# Patient Record
Sex: FEMALE | Race: WHITE | NOT HISPANIC OR LATINO | Employment: OTHER | URBAN - METROPOLITAN AREA
[De-identification: names, ages, dates, MRNs, and addresses within clinical notes are randomized per-mention and may not be internally consistent; named-entity substitution may affect disease eponyms.]

---

## 2017-01-05 ENCOUNTER — OFFICE VISIT (OUTPATIENT)
Dept: URGENT CARE | Facility: URGENT CARE | Age: 67
End: 2017-01-05
Payer: COMMERCIAL

## 2017-01-05 VITALS
HEART RATE: 80 BPM | OXYGEN SATURATION: 98 % | TEMPERATURE: 98.3 F | WEIGHT: 177 LBS | SYSTOLIC BLOOD PRESSURE: 130 MMHG | BODY MASS INDEX: 30.86 KG/M2 | DIASTOLIC BLOOD PRESSURE: 74 MMHG

## 2017-01-05 DIAGNOSIS — J01.90 ACUTE SINUSITIS WITH SYMPTOMS > 10 DAYS: Primary | ICD-10-CM

## 2017-01-05 PROCEDURE — 99213 OFFICE O/P EST LOW 20 MIN: CPT | Performed by: FAMILY MEDICINE

## 2017-01-05 RX ORDER — LEVOFLOXACIN 500 MG/1
500 TABLET, FILM COATED ORAL DAILY
Qty: 10 TABLET | Refills: 0 | Status: SHIPPED | OUTPATIENT
Start: 2017-01-05 | End: 2017-01-15

## 2017-01-05 NOTE — PROGRESS NOTES
SUBJECTIVE: Elvia Helm is a 66 year old female here with concerns about sinus infection.  She states onset  of symptoms were greater than 10 days with sinus pressure and drainage.  Tx 1 month ago for sinusitis but never totally went away  Course of illness is worsening.    Severity: moderate  Current and Associated symptoms: cold symptoms  Predisposing factors include none.   Recent treatment has included: OTC's, z pack  Allergic symptoms include: none    Past Medical History   Diagnosis Date     Allergic rhinitis, cause unspecified 2003a     DIABETES TYPE II 2003     Essential hypertension, benign      Allergic rhinitis, cause unspecified      Localized osteoarthrosis not specified whether primary or secondary, ankle and foot      After surgery     Bee sting reaction 7-09     Hyperlipidaemia LDL goal < 100      Arthritis of knee 6/6/2014     Flat foot(734) 6/6/2014     Tinnitus 6/6/2014     Allergies   Allergen Reactions     Amoxicillin Hives     Bee Swelling     HIVES AND SWELLING --carries Epi Pen      Ceftin      hives     Clindamycin Hives     Erythromycin GI Disturbance     Shrimp Hives     Happened twice     Tetracycline Other (See Comments)     ?severe headaches  & nausea      Social History   Substance Use Topics     Smoking status: Former Smoker -- 1.00 packs/day for 10 years     Types: Cigarettes     Quit date: 08/19/1982     Smokeless tobacco: Former User     Alcohol Use: 0.0 oz/week     0 Standard drinks or equivalent per week      Comment: wine 1 glass qd, occasional beer       ROS:   no rash  no vomiting    OBJECTIVE:  /74 mmHg  Pulse 80  Temp(Src) 98.3  F (36.8  C) (Oral)  Wt 177 lb (80.287 kg)  SpO2 98%  LMP 03/17/2001  NAD  EYES: clear, no mattering  EARS: TM's clear, no redness/buldging, canals clear, no swelling/redness  NOSE: discolored discharge riya. Nares  THROAT: clear, no erythema, no exudate  SINUS: maxillary tenderness with palpation  LUNGS: CTAB, no  rhonchi/wheeze/rales      ICD-10-CM    1. Acute sinusitis with symptoms > 10 days J01.90 levofloxacin (LEVAQUIN) 500 MG tablet       Follow up with primary clinic if not improving

## 2017-01-05 NOTE — MR AVS SNAPSHOT
After Visit Summary   1/5/2017    Elvia Helm    MRN: [id number]           Patient Information     Date Of Birth          1950        Visit Information        Provider Department      1/5/2017 4:30 PM John Friedman DO [facility]        Today's Diagnoses     Acute sinusitis with symptoms > 10 days    -  1        Follow-ups after your visit        Who to contact     If you have questions or need follow up information about today's clinic visit or your schedule please contact [facility] directly at [phone].  Normal or non-critical lab and imaging results will be communicated to you by Wang[employer], letter or phone within 4 business days after the clinic has received the results. If you do not hear from us within 7 days, please contact the clinic through [employer]You[employer] or phone. If you have a critical or abnormal lab result, we will notify you by phone as soon as possible.  Submit refill requests through [employer] or call your pharmacy and they will forward the refill request to us. Please allow 3 business days for your refill to be completed.          Additional Information About Your Visit        MyChart Information     [employer] gives you secure access to your electronic health record. If you see a primary care provider, you can also send messages to your care team and make appointments. If you have questions, please call your primary care clinic.  If you do not have a primary care provider, please call [phone] and they will assist you.        Care EveryWhere ID     This is your Care EveryWhere ID. This could be used by other organizations to access your [place] medical records  [id number]        Your Vitals Were     Pulse Temperature Pulse Oximetry Last Period          80 98.3  F (36.8  C) (Oral) 98% 03/17/2001         Blood Pressure from Last 3 Encounters:   01/05/17 130/74   12/15/16 131/77   12/06/16 120/60    Weight from Last 3  Encounters:   01/05/17 177 lb (80.287 kg)   12/15/16 173 lb (78.472 kg)   12/06/16 175 lb 14.4 oz (79.788 kg)              Today, you had the following     No orders found for display         Today's Medication Changes          These changes are accurate as of: 1/5/17  4:46 PM.  If you have any questions, ask your nurse or doctor.               Start taking these medicines.        Dose/Directions    levofloxacin 500 MG tablet   Commonly known as:  LEVAQUIN   Used for:  Acute sinusitis with symptoms > 10 days   Started by:  John Friedman DO        Dose:  500 mg   Take 1 tablet (500 mg) by mouth daily for 10 days   Quantity:  10 tablet   Refills:  0         These medicines have changed or have updated prescriptions.        Dose/Directions    blood glucose monitoring test strip   Commonly known as:  ACCU-CHEK SMARTVIEW   This may have changed:    - how much to take  - when to take this  - additional instructions   Used for:  Type 2 diabetes, HbA1c goal < 7% (H)        Use to test blood sugar 2 times daily or as directed.   Quantity:  100 each   Refills:  1            Where to get your medicines      These medications [id number] [place] [place] [facility] [address] [facility]  [address] [facility] [address]     Phone:  [phone]    - levofloxacin 500 MG tablet             Primary Care Provider Office Phone # Fax #    Masood Osborne [phone]       [facility] [address] [facility] [address]  [facility] [address]        Thank you!     Thank you for choosing [facility]  for your care. Our goal is always to provide you with excellent care. Hearing back from our patients is one way we can continue to improve our services. Please take a few minutes to complete the written survey that you may receive in the mail after your visit with us. Thank you!             Your Updated Medication List - Protect others around you: Learn how to safely  use, store and throw away your medicines at www.disposemymeds.org.          This list is accurate as of: 1/5/17  4:46 PM.  Always use your most recent med list.                   Brand Name Dispense Instructions for use    aspirin 81 MG tablet          1 tab po QD (Once per day)       atorvastatin 20 MG tablet    LIPITOR    90 tablet    Take 1 tablet (20 mg) by mouth daily       blood glucose monitoring lancets     102 Box    USE TO TEST BLOOD SUGAR 2 TIMES DAILY OR AS DIRECTED.       blood glucose monitoring meter device kit     1 kit    1 kit Use to test blood sugar 1 times daily or as directed.       blood glucose monitoring test strip    ACCU-CHEK SMARTVIEW    100 each    Use to test blood sugar 2 times daily or as directed.       CLARITIN PO      Take 10 mg by mouth daily       EPINEPHrine 0.3 MG/0.3ML injection     0.3 mL    Inject 0.3 mLs (0.3 mg) into the muscle once as needed for anaphylaxis       fluticasone 50 MCG/ACT spray    FLONASE    16 g    Spray 1-2 sprays into both nostrils daily       guaiFENesin 600 MG 12 hr tablet    MUCINEX    20 tablet    Take 1 tablet (600 mg) by mouth 2 times daily as needed for congestion       levofloxacin 500 MG tablet    LEVAQUIN    10 tablet    Take 1 tablet (500 mg) by mouth daily for 10 days       lisinopril-hydrochlorothiazide 20-25 MG per tablet    PRINZIDE/ZESTORETIC    90 tablet    Take 0.5 tablets by mouth daily       metFORMIN 500 MG 24 hr tablet    GLUCOPHAGE-XR    180 tablet    Take 2 tablets (1,000 mg) by mouth daily (with breakfast)       OMEGA-3 FISH OIL PO          vitamin B complex with vitamin C Tabs tablet      Take 1 tablet by mouth daily       VITAMIN B-1 PO          VITAMIN D3 PO      Take by mouth daily

## 2017-01-05 NOTE — NURSING NOTE
"Chief Complaint   Patient presents with     Sinus Problem     recent tx for sinusitis which had resolved but now with sx again       Initial /74 mmHg  Pulse 80  Temp(Src) 98.3  F (36.8  C) (Oral)  Wt 177 lb (80.287 kg)  SpO2 98%  LMP 03/17/2001 Estimated body mass index is 30.86 kg/(m^2) as calculated from the following:    Height as of 12/15/16: 5' 3.5\" (1.613 m).    Weight as of this encounter: 177 lb (80.287 kg).  BP completed using cuff size: regular    "

## 2017-03-22 ENCOUNTER — OFFICE VISIT (OUTPATIENT)
Dept: URGENT CARE | Facility: URGENT CARE | Age: 67
End: 2017-03-22
Payer: COMMERCIAL

## 2017-03-22 VITALS
DIASTOLIC BLOOD PRESSURE: 77 MMHG | TEMPERATURE: 97.8 F | SYSTOLIC BLOOD PRESSURE: 122 MMHG | WEIGHT: 177 LBS | HEART RATE: 87 BPM | BODY MASS INDEX: 30.86 KG/M2

## 2017-03-22 DIAGNOSIS — Z20.818 EXPOSURE TO STREP THROAT: Primary | ICD-10-CM

## 2017-03-22 DIAGNOSIS — J30.2 SEASONAL ALLERGIC RHINITIS, UNSPECIFIED ALLERGIC RHINITIS TRIGGER: ICD-10-CM

## 2017-03-22 LAB
DEPRECATED S PYO AG THROAT QL EIA: NORMAL
MICRO REPORT STATUS: NORMAL
SPECIMEN SOURCE: NORMAL

## 2017-03-22 PROCEDURE — 99213 OFFICE O/P EST LOW 20 MIN: CPT | Performed by: PHYSICIAN ASSISTANT

## 2017-03-22 PROCEDURE — 87081 CULTURE SCREEN ONLY: CPT | Performed by: PHYSICIAN ASSISTANT

## 2017-03-22 PROCEDURE — 87880 STREP A ASSAY W/OPTIC: CPT | Performed by: PHYSICIAN ASSISTANT

## 2017-03-22 NOTE — MR AVS SNAPSHOT
After Visit Summary   3/22/2017    Elvia Helm    MRN: [id number]           Patient Information     Date Of Birth          1950        Visit Information        Provider Department      3/22/2017 1:15 PM Nataly Jacobson PA-C [facility]        Today's Diagnoses     Exposure to strep throat    -  1    Seasonal allergic rhinitis, unspecified allergic rhinitis trigger           Follow-ups after your visit        Who to contact     If you have questions or need follow up information about today's clinic visit or your schedule please contact [facility] directly at [phone].  Normal or non-critical lab and imaging results will be communicated to you by MyChart, letter or phone within 4 business days after the clinic has received the results. If you do not hear from us within 7 days, please contact the clinic through [employer] or phone. If you have a critical or abnormal lab result, we will notify you by phone as soon as possible.  Submit refill requests through [employer] or call your pharmacy and they will forward the refill request to us. Please allow 3 business days for your refill to be completed.          Additional Information About Your Visit        MyChart Information     [employer] gives you secure access to your electronic health record. If you see a primary care provider, you can also send messages to your care team and make appointments. If you have questions, please call your primary care clinic.  If you do not have a primary care provider, please call [phone] and they will assist you.        Care EveryWhere ID     This is your Care EveryWhere ID. This could be used by other organizations to access your [place] medical records  [id number]        Your Vitals Were     Pulse Temperature Last Period Breastfeeding? BMI (Body Mass Index)       87 97.8  F (36.6  C) (Oral) 03/17/2001 No 30.86 kg/m2        Blood Pressure  from Last 3 Encounters:   03/22/17 122/77   01/05/17 130/74   12/15/16 131/77    Weight from Last 3 Encounters:   03/22/17 177 lb (80.3 kg)   01/05/17 177 lb (80.3 kg)   12/15/16 173 lb (78.5 kg)              We Performed the Following     Beta strep group A culture     Rapid strep screen          Today's Medication Changes          These changes are accurate as of: 3/22/17  2:04 PM.  If you have any questions, ask your nurse or doctor.               These medicines have changed or have updated prescriptions.        Dose/Directions    blood glucose monitoring test strip   Commonly known as:  ACCU-CHEK SMARTVIEW   This may have changed:    - how much to take  - when to take this  - additional instructions   Used for:  Type 2 diabetes, HbA1c goal < 7% (H)        Use to test blood sugar 2 times daily or as directed.   Quantity:  100 each   Refills:  1                Primary Care Provider Office Phone # Fax #    Bhavrain Osborne [phone]       [facility] [address] [facility] [address]  [facility] [address]        Thank you!     Thank you for choosing [facility]  for your care. Our goal is always to provide you with excellent care. Hearing back from our patients is one way we can continue to improve our services. Please take a few minutes to complete the written survey that you may receive in the mail after your visit with us. Thank you!             Your Updated Medication List - Protect others around you: Learn how to safely use, store and throw away your medicines at www.disposemymeds.org.          This list is accurate as of: 3/22/17  2:04 PM.  Always use your most recent med list.                   Brand Name Dispense Instructions for use    aspirin 81 MG tablet          1 tab po QD (Once per day)       atorvastatin 20 MG tablet    LIPITOR    90 tablet    Take 1 tablet (20 mg) by mouth daily       blood glucose monitoring lancets     102 Box    USE TO TEST BLOOD SUGAR  2 TIMES DAILY OR AS DIRECTED.       blood glucose monitoring meter device kit     1 kit    1 kit Use to test blood sugar 1 times daily or as directed.       blood glucose monitoring test strip    ACCU-CHEK SMARTVIEW    100 each    Use to test blood sugar 2 times daily or as directed.       CLARITIN PO      Take 10 mg by mouth daily       EPINEPHrine 0.3 MG/0.3ML injection     0.3 mL    Inject 0.3 mLs (0.3 mg) into the muscle once as needed for anaphylaxis       fluticasone 50 MCG/ACT spray    FLONASE    16 g    Spray 1-2 sprays into both nostrils daily       lisinopril-hydrochlorothiazide 20-25 MG per tablet    PRINZIDE/ZESTORETIC    90 tablet    Take 0.5 tablets by mouth daily       metFORMIN 500 MG 24 hr tablet    GLUCOPHAGE-XR    180 tablet    Take 2 tablets (1,000 mg) by mouth daily (with breakfast)       OMEGA-3 FISH OIL PO          vitamin B complex with vitamin C Tabs tablet      Take 1 tablet by mouth daily       VITAMIN B-1 PO          VITAMIN D3 PO      Take by mouth daily

## 2017-03-22 NOTE — NURSING NOTE
"Chief Complaint   Patient presents with     Throat Problem     scratchy throat and headache for three days - reports recent exposure to strep throat.        Initial /77  Pulse 87  Temp 97.8  F (36.6  C) (Oral)  Wt 177 lb (80.3 kg)  LMP 03/17/2001  Breastfeeding? No  BMI 30.86 kg/m2 Estimated body mass index is 30.86 kg/(m^2) as calculated from the following:    Height as of 12/15/16: 5' 3.5\" (1.613 m).    Weight as of this encounter: 177 lb (80.3 kg).  Medication Reconciliation: complete    "

## 2017-03-22 NOTE — PROGRESS NOTES
SUBJECTIVE:   Elvia Helm is a 66 year old female presenting with a chief complaint of:  1) scratchy throat for the past 3 days  2) nasal congestion, slight cough.  Onset of symptoms was 3 day(s) ago.  Course of illness is worsening.    Severity moderate  Current and Associated symptoms: as above.  Also sneezing  Treatment measures tried include None tried.  Predisposing factors include strep exposure.    PMH  allergies    Past Medical History:   Diagnosis Date     Allergic rhinitis, cause unspecified 2003a     Allergic rhinitis, cause unspecified      Arthritis of knee 6/6/2014     Bee sting reaction 7-09     DIABETES TYPE II 2003     Essential hypertension, benign      Flat foot(734) 6/6/2014     Hyperlipidaemia LDL goal < 100      Localized osteoarthrosis not specified whether primary or secondary, ankle and foot     After surgery     Tinnitus 6/6/2014     Patient Active Problem List   Diagnosis     Allergic rhinitis     Essential hypertension, benign     Postmenopausal atrophic vaginitis     Bee sting reaction     Torticollis     Type 2 diabetes mellitus without complications (H)     HYPERLIPIDEMIA LDL GOAL <100     Post-nasal drip     Advanced directives, counseling/discussion     Osteopenia     Urticaria     Arthritis of knee     Pes planus     Tinnitus     Social History   Substance Use Topics     Smoking status: Former Smoker     Packs/day: 1.00     Years: 10.00     Types: Cigarettes     Quit date: 8/19/1982     Smokeless tobacco: Never Used     Alcohol use 0.0 oz/week     0 Standard drinks or equivalent per week      Comment: wine 1 glass qd, occasional beer       ROS:  CONSTITUTIONAL:NEGATIVE for fever, chills, change in weight  INTEGUMENTARY/SKIN: NEGATIVE for worrisome rashes, moles or lesions  EYES: NEGATIVE for vision changes or irritation  ENT/MOUTH: as per HPI  RESP:NEGATIVE for significant cough or SOB  CV: NEGATIVE for chest pain, palpitations or peripheral edema  GI: NEGATIVE for nausea,  abdominal pain, heartburn, or change in bowel habits  MUSCULOSKELETAL: NEGATIVE for significant arthralgias or myalgia    OBJECTIVE  :/77  Pulse 87  Temp 97.8  F (36.6  C) (Oral)  Wt 177 lb (80.3 kg)  LMP 03/17/2001  Breastfeeding? No  BMI 30.86 kg/m2  GENERAL APPEARANCE: healthy, alert and no distress  EYES: EOMI,  PERRL, conjunctiva clear  HENT: ear canals and TM's normal.  Nose and mouth without ulcers, erythema or lesions  NECK: supple, nontender, no lymphadenopathy  RESP: lungs clear to auscultation - no rales, rhonchi or wheezes  CV: regular rates and rhythm, normal S1 S2, no murmur noted  ABDOMEN:  soft, nontender, no HSM or masses and bowel sounds normal  NEURO: Normal strength and tone, sensory exam grossly normal,  normal speech and mentation  SKIN: no suspicious lesions or rashes    (Z20.818) Exposure to strep throat  (primary encounter diagnosis)  Comment:   Plan: Rapid strep screen, Beta strep group A culture        Salt water gargles.  Tylenol or ibuprofen prn.     (J30.2) Seasonal allergic rhinitis, unspecified allergic rhinitis trigger  Comment:   Plan: patient to start her OTC allergy meds.      F/U with PCP should symptoms persist or worsen.    Patient expresses understanding and agreement with the assessment and plan as above.

## 2017-03-24 LAB
BACTERIA SPEC CULT: NORMAL
MICRO REPORT STATUS: NORMAL
SPECIMEN SOURCE: NORMAL

## 2017-05-24 ENCOUNTER — OFFICE VISIT (OUTPATIENT)
Dept: PHARMACY | Facility: CLINIC | Age: 67
End: 2017-05-24
Payer: COMMERCIAL

## 2017-05-24 VITALS — HEART RATE: 86 BPM | SYSTOLIC BLOOD PRESSURE: 129 MMHG | DIASTOLIC BLOOD PRESSURE: 83 MMHG

## 2017-05-24 DIAGNOSIS — E11.9 TYPE 2 DIABETES MELLITUS WITHOUT COMPLICATION, WITHOUT LONG-TERM CURRENT USE OF INSULIN (H): Primary | ICD-10-CM

## 2017-05-24 DIAGNOSIS — J30.2 SEASONAL ALLERGIC RHINITIS, UNSPECIFIED ALLERGIC RHINITIS TRIGGER: ICD-10-CM

## 2017-05-24 DIAGNOSIS — E56.9 VITAMIN DEFICIENCY: ICD-10-CM

## 2017-05-24 DIAGNOSIS — I10 ESSENTIAL HYPERTENSION, BENIGN: ICD-10-CM

## 2017-05-24 DIAGNOSIS — E78.5 HYPERLIPIDEMIA LDL GOAL <100: ICD-10-CM

## 2017-05-24 PROCEDURE — 99605 MTMS BY PHARM NP 15 MIN: CPT | Performed by: PHARMACIST

## 2017-05-24 PROCEDURE — 99607 MTMS BY PHARM ADDL 15 MIN: CPT | Performed by: PHARMACIST

## 2017-05-24 NOTE — MR AVS SNAPSHOT
After Visit Summary   5/24/2017    Elvia Helm    MRN: [id number]           Patient Information     Date Of Birth          1950        Visit Information        Provider Department      5/24/2017 1:00 PM Marycruz Carcamo, [facility]        Today's Diagnoses     Type 2 diabetes mellitus without complication, without long-term current use of insulin (H)    -  1    BENIGN HYPERTENSION         Hyperlipidemia LDL goal <100        Seasonal allergic rhinitis, unspecified allergic rhinitis trigger        Vitamin deficiency          Care Instructions    Recommendations from today's MTM visit:                                                        1. When your lab measurements return in July, we will be able to tell how well your diabetes is doing.  I will MyChart you with your lab results and any other important information.     2. Try Zyrtec instead of Claritin to see if it controls your allergy symptoms more effectively.     Next MTM visit: I will MyChart you in July when your lab results are back.     To schedule another MTM appointment, please call the clinic directly or you may call the MTM scheduling line at [phone] or toll-free at [phone].     My Clinical Pharmacist's contact information:                                                      It was a pleasure seeing you today!  Please feel free to contact me with any questions or concerns you have.      Marycruz Carcamo, Pharm.D.  Medication Therapy Management Pharmacist  Page/VM:  [phone]    You may receive a survey about the MT services you received.  I would appreciate your feedback to help me serve you better in the future. Please fill it out and return it when you can. Your comments will be anonymous.                  Follow-ups after your visit        Who to contact     If you have questions or need follow up information about today's clinic visit or your schedule please contact [place]  [facility] directly at [phone].  Normal or non-critical lab and imaging results will be communicated to you by MyChart, letter or phone within 4 business days after the clinic has received the results. If you do not hear from us within 7 days, please contact the clinic through [employer] or phone. If you have a critical or abnormal lab result, we will notify you by phone as soon as possible.  Submit refill requests through [employer] or call your pharmacy and they will forward the refill request to us. Please allow 3 business days for your refill to be completed.          Additional Information About Your Visit        [employer] Information     [employer] gives you secure access to your electronic health record. If you see a primary care provider, you can also send messages to your care team and make appointments. If you have questions, please call your primary care clinic.  If you do not have a primary care provider, please call [phone] and they will assist you.        Care EveryWhere ID     This is your Care EveryWhere ID. This could be used by other organizations to access your [place] medical records  [id number]        Your Vitals Were     Pulse Last Period                86 03/17/2001           Blood Pressure from Last 3 Encounters:   05/25/17 120/85   05/24/17 129/83   03/22/17 122/77    Weight from Last 3 Encounters:   05/25/17 176 lb 11.2 oz (80.2 kg)   03/22/17 177 lb (80.3 kg)   01/05/17 177 lb (80.3 kg)              Today, you had the following     No orders found for display         Today's Medication Changes          These changes are accurate as of: 5/24/17 11:59 PM.  If you have any questions, ask your nurse or doctor.               These medicines have changed or have updated prescriptions.        Dose/Directions    blood glucose monitoring test strip   Commonly known as:  ACCU-CHEK SMARTMuut   This may have changed:    - how much to take  - when to take this  - additional instructions    Used for:  Type 2 diabetes, HbA1c goal < 7% (H)        Use to test blood sugar 2 times daily or as directed.   Quantity:  100 each   Refills:  1         Stop taking these medicines if you haven't already. Please contact your care team if you have questions.     CLARITIN PO                    Primary Care Provider Office Phone # Fax #    Masood MD [place] [phone]       [facility] [address] [facility] [address]  [facility] [address]        Thank you!     Thank you for choosing [facility]  for your care. Our goal is always to provide you with excellent care. Hearing back from our patients is one way we can continue to improve our services. Please take a few minutes to complete the written survey that you may receive in the mail after your visit with us. Thank you!             Your Updated Medication List - Protect others around you: Learn how to safely use, store and throw away your medicines at www.disposemymeds.org.          This list is accurate as of: 5/24/17 11:59 PM.  Always use your most recent med list.                   Brand Name Dispense Instructions for use    aspirin 81 MG tablet          1 tab po QD (Once per day)       atorvastatin 20 MG tablet    LIPITOR    90 tablet    Take 1 tablet (20 mg) by mouth daily       blood glucose monitoring lancets     102 Box    USE TO TEST BLOOD SUGAR 2 TIMES DAILY OR AS DIRECTED.       blood glucose monitoring meter device kit     1 kit    1 kit Use to test blood sugar 1 times daily or as directed.       blood glucose monitoring test strip    ACCU-CHEK SMARTVIEW    100 each    Use to test blood sugar 2 times daily or as directed.       cetirizine 10 MG tablet    zyrTEC    30 tablet    Take 1 tablet (10 mg) by mouth every evening       EPINEPHrine 0.3 MG/0.3ML injection     0.3 mL    Inject 0.3 mLs (0.3 mg) into the muscle once as needed for anaphylaxis       fluticasone 50 MCG/ACT spray    FLONASE    16 g    Spray 1-2 sprays into  both nostrils daily       lisinopril-hydrochlorothiazide 20-25 MG per tablet    PRINZIDE/ZESTORETIC    90 tablet    Take 0.5 tablets by mouth daily       metFORMIN 500 MG 24 hr tablet    GLUCOPHAGE-XR    180 tablet    Take 2 tablets (1,000 mg) by mouth daily (with breakfast)       OMEGA-3 FISH OIL PO          vitamin B complex with vitamin C Tabs tablet      Take 1 tablet by mouth daily       VITAMIN D3 PO      Take by mouth daily

## 2017-05-24 NOTE — PROGRESS NOTES
SUBJECTIVE/OBJECTIVE:                                                    Elvia Helm is a 65 year old female coming in for a follow-up visit for Medication Therapy Management.  She was referred to me from Dr. Osborne. This serves as an initial visit for 2017.    Chief Complaint: Follow up from visit on 3/30/16    Tobacco: History of tobacco dependence - quit    Alcohol: Less than 1 beverages / week    Medication Adherence: no issues reported by patient    Diabetes:  Pt currently taking metformin ER 1000mg/day.  Pt is not experiencing side effects.  Metformin still makes her a little nauseous sometimes.  She also has trouble figuring out what to eat.    SMB-2 times daily.   Ranges (per glucometer readings): 120-130's, some 140's.  She states she has noticed that her sugars have gone up recently into the 120's, 130's and 140's (up from primarily 120's and 130's).  She wonders if she will need more medication. We discuss that her blood sugars are still well managed and that her most recent A1c in December was excellent.   Symptoms of low blood sugar? none. Frequency of hypoglycemia? never.  Recent symptoms of high blood sugar? none.  Eye exam: up to date  Foot exam: up to date  Microalbumin is < 30 mg/g. Pt is taking an ACEi/ARB.  Aspirin: Taking 81mg daily and denies side effects   Diet/Exercise: She has noticed she has gained about 5 pounds since 2017.  Given a Fitbit for Mother's Day, intends to lose the weight she has gained before her next trip.  She cares for a grandchild often and it is hard to focus on exercise and diet during those times. She has started walking by herself (without dogs) which she has found to be more effective.  She wonders if the recommended 10,000 steps is appropriate for all ages and abilities.  We discuss making measurable goals that she can achieve and that it doesn't not need to be 10,000 steps daily.  All movement is helpful in managing diabetes.    "    Hypertension: Current medications include lisinopril/HCTZ 20/25mg daily - 1/2 tablet daily.  Patient does not self-monitor BP.  Patient reports no side effects.      Hyperlipidemia: Current therapy includes atorvastatin 20mg daily.  Pt reports no significant myalgias or other side effects.   The 10-year ASCVD risk score (Derrick KELLY Jr, et al., 2013) is: 14.7%    Values used to calculate the score:      Age: 66 years      Sex: Female      Is Non- : No      Diabetic: Yes      Tobacco smoker: No      Systolic Blood Pressure: 120 mmHg      Is BP treated: Yes      HDL Cholesterol: 50 mg/dL      Total Cholesterol: 173 mg/dL    Allergies: She takes loratadine 10mg daily and fluticasone nasal spray daily. She states that her \"body must just be getting used to it\" because it is not longer working.  Her daughter uses Zyrtec and she wonders if that would be effective for her.      Vitamins:  Current medications include vitamin B complex, Omega 3 fatty acids and cholecalciferol.  She is not experiencing side effects.     Current labs include:  BP Readings from Last 3 Encounters:   05/25/17 120/85   05/24/17 129/83   03/22/17 122/77     Lab Results   Component Value Date    A1C 6.0 12/15/2016    A1C 6.0 07/19/2016    A1C 6.0 12/10/2015    A1C 6.7 06/30/2015    A1C 12.6 04/10/2015     Last Basic Metabolic Panel:  Lab Results   Component Value Date     12/15/2016      Lab Results   Component Value Date    POTASSIUM 4.3 12/15/2016     Lab Results   Component Value Date    CHLORIDE 105 12/15/2016     Lab Results   Component Value Date    KIM 9.4 12/15/2016     Lab Results   Component Value Date    CO2 30 12/15/2016     Lab Results   Component Value Date    BUN 19 12/15/2016     Lab Results   Component Value Date    CR 0.79 12/15/2016     Lab Results   Component Value Date     12/15/2016     Recent Labs   Lab Test  12/15/16   1113  07/19/16   0745  06/30/15   0855  06/06/14   0906   CHOL  173  " 169  146  170   HDL  50  47*  50*  44*   LDL  84  74  67  84   TRIG  194*  238*  145  212*   CHOLHDLRATIO   --    --   2.9  3.9     Liver Function Studies -   Recent Labs   Lab Test  07/19/16   0745   PROTTOTAL  7.1   ALBUMIN  3.9   BILITOTAL  0.4   ALKPHOS  60   AST  15   ALT  30       Lab Results   Component Value Date    MICROL 6 07/19/2016       GFR Estimate   Date Value Ref Range Status   12/15/2016 72 >60 mL/min/1.7m2 Final     Comment:     Non  GFR Calc   07/19/2016 66 >60 mL/min/1.7m2 Final     Comment:     Non  GFR Calc   06/30/2015 78 >60 mL/min/1.7m2 Final     Comment:     Non  GFR Calc     TSH   Date Value Ref Range Status   12/15/2016 1.73 0.40 - 4.00 mU/L Final     /83 (BP Location: Right arm, Patient Position: Chair, Cuff Size: Adult Regular)  Pulse 86  LMP 03/17/2001    Most Recent Immunizations   Administered Date(s) Administered     Influenza (H1N1) 01/14/2010     Influenza (High Dose) 3 valent vaccine 12/01/2016     Influenza (IIV3) 12/27/2013     Pneumococcal (PCV 13) 04/10/2015     Pneumococcal 23 valent 11/05/2009     TD (ADULT, 7+) 04/20/2005     TDAP Vaccine (Adacel) 03/11/2013     Zoster vaccine, live 11/19/2009     ASSESSMENT:                                                    Current medications were reviewed with her today.      Medication Adherence: no issues identified    Diabetes: Stable. Patient is meeting A1c goal of < 7%. Self monitoring of blood glucose is at goal of fasting  mg/dL. Patient would benefit from continuing close monitoring of weight and exercise.  Continue SMBG. Due for A1c in June.     Hypertension: Stable. Patient meeting BP goal of <140/90. Due for micoalbumin in July.     Hyperlipidemia: Stable. Pt is not on high intensity statin which is indicated based on 2013 ACC/AHA guidelines for lipid management.  Further increase in statin dose not warranted as LDL is well controlled (close to or less than  40mg/dL).    Allergies: Needs improvement.  Patient may benefit from changing to another second generation antihistamine to help improve allergy symptoms.       Vitamins:  Stable.      PLAN:                                                      1) Measure A1c. Continue SMBG.  2) Measure microalbumin.   3) Stop Loratadine. Start cetirizine 10 mg daily.     I spent 30 minutes with this patient today. All changes were made in collaborative practice agreement with Masood Osborne. A copy of the visit note was provided to the patient's primary care provider.     [facility] allergies, lab results and weight.     The patient given a summary of these recommendations via [employer] as an after visit summary.    Marycruz Carcamo, Pharm.D.  Medication Therapy Management Pharmacist  Page/VM:  [phone]

## 2017-05-25 ENCOUNTER — OFFICE VISIT (OUTPATIENT)
Dept: URGENT CARE | Facility: URGENT CARE | Age: 67
End: 2017-05-25
Payer: COMMERCIAL

## 2017-05-25 VITALS
TEMPERATURE: 98 F | DIASTOLIC BLOOD PRESSURE: 85 MMHG | OXYGEN SATURATION: 96 % | WEIGHT: 176.7 LBS | SYSTOLIC BLOOD PRESSURE: 120 MMHG | BODY MASS INDEX: 30.81 KG/M2 | RESPIRATION RATE: 20 BRPM | HEART RATE: 83 BPM

## 2017-05-25 DIAGNOSIS — R07.0 THROAT PAIN: Primary | ICD-10-CM

## 2017-05-25 DIAGNOSIS — Z20.818 EXPOSURE TO STREP THROAT: ICD-10-CM

## 2017-05-25 DIAGNOSIS — J03.90 TONSILLITIS WITH EXUDATE: ICD-10-CM

## 2017-05-25 LAB
DEPRECATED S PYO AG THROAT QL EIA: NORMAL
MICRO REPORT STATUS: NORMAL
SPECIMEN SOURCE: NORMAL

## 2017-05-25 PROCEDURE — 99213 OFFICE O/P EST LOW 20 MIN: CPT | Performed by: PHYSICIAN ASSISTANT

## 2017-05-25 PROCEDURE — 87081 CULTURE SCREEN ONLY: CPT | Performed by: FAMILY MEDICINE

## 2017-05-25 PROCEDURE — 87880 STREP A ASSAY W/OPTIC: CPT | Performed by: FAMILY MEDICINE

## 2017-05-25 RX ORDER — AZITHROMYCIN 250 MG/1
TABLET, FILM COATED ORAL
Qty: 6 TABLET | Refills: 0 | Status: SHIPPED | OUTPATIENT
Start: 2017-05-25 | End: 2018-01-12

## 2017-05-25 NOTE — NURSING NOTE
"Chief Complaint   Patient presents with     Cough     Sore throat and cough. Pt was exposed to strep.       Initial /85 (BP Location: Left arm, Patient Position: Chair, Cuff Size: Adult Regular)  Pulse 83  Temp 98  F (36.7  C) (Oral)  Resp 20  Wt 176 lb 11.2 oz (80.2 kg)  LMP 03/17/2001  SpO2 96%  BMI 30.81 kg/m2 Estimated body mass index is 30.81 kg/(m^2) as calculated from the following:    Height as of 12/15/16: 5' 3.5\" (1.613 m).    Weight as of this encounter: 176 lb 11.2 oz (80.2 kg).  Medication Reconciliation: complete    "

## 2017-05-25 NOTE — MR AVS SNAPSHOT
After Visit Summary   5/25/2017    Elvia Helm    MRN: [id number]           Patient Information     Date Of Birth          1950        Visit Information        Provider Department      5/25/2017 9:05 AM Vincent Encinas PA-C [facility]        Today's Diagnoses     Throat pain    -  1    Tonsillitis with exudate        Exposure to strep throat           Follow-ups after your visit        Who to contact     If you have questions or need follow up information about today's clinic visit or your schedule please contact [facility] directly at [phone].  Normal or non-critical lab and imaging results will be communicated to you by [employer], letter or phone within 4 business days after the clinic has received the results. If you do not hear from us within 7 days, please contact the clinic through [employer] or phone. If you have a critical or abnormal lab result, we will notify you by phone as soon as possible.  Submit refill requests through [employer] or call your pharmacy and they will forward the refill request to us. Please allow 3 business days for your refill to be completed.          Additional Information About Your Visit        MyChart Information     [employer] gives you secure access to your electronic health record. If you see a primary care provider, you can also send messages to your care team and make appointments. If you have questions, please call your primary care clinic.  If you do not have a primary care provider, please call [phone] and they will assist you.        Care EveryWhere ID     This is your Care EveryWhere ID. This could be used by other organizations to access your [facility] medical records  [id number]        Your Vitals Were     Pulse Temperature Respirations Last Period Pulse Oximetry BMI (Body Mass Index)    83 98  F (36.7  C) (Oral) 20 03/17/2001 96% 30.81 kg/m2       Blood Pressure from Last 3  Encounters:   05/25/17 120/85   05/24/17 129/83   03/22/17 122/77    Weight from Last 3 Encounters:   05/25/17 176 lb 11.2 oz (80.2 kg)   03/22/17 177 lb (80.3 kg)   01/05/17 177 lb (80.3 kg)              We Performed the Following     Beta strep group A culture     Strep, Rapid Screen          Today's Medication Changes          These changes are accurate as of: 5/25/17  9:35 AM.  If you have any questions, ask your nurse or doctor.               Start taking these medicines.        Dose/Directions    azithromycin 250 MG tablet   Commonly known as:  ZITHROMAX   Used for:  Tonsillitis with exudate, Exposure to strep throat   Started by:  Vincent Encinas PA-C        2 tabs po qd day 1, then 1 tab po qd days 2-5   Quantity:  6 tablet   Refills:  0       MAGIC MOUTHWASH (ENTER INGREDIENTS IN COMMENTS)   Used for:  Tonsillitis with exudate, Exposure to strep throat   Started by:  Vincent Encinas PA-C        Dose:  5-10 mL   Swish and spit 5-10 mLs in mouth every 6 hours as needed Pharmacy please compound  30 ml of Benadryl (12.5 mg/5 ml), 60 ml Maalox and 30 ml Viscous Lidocaine   Quantity:  120 mL   Refills:  0         These medicines have changed or have updated prescriptions.        Dose/Directions    blood glucose monitoring test strip   Commonly known as:  ACCU-CHEK SMARTVIEW   This may have changed:    - how much to take  - when to take this  - additional instructions   Used for:  Type 2 diabetes, HbA1c goal < 7% (H)        Use to test blood sugar 2 times daily or as directed.   Quantity:  100 each   Refills:  1            Where to get your medicines      These medications were sent to [id number] - [facility] [address] [facility]  [address] [address] [facility] [place] [address]     Phone:  [phone]     azithromycin 250 MG tablet         Some of these will need a paper prescription and others can be bought over the counter.  Ask your nurse if you have questions.     Bring a paper prescription  for each of these medications     MAGIC MOUTHWASH (ENTER INGREDIENTS IN COMMENTS)                Primary Care Provider Office Phone # Fax #    Masood Osborne [phone]       [facility] [address]   [place] [address]        Thank you!     Thank you for choosing [facility]  for your care. Our goal is always to provide you with excellent care. Hearing back from our patients is one way we can continue to improve our services. Please take a few minutes to complete the written survey that you may receive in the mail after your visit with us. Thank you!             Your Updated Medication List - Protect others around you: Learn how to safely use, store and throw away your medicines at www.disposemymeds.org.          This list is accurate as of: 5/25/17  9:35 AM.  Always use your most recent med list.                   Brand Name Dispense Instructions for use    aspirin 81 MG tablet          1 tab po QD (Once per day)       atorvastatin 20 MG tablet    LIPITOR    90 tablet    Take 1 tablet (20 mg) by mouth daily       azithromycin 250 MG tablet    ZITHROMAX    6 tablet    2 tabs po qd day 1, then 1 tab po qd days 2-5       blood glucose monitoring lancets     102 Box    USE TO TEST BLOOD SUGAR 2 TIMES DAILY OR AS DIRECTED.       blood glucose monitoring meter device kit     1 kit    1 kit Use to test blood sugar 1 times daily or as directed.       blood glucose monitoring test strip    ACCU-CHEK SMARTVIEW    100 each    Use to test blood sugar 2 times daily or as directed.       CLARITIN PO      Take 10 mg by mouth daily       EPINEPHrine 0.3 MG/0.3ML injection     0.3 mL    Inject 0.3 mLs (0.3 mg) into the muscle once as needed for anaphylaxis       fluticasone 50 MCG/ACT spray    FLONASE    16 g    Spray 1-2 sprays into both nostrils daily       lisinopril-hydrochlorothiazide 20-25 MG per tablet    PRINZIDE/ZESTORETIC    90 tablet    Take 0.5 tablets  by mouth daily       MAGIC MOUTHWASH (ENTER INGREDIENTS IN COMMENTS)     120 mL    Swish and spit 5-10 mLs in mouth every 6 hours as needed Pharmacy please compound  30 ml of Benadryl (12.5 mg/5 ml), 60 ml Maalox and 30 ml Viscous Lidocaine       metFORMIN 500 MG 24 hr tablet    GLUCOPHAGE-XR    180 tablet    Take 2 tablets (1,000 mg) by mouth daily (with breakfast)       OMEGA-3 FISH OIL PO          vitamin B complex with vitamin C Tabs tablet      Take 1 tablet by mouth daily       VITAMIN D3 PO      Take by mouth daily

## 2017-05-25 NOTE — PROGRESS NOTES
SUBJECTIVE:  Elvia Helm is a 66 year old female with a chief complaint of sore throat.  Onset of symptoms was 5 day(s) ago.    Course of illness: gradual onset.  Severity moderate  Current and Associated symptoms: sore throat  Treatment measures tried include OTC meds.  Predisposing factors include strep throat exposure.    Past Medical History:   Diagnosis Date     Allergic rhinitis, cause unspecified 2003a     Allergic rhinitis, cause unspecified      Arthritis of knee 6/6/2014     Bee sting reaction 7-09     DIABETES TYPE II 2003     Essential hypertension, benign      Flat foot(734) 6/6/2014     Hyperlipidaemia LDL goal < 100      Localized osteoarthrosis not specified whether primary or secondary, ankle and foot     After surgery     Tinnitus 6/6/2014     Allergies   Allergen Reactions     Amoxicillin Hives     Bee Swelling     HIVES AND SWELLING --carries Epi Pen      Ceftin      hives     Clindamycin Hives     Erythromycin GI Disturbance     Shrimp Hives     Happened twice     Tetracycline Other (See Comments)     ?severe headaches  & nausea      Social History   Substance Use Topics     Smoking status: Former Smoker     Packs/day: 1.00     Years: 10.00     Types: Cigarettes     Quit date: 8/19/1982     Smokeless tobacco: Never Used     Alcohol use 0.0 oz/week     0 Standard drinks or equivalent per week      Comment: wine 1 glass qd, occasional beer       ROS:  CONSTITUTIONAL:NEGATIVE for fever, chills, change in weight  INTEGUMENTARY/SKIN: NEGATIVE for worrisome rashes, moles or lesions  ENT/MOUTH: POSITIVE for throat pain, swollen glands, strep throat exposure  RESP:NEGATIVE for significant cough or SOB  CV: NEGATIVE for chest pain, palpitations or peripheral edema  GI: NEGATIVE for nausea, abdominal pain, heartburn, or change in bowel habits  MUSCULOSKELETAL: NEGATIVE for significant arthralgias or myalgia  NEURO: NEGATIVE for weakness, dizziness or paresthesias    OBJECTIVE:   /85 (BP  Location: Left arm, Patient Position: Chair, Cuff Size: Adult Regular)  Pulse 83  Temp 98  F (36.7  C) (Oral)  Resp 20  Wt 176 lb 11.2 oz (80.2 kg)  LMP 03/17/2001  SpO2 96%  BMI 30.81 kg/m2  GENERAL APPEARANCE: healthy, alert and no distress  EYES: EOMI,  PERRL, conjunctiva clear  HENT: TM's normal bilaterally, nasal turbinates erythematous, swollen, tonsillar hypertrophy and tonsillar erythema  NECK: cervical adenopathy   RESP: lungs clear to auscultation - no rales, rhonchi or wheezes  CV: regular rates and rhythm, normal S1 S2, no murmur noted  SKIN: no suspicious lesions or rashes    Results for orders placed or performed in visit on 03/22/17   Rapid strep screen   Result Value Ref Range    Specimen Description Throat     Rapid Strep A Screen       NEGATIVE: No Group A streptococcal antigen detected by immunoassay, await   culture report.      Micro Report Status FINAL 03/22/2017    Beta strep group A culture   Result Value Ref Range    Specimen Description Throat     Culture Micro No Beta Streptococcus isolated     Micro Report Status FINAL 03/24/2017          ASSESSMENT/PLAN:      ICD-10-CM    1. Throat pain R07.0 Strep, Rapid Screen     Beta strep group A culture   2. Tonsillitis with exudate J03.90 azithromycin (ZITHROMAX) 250 MG tablet     MAGIC MOUTHWASH, ENTER INGREDIENTS IN COMMENTS,   3. Exposure to strep throat Z20.818 azithromycin (ZITHROMAX) 250 MG tablet     MAGIC MOUTHWASH, ENTER INGREDIENTS IN COMMENTS,     Advisement given that patient will be contagious for the next 24-48 hours after antibiotics initiated

## 2017-05-26 LAB
BACTERIA SPEC CULT: NORMAL
MICRO REPORT STATUS: NORMAL
SPECIMEN SOURCE: NORMAL

## 2017-05-26 RX ORDER — CETIRIZINE HYDROCHLORIDE 10 MG/1
10 TABLET ORAL EVERY EVENING
Qty: 30 TABLET | Refills: 1 | COMMUNITY
Start: 2017-05-26 | End: 2018-01-12

## 2017-05-26 NOTE — PATIENT INSTRUCTIONS
Recommendations from today's MTM visit:                                                        1. When your lab measurements return in July, we will be able to tell how well your diabetes is doing.  I will MyChart you with your lab results and any other important information.     2. Try Zyrtec instead of Claritin to see if it controls your allergy symptoms more effectively.     Next MTM visit: I will MyChart you in July when your lab results are back.     To schedule another MTM appointment, please call the clinic directly or you may call the MTM scheduling line at [phone] or toll-free at [phone].     My Clinical Pharmacist's contact information:                                                      It was a pleasure seeing you today!  Please feel free to contact me with any questions or concerns you have.      Marycruz Carcamo, Pharm.D.  Medication Therapy Management Pharmacist  Page/VM:  [phone]    You may receive a survey about the MTM services you received.  I would appreciate your feedback to help me serve you better in the future. Please fill it out and return it when you can. Your comments will be anonymous.

## 2017-05-30 DIAGNOSIS — E11.9 TYPE 2 DIABETES, HBA1C GOAL < 7% (H): ICD-10-CM

## 2017-05-30 NOTE — TELEPHONE ENCOUNTER
Pending Prescriptions:                       Disp   Refills    blood glucose monitoring (ACCU-CHEK SMART*100 ea*1            Sig: Use to test blood sugar 2 times daily or as           directed.          Last Written Prescription Date: 4/26/16  Last Fill Quantity: 100,  # refills: 1  Last Office Visit with [facility], [facility] or [facility] prescribing provider: 12/15/16 RT Flor(R)

## 2017-05-31 NOTE — TELEPHONE ENCOUNTER
Patient has not seen Dr. Osborne since 4-26-16.    Sent to [facility] for refill.  Brittani Moore RN

## 2017-06-05 DIAGNOSIS — E78.5 HYPERLIPIDEMIA LDL GOAL <100: ICD-10-CM

## 2017-06-05 RX ORDER — ATORVASTATIN CALCIUM 20 MG/1
TABLET, FILM COATED ORAL
Qty: 90 TABLET | Refills: 1 | Status: SHIPPED | OUTPATIENT
Start: 2017-06-05 | End: 2017-12-01

## 2017-06-05 NOTE — TELEPHONE ENCOUNTER
Atorvastatin     Last Written Prescription Date: 12/6/2016  Last Fill Quantity: 90, # refills: 1  Last Office Visit with G, [facility] or [facility] prescribing provider: 12/15/2016       Lab Results   Component Value Date    CHOL 173 12/15/2016     Lab Results   Component Value Date    HDL 50 12/15/2016     Lab Results   Component Value Date    LDL 84 12/15/2016     Lab Results   Component Value Date    TRIG 194 12/15/2016     Lab Results   Component Value Date    CHOLHDLRATIO 2.9 06/30/2015     Prescription approved per [facility] or [facility] refill protocol.  Raeann Manrique RN  Triage Flex Workforce

## 2017-07-11 DIAGNOSIS — E11.9 TYPE 2 DIABETES MELLITUS WITHOUT COMPLICATION, WITHOUT LONG-TERM CURRENT USE OF INSULIN (H): ICD-10-CM

## 2017-07-11 LAB — HBA1C MFR BLD: 6.1 % (ref 4.3–6)

## 2017-07-11 PROCEDURE — 36415 COLL VENOUS BLD VENIPUNCTURE: CPT | Performed by: INTERNAL MEDICINE

## 2017-07-11 PROCEDURE — 83036 HEMOGLOBIN GLYCOSYLATED A1C: CPT | Performed by: INTERNAL MEDICINE

## 2017-09-23 ENCOUNTER — HEALTH MAINTENANCE LETTER (OUTPATIENT)
Age: 67
End: 2017-09-23

## 2017-10-05 ENCOUNTER — OFFICE VISIT (OUTPATIENT)
Dept: URGENT CARE | Facility: URGENT CARE | Age: 67
End: 2017-10-05
Payer: COMMERCIAL

## 2017-10-05 VITALS
TEMPERATURE: 98.1 F | WEIGHT: 177.3 LBS | OXYGEN SATURATION: 98 % | BODY MASS INDEX: 30.91 KG/M2 | RESPIRATION RATE: 20 BRPM | HEART RATE: 88 BPM | DIASTOLIC BLOOD PRESSURE: 90 MMHG | SYSTOLIC BLOOD PRESSURE: 147 MMHG

## 2017-10-05 DIAGNOSIS — J01.90 ACUTE SINUSITIS WITH SYMPTOMS > 10 DAYS: Primary | ICD-10-CM

## 2017-10-05 PROCEDURE — 99213 OFFICE O/P EST LOW 20 MIN: CPT | Performed by: FAMILY MEDICINE

## 2017-10-05 RX ORDER — AZITHROMYCIN 250 MG/1
TABLET, FILM COATED ORAL
Qty: 6 TABLET | Refills: 0 | Status: SHIPPED | OUTPATIENT
Start: 2017-10-05 | End: 2017-10-10

## 2017-10-05 NOTE — MR AVS SNAPSHOT
"              After Visit Summary   10/5/2017    Elvia Helm    MRN: [id number]           Patient Information     Date Of Birth          1950        Visit Information        Provider Department      10/5/2017 2:40 PM John Friedman DO [facility]        Today's Diagnoses     Acute sinusitis with symptoms > 10 days    -  1       Follow-ups after your visit        Your next 10 appointments already scheduled     Oct 11, 2017  9:15 AM CDT   [place] SCREENING BILATERAL W/ AMADO with [id number]   [facility] [place] ([facility])    [address] [place] [address], [address]  [facility] [id number] [phone]           Three-dimensional (3D) mammograms are available at [facility] locations in [facility], [place], [facility], [facility], and [place]. [facility] locations include [place] [facility] in [place]. Benefits of 3D mammograms include: - Improved rate of cancer detection - Decreases your chance of having to go back for more tests, which means fewer: - \"False-positive\" results (This means that there is an abnormal area but it isn't cancer.) - Invasive testing procedures, such as a biopsy or surgery - Can provide clearer images of the breast if you have dense breast tissue. 3D mammography is an optional exam that anyone can have with a 2D mammogram. It doesn't replace or take the place of a 2D mammogram. 2D mammograms remain an effective screening test for all women.  Not all insurance companies cover the cost of a 3D mammogram. Check with your insurance.              Who to contact     If you have questions or need follow up information about today's clinic visit or your schedule please contact [facility] directly at [phone].  Normal or non-critical lab and imaging results will be communicated to you by MyChart, letter or phone within 4 business days after the clinic has " received the results. If you do not hear from us within 7 days, please contact the clinic through [facility] or phone. If you have a critical or abnormal lab result, we will notify you by phone as soon as possible.  Submit refill requests through [facility] or call your pharmacy and they will forward the refill request to us. Please allow 3 business days for your refill to be completed.          Additional Information About Your Visit        [facility] Information     [facility] gives you secure access to your electronic health record. If you see a primary care provider, you can also send messages to your care team and make appointments. If you have questions, please call your primary care clinic.  If you do not have a primary care provider, please call [phone] and they will assist you.        Care EveryWhere ID     This is your Care EveryWhere ID. This could be used by other organizations to access your [place] medical records  [id number]        Your Vitals Were     Pulse Temperature Respirations Last Period Pulse Oximetry BMI (Body Mass Index)    88 98.1  F (36.7  C) (Oral) 20 03/17/2001 98% 30.91 kg/m2       Blood Pressure from Last 3 Encounters:   10/05/17 147/90   05/25/17 120/85   05/24/17 129/83    Weight from Last 3 Encounters:   10/05/17 177 lb 4.8 oz (80.4 kg)   05/25/17 176 lb 11.2 oz (80.2 kg)   03/22/17 177 lb (80.3 kg)              Today, you had the following     No orders found for display         Today's Medication Changes          These changes are accurate as of: 10/5/17  2:58 PM.  If you have any questions, ask your nurse or doctor.               These medicines have changed or have updated prescriptions.        Dose/Directions    * azithromycin 250 MG tablet   Commonly known as:  ZITHROMAX   This may have changed:  Another medication with the same name was added. Make sure you understand how and when to take each.   Used for:  Tonsillitis with exudate, Exposure to strep throat   Changed by:  Ce  Vincent MITTAL PA-C        2 tabs po qd day 1, then 1 tab po qd days 2-5   Quantity:  6 tablet   Refills:  0       * azithromycin 250 MG tablet   Commonly known as:  ZITHROMAX   This may have changed:  You were already taking a medication with the same name, and this prescription was added. Make sure you understand how and when to take each.   Used for:  Acute sinusitis with symptoms > 10 days   Changed by:  John Friedman, DO        Two tablets first day, then one tablet daily for four days.   Quantity:  6 tablet   Refills:  0       * Notice:  This list has 2 medication(s) that are the same as other medications prescribed for you. Read the directions carefully, and ask your doctor or other care provider to review them with you.         Where to get your medicines      These medications were sent to [id number] - [facility] [address] [facility]  [address] [facility] [address]     Phone:  [phone]     azithromycin 250 MG tablet                Primary Care Provider Office Phone # Fax #    Bhelsy Osborne [phone] [address] [facility] [address]  [facility] [address]        Equal Access to Services     [facility]: Hadii aad ku hadasho Somarleyali, waaxda luqadaha, qaybta kaalmada adeegyada, nara quiñones . So [facility] [phone].    ATENCIÓN: Si habla español, tiene a navarrete disposición servicios gratuitos de asistencia lingüística. Llame al [phone].    We comply with applicable federal civil rights laws and [place] laws. We do not discriminate on the basis of race, color, national origin, age, disability, sex, sexual orientation, or gender identity.            Thank you!     Thank you for choosing [facility]  for your care. Our goal is always to provide you with excellent care. Hearing back from our patients is one way we can continue to improve our services. Please take a few minutes to complete the written survey  that you may receive in the mail after your visit with us. Thank you!             Your Updated Medication List - Protect others around you: Learn how to safely use, store and throw away your medicines at www.disposemymeds.org.          This list is accurate as of: 10/5/17  2:58 PM.  Always use your most recent med list.                   Brand Name Dispense Instructions for use Diagnosis    aspirin 81 MG tablet          1 tab po QD (Once per day)        atorvastatin 20 MG tablet    LIPITOR    90 tablet    TAKE ONE TABLET BY MOUTH ONE TIME DAILY    Hyperlipidemia LDL goal <100       * azithromycin 250 MG tablet    ZITHROMAX    6 tablet    2 tabs po qd day 1, then 1 tab po qd days 2-5    Tonsillitis with exudate, Exposure to strep throat       * azithromycin 250 MG tablet    ZITHROMAX    6 tablet    Two tablets first day, then one tablet daily for four days.    Acute sinusitis with symptoms > 10 days       blood glucose monitoring lancets     102 Box    USE TO TEST BLOOD SUGAR 2 TIMES DAILY OR AS DIRECTED.    Type 2 diabetes mellitus without complications (H)       blood glucose monitoring meter device kit     1 kit    1 kit Use to test blood sugar 1 times daily or as directed.    Type 2 diabetes, HbA1c goal < 7% (H)       blood glucose monitoring test strip    ACCU-CHEK SMARTVIEW    100 each    Use to test blood sugar 2 times daily or as directed.    Type 2 diabetes, HbA1c goal < 7% (H)       cetirizine 10 MG tablet    zyrTEC    30 tablet    Take 1 tablet (10 mg) by mouth every evening    Seasonal allergic rhinitis, unspecified allergic rhinitis trigger       EPINEPHrine 0.3 MG/0.3ML injection 2-pack    EPIPEN/ADRENACLICK/or ANY BX GENERIC EQUIV    0.3 mL    Inject 0.3 mLs (0.3 mg) into the muscle once as needed for anaphylaxis    Bee sting reaction       fluticasone 50 MCG/ACT spray    FLONASE    16 g    Spray 1-2 sprays into both nostrils daily    Seasonal allergic rhinitis due to other allergic trigger        lisinopril-hydrochlorothiazide 20-25 MG per tablet    PRINZIDE/ZESTORETIC    90 tablet    Take 0.5 tablets by mouth daily    Essential hypertension, benign       MAGIC MOUTHWASH (ENTER INGREDIENTS IN COMMENTS)     120 mL    Swish and spit 5-10 mLs in mouth every 6 hours as needed Pharmacy please compound  30 ml of Benadryl (12.5 mg/5 ml), 60 ml Maalox and 30 ml Viscous Lidocaine    Tonsillitis with exudate, Exposure to strep throat       metFORMIN 500 MG 24 hr tablet    GLUCOPHAGE-XR    180 tablet    Take 2 tablets (1,000 mg) by mouth daily (with breakfast)    Type 2 diabetes mellitus without complications (H)       OMEGA-3 FISH OIL PO           vitamin B complex with vitamin C Tabs tablet      Take 1 tablet by mouth daily        VITAMIN D3 PO      Take by mouth daily        * Notice:  This list has 2 medication(s) that are the same as other medications prescribed for you. Read the directions carefully, and ask your doctor or other care provider to review them with you.

## 2017-10-05 NOTE — NURSING NOTE
"Chief Complaint   Patient presents with     Sinus Problem     Sinus pressure, cough and rt ear pain x couple of weeks on and off.        Initial /90  Pulse 88  Temp 98.1  F (36.7  C) (Oral)  Resp 20  Wt 177 lb 4.8 oz (80.4 kg)  LMP 03/17/2001  SpO2 98%  BMI 30.91 kg/m2 Estimated body mass index is 30.91 kg/(m^2) as calculated from the following:    Height as of 12/15/16: 5' 3.5\" (1.613 m).    Weight as of this encounter: 177 lb 4.8 oz (80.4 kg).  Medication Reconciliation: complete    "

## 2017-10-05 NOTE — PROGRESS NOTES
SUBJECTIVE: Elvia Helm is a 67 year old female here with concerns about sinus infection.  She states onset  of symptoms were greater than 10 days with sinus pressure and drainage.  Course of illness is worsening.    Severity: moderate  Current and Associated symptoms: cold symptoms  Predisposing factors include none.   Recent treatment has included: OTC's  Allergic symptoms include: none    Past Medical History:   Diagnosis Date     Allergic rhinitis, cause unspecified 2003a     Allergic rhinitis, cause unspecified      Arthritis of knee 6/6/2014     Bee sting reaction 7-09     DIABETES TYPE II 2003     Essential hypertension, benign      Flat foot(734) 6/6/2014     Hyperlipidaemia LDL goal < 100      Localized osteoarthrosis not specified whether primary or secondary, ankle and foot     After surgery     Tinnitus 6/6/2014     Allergies   Allergen Reactions     Amoxicillin Hives     Bee Swelling     HIVES AND SWELLING --carries Epi Pen      Ceftin      hives     Clindamycin Hives     Erythromycin GI Disturbance     Shrimp Hives     Happened twice     Tetracycline Other (See Comments)     ?severe headaches  & nausea      Social History   Substance Use Topics     Smoking status: Former Smoker     Packs/day: 1.00     Years: 10.00     Types: Cigarettes     Quit date: 8/19/1982     Smokeless tobacco: Never Used     Alcohol use 0.0 oz/week     0 Standard drinks or equivalent per week      Comment: wine 1 glass qd, occasional beer       ROS:   no rash  no vomiting    OBJECTIVE:  /90  Pulse 88  Temp 98.1  F (36.7  C) (Oral)  Resp 20  Wt 177 lb 4.8 oz (80.4 kg)  LMP 03/17/2001  SpO2 98%  BMI 30.91 kg/m2  NAD  EYES: clear, no mattering  EARS: TM's clear, no redness/buldging, canals clear, no swelling/redness  NOSE: discolored discharge riya. Nares  THROAT: clear, no erythema, no exudate  SINUS: maxillary tenderness with palpation  LUNGS: CTAB, no rhonchi/wheeze/rales      ICD-10-CM    1. Acute sinusitis  with symptoms > 10 days J01.90 azithromycin (ZITHROMAX) 250 MG tablet       Follow up with primary clinic if not improving

## 2017-10-11 ENCOUNTER — HOSPITAL ENCOUNTER (OUTPATIENT)
Dept: MAMMOGRAPHY | Facility: CLINIC | Age: 67
Discharge: HOME | End: 2017-10-11
Attending: INTERNAL MEDICINE | Admitting: INTERNAL MEDICINE
Payer: MEDICARE

## 2017-10-11 DIAGNOSIS — Z12.31 VISIT FOR SCREENING MAMMOGRAM: ICD-10-CM

## 2017-10-11 DIAGNOSIS — E11.9 TYPE 2 DIABETES MELLITUS WITHOUT COMPLICATION, WITHOUT LONG-TERM CURRENT USE OF INSULIN (H): ICD-10-CM

## 2017-10-11 LAB
CREAT UR-MCNC: 49 MG/DL
MICROALBUMIN UR-MCNC: 8 MG/L
MICROALBUMIN/CREAT UR: 16.01 MG/G CR (ref 0–25)

## 2017-10-11 PROCEDURE — 77063 BREAST TOMOSYNTHESIS BI: CPT

## 2017-10-11 PROCEDURE — 82043 UR ALBUMIN QUANTITATIVE: CPT | Performed by: INTERNAL MEDICINE

## 2017-10-12 DIAGNOSIS — T63.441A BEE STING REACTION: ICD-10-CM

## 2017-10-12 DIAGNOSIS — I10 ESSENTIAL HYPERTENSION, BENIGN: ICD-10-CM

## 2017-10-12 RX ORDER — LISINOPRIL AND HYDROCHLOROTHIAZIDE 20; 25 MG/1; MG/1
TABLET ORAL
Qty: 45 TABLET | Refills: 0 | Status: SHIPPED | OUTPATIENT
Start: 2017-10-12 | End: 2018-01-29

## 2017-10-13 RX ORDER — EPINEPHRINE 0.3 MG/.3ML
INJECTION INTRAMUSCULAR
Qty: 2 ML | Refills: 1 | Status: SHIPPED | OUTPATIENT
Start: 2017-10-13 | End: 2018-12-20

## 2017-10-13 NOTE — TELEPHONE ENCOUNTER
Pending Prescriptions:                       Disp   Refills    EPIPEN 2-THERESA 0.3 MG/0.3ML injection 2-pac*       1            Sig: INJECT 0.3 MLS INTO THE MUSCLE ONCE AS NEEDED FOR           ANAPHYLAXIS          Last Written Prescription Date: 10/7/16  Last Fill Quantity: 0.3mL,  # refills: 2   Last Office Visit with G, P or [facility] prescribing provider: 12/15/16                                             RT Nabil(R)

## 2017-10-28 ENCOUNTER — MYC MEDICAL ADVICE (OUTPATIENT)
Dept: FAMILY MEDICINE | Facility: CLINIC | Age: 67
End: 2017-10-28

## 2017-10-28 DIAGNOSIS — E11.9 TYPE 2 DIABETES MELLITUS WITHOUT COMPLICATIONS (H): ICD-10-CM

## 2017-10-30 RX ORDER — METFORMIN HCL 500 MG
TABLET, EXTENDED RELEASE 24 HR ORAL
Qty: 180 TABLET | Refills: 0 | Status: SHIPPED | OUTPATIENT
Start: 2017-10-30 | End: 2018-01-29

## 2017-10-30 NOTE — TELEPHONE ENCOUNTER
"Dr Osborne--please read patient's [employer] message.      She has already had both Pneumonia vaccines.     Health Maintenance is advising PPSV23 (with message 2 of 2).   I see that a second \"23\" might be indicated if given prior to age 65.  Patient received it at age 59.      Please advise.      Mi DOUGHERTY RN,BSN    "

## 2017-10-30 NOTE — TELEPHONE ENCOUNTER
Prescription approved per [facility] Refill Protocol.  Due for appointment December 2017  Adriana CORNELIUS RN

## 2017-12-01 DIAGNOSIS — E78.5 HYPERLIPIDEMIA LDL GOAL <100: ICD-10-CM

## 2017-12-04 RX ORDER — ATORVASTATIN CALCIUM 20 MG/1
TABLET, FILM COATED ORAL
Qty: 30 TABLET | Refills: 0 | Status: SHIPPED | OUTPATIENT
Start: 2017-12-04 | End: 2018-01-05

## 2017-12-04 NOTE — TELEPHONE ENCOUNTER
Due for annual physical and fasting labs  Medication is being filled for 1 time refill only due to:  Patient needs to be seen because it has been more than one year since last visit.   Libra Beck RN

## 2018-01-05 DIAGNOSIS — E78.5 HYPERLIPIDEMIA LDL GOAL <100: ICD-10-CM

## 2018-01-05 NOTE — TELEPHONE ENCOUNTER
Requested Prescriptions   Pending Prescriptions Disp Refills     atorvastatin (LIPITOR) 20 MG tablet [Pharmacy Med Name: Atorvastatin Calcium Oral Tablet 20 MG] 30 tablet 0    Last Written Prescription Date:  12/04/17  Last Fill Quantity: 30,  # refills: 0   Last Office Visit with [facility], [facility] or [facility] prescribing provider:  12/15/16   Future Office Visit:    Next 5 appointments (look out 90 days)     Jan 12, 2018  9:00 AM CST   SHORT with Danyelle Jonas RPH   [facility] ([facility])    [address] [place] [id number]   [phone]            Feb 19, 2018  8:30 AM CST   PHYSICAL with Masood Osborne MD   [facility]    [address] [place] [id number]   [phone]                  Sig: TAKE ONE TABLET BY MOUTH ONE TIME DAILY    Statins Protocol Failed    1/5/2018 10:04 AM       Failed - LDL on file in past 12 months    Recent Labs   Lab Test  12/15/16   1113   LDL  84            Failed - Recent or future visit with authorizing provider    Patient had office visit in the last year or has a visit in the next 30 days with authorizing provider.  See chart review.              Passed - No abnormal creatine kinase in past 12 months    No lab results found.         Passed - Patient is age 18 or older       Passed - No active pregnancy on record       Passed - No positive pregnancy test in past 12 months

## 2018-01-08 RX ORDER — ATORVASTATIN CALCIUM 20 MG/1
TABLET, FILM COATED ORAL
Qty: 30 TABLET | Refills: 0 | Status: SHIPPED | OUTPATIENT
Start: 2018-01-08 | End: 2018-01-29

## 2018-01-08 NOTE — TELEPHONE ENCOUNTER
Prescription approved per [facility] Refill Protocol.  Has apt scheduled 1/12.  Libra Beck RN

## 2018-01-12 ENCOUNTER — OFFICE VISIT (OUTPATIENT)
Dept: PHARMACY | Facility: CLINIC | Age: 68
End: 2018-01-12
Payer: COMMERCIAL

## 2018-01-12 VITALS
HEART RATE: 76 BPM | DIASTOLIC BLOOD PRESSURE: 76 MMHG | WEIGHT: 177 LBS | BODY MASS INDEX: 30.86 KG/M2 | SYSTOLIC BLOOD PRESSURE: 134 MMHG

## 2018-01-12 DIAGNOSIS — E78.5 HYPERLIPIDEMIA LDL GOAL <100: ICD-10-CM

## 2018-01-12 DIAGNOSIS — I10 ESSENTIAL HYPERTENSION, BENIGN: ICD-10-CM

## 2018-01-12 DIAGNOSIS — E11.9 TYPE 2 DIABETES MELLITUS WITHOUT COMPLICATION, WITHOUT LONG-TERM CURRENT USE OF INSULIN (H): Primary | ICD-10-CM

## 2018-01-12 DIAGNOSIS — E56.9 VITAMIN DEFICIENCY: ICD-10-CM

## 2018-01-12 DIAGNOSIS — L50.9 URTICARIA: ICD-10-CM

## 2018-01-12 DIAGNOSIS — E11.9 TYPE 2 DIABETES MELLITUS WITHOUT COMPLICATION, WITHOUT LONG-TERM CURRENT USE OF INSULIN (H): ICD-10-CM

## 2018-01-12 LAB
ANION GAP SERPL CALCULATED.3IONS-SCNC: 8 MMOL/L (ref 3–14)
BUN SERPL-MCNC: 16 MG/DL (ref 7–30)
CALCIUM SERPL-MCNC: 9.4 MG/DL (ref 8.5–10.1)
CHLORIDE SERPL-SCNC: 103 MMOL/L (ref 94–109)
CHOLEST SERPL-MCNC: 179 MG/DL
CO2 SERPL-SCNC: 29 MMOL/L (ref 20–32)
CREAT SERPL-MCNC: 0.77 MG/DL (ref 0.52–1.04)
GFR SERPL CREATININE-BSD FRML MDRD: 74 ML/MIN/1.7M2
GLUCOSE SERPL-MCNC: 145 MG/DL (ref 70–99)
HBA1C MFR BLD: 5.9 % (ref 4.3–6)
HDLC SERPL-MCNC: 62 MG/DL
LDLC SERPL CALC-MCNC: 78 MG/DL
NONHDLC SERPL-MCNC: 117 MG/DL
POTASSIUM SERPL-SCNC: 4.3 MMOL/L (ref 3.4–5.3)
SODIUM SERPL-SCNC: 140 MMOL/L (ref 133–144)
TRIGL SERPL-MCNC: 193 MG/DL

## 2018-01-12 PROCEDURE — 99607 MTMS BY PHARM ADDL 15 MIN: CPT | Performed by: PHARMACIST

## 2018-01-12 PROCEDURE — 80061 LIPID PANEL: CPT | Performed by: INTERNAL MEDICINE

## 2018-01-12 PROCEDURE — 83036 HEMOGLOBIN GLYCOSYLATED A1C: CPT | Performed by: INTERNAL MEDICINE

## 2018-01-12 PROCEDURE — 99605 MTMS BY PHARM NP 15 MIN: CPT | Performed by: PHARMACIST

## 2018-01-12 PROCEDURE — 36415 COLL VENOUS BLD VENIPUNCTURE: CPT | Performed by: INTERNAL MEDICINE

## 2018-01-12 PROCEDURE — 80048 BASIC METABOLIC PNL TOTAL CA: CPT | Performed by: INTERNAL MEDICINE

## 2018-01-12 RX ORDER — LORATADINE 10 MG/1
10 TABLET ORAL DAILY
COMMUNITY
End: 2018-02-19

## 2018-01-12 NOTE — PATIENT INSTRUCTIONS
Recommendations from today's MTM visit:                                                    MTM (medication therapy management) is a service provided by a clinical pharmacist designed to help you get the most of out of your medicines.   Today we reviewed what your medicines are for, how to know if they are working, that your medicines are safe and how to make your medicine regimen as easy as possible.     1.  We will check your labs today - A1c, cholesterol, electrolytes/kidney function.    2.  I agree with trying to change Claritin (loratadine) to Allegra (fexofenadine) 180mg daily.    3.  I would recommend discontinuing Vitamin E - this can increase the risk of cardiac events.    4.  Depending on the results of your cholesterol, we may want to consider increasing the atorvastatin to the 40mg dose.    Next MTM visit:  Pending lab results    To schedule another MTM appointment, please call the clinic directly or you may call the MTM scheduling line at [phone] or toll-free at [phone].     My Clinical Pharmacist's contact information:                                                      It was a pleasure seeing you today!  Please feel free to contact me with any questions or concerns you have.      Monae Ley, PharmD  Pharmaceutical Care Resident  Pager: [phone]    Danyelle Jonas PharmD, [facility]  Medication Therapy Management Provider  Pager: [phone]     You may receive a survey about the MTM services you received.  I would appreciate your feedback to help me serve you better in the future. Please fill it out and return it when you can. Your comments will be anonymous.

## 2018-01-12 NOTE — MR AVS SNAPSHOT
After Visit Summary   1/12/2018    Elvia Helm    MRN: [id number]           Patient Information     Date Of Birth          1950        Visit Information        Provider Department      1/12/2018 9:00 AM Danyelle Jonas, [facility] MTM        Today's Diagnoses     Type 2 diabetes mellitus without complication, without long-term current use of insulin (H)    -  1    Hyperlipidemia LDL goal <100        Essential hypertension, benign          Care Instructions    Recommendations from today's MTM visit:                                                    MTM (medication therapy management) is a service provided by a clinical pharmacist designed to help you get the most of out of your medicines.   Today we reviewed what your medicines are for, how to know if they are working, that your medicines are safe and how to make your medicine regimen as easy as possible.     1.  We will check your labs today - A1c, cholesterol, electrolytes/kidney function.    2.  I agree with trying to change Claritin (loratadine) to Allegra (fexofenadine) 180mg daily.    3.  I would recommend discontinuing Vitamin E - this can increase the risk of cardiac events.    4.  Depending on the results of your cholesterol, we may want to consider increasing the atorvastatin to the 40mg dose.    Next MTM visit:  Pending lab results    To schedule another MTM appointment, please call the clinic directly or you may call the MTM scheduling line at [phone] or toll-free at [phone].     My Clinical Pharmacist's contact information:                                                      It was a pleasure seeing you today!  Please feel free to contact me with any questions or concerns you have.      Monae Ley PharmD  Pharmaceutical Care Resident  Pager: [phone]    Danyelle Jonas PharmD, [facility]  Medication Therapy Management Provider  Pager: [phone]     You may receive a survey about the MTM  services you received.  I would appreciate your feedback to help me serve you better in the future. Please fill it out and return it when you can. Your comments will be anonymous.                   Follow-ups after your visit        Your next 10 appointments already scheduled     Jan 12, 2018  9:30 AM CST   LAB with [facility]   [facility]    [address] [address] [phone]           Please do not eat 10-12 hours before your appointment if you are coming in fasting for labs on lipids, cholesterol, or glucose (sugar). This does not apply to pregnant women. Water, hot tea and black coffee (with nothing added) are okay. Do not drink other fluids, diet soda or chew gum.            Feb 19, 2018  8:30 AM CST   PHYSICAL with Masood Osborne MD   [facility]    [address] [place] [id number] [phone]              Future tests that were ordered for you today     Open Future Orders        Priority Expected Expires Ordered    Hemoglobin A1c Routine  1/12/2019 1/12/2018    Lipid panel reflex to direct LDL Fasting Routine  1/12/2019 1/12/2018    Basic metabolic panel Routine  1/12/2019 1/12/2018            Who to contact     If you have questions or need follow up information about today's clinic visit or your schedule please contact [facility] MT directly at [phone].  Normal or non-critical lab and imaging results will be communicated to you by MyChart, letter or phone within 4 business days after the clinic has received the results. If you do not hear from us within 7 days, please contact the clinic through MyChart or phone. If you have a critical or abnormal lab result, we will notify you by phone as soon as possible.  Submit refill requests through [facility] or call your pharmacy and they will forward the refill request to us. Please allow 3 business days for your refill to be completed.           Additional Information About Your Visit        MyChart Information     [facility] gives you secure access to your electronic health record. If you see a primary care provider, you can also send messages to your care team and make appointments. If you have questions, please call your primary care clinic.  If you do not have a primary care provider, please call [phone] and they will assist you.        Care EveryWhere ID     This is your Care EveryWhere ID. This could be used by other organizations to access your [facility] medical records  [id number]        Your Vitals Were     Pulse Last Period BMI (Body Mass Index)             76 03/17/2001 30.86 kg/m2          Blood Pressure from Last 3 Encounters:   01/12/18 134/76   10/05/17 147/90   05/25/17 120/85    Weight from Last 3 Encounters:   01/12/18 177 lb (80.3 kg)   10/05/17 177 lb 4.8 oz (80.4 kg)   05/25/17 176 lb 11.2 oz (80.2 kg)                 Today's Medication Changes          These changes are accurate as of: 1/12/18  9:19 AM.  If you have any questions, ask your nurse or doctor.               Stop taking these medicines if you haven't already. Please contact your care team if you have questions.     VITAMIN E NATURAL PO   Stopped by:  Danyelle Jonas, [facility]                    Primary Care Provider Office Phone # Fax #    Bhavjot MD [place] [phone] [address] [facility] [address]  [place] [address]        Equal Access to Services     GEETHA [facility] AH: Hadii kaitlyn tolentinoo Soandrews, waaxda luchavo, qaybta kaalmanara johnson bushrapippa slaughter. So [facility] [phone].    ATENCIÓN: Si habla español, tiene a navarrete disposición servicios gratuitos de asistencia lingüística. Llame al [phone].    We comply with applicable federal civil rights laws and [place] laws. We do not discriminate on the basis of race, color, national origin, age, disability, sex, sexual orientation, or gender identity.            Thank you!      Thank you for choosing [facility]  for your care. Our goal is always to provide you with excellent care. Hearing back from our patients is one way we can continue to improve our services. Please take a few minutes to complete the written survey that you may receive in the mail after your visit with us. Thank you!             Your Updated Medication List - Protect others around you: Learn how to safely use, store and throw away your medicines at www.disposemymeds.org.          This list is accurate as of: 1/12/18  9:19 AM.  Always use your most recent med list.                   Brand Name Dispense Instructions for use Diagnosis    aspirin 81 MG tablet          1 tab po QD (Once per day)        atorvastatin 20 MG tablet    LIPITOR    30 tablet    TAKE ONE TABLET BY MOUTH ONE TIME DAILY    Hyperlipidemia LDL goal <100       blood glucose monitoring lancets     102 Box    USE TO TEST BLOOD SUGAR 2 TIMES DAILY OR AS DIRECTED.    Type 2 diabetes mellitus without complications (H)       blood glucose monitoring meter device kit     1 kit    1 kit Use to test blood sugar 1 times daily or as directed.    Type 2 diabetes, HbA1c goal < 7% (H)       blood glucose monitoring test strip    ACCU-CHEK SMARTVIEW    100 each    Use to test blood sugar 2 times daily or as directed.    Type 2 diabetes, HbA1c goal < 7% (H)       EMERGEN-C IMMUNE Pack      Take 1 packet by mouth daily        EPIPEN 2-THERESA 0.3 MG/0.3ML injection 2-pack   Generic drug:  EPINEPHrine     2 mL    INJECT 0.3 MLS INTO THE MUSCLE ONCE AS NEEDED FOR ANAPHYLAXIS    Bee sting reaction       fluticasone 50 MCG/ACT spray    FLONASE    16 g    Spray 1-2 sprays into both nostrils daily    Seasonal allergic rhinitis due to other allergic trigger       lisinopril-hydrochlorothiazide 20-25 MG per tablet    PRINZIDE/ZESTORETIC    45 tablet    TAKE 1/2 TABLET BY MOUTH EVERY DAY    Essential hypertension, benign       loratadine 10 MG tablet    CLARITIN      Take 10 mg by mouth daily        metFORMIN 500 MG 24 hr tablet    GLUCOPHAGE-XR    180 tablet    take 2 tablets by mouth daily (with breakfast)    Type 2 diabetes mellitus without complications (H)       OMEGA-3 FISH OIL PO      Take 1 g by mouth daily        sodium chloride 0.65 % nasal spray    OCEAN     Spray 1 spray into both nostrils daily as needed for congestion        vitamin B complex with vitamin C Tabs tablet      Take 1 tablet by mouth daily        VITAMIN D3 PO      Take by mouth daily Dose unknown

## 2018-01-12 NOTE — PROGRESS NOTES
SUBJECTIVE/OBJECTIVE:                Elvia Helm is a 67 year old female coming in for a follow-up visit for Medication Therapy Management.  She was referred to me from Dr. Osborne.     Chief Complaint: Follow up from her visit on 17 with Pedro Carcamo, PharmD, BCACP.  She's here to f/up on diabetes management.  This visit serves as an initial visit for 2018.     Tobacco: History of tobacco dependence - quit   Alcohol: 1 glass of wine per day    Medication Adherence: no issues reported    Diabetes:  Pt currently taking metformin ER 1000mg/day.  Pt notes food just doesn't sound good anymore, and she attributes this to metformin.  She knows the metformin works well for her so she prefers to continue on this vs change to something else.  SMB-2 times daily.   Ranges (per glucometer readings): 90-130s  Symptoms of low blood sugar? Shaky, anxious feeling. Frequency of hypoglycemia? Very rarely  Recent symptoms of high blood sugar? none.  Eye exam: up to date  Foot exam: due  Microalbumin is < 30 mg/g. Pt is taking an ACEi/ARB.  Aspirin: Taking 81mg daily and denies side effects   Diet/Exercise: She continues using her FitBit to track exercise, she continues watching her granddaughter a lot, working around the house.  She's not going to the [facility] due to rotator cuff pain    Hypertension: Current medications include lisinopril/HCTZ 20/25mg daily - 1/2 tablet daily.  Patient does not self-monitor BP.  Patient reports no side effects.      Hyperlipidemia: Current therapy includes atorvastatin 20mg daily.  Pt reports no significant myalgias or other side effects.   The 10-year ASCVD risk score (Derrick LETICIA Jr, et al., 2013) is: 23.5%    Values used to calculate the score:      Age: 67 years      Sex: Female      Is Non- : No      Diabetic: Yes      Tobacco smoker: No      Systolic Blood Pressure: 147 mmHg      Is BP treated: Yes      HDL Cholesterol: 50 mg/dL      Total Cholesterol: 173  mg/dL     Allergies:  She's currently taking loratadine 10mg daily.  She did try Zyrtec, but didn't like how it made her feel.  She still doesn't feel like loratadine is working great so wonders about trying Allegra.  She also continues using a saline nasal spray, Flonase nasal spray and has an EpiPen on hand to use if needed.  She denies side effects.    Vitamins:  Current medications include vitamin B complex, Omega 3 fatty acids, cholecalciferol and Vitamin E.  She's also been using Emergen-C this winter and has found this effective.  She is not experiencing side effects.     Current labs include:BP Readings from Last 3 Encounters:   10/05/17 147/90   05/25/17 120/85   05/24/17 129/83     Today's Vitals: /76  Pulse 76  Wt 177 lb (80.3 kg)  LMP 03/17/2001  BMI 30.86 kg/m2     Lab Results   Component Value Date    A1C 6.1 07/11/2017   .  Lab Results   Component Value Date    CHOL 173 12/15/2016     Lab Results   Component Value Date    TRIG 194 12/15/2016     Lab Results   Component Value Date    HDL 50 12/15/2016     Lab Results   Component Value Date    LDL 84 12/15/2016       Liver Function Studies -   Recent Labs   Lab Test  07/19/16   0745   PROTTOTAL  7.1   ALBUMIN  3.9   BILITOTAL  0.4   ALKPHOS  60   AST  15   ALT  30       Lab Results   Component Value Date    UCRR 49 10/11/2017    MICROL 8 10/11/2017    UMALCR 16.01 10/11/2017       Last Basic Metabolic Panel:  Lab Results   Component Value Date     12/15/2016      Lab Results   Component Value Date    POTASSIUM 4.3 12/15/2016     Lab Results   Component Value Date    CHLORIDE 105 12/15/2016     Lab Results   Component Value Date    BUN 19 12/15/2016     Lab Results   Component Value Date    CR 0.79 12/15/2016     GFR Estimate   Date Value Ref Range Status   12/15/2016 72 >60 mL/min/1.7m2 Final     Comment:     Non  GFR Calc   07/19/2016 66 >60 mL/min/1.7m2 Final     Comment:     Non  GFR Calc   06/30/2015  78 >60 mL/min/1.7m2 Final     Comment:     Non  GFR Calc       TSH   Date Value Ref Range Status   12/15/2016 1.73 0.40 - 4.00 mU/L Final   ]    Most Recent Immunizations   Administered Date(s) Administered     Influenza (H1N1) 01/14/2010     Influenza (High Dose) 3 valent vaccine 12/01/2016     Influenza (IIV3) PF 12/27/2013     Pneumo Conj 13-V (2010&after) 04/10/2015     Pneumococcal 23 valent 11/05/2009     TD (ADULT, 7+) 04/20/2005     TDAP Vaccine (Adacel) 03/11/2013     Zoster vaccine, live 11/19/2009       ASSESSMENT:              Current medications were reviewed today as discussed above.      Medication Adherence: no issues identified    Diabetes: Needs Improvement. Patient is meeting A1c goal of < 7%, due for re-check. Self monitoring of blood glucose is at goal of fasting  mg/dL and post prandial < 180 mg/dL.     Hypertension: Needs Improvement. Patient is meeting BP goal of < 140/90mmHg.  Due for BMP.     Hyperlipidemia: Needs Improvement. Pt is not on high intensity statin which is indicated based on 2013 ACC/AHA guidelines for lipid management.  Due for fasting lipid panel, if LDL is >70mg/dL will change to high intensity statin.       Allergies: Agree would be good to try Allegra instead of loratadine since she's not getting complete relief.    Supplements: Needed additional education regarding potential risks of Vitamin E therapy.  Questionable how much benefit she's getting from Vitamin C, Vitamin B complex, and fish oil but she prefers to continue and they likely aren't harmful.  Vitamin D should be continued.     PLAN:                1.  Orders placed for A1c, lipids and BMP to be checked today.  2.  Recommended changing Claritin to Allegra 180mg daily.  3.  Recommended d/c Vitamin E  4.  If LDL returns >70mg/dL, will change atorvastatin to 40mg dose.    I spent 30 minutes with this patient today.  All changes were made via collaborative practice agreement with [place]  Masood. A copy of the visit note was provided to the patient's primary care provider.     Will follow up pending lab results.    The patient was given a summary of these recommendations as an after visit summary.    Betsy Bowens, HeD IV Student    Danyelle Burres, PharmD, [facility]  Medication Therapy Management Provider  Pager: [phone]

## 2018-01-29 DIAGNOSIS — E78.5 HYPERLIPIDEMIA LDL GOAL <100: ICD-10-CM

## 2018-01-29 DIAGNOSIS — E11.9 TYPE 2 DIABETES MELLITUS WITHOUT COMPLICATIONS (H): ICD-10-CM

## 2018-01-29 DIAGNOSIS — I10 ESSENTIAL HYPERTENSION, BENIGN: ICD-10-CM

## 2018-01-30 NOTE — TELEPHONE ENCOUNTER
"Requested Prescriptions   Pending Prescriptions Disp Refills     atorvastatin (LIPITOR) 20 MG tablet [Pharmacy Med Name: Atorvastatin Calcium Oral Tablet 20 MG] 30 tablet 0    Last Written Prescription Date:  1/8/2018  Last Fill Quantity: 30,  # refills: 0   Last Office Visit with [facility] provider:  12/15/2016   Future Office Visit:    Next 5 appointments (look out 90 days)     Feb 19, 2018  8:30 AM CST   PHYSICAL with Masood Osborne MD   [facility])    [address] [address]   [phone]                  Sig: TAKE ONE TABLET BY MOUTH ONE TIME DAILY    Statins Protocol Passed    1/29/2018 12:40 PM       Passed - LDL on file in past 12 months    Recent Labs   Lab Test  01/12/18   0933   LDL  78            Passed - No abnormal creatine kinase in past 12 months    No lab results found.         Passed - Recent or future visit with authorizing provider    Patient had office visit in the last year or has a visit in the next 30 days with authorizing provider.  See \"Patient Info\" tab in inbasket, or \"Choose Columns\" in Meds & Orders section of the refill encounter.            Passed - Patient is age 18 or older       Passed - No active pregnancy on record       Passed - No positive pregnancy test in past 12 months        lisinopril-hydrochlorothiazide (PRINZIDE/ZESTORETIC) 20-25 MG per tablet [Pharmacy Med Name: Lisinopril-Hydrochlorothiazide Oral Tablet 20-25 MG] 45 tablet 0    Last Written Prescription Date:  10/12/2017  Last Fill Quantity: 45,  # refills: 0   Last Office Visit with [facility] provider:  12/15/2016   Future Office Visit:    Next 5 appointments (look out 90 days)     Feb 19, 2018  8:30 AM CST   PHYSICAL with Masood Osborne MD   [facility] ([facility])    [address] [address] [phone]                  Sig: TAKE 1/2 TABLET BY MOUTH EVERY DAY    Diuretics (Including Combos) Protocol Passed    1/29/2018 12:40 PM    " "   Passed - Blood pressure under 140/90    BP Readings from Last 3 Encounters:   01/12/18 134/76   10/05/17 147/90   05/25/17 120/85                Passed - Recent or future visit with authorizing provider's specialty    Patient had office visit in the last year or has a visit in the next 30 days with authorizing provider.  See \"Patient Info\" tab in inbasket, or \"Choose Columns\" in Meds & Orders section of the refill encounter.            Passed - Patient is age 18 or older       Passed - No active pregancy on record       Passed - Normal serum creatinine on file in past 12 months    Recent Labs   Lab Test  01/12/18   0933   CR  0.77             Passed - Normal serum potassium on file in past 12 months    Recent Labs   Lab Test  01/12/18   0933   POTASSIUM  4.3                   Passed - Normal serum sodium on file in past 12 months    Recent Labs   Lab Test  01/12/18   0933   NA  140             Passed - No positive pregnancy test in past 12 months        metFORMIN (GLUCOPHAGE-XR) 500 MG 24 hr tablet [Pharmacy Med Name: MetFORMIN HCl ER Oral Tablet Extended Release 24 Hour 500 MG] 180 tablet 0    Last Written Prescription Date:  10/30/2017  Last Fill Quantity: 180,  # refills: 0   Last Office Visit with [facility] [place]:  12/15/2016   Future Office Visit:    Next 5 appointments (look out 90 days)     Feb 19, 2018  8:30 AM CST   PHYSICAL with Masood Osborne MD   [facility]    [address] [address]   [phone]                  Sig: take 2 tablets by mouth daily (with breakfast)    Biguanide Agents Passed    1/29/2018 12:40 PM       Passed - Patient's BP is less than 140/90    BP Readings from Last 3 Encounters:   01/12/18 134/76   10/05/17 147/90   05/25/17 120/85                Passed - Patient has documented LDL within the past 12 mos.    Recent Labs   Lab Test  01/12/18   0933   LDL  78            Passed - Patient has had a Microalbumin in the past 12 mos.    " "Recent Labs   Lab Test  10/11/17   0835   MICROL  8   UMALCR  16.01            Passed - Patient is age 10 or older       Passed - Patient has documented A1c within the specified period of time.    Recent Labs   Lab Test  01/12/18   0933   A1C  5.9            Passed - Patient's CR is NOT>1.4 OR Patient's EGFR is NOT<45 within past 12 mos.    Recent Labs   Lab Test  01/12/18   0933   GFRESTIMATED  74   GFRESTBLACK  90       Recent Labs   Lab Test  01/12/18   0933   CR  0.77            Passed - Patient does NOT have a diagnosis of CHF.       Passed - Patient is not pregnant       Passed - Patient has not had a positive pregnancy test within the past 12 mos.        Passed - Recent (6 mos) or future visit with authorizing provider's specialty    Patient had office visit in the last 6 months or has a visit in the next 30 days with authorizing provider.  See \"Patient Info\" tab in inbasket, or \"Choose Columns\" in Meds & Orders section of the refill encounter.              "

## 2018-01-31 RX ORDER — LISINOPRIL AND HYDROCHLOROTHIAZIDE 20; 25 MG/1; MG/1
TABLET ORAL
Qty: 45 TABLET | Refills: 0 | Status: SHIPPED | OUTPATIENT
Start: 2018-01-31 | End: 2018-02-19

## 2018-01-31 RX ORDER — ATORVASTATIN CALCIUM 20 MG/1
TABLET, FILM COATED ORAL
Qty: 90 TABLET | Refills: 0 | Status: SHIPPED | OUTPATIENT
Start: 2018-01-31 | End: 2018-02-19

## 2018-01-31 RX ORDER — METFORMIN HCL 500 MG
TABLET, EXTENDED RELEASE 24 HR ORAL
Qty: 180 TABLET | Refills: 0 | Status: SHIPPED | OUTPATIENT
Start: 2018-01-31 | End: 2018-02-19

## 2018-01-31 NOTE — TELEPHONE ENCOUNTER
Prescription approved per [facility] Refill Protocol.  Adriana CORNELIUS RN    Next 5 appointments (look out 90 days)     Feb 19, 2018  8:30 AM CST   PHYSICAL with Masood Osborne MD   [facility]    [address] [facility] [address]   [phone]

## 2018-02-16 ASSESSMENT — ACTIVITIES OF DAILY LIVING (ADL)
I_NEED_ASSISTANCE_FOR_THE_FOLLOWING_DAILY_ACTIVITIES:: NO ASSISTANCE IS NEEDED
CURRENT_FUNCTION: NO ASSISTANCE NEEDED

## 2018-02-19 ENCOUNTER — RADIANT APPOINTMENT (OUTPATIENT)
Dept: GENERAL RADIOLOGY | Facility: CLINIC | Age: 68
End: 2018-02-19
Attending: INTERNAL MEDICINE
Payer: COMMERCIAL

## 2018-02-19 ENCOUNTER — OFFICE VISIT (OUTPATIENT)
Dept: FAMILY MEDICINE | Facility: CLINIC | Age: 68
End: 2018-02-19
Payer: COMMERCIAL

## 2018-02-19 VITALS
BODY MASS INDEX: 31.36 KG/M2 | TEMPERATURE: 98.9 F | HEART RATE: 89 BPM | SYSTOLIC BLOOD PRESSURE: 132 MMHG | DIASTOLIC BLOOD PRESSURE: 75 MMHG | OXYGEN SATURATION: 96 % | HEIGHT: 63 IN | WEIGHT: 177 LBS

## 2018-02-19 DIAGNOSIS — G89.29 CHRONIC RIGHT SHOULDER PAIN: ICD-10-CM

## 2018-02-19 DIAGNOSIS — Z00.00 ROUTINE GENERAL MEDICAL EXAMINATION AT A HEALTH CARE FACILITY: Primary | ICD-10-CM

## 2018-02-19 DIAGNOSIS — M21.41 PES PLANUS OF BOTH FEET: ICD-10-CM

## 2018-02-19 DIAGNOSIS — E11.9 TYPE 2 DIABETES MELLITUS WITHOUT COMPLICATION, WITHOUT LONG-TERM CURRENT USE OF INSULIN (H): ICD-10-CM

## 2018-02-19 DIAGNOSIS — M94.9 DISORDER OF BONE AND CARTILAGE: ICD-10-CM

## 2018-02-19 DIAGNOSIS — J01.01 ACUTE RECURRENT MAXILLARY SINUSITIS: ICD-10-CM

## 2018-02-19 DIAGNOSIS — M25.511 CHRONIC RIGHT SHOULDER PAIN: ICD-10-CM

## 2018-02-19 DIAGNOSIS — E78.5 HYPERLIPIDEMIA LDL GOAL <100: ICD-10-CM

## 2018-02-19 DIAGNOSIS — I10 ESSENTIAL HYPERTENSION, BENIGN: ICD-10-CM

## 2018-02-19 DIAGNOSIS — Z12.11 SPECIAL SCREENING FOR MALIGNANT NEOPLASMS, COLON: ICD-10-CM

## 2018-02-19 DIAGNOSIS — M21.42 PES PLANUS OF BOTH FEET: ICD-10-CM

## 2018-02-19 DIAGNOSIS — M89.9 DISORDER OF BONE AND CARTILAGE: ICD-10-CM

## 2018-02-19 PROCEDURE — 73030 X-RAY EXAM OF SHOULDER: CPT | Mod: RT

## 2018-02-19 PROCEDURE — 99213 OFFICE O/P EST LOW 20 MIN: CPT | Mod: 25 | Performed by: INTERNAL MEDICINE

## 2018-02-19 PROCEDURE — 99397 PER PM REEVAL EST PAT 65+ YR: CPT | Mod: 25 | Performed by: INTERNAL MEDICINE

## 2018-02-19 PROCEDURE — 99207 C FOOT EXAM  NO CHARGE: CPT | Mod: 25 | Performed by: INTERNAL MEDICINE

## 2018-02-19 RX ORDER — AZITHROMYCIN 250 MG/1
TABLET, FILM COATED ORAL
Qty: 6 TABLET | Refills: 0 | Status: SHIPPED | OUTPATIENT
Start: 2018-02-19 | End: 2018-03-14

## 2018-02-19 RX ORDER — ATORVASTATIN CALCIUM 20 MG/1
20 TABLET, FILM COATED ORAL DAILY
Qty: 90 TABLET | Refills: 3 | Status: SHIPPED | OUTPATIENT
Start: 2018-02-19 | End: 2018-05-29 | Stop reason: DRUGHIGH

## 2018-02-19 RX ORDER — LISINOPRIL AND HYDROCHLOROTHIAZIDE 20; 25 MG/1; MG/1
0.5 TABLET ORAL DAILY
Qty: 45 TABLET | Refills: 3 | Status: SHIPPED | OUTPATIENT
Start: 2018-02-19 | End: 2019-04-13

## 2018-02-19 RX ORDER — METFORMIN HCL 500 MG
TABLET, EXTENDED RELEASE 24 HR ORAL
Qty: 180 TABLET | Refills: 3 | Status: SHIPPED | OUTPATIENT
Start: 2018-02-19 | End: 2019-02-18

## 2018-02-19 ASSESSMENT — ACTIVITIES OF DAILY LIVING (ADL): CURRENT_FUNCTION: NO ASSISTANCE NEEDED

## 2018-02-19 NOTE — PROGRESS NOTES
SUBJECTIVE:   Elvia Helm is a 67 year old female who presents for Preventive Visit.    Patient has sinus drainage - greenish bloody for 2 weeks  No headache or fever   No shortness of breath  She travels a lot and is exposed to grand kids  She also has a blister above gum line  She also has rt shoulder pain for months  She has to sleep in a posture to get comfortable    She carries heavy grand daughter about 30 lb who lives with her    Physical   Annual:     Getting at least 3 servings of Calcium per day::  Yes    Bi-annual eye exam::  Yes    Dental care twice a year::  Yes    Sleep apnea or symptoms of sleep apnea::  Daytime drowsiness    Diet::  Regular (no restrictions)    Frequency of exercise::  None    Taking medications regularly::  Yes    Medication side effects::  Other    Additional concerns today::  YES    Ability to successfully perform activities of daily living: no assistance needed  Home Safety:  No safety concerns identified  Hearing Impairment: difficulty following a conversation in a noisy restaurant or crowded room        Fall risk:  Fallen 2 or more times in the past year?: No  Any fall with injury in the past year?: No    COGNITIVE SCREEN  1) Repeat 3 items (Banana, Sunrise, Chair)    2) Clock draw: NORMAL  3) 3 item recall: Recalls 3 objects  Results: 3 items recalled: COGNITIVE IMPAIRMENT LESS LIKELY    Mini-CogTM Copyright EDITH Costa. Licensed by the author for use in [facility]; reprinted with permission (beck@[email or web address]). All rights reserved.        Reviewed and updated as needed this visit by clinical staff  Tobacco  Allergies  Meds  Problems  Fam Hx         Reviewed and updated as needed this visit by Provider  Allergies  Meds  Problems  Fam Hx        Social History   Substance Use Topics     Smoking status: Former Smoker     Packs/day: 1.00     Years: 10.00     Types: Cigarettes     Quit date: 8/19/1982     Smokeless tobacco: Never Used     Alcohol use 0.0  oz/week     0 Standard drinks or equivalent per week      Comment: wine 1 glass qd, occasional beer       Alcohol Use 2/16/2018   If you drink alcohol, do you typically have greater than 3 drinks per day OR greater than 7 drinks per week?   Yes   AUDIT SCORE  5               Today's PHQ-2 Score:   PHQ-2 ( 1999 [employer]) 2/19/2018   Q1: Little interest or pleasure in doing things 0   Q2: Feeling down, depressed or hopeless 1   PHQ-2 Score 1   Q1: Little interest or pleasure in doing things -   Q2: Feeling down, depressed or hopeless -   PHQ-2 Score -       Do you feel safe in your environment - Yes    Do you have a Health Care Directive?: No: Advance care planning reviewed with patient; information given to patient to review.    Current providers sharing in care for this patient include:   Patient Care Team:  Masood Osborne MD as PCP - General (Internal Medicine)  Masood Osborne MD as PCP - Internal Medicine    The following health maintenance items are reviewed in Epic and correct as of today:  Health Maintenance   Topic Date Due     DEXA Q3 YR  11/30/2015     FOOT EXAM Q1 YEAR  06/26/2016     ADVANCE DIRECTIVE PLANNING Q5 YRS  03/26/2017     COLON CANCER SCREEN (SYSTEM ASSIGNED)  12/10/2017     FALL RISK ASSESSMENT  12/15/2017     A1C Q6 MO  07/12/2018     MICROALBUMIN Q1 YEAR  10/11/2018     MAMMO Q1 YR  10/11/2018     TSH W/ FREE T4 REFLEX Q2 YEAR  12/15/2018     EYE EXAM Q1 YEAR  12/18/2018     CREATININE Q1 YEAR  01/12/2019     LIPID MONITORING Q1 YEAR  01/12/2019     TETANUS Q10 YR  03/11/2023     INFLUENZA VACCINE (SYSTEM ASSIGNED)  Addressed     PNEUMOCOCCAL  Completed     HEPATITIS C SCREENING  Completed     Patient Active Problem List   Diagnosis     Allergic rhinitis     Essential hypertension, benign     Postmenopausal atrophic vaginitis     Bee sting reaction     Torticollis     Type 2 diabetes mellitus without complications (H)     HYPERLIPIDEMIA LDL GOAL <100     Post-nasal drip     Advanced directives,  counseling/discussion     Osteopenia     Urticaria     Arthritis of knee     Pes planus     Tinnitus     Past Surgical History:   Procedure Laterality Date     C  DELIVERY ONLY       C LIGATE FALLOPIAN TUBE,POSTPARTUM       C NONSPECIFIC PROCEDURE      right ankle roe and pins     COLONOSCOPY        ENDOMETRIAL BIOPSY W/O CERVICAL DILATION         Social History   Substance Use Topics     Smoking status: Former Smoker     Packs/day: 1.00     Years: 10.00     Types: Cigarettes     Quit date: 1982     Smokeless tobacco: Never Used     Alcohol use 0.0 oz/week     0 Standard drinks or equivalent per week      Comment: wine 1 glass qd, occasional beer     Family History   Problem Relation Age of Onset     Musculoskeletal Disorder Mother      b:1938   Hx Fibromyalgia     Hypertension Mother      CEREBROVASCULAR DISEASE Mother      CEREBROVASCULAR DISEASE Father      b:,  age 68  massive stroke     DIABETES Father      dx age 50s and his family     GASTROINTESTINAL DISEASE Sister      b:   Hx of reflux     Family History Negative Brother      b:     Hypertension Daughter      Family History Negative Daughter      b:     DIABETES Daughter      b:    Diabetes dx age 22**insulin     Lipids Son      b: high cholosterol and triglycerides     CANCER Maternal Grandfather      throat     CEREBROVASCULAR DISEASE Sister 62     ? TIA         Current Outpatient Prescriptions   Medication Sig Dispense Refill     atorvastatin (LIPITOR) 20 MG tablet Take 1 tablet (20 mg) by mouth daily 90 tablet 3     lisinopril-hydrochlorothiazide (PRINZIDE/ZESTORETIC) 20-25 MG per tablet Take 0.5 tablets by mouth daily 45 tablet 3     metFORMIN (GLUCOPHAGE-XR) 500 MG 24 hr tablet take 2 tablets by mouth daily (with breakfast) 180 tablet 3     azithromycin (ZITHROMAX) 250 MG tablet Two tablets first day, then one tablet daily for four days. 6 tablet 0     sodium chloride (OCEAN) 0.65 % nasal  "spray Spray 1 spray into both nostrils daily as needed for congestion       Multiple Vitamins-Minerals (EMERGEN-C IMMUNE) PACK Take 1 packet by mouth daily       EPIPEN 2-THERESA 0.3 MG/0.3ML injection 2-pack INJECT 0.3 MLS INTO THE MUSCLE ONCE AS NEEDED FOR ANAPHYLAXIS 2 mL 1     blood glucose monitoring (ACCU-CHEK SMARTVIEW) test strip Use to test blood sugar 2 times daily or as directed. 100 each 1     Cholecalciferol (VITAMIN D3 PO) Take by mouth daily Dose unknown       vitamin B complex with vitamin C (VITAMIN  B COMPLEX) TABS tablet Take 1 tablet by mouth daily       Omega-3 Fatty Acids (OMEGA-3 FISH OIL PO) Take 1 g by mouth daily        fluticasone (FLONASE) 50 MCG/ACT spray Spray 1-2 sprays into both nostrils daily 16 g 0     blood glucose monitoring (ACCU-CHEK FASTCLIX) lancets USE TO TEST BLOOD SUGAR 2 TIMES DAILY OR AS DIRECTED. 102 Box PRN     blood glucose monitoring (ACCU-CHEK SIXTO SMARTVIEW) meter device kit 1 kit Use to test blood sugar 1 times daily or as directed. 1 kit 0     ASPIRIN 81 MG OR TABS 1 tab po QD (Once per day)       [DISCONTINUED] atorvastatin (LIPITOR) 20 MG tablet TAKE ONE TABLET BY MOUTH ONE TIME DAILY 90 tablet 0     [DISCONTINUED] lisinopril-hydrochlorothiazide (PRINZIDE/ZESTORETIC) 20-25 MG per tablet TAKE 1/2 TABLET BY MOUTH EVERY DAY 45 tablet 0     [DISCONTINUED] metFORMIN (GLUCOPHAGE-XR) 500 MG 24 hr tablet take 2 tablets by mouth daily (with breakfast) 180 tablet 0           Review of Systems  Constitutional, HEENT, cardiovascular, pulmonary, GI, , musculoskeletal, neuro, skin, endocrine and psych systems are negative, except as otherwise noted.    OBJECTIVE:   /75 (BP Location: Left arm, Cuff Size: Adult Regular)  Pulse 89  Temp 98.9  F (37.2  C) (Oral)  Ht 5' 2.8\" (1.595 m)  Wt 177 lb (80.3 kg)  LMP 03/17/2001  SpO2 96%  BMI 31.55 kg/m2 Estimated body mass index is 31.55 kg/(m^2) as calculated from the following:    Height as of this encounter: 5' 2.8\" (1.595 " m).    Weight as of this encounter: 177 lb (80.3 kg).  Physical Exam  GENERAL APPEARANCE: healthy, alert and no distress  EYES: Eyes grossly normal to inspection, PERRL and conjunctivae and sclerae normal  HENT: ear canals are normal and TM's both are red and bulging, nose purulent and mouth without ulcers or lesions, oropharynx clear and oral mucous membranes moist    Rt gumline lesion swelling NECK: no adenopathy, no asymmetry, masses, or scars and thyroid normal to palpation  RESP: lungs clear to auscultation - no rales, rhonchi or wheezes  BREAST: normal without masses, tenderness or nipple discharge and no palpable axillary masses or adenopathy  CV: regular rate and rhythm, normal S1 S2, no S3 or S4, no murmur, click or rub, no peripheral edema and peripheral pulses strong  ABDOMEN: soft, nontender, no hepatosplenomegaly, no masses and bowel sounds normal  MS: no musculoskeletal defects are noted and gait is age appropriate without ataxia  SKIN: no suspicious lesions or rashes  NEURO: Normal strength and tone, sensory exam grossly normal, mentation intact and speech normal  PSYCH: mentation appears normal and affect normal/bright  Foot exam shows no ulceration, no neuropathy, microfilament well felt. Skin on the feet not dry.  Pulses in the feet well felt.  Orders Only on 01/12/2018   Component Date Value Ref Range Status     Hemoglobin A1C 01/12/2018 5.9  4.3 - 6.0 % Final     Cholesterol 01/12/2018 179  <200 mg/dL Final     Triglycerides 01/12/2018 193* <150 mg/dL Final    Comment: Borderline high:  150-199 mg/dl  High:             200-499 mg/dl  Very high:       >499 mg/dl       HDL Cholesterol 01/12/2018 62  >49 mg/dL Final     LDL Cholesterol Calculated 01/12/2018 78  <100 mg/dL Final    Desirable:       <100 mg/dl     Non HDL Cholesterol 01/12/2018 117  <130 mg/dL Final     Sodium 01/12/2018 140  133 - 144 mmol/L Final     Potassium 01/12/2018 4.3  3.4 - 5.3 mmol/L Final     Chloride 01/12/2018 103  94 -  109 mmol/L Final     Carbon Dioxide 01/12/2018 29  20 - 32 mmol/L Final     Anion Gap 01/12/2018 8  3 - 14 mmol/L Final     Glucose 01/12/2018 145* 70 - 99 mg/dL Final     Urea Nitrogen 01/12/2018 16  7 - 30 mg/dL Final     Creatinine 01/12/2018 0.77  0.52 - 1.04 mg/dL Final     GFR Estimate 01/12/2018 74  >60 mL/min/1.7m2 Final    Non  GFR Calc     GFR Estimate If Black 01/12/2018 90  >60 mL/min/1.7m2 Final    African American GFR Calc     Calcium 01/12/2018 9.4  8.5 - 10.1 mg/dL Final       ASSESSMENT / PLAN:   Elvia was seen today for wellness visit.    Diagnoses and all orders for this visit:    Routine general medical examination at a health care facility  She needs to lose weight and will start working at that  She is currently very busy taking care of grandbaby  Please see below she will not be able to lift the baby until physical therapy is done  Type 2 diabetes mellitus without complication, without long-term current use of insulin (H)  -    Very well controlled  Foot exam is normal  -     metFORMIN (GLUCOPHAGE-XR) 500 MG 24 hr tablet; take 2 tablets by mouth daily (with breakfast)    Disorder of bone and cartilage  -     DEXA HIP/PELVIS/SPINE - Future; Future    Hyperlipidemia LDL goal <100  -     atorvastatin (LIPITOR) 20 MG tablet; Take 1 tablet (20 mg) by mouth daily  She will continue statins at this point and a baby aspirin  LDL Cholesterol Calculated   Date Value Ref Range Status   01/12/2018 78 <100 mg/dL Final     Comment:     Desirable:       <100 mg/dl   ]    Pes planus of both feet  She does have chronic pain in the feet so swimming exercises can be done  Essential hypertension, benign  -     lisinopril-hydrochlorothiazide (PRINZIDE/ZESTORETIC) 20-25 MG per tablet; Take 0.5 tablets by mouth daily  Lisinopril causes some dry cough but this is acceptable to her  Special screening for malignant neoplasms, colon  -     GASTROENTEROLOGY ADULT REF PROCEDURE ONLY [facility]  [phone]; Colorectal Surgery (Dr Isaacs or Maria Del Rosario)      Rt shoulder pain  I do not think she has tendon rupture but this is bursitis   Physical therapy will be done   Local x-ray will be taken today   She does not want pain meds   She will not be able to lift more than 5-10 pounds until physical therapy is done     sinusitis and acute otitis media    Z-Matthias will be given  She might have to change to a different antibiotic if symptoms persist    End of Life Planning:  Patient currently has an advanced directive: DNR/DNI.  Practitioner is supportive of decision.    COUNSELING:  Reviewed preventive health counseling, as reflected in patient instructions       Regular exercise       Healthy diet/nutrition        Estimated body mass index is 31.55 kg/(m^2) as calculated from the following:    Height as of this encounter: 5' 2.8\" (1.595 m).    Weight as of this encounter: 177 lb (80.3 kg).  Weight management plan: Discussed healthy diet and exercise guidelines and patient will follow up in 6 months in clinic to re-evaluate.   reports that she quit smoking about 35 years ago. Her smoking use included Cigarettes. She has a 10.00 pack-year smoking history. She has never used smokeless tobacco.      Appropriate preventive services were discussed with this patient, including applicable screening as appropriate for cardiovascular disease, diabetes, osteopenia/osteoporosis, and glaucoma.  As appropriate for age/gender, discussed screening for colorectal cancer,r, breast cancer, and cervical cancer. Checklist reviewing preventive services available has been given to the patient.    Reviewed patients plan of care and provided an AVS. The Basic Care Plan (routine screening as documented in Health Maintenance) for Elvia meets the Care Plan requirement. This Care Plan has been established and reviewed with the Patient.    Counseling Resources:  ATP IV Guidelines  Pooled Cohorts Equation Calculator  Breast Cancer Risk " Calculator  FRAX Risk Assessment  ICSI Preventive Guidelines  Dietary Guidelines for Americans, 2010  [place]'s MyPlate  ASA Prophylaxis  Lung CA Screening    Masood Osborne MD  [facility]  Answers for HPI/ROS submitted by the patient on 2/16/2018   PHQ-2 Score: 1

## 2018-02-19 NOTE — NURSING NOTE
"Chief Complaint   Patient presents with     Wellness Visit     Fasting       Initial /75 (BP Location: Left arm, Cuff Size: Adult Regular)  Pulse 89  Temp 98.9  F (37.2  C) (Oral)  Ht 5' 2.8\" (1.595 m)  Wt 177 lb (80.3 kg)  LMP 03/17/2001  SpO2 96%  BMI 31.55 kg/m2 Estimated body mass index is 31.55 kg/(m^2) as calculated from the following:    Height as of this encounter: 5' 2.8\" (1.595 m).    Weight as of this encounter: 177 lb (80.3 kg).  Medication Reconciliation: complete       Jil Garduno CMA      "

## 2018-02-19 NOTE — MR AVS SNAPSHOT
After Visit Summary   2/19/2018    Elvia Helm    MRN: [id number]           Patient Information     Date Of Birth          1950        Visit Information        Provider Department      2/19/2018 8:30 AM Masood Osborne MD [facility]        Today's Diagnoses     Routine general medical examination at a health care facility    -  1    Type 2 diabetes mellitus without complication, without long-term current use of insulin (H)        Disorder of bone and cartilage        Hyperlipidemia LDL goal <100        Pes planus of both feet        Essential hypertension, benign        Special screening for malignant neoplasms, colon          Care Instructions      Preventive Health Recommendations    Female Ages 65 +    Yearly exam:     See your health care provider every year in order to  o Review health changes.   o Discuss preventive care.    o Review your medicines if your doctor has prescribed any.      You no longer need a yearly Pap test unless you've had an abnormal Pap test in the past 10 years. If you have vaginal symptoms, such as bleeding or discharge, be sure to talk with your provider about a Pap test.      Every 1 to 2 years, have a mammogram.  If you are over 69, talk with your health care provider about whether or not you want to continue having screening mammograms.      Every 10 years, have a colonoscopy. Or, have a yearly FIT test (stool test). These exams will check for colon cancer.       Have a cholesterol test every 5 years, or more often if your doctor advises it.       Have a diabetes test (fasting glucose) every three years. If you are at risk for diabetes, you should have this test more often.       At age 65, have a bone density scan (DEXA) to check for osteoporosis (brittle bone disease).    Shots:    Get a flu shot each year.    Get a tetanus shot every 10 years.    Talk to your doctor about your pneumonia vaccines. There are now two you should receive -  Pneumovax (PPSV 23) and Prevnar (PCV 13).    Talk to your doctor about the shingles vaccine.    Talk to your doctor about the hepatitis B vaccine.    Nutrition:     Eat at least 5 servings of fruits and vegetables each day.      Eat whole-grain bread, whole-wheat pasta and brown rice instead of white grains and rice.      Talk to your provider about Calcium and Vitamin D.     Lifestyle    Exercise at least 150 minutes a week (30 minutes a day, 5 days a week). This will help you control your weight and prevent disease.      Limit alcohol to one drink per day.      No smoking.       Wear sunscreen to prevent skin cancer.       See your dentist twice a year for an exam and cleaning.      See your eye doctor every 1 to 2 years to screen for conditions such as glaucoma, macular degeneration and cataracts.          Follow-ups after your visit        Additional Services     GASTROENTEROLOGY ADULT REF PROCEDURE ONLY Deo Meyersge [phone]; Colorectal Surgery (Dr Isaacs or Maria Del Rosario)       Last Lab Result: Creatinine (mg/dL)       Date                     Value                 01/12/2018               0.77             ----------  Body mass index is 31.55 kg/(m^2).      Patient will be contacted to schedule procedure.     Please be aware that coverage of these services is subject to the terms and limitations of your health insurance plan.  Call member services at your health plan with any benefit or coverage questions.  Any procedures must be performed at a [facility] facility OR coordinated by your clinic's referral office.    Please bring the following with you to your appointment:    (1) Any X-Rays, CTs or MRIs which have been performed.  Contact the facility where they were done to arrange for  prior to your scheduled appointment.    (2) List of current medications   (3) This referral request   (4) Any documents/labs given to you for this referral                  Future tests that were ordered for you  "today     Open Future Orders        Priority Expected Expires Ordered    DEXA HIP/PELVIS/SPINE - Future Routine  2/19/2019 2/19/2018            Who to contact     If you have questions or need follow up information about today's clinic visit or your schedule please contact [facility] directly at [phone].  Normal or non-critical lab and imaging results will be communicated to you by MyChart, letter or phone within 4 business days after the clinic has received the results. If you do not hear from us within 7 days, please contact the clinic through [employer] or phone. If you have a critical or abnormal lab result, we will notify you by phone as soon as possible.  Submit refill requests through [employer] or call your pharmacy and they will forward the refill request to us. Please allow 3 business days for your refill to be completed.          Additional Information About Your Visit        [employer] Information     [employer] gives you secure access to your electronic health record. If you see a primary care provider, you can also send messages to your care team and make appointments. If you have questions, please call your primary care clinic.  If you do not have a primary care provider, please call [phone] and they will assist you.        Care EveryWhere ID     This is your Care EveryWhere ID. This could be used by other organizations to access your [facility] medical records  [id number]        Your Vitals Were     Pulse Temperature Height Last Period Pulse Oximetry BMI (Body Mass Index)    89 98.9  F (37.2  C) (Oral) 5' 2.8\" (1.595 m) 03/17/2001 96% 31.55 kg/m2       Blood Pressure from Last 3 Encounters:   02/19/18 132/75   01/12/18 134/76   10/05/17 147/90    Weight from Last 3 Encounters:   02/19/18 177 lb (80.3 kg)   01/12/18 177 lb (80.3 kg)   10/05/17 177 lb 4.8 oz (80.4 kg)              We Performed the Following     FOOT EXAM  NO CHARGE [14853.114]     GASTROENTEROLOGY ADULT REF PROCEDURE ONLY " Deo Krupa [phone]; Colorectal Surgery (Dr Isaacs or Maria Del Rosario)          Today's Medication Changes          These changes are accurate as of 2/19/18  8:52 AM.  If you have any questions, ask your nurse or doctor.               These medicines have changed or have updated prescriptions.        Dose/Directions    atorvastatin 20 MG tablet   Commonly known as:  LIPITOR   This may have changed:  See the new instructions.   Used for:  Hyperlipidemia LDL goal <100   Changed by:  Masood Osborne MD        Dose:  20 mg   Take 1 tablet (20 mg) by mouth daily   Quantity:  90 tablet   Refills:  3       lisinopril-hydrochlorothiazide 20-25 MG per tablet   Commonly known as:  PRINZIDE/ZESTORETIC   This may have changed:  See the new instructions.   Used for:  Essential hypertension, benign   Changed by:  Masood Osborne MD        Dose:  0.5 tablet   Take 0.5 tablets by mouth daily   Quantity:  45 tablet   Refills:  3       metFORMIN 500 MG 24 hr tablet   Commonly known as:  GLUCOPHAGE-XR   This may have changed:  See the new instructions.   Used for:  Type 2 diabetes mellitus without complication, without long-term current use of insulin (H)   Changed by:  Masood Osborne MD        take 2 tablets by mouth daily (with breakfast)   Quantity:  180 tablet   Refills:  3            Where to get your medicines      These medications were sent to [id number] [address] [address]  [address] [address] [facility] [address]     Phone:  [phone]     atorvastatin 20 MG tablet    lisinopril-hydrochlorothiazide 20-25 MG per tablet    metFORMIN 500 MG 24 hr tablet                Primary Care Provider Office Phone # Fax #    Masood Osborne [phone] [address] [facility] [address]  [facility] [address]        Equal Access to Services     JASON LUNA AH: Mikie Gonzáles, diane choudhary, melonie new, nara slaughter. So [facility]  [phone].    ATENCIÓN: Si reymundo cueva, tiene a navarrete disposición servicios gratuitos de asistencia lingüística. Chidi laird [phone].    We comply with applicable federal civil rights laws and [place] laws. We do not discriminate on the basis of race, color, national origin, age, disability, sex, sexual orientation, or gender identity.            Thank you!     Thank you for choosing [facility]  for your care. Our goal is always to provide you with excellent care. Hearing back from our patients is one way we can continue to improve our services. Please take a few minutes to complete the written survey that you may receive in the mail after your visit with us. Thank you!             Your Updated Medication List - Protect others around you: Learn how to safely use, store and throw away your medicines at www.disposemymeds.org.          This list is accurate as of 2/19/18  8:52 AM.  Always use your most recent med list.                   Brand Name Dispense Instructions for use Diagnosis    aspirin 81 MG tablet          1 tab po QD (Once per day)        atorvastatin 20 MG tablet    LIPITOR    90 tablet    Take 1 tablet (20 mg) by mouth daily    Hyperlipidemia LDL goal <100       blood glucose monitoring lancets     102 Box    USE TO TEST BLOOD SUGAR 2 TIMES DAILY OR AS DIRECTED.    Type 2 diabetes mellitus without complications (H)       blood glucose monitoring meter device kit     1 kit    1 kit Use to test blood sugar 1 times daily or as directed.    Type 2 diabetes, HbA1c goal < 7% (H)       blood glucose monitoring test strip    ACCU-CHEK SMARTVIEW    100 each    Use to test blood sugar 2 times daily or as directed.    Type 2 diabetes, HbA1c goal < 7% (H)       EMERGEN-C IMMUNE Pack      Take 1 packet by mouth daily        EPIPEN 2-THERESA 0.3 MG/0.3ML injection 2-pack   Generic drug:  EPINEPHrine     2 mL    INJECT 0.3 MLS INTO THE MUSCLE ONCE AS NEEDED FOR ANAPHYLAXIS    Bee sting reaction        fluticasone 50 MCG/ACT spray    FLONASE    16 g    Spray 1-2 sprays into both nostrils daily    Seasonal allergic rhinitis due to other allergic trigger       lisinopril-hydrochlorothiazide 20-25 MG per tablet    PRINZIDE/ZESTORETIC    45 tablet    Take 0.5 tablets by mouth daily    Essential hypertension, benign       metFORMIN 500 MG 24 hr tablet    GLUCOPHAGE-XR    180 tablet    take 2 tablets by mouth daily (with breakfast)    Type 2 diabetes mellitus without complication, without long-term current use of insulin (H)       OMEGA-3 FISH OIL PO      Take 1 g by mouth daily        sodium chloride 0.65 % nasal spray    OCEAN     Spray 1 spray into both nostrils daily as needed for congestion        vitamin B complex with vitamin C Tabs tablet      Take 1 tablet by mouth daily        VITAMIN D3 PO      Take by mouth daily Dose unknown

## 2018-02-27 ENCOUNTER — THERAPY VISIT (OUTPATIENT)
Dept: PHYSICAL THERAPY | Facility: CLINIC | Age: 68
End: 2018-02-27
Payer: COMMERCIAL

## 2018-02-27 DIAGNOSIS — M25.511 RIGHT SHOULDER PAIN: Primary | ICD-10-CM

## 2018-02-27 PROCEDURE — 97110 THERAPEUTIC EXERCISES: CPT | Mod: GP | Performed by: PHYSICAL THERAPIST

## 2018-02-27 PROCEDURE — 97161 PT EVAL LOW COMPLEX 20 MIN: CPT | Mod: GP | Performed by: PHYSICAL THERAPIST

## 2018-02-27 NOTE — MR AVS SNAPSHOT
After Visit Summary   2/27/2018    Elvia Helm    MRN: [id number]           Patient Information     Date Of Birth          1950        Visit Information        Provider Department      2/27/2018 8:10 AM Padmini Macedo, CHRISTOPHE [facility]        Today's Diagnoses     Right shoulder pain    -  1       Follow-ups after your visit        Your next 10 appointments already scheduled     Mar 01, 2018  9:00 AM CST   DX HIP/PELVIS/SPINE with [id number]   [facility] ([facility])    [address] [address]   [phone]           Please do not take any of the following 24 hours prior to the day of your exam: vitamins, calcium tablets, antacids.  If possible, please wear clothes without metal (snaps, zippers). A sweatsuit works well.            Mar 06, 2018  8:10 AM CST   SIVA Extremity with Padmini Macedo PT   [facility] ([facility]  )    [address] [id number] [phone]            Mar 13, 2018  8:10 AM CDT   SIVA Extremity with Padmini Macedo PT   [facility] ([facility]  )    [address] [place] [address] [address]  [facility] [id number] [phone]              Who to contact     If you have questions or need follow up information about today's clinic visit or your schedule please contact [facility] PH[facility] THERAP[facility] directly at [phone].  Normal or non-critical lab and imaging results will be communicated to you by MyChart, letter or phone within 4 business days after the clinic has received the results. If you do not hear from us within 7 days, please contact the clinic through MyChart or phone. If you have a critical or abnormal lab result, we will notify you by phone as soon as possible.  Submit refill  requests through [employer] or call your pharmacy and they will forward the refill request to us. Please allow 3 business days for your refill to be completed.          Additional Information About Your Visit        [email or web address] Information     [employer] gives you secure access to your electronic health record. If you see a primary care provider, you can also send messages to your care team and make appointments. If you have questions, please call your primary care clinic.  If you do not have a primary care provider, please call [phone] and they will assist you.        Care EveryWhere ID     This is your Care EveryWhere ID. This could be used by other organizations to access your [place] medical records  [id number]        Your Vitals Were     Last Period                   03/17/2001            Blood Pressure from Last 3 Encounters:   02/19/18 132/75   01/12/18 134/76   10/05/17 147/90    Weight from Last 3 Encounters:   02/19/18 80.3 kg (177 lb)   01/12/18 80.3 kg (177 lb)   10/05/17 80.4 kg (177 lb 4.8 oz)              We Performed the Following     SIVA Inital Eval Report     PT Eval, Low Complexity ([id number])     Therapeutic Exercises        Primary Care Provider Office Phone # Fax #    Bhavfelipet MD [place] [phone] [address] [facility] [address]  [facility] [address]        Equal Access to Services     GEETHA [facility]AMES : Hadii aad ku hadasho Soomaali, waaxda luqadaha, qaybta kaalmada adeegyada, nara quiñones . So [place] -[phone].    ATENCIÓN: Si habla español, tiene a navarrete disposición servicios gratuitos de asistencia lingüística. Llame al [phone].    We comply with applicable federal civil rights laws and [place] laws. We do not discriminate on the basis of race, color, national origin, age, disability, sex, sexual orientation, or gender identity.            Thank you!     Thank you for choosing [facility] THER[facility]  for your care. Our  goal is always to provide you with excellent care. Hearing back from our patients is one way we can continue to improve our services. Please take a few minutes to complete the written survey that you may receive in the mail after your visit with us. Thank you!             Your Updated Medication List - Protect others around you: Learn how to safely use, store and throw away your medicines at www.disposemymeds.org.          This list is accurate as of 2/27/18  9:23 AM.  Always use your most recent med list.                   Brand Name Dispense Instructions for use Diagnosis    aspirin 81 MG tablet          1 tab po QD (Once per day)        atorvastatin 20 MG tablet    LIPITOR    90 tablet    Take 1 tablet (20 mg) by mouth daily    Hyperlipidemia LDL goal <100       azithromycin 250 MG tablet    ZITHROMAX    6 tablet    Two tablets first day, then one tablet daily for four days.    Acute recurrent maxillary sinusitis       blood glucose monitoring lancets     102 Box    USE TO TEST BLOOD SUGAR 2 TIMES DAILY OR AS DIRECTED.    Type 2 diabetes mellitus without complications (H)       blood glucose monitoring meter device kit     1 kit    1 kit Use to test blood sugar 1 times daily or as directed.    Type 2 diabetes, HbA1c goal < 7% (H)       blood glucose monitoring test strip    ACCU-CHEK SMARTVIEW    100 each    Use to test blood sugar 2 times daily or as directed.    Type 2 diabetes, HbA1c goal < 7% (H)       doxycycline 100 MG capsule    VIBRAMYCIN    20 capsule    Take 1 capsule (100 mg) by mouth 2 times daily    Acute otitis media, unspecified otitis media type       EMERGEN-C IMMUNE Pack      Take 1 packet by mouth daily        EPIPEN 2-THERESA 0.3 MG/0.3ML injection 2-pack   Generic drug:  EPINEPHrine     2 mL    INJECT 0.3 MLS INTO THE MUSCLE ONCE AS NEEDED FOR ANAPHYLAXIS    Bee sting reaction       fluticasone 50 MCG/ACT spray    FLONASE    16 g    Spray 1-2 sprays into both nostrils daily    Seasonal allergic  rhinitis due to other allergic trigger       lisinopril-hydrochlorothiazide 20-25 MG per tablet    PRINZIDE/ZESTORETIC    45 tablet    Take 0.5 tablets by mouth daily    Essential hypertension, benign       metFORMIN 500 MG 24 hr tablet    GLUCOPHAGE-XR    180 tablet    take 2 tablets by mouth daily (with breakfast)    Type 2 diabetes mellitus without complication, without long-term current use of insulin (H)       OMEGA-3 FISH OIL PO      Take 1 g by mouth daily        sodium chloride 0.65 % nasal spray    OCEAN     Spray 1 spray into both nostrils daily as needed for congestion        vitamin B complex with vitamin C Tabs tablet      Take 1 tablet by mouth daily        VITAMIN D3 PO      Take by mouth daily Dose unknown

## 2018-02-27 NOTE — PROGRESS NOTES
[facility] Initial Evaluation  Subjective:  Patient is a 67 year old female presenting with rehab right shoulder hpi.   Elvia Helm is a 67 year old female with a right shoulder condition.  Condition occurred with:  Degenerative joint disease.  Condition occurred: for unknown reasons.  This is a recurrent condition  Pt presents with complaints of right shoulder pain. Pt reported long history of shoulder pain for the past 10 years but aggravated in January of this year. Pt reported current sx's are of constant duration but of varying intensity. Sx's are aggravated with reaching with R UE over head (with weight), reaching behind the back and with lying on the right side. Pt reported she is currently caring for her 2 year old granddaughter while her daughter is recovering from back surgery; pt has been advised to avoid lifting over 10# per MD.    Patient reports pain:  Anterior.    Pain is described as aching and is constant Pain Scale: 2-8/10.  Associated symptoms:  Loss of motion/stiffness and loss of strength. Pain is worse during the day and worse during the night.  Symptoms are exacerbated by using arm overhead, using arm behind back and lifting and relieved by heat.  Since onset symptoms are unchanged.  Special tests:  X-ray.      General health as reported by patient is good.                  Barriers include:  None as reported by the patient.    Red flags:  None as reported by the patient.                        Objective:  System                   Shoulder Evaluation:  ROM:  AROM:    Flexion:  Left:  WFL's    Right:  WFL's; mild increase of pain during ROM    Abduction:  Left: WFL's   Right:  WFL's, mild increase of pain during movement, primarily with eccentric lowering                Flexion/External Rotation:  Left:  T1    Right:  T1  Extension/Internal Rotation:  Left:  T6    Right:  T9          Strength:        Abduction:  Left: 5/5  Pain:    Right: 5-/5       Pain:+    Internal Rotation:  Left:5/5     Pain:    Right: 5/5     Pain:  External Rotation:   Left:5/5     Pain:   Right:5-/5     Pain:              Special Tests:      Right shoulder positive for the following special tests:Impingement                                       General     ROS    Assessment/Plan:    Patient is a 67 year old female with right side shoulder complaints.    Patient has the following significant findings with corresponding treatment plan.                Diagnosis 1:  Right shoulder pain  Pain -  self management, education and home program  Decreased strength - therapeutic exercise and therapeutic activities  Impaired muscle performance - neuro re-education  Decreased function - therapeutic activities    Therapy Evaluation Codes:   1) History comprised of:   Personal factors that impact the plan of care:      Time since onset of symptoms.    Comorbidity factors that impact the plan of care are:      None.     Medications impacting care: None.  2) Examination of Body Systems comprised of:   Body structures and functions that impact the plan of care:      Shoulder.   Activity limitations that impact the plan of care are:      Lifting, Sleeping and reaching.  3) Clinical presentation characteristics are:   Stable/Uncomplicated.  4) Decision-Making    Low complexity using standardized patient assessment instrument and/or measureable assessment of functional outcome.  Cumulative Therapy Evaluation is: Low complexity.    Previous and current functional limitations:  (See Goal Flow Sheet for this information)    Short term and Long term goals: (See Goal Flow Sheet for this information)     Communication ability:  Patient appears to be able to clearly communicate and understand verbal and written communication and follow directions correctly.  Treatment Explanation - The following has been discussed with the patient:   RX ordered/plan of care  Anticipated outcomes  Possible risks and side  effects  This patient would benefit from PT intervention to resume normal activities.   Rehab potential is good.    Frequency:  1 X week, once daily  Duration:  for 6 visits  Discharge Plan:  Achieve all LTG.  Independent in home treatment program.  Reach maximal therapeutic benefit.    Please refer to the daily flowsheet for treatment today, total treatment time and time spent performing 1:1 timed codes.

## 2018-02-28 NOTE — PROGRESS NOTES
[facility] Initial Evaluation  Subjective:  Patient is a 67 year old female presenting with rehab left ankle/foot hpi.                                      Pertinent medical history includes:  Osteoarthritis, diabetes, overweight and high blood pressure.    Other surgeries include:  Orthopedic surgery (Rt ankle).  Current medications:  Cardiac medication and high blood pressure medication.  Current occupation is Retired.    Primary job tasks include:  Repetitive tasks.                                Objective:  System    Physical Exam    General     ROS    Assessment/Plan:

## 2018-03-01 ENCOUNTER — HOSPITAL ENCOUNTER (OUTPATIENT)
Dept: BONE DENSITY | Facility: CLINIC | Age: 68
Discharge: HOME | End: 2018-03-01
Attending: INTERNAL MEDICINE | Admitting: INTERNAL MEDICINE
Payer: MEDICARE

## 2018-03-01 DIAGNOSIS — M94.9 DISORDER OF BONE AND CARTILAGE: ICD-10-CM

## 2018-03-01 DIAGNOSIS — M89.9 DISORDER OF BONE AND CARTILAGE: ICD-10-CM

## 2018-03-01 PROCEDURE — 77080 DXA BONE DENSITY AXIAL: CPT

## 2018-03-02 ENCOUNTER — TELEPHONE (OUTPATIENT)
Dept: FAMILY MEDICINE | Facility: CLINIC | Age: 68
End: 2018-03-02

## 2018-03-02 DIAGNOSIS — Z76.89 ENCOUNTER FOR BIOPSY: Primary | ICD-10-CM

## 2018-03-02 NOTE — TELEPHONE ENCOUNTER
Reason for Call: Request for an order or referral:    Order or referral being requested: Referral for bx    Date needed: Pt has appt 3/13    Has the patient been seen by the PCP for this problem? YES    Additional comments: Need referral with explanation and any imaging if there was any done    Phone number Patient can be reached at:  Other phone number:  Number to Clinic Oral Surg Consult   P: [phone]  F: [phone]    Best Time:  any    Can we leave a detailed message on this number?  Not Applicable    Call taken on 3/2/2018 at 10:31 AM by Nichol Montes

## 2018-03-06 ENCOUNTER — THERAPY VISIT (OUTPATIENT)
Dept: PHYSICAL THERAPY | Facility: CLINIC | Age: 68
End: 2018-03-06
Payer: COMMERCIAL

## 2018-03-06 DIAGNOSIS — M25.511 RIGHT SHOULDER PAIN: ICD-10-CM

## 2018-03-06 PROCEDURE — 97110 THERAPEUTIC EXERCISES: CPT | Mod: GP | Performed by: PHYSICAL THERAPIST

## 2018-03-06 PROCEDURE — 97112 NEUROMUSCULAR REEDUCATION: CPT | Mod: GP | Performed by: PHYSICAL THERAPIST

## 2018-03-13 ENCOUNTER — THERAPY VISIT (OUTPATIENT)
Dept: PHYSICAL THERAPY | Facility: CLINIC | Age: 68
End: 2018-03-13
Payer: COMMERCIAL

## 2018-03-13 DIAGNOSIS — M25.511 RIGHT SHOULDER PAIN: ICD-10-CM

## 2018-03-13 PROCEDURE — 97110 THERAPEUTIC EXERCISES: CPT | Mod: GP | Performed by: PHYSICAL THERAPIST

## 2018-03-13 PROCEDURE — 97112 NEUROMUSCULAR REEDUCATION: CPT | Mod: GP | Performed by: PHYSICAL THERAPIST

## 2018-03-14 ENCOUNTER — OFFICE VISIT (OUTPATIENT)
Dept: FAMILY MEDICINE | Facility: CLINIC | Age: 68
End: 2018-03-14
Payer: COMMERCIAL

## 2018-03-14 VITALS
WEIGHT: 175 LBS | OXYGEN SATURATION: 98 % | HEART RATE: 85 BPM | HEIGHT: 63 IN | SYSTOLIC BLOOD PRESSURE: 151 MMHG | DIASTOLIC BLOOD PRESSURE: 86 MMHG | BODY MASS INDEX: 31.01 KG/M2 | TEMPERATURE: 99 F

## 2018-03-14 DIAGNOSIS — H92.01 RIGHT EAR PAIN: Primary | ICD-10-CM

## 2018-03-14 DIAGNOSIS — K04.7 DENTAL INFECTION: ICD-10-CM

## 2018-03-14 PROCEDURE — 99213 OFFICE O/P EST LOW 20 MIN: CPT | Performed by: NURSE PRACTITIONER

## 2018-03-14 NOTE — NURSING NOTE
"Chief Complaint   Patient presents with     Ear Problem       Initial /86 (BP Location: Right arm, Cuff Size: Adult Large)  Pulse 85  Temp 99  F (37.2  C) (Tympanic)  Ht 5' 2.8\" (1.595 m)  Wt 175 lb (79.4 kg)  LMP 03/17/2001  SpO2 98%  Breastfeeding? No  BMI 31.2 kg/m2 Estimated body mass index is 31.2 kg/(m^2) as calculated from the following:    Height as of this encounter: 5' 2.8\" (1.595 m).    Weight as of this encounter: 175 lb (79.4 kg).  Medication Reconciliation: complete   Diya Mcconnell MA      "

## 2018-03-14 NOTE — MR AVS SNAPSHOT
After Visit Summary   3/14/2018    Elvia Helm    MRN: [id number]           Patient Information     Date Of Birth          1950        Visit Information        Provider Department      3/14/2018 1:30 PM Skip Francis APRN CNP [facility]        Today's Diagnoses     Right ear pain    -  1    Dental infection           Follow-ups after your visit        Your next 10 appointments already scheduled     Mar 30, 2018  3:20 PM CDT   SIVA Extremity with Padmini Macedo, PT   [place] [facility] ([facility]  )    [address] [address] [phone]              Who to contact     If you have questions or need follow up information about today's clinic visit or your schedule please contact [facility] directly at [phone].  Normal or non-critical lab and imaging results will be communicated to you by MyChart, letter or phone within 4 business days after the clinic has received the results. If you do not hear from us within 7 days, please contact the clinic through MyChart or phone. If you have a critical or abnormal lab result, we will notify you by phone as soon as possible.  Submit refill requests through [employer] or call your pharmacy and they will forward the refill request to us. Please allow 3 business days for your refill to be completed.          Additional Information About Your Visit        MyChart Information     [employer] gives you secure access to your electronic health record. If you see a primary care provider, you can also send messages to your care team and make appointments. If you have questions, please call your primary care clinic.  If you do not have a primary care provider, please call [phone] and they will assist you.        Care EveryWhere ID     This is your Care EveryWhere ID. This could be used by other organizations to access your [facility]  "records  [id number]        Your Vitals Were     Pulse Temperature Height Last Period Pulse Oximetry Breastfeeding?    85 99  F (37.2  C) (Tympanic) 5' 2.8\" (1.595 m) 03/17/2001 98% No    BMI (Body Mass Index)                   31.2 kg/m2            Blood Pressure from Last 3 Encounters:   03/14/18 151/86   02/19/18 132/75   01/12/18 134/76    Weight from Last 3 Encounters:   03/14/18 175 lb (79.4 kg)   02/19/18 177 lb (80.3 kg)   01/12/18 177 lb (80.3 kg)              Today, you had the following     No orders found for display       Primary Care Provider Office Phone # Fax #    Masood Osborne [phone] [address] [facility] [address]  [facility] [address]        Equal Access to Services     [facility]: Hadii kaitlyn tolentinoo Soandrews, waaxda luqricci, qaybta kaalmada virgen, nara quiñones . So [facility] [phone].    ATENCIÓN: Si habla español, tiene a navarrete disposición servicios gratuitos de asistencia lingüística. Lllorri al [phone].    We comply with applicable federal civil rights laws and [place] laws. We do not discriminate on the basis of race, color, national origin, age, disability, sex, sexual orientation, or gender identity.            Thank you!     Thank you for choosing [facility]  for your care. Our goal is always to provide you with excellent care. Hearing back from our patients is one way we can continue to improve our services. Please take a few minutes to complete the written survey that you may receive in the mail after your visit with us. Thank you!             Your Updated Medication List - Protect others around you: Learn how to safely use, store and throw away your medicines at www.disposemymeds.org.          This list is accurate as of 3/14/18 11:59 PM.  Always use your most recent med list.                   Brand Name Dispense Instructions for use Diagnosis    aspirin 81 MG tablet          1 tab po QD (Once per day)        " atorvastatin 20 MG tablet    LIPITOR    90 tablet    Take 1 tablet (20 mg) by mouth daily    Hyperlipidemia LDL goal <100       blood glucose monitoring lancets     102 Box    USE TO TEST BLOOD SUGAR 2 TIMES DAILY OR AS DIRECTED.    Type 2 diabetes mellitus without complications (H)       blood glucose monitoring meter device kit     1 kit    1 kit Use to test blood sugar 1 times daily or as directed.    Type 2 diabetes, HbA1c goal < 7% (H)       blood glucose monitoring test strip    ACCU-CHEK SMARTVIEW    100 each    Use to test blood sugar 2 times daily or as directed.    Type 2 diabetes, HbA1c goal < 7% (H)       EMERGEN-C IMMUNE Pack      Take 1 packet by mouth daily        EPIPEN 2-THERESA 0.3 MG/0.3ML injection 2-pack   Generic drug:  EPINEPHrine     2 mL    INJECT 0.3 MLS INTO THE MUSCLE ONCE AS NEEDED FOR ANAPHYLAXIS    Bee sting reaction       fluticasone 50 MCG/ACT spray    FLONASE    16 g    Spray 1-2 sprays into both nostrils daily    Seasonal allergic rhinitis due to other allergic trigger       lisinopril-hydrochlorothiazide 20-25 MG per tablet    PRINZIDE/ZESTORETIC    45 tablet    Take 0.5 tablets by mouth daily    Essential hypertension, benign       metFORMIN 500 MG 24 hr tablet    GLUCOPHAGE-XR    180 tablet    take 2 tablets by mouth daily (with breakfast)    Type 2 diabetes mellitus without complication, without long-term current use of insulin (H)       OMEGA-3 FISH OIL PO      Take 1 g by mouth daily        sodium chloride 0.65 % nasal spray    OCEAN     Spray 1 spray into both nostrils daily as needed for congestion        vitamin B complex with vitamin C Tabs tablet      Take 1 tablet by mouth daily        VITAMIN D3 PO      Take by mouth daily Dose unknown

## 2018-03-14 NOTE — PROGRESS NOTES
HPI      SUBJECTIVE:   Elvia Helm is a 67 year old female who presents to clinic today for the following health issues:    Follow Up - Acute Otitis Media.  After 2 rounds of antibiotics, still experiencing some ear pain in both ear, but right is worst.   Found out yesterday that she has a tooth abscess on the upper right and can't get in to the dentist for 2 weeks to get it taken care of. She is flying to [place] in 2 days and wants to see about any other possible treatment.     Has some pain of the right zygoma   Sometimes the pain shoots up the head to forehead and back   Ear still hurts some, not as bad   Occasional left ear pain   No fevers   No tooth sensitivity       Past Medical History:   Diagnosis Date     Allergic rhinitis, cause unspecified a     Allergic rhinitis, cause unspecified      Arthritis of knee 2014     Bee sting reaction      DIABETES TYPE II      Essential hypertension, benign      Flat foot(734) 2014     Hyperlipidaemia LDL goal < 100      Localized osteoarthrosis not specified whether primary or secondary, ankle and foot     After surgery     Tinnitus 2014     Past Surgical History:   Procedure Laterality Date     C  DELIVERY ONLY       C LIGATE FALLOPIAN TUBE,POSTPARTUM       C NONSPECIFIC PROCEDURE      right ankle roe and pins     COLONOSCOPY       HC ENDOMETRIAL BIOPSY W/O CERVICAL DILATION       Social History   Substance Use Topics     Smoking status: Former Smoker     Packs/day: 1.00     Years: 10.00     Types: Cigarettes     Quit date: 1982     Smokeless tobacco: Never Used     Alcohol use 0.0 oz/week     0 Standard drinks or equivalent per week      Comment: wine 1 glass qd, occasional beer     Current Outpatient Prescriptions   Medication Sig Dispense Refill     atorvastatin (LIPITOR) 20 MG tablet Take 1 tablet (20 mg) by mouth daily 90 tablet 3     lisinopril-hydrochlorothiazide (PRINZIDE/ZESTORETIC) 20-25 MG  "per tablet Take 0.5 tablets by mouth daily 45 tablet 3     metFORMIN (GLUCOPHAGE-XR) 500 MG 24 hr tablet take 2 tablets by mouth daily (with breakfast) 180 tablet 3     sodium chloride (OCEAN) 0.65 % nasal spray Spray 1 spray into both nostrils daily as needed for congestion       Multiple Vitamins-Minerals (EMERGEN-C IMMUNE) PACK Take 1 packet by mouth daily       EPIPEN 2-THERESA 0.3 MG/0.3ML injection 2-pack INJECT 0.3 MLS INTO THE MUSCLE ONCE AS NEEDED FOR ANAPHYLAXIS 2 mL 1     blood glucose monitoring (ACCU-CHEK SMARTVIEW) test strip Use to test blood sugar 2 times daily or as directed. 100 each 1     Cholecalciferol (VITAMIN D3 PO) Take by mouth daily Dose unknown       vitamin B complex with vitamin C (VITAMIN  B COMPLEX) TABS tablet Take 1 tablet by mouth daily       Omega-3 Fatty Acids (OMEGA-3 FISH OIL PO) Take 1 g by mouth daily        fluticasone (FLONASE) 50 MCG/ACT spray Spray 1-2 sprays into both nostrils daily 16 g 0     blood glucose monitoring (ACCU-CHEK FASTCLIX) lancets USE TO TEST BLOOD SUGAR 2 TIMES DAILY OR AS DIRECTED. 102 Box PRN     blood glucose monitoring (ACCU-CHEK SIXTO SMARTVIEW) meter device kit 1 kit Use to test blood sugar 1 times daily or as directed. 1 kit 0     ASPIRIN 81 MG OR TABS 1 tab po QD (Once per day)       Allergies   Allergen Reactions     Amoxicillin Hives     Bee Swelling     HIVES AND SWELLING --carries Epi Pen      Ceftin      hives     Clindamycin Hives     Erythromycin GI Disturbance     Shrimp Hives     Happened twice     Tetracycline Other (See Comments)     ?severe headaches  & nausea        Reviewed and updated as needed this visit by clinical staff and provider      ROS  Detailed as above       /86 (BP Location: Right arm, Cuff Size: Adult Large)  Pulse 85  Temp 99  F (37.2  C) (Tympanic)  Ht 5' 2.8\" (1.595 m)  Wt 175 lb (79.4 kg)  LMP 03/17/2001  SpO2 98%  Breastfeeding? No  BMI 31.2 kg/m2    Physical Exam   Constitutional: She is well-developed, " well-nourished, and in no distress.   HENT:   Head: Normocephalic.   Right Ear: Tympanic membrane, external ear and ear canal normal.   Left Ear: Tympanic membrane, external ear and ear canal normal.   Mouth/Throat: Oropharynx is clear and moist. No oropharyngeal exudate.   Eyes: Conjunctivae are normal.   Neck: Normal range of motion.   Pulmonary/Chest: Effort normal.   Lymphadenopathy:     She has no cervical adenopathy.   Neurological: She is alert.   Skin: Skin is warm and dry.   Psychiatric: Mood and affect normal.   Vitals reviewed.      Assessment and Plan:       ICD-10-CM    1. Right ear pain H92.01    2. Dental infection K04.7        Persistent right ear pain after 2 rounds of antibiotics.  She just went to the dentist and they found that she does have a dental infection.  I suspect this is the cause of the pain as exam is normal today.  She has an appointment for 2 weeks from now to get this taken care of.  If her symptoms are still present after her dental visit, we will need to send to ENT for evaluation      KRISTI Foster, CNP  [facility]

## 2018-04-10 ENCOUNTER — THERAPY VISIT (OUTPATIENT)
Dept: PHYSICAL THERAPY | Facility: CLINIC | Age: 68
End: 2018-04-10
Payer: COMMERCIAL

## 2018-04-10 DIAGNOSIS — M25.511 RIGHT SHOULDER PAIN: Primary | ICD-10-CM

## 2018-04-10 PROCEDURE — 97110 THERAPEUTIC EXERCISES: CPT | Mod: GP | Performed by: PHYSICAL THERAPIST

## 2018-04-10 PROCEDURE — 97112 NEUROMUSCULAR REEDUCATION: CPT | Mod: GP | Performed by: PHYSICAL THERAPIST

## 2018-04-10 NOTE — MR AVS SNAPSHOT
After Visit Summary   4/10/2018    Elvia Helm    MRN: [id number]           Patient Information     Date Of Birth          1950        Visit Information        Provider Department      4/10/2018 8:50 AM Padmini Macedo PT [facility]        Today's Diagnoses     Right shoulder pain    -  1       Follow-ups after your visit        Your next 10 appointments already scheduled     May 10, 2018  1:20 PM CDT   SIVA Extremity with Padmini Macedo PT   [facility]    [address] [address] [phone]              Who to contact     If you have questions or need follow up information about today's clinic visit or your schedule please contact [facility]ERAP[facility] directly at [phone].  Normal or non-critical lab and imaging results will be communicated to you by MyChart, letter or phone within 4 business days after the clinic has received the results. If you do not hear from us within 7 days, please contact the clinic through [employer] or phone. If you have a critical or abnormal lab result, we will notify you by phone as soon as possible.  Submit refill requests through [employer] or call your pharmacy and they will forward the refill request to us. Please allow 3 business days for your refill to be completed.          Additional Information About Your Visit        MyChart Information     [employer] gives you secure access to your electronic health record. If you see a primary care provider, you can also send messages to your care team and make appointments. If you have questions, please call your primary care clinic.  If you do not have a primary care provider, please call [phone] and they will assist you.        Care EveryWhere ID     This is your Care EveryWhere ID. This could be used by other  organizations to access your [facility] medical records  [id number]        Your Vitals Were     Last Period                   03/17/2001            Blood Pressure from Last 3 Encounters:   03/14/18 151/86   02/19/18 132/75   01/12/18 134/76    Weight from Last 3 Encounters:   03/14/18 79.4 kg (175 lb)   02/19/18 80.3 kg (177 lb)   01/12/18 80.3 kg (177 lb)              We Performed the Following     Neuromuscular Re-Education     Therapeutic Exercises        Primary Care Provider Office Phone # Fax #    Masood Osborne [phone] [address]   [place] [address]        Equal Access to Services     JASON LUNA : Hadserjio Gonzáles, wacheko choudhary, qachad haasalmades new, anra quiñones . So [facility] [phone].    ATENCIÓN: Si habla español, tiene a navarrete disposición servicios gratuitos de asistencia lingüística. Llame al [phone].    We comply with applicable federal civil rights laws and [place] laws. We do not discriminate on the basis of race, color, national origin, age, disability, sex, sexual orientation, or gender identity.            Thank you!     Thank you for choosing [facility]ER[facility]  for your care. Our goal is always to provide you with excellent care. Hearing back from our patients is one way we can continue to improve our services. Please take a few minutes to complete the written survey that you may receive in the mail after your visit with us. Thank you!             Your Updated Medication List - Protect others around you: Learn how to safely use, store and throw away your medicines at www.disposemymeds.org.          This list is accurate as of 4/10/18 11:35 AM.  Always use your most recent med list.                   Brand Name Dispense Instructions for use Diagnosis    aspirin 81 MG tablet          1 tab po QD (Once per day)        atorvastatin 20 MG tablet    LIPITOR    90  tablet    Take 1 tablet (20 mg) by mouth daily    Hyperlipidemia LDL goal <100       blood glucose monitoring lancets     102 Box    USE TO TEST BLOOD SUGAR 2 TIMES DAILY OR AS DIRECTED.    Type 2 diabetes mellitus without complications (H)       blood glucose monitoring meter device kit     1 kit    1 kit Use to test blood sugar 1 times daily or as directed.    Type 2 diabetes, HbA1c goal < 7% (H)       blood glucose monitoring test strip    ACCU-CHEK SMARTVIEW    100 each    Use to test blood sugar 2 times daily or as directed.    Type 2 diabetes, HbA1c goal < 7% (H)       EMERGEN-C IMMUNE Pack      Take 1 packet by mouth daily        EPIPEN 2-THERESA 0.3 MG/0.3ML injection 2-pack   Generic drug:  EPINEPHrine     2 mL    INJECT 0.3 MLS INTO THE MUSCLE ONCE AS NEEDED FOR ANAPHYLAXIS    Bee sting reaction       fluticasone 50 MCG/ACT spray    FLONASE    16 g    Spray 1-2 sprays into both nostrils daily    Seasonal allergic rhinitis due to other allergic trigger       lisinopril-hydrochlorothiazide 20-25 MG per tablet    PRINZIDE/ZESTORETIC    45 tablet    Take 0.5 tablets by mouth daily    Essential hypertension, benign       metFORMIN 500 MG 24 hr tablet    GLUCOPHAGE-XR    180 tablet    take 2 tablets by mouth daily (with breakfast)    Type 2 diabetes mellitus without complication, without long-term current use of insulin (H)       OMEGA-3 FISH OIL PO      Take 1 g by mouth daily        sodium chloride 0.65 % nasal spray    OCEAN     Spray 1 spray into both nostrils daily as needed for congestion        vitamin B complex with vitamin C Tabs tablet      Take 1 tablet by mouth daily        VITAMIN D3 PO      Take by mouth daily Dose unknown

## 2018-04-26 ENCOUNTER — TRANSFERRED RECORDS (OUTPATIENT)
Dept: HEALTH INFORMATION MANAGEMENT | Facility: CLINIC | Age: 68
End: 2018-04-26

## 2018-10-17 ENCOUNTER — OFFICE VISIT (OUTPATIENT)
Dept: PHARMACY | Facility: CLINIC | Age: 68
End: 2018-10-17
Payer: COMMERCIAL

## 2018-10-17 VITALS
HEART RATE: 85 BPM | BODY MASS INDEX: 31.71 KG/M2 | DIASTOLIC BLOOD PRESSURE: 68 MMHG | WEIGHT: 177.9 LBS | SYSTOLIC BLOOD PRESSURE: 130 MMHG

## 2018-10-17 DIAGNOSIS — J30.2 SEASONAL ALLERGIC RHINITIS, UNSPECIFIED TRIGGER: ICD-10-CM

## 2018-10-17 DIAGNOSIS — E56.9 VITAMIN DEFICIENCY: ICD-10-CM

## 2018-10-17 DIAGNOSIS — E11.9 TYPE 2 DIABETES MELLITUS WITHOUT COMPLICATION, WITHOUT LONG-TERM CURRENT USE OF INSULIN (H): ICD-10-CM

## 2018-10-17 DIAGNOSIS — E78.5 HYPERLIPIDEMIA LDL GOAL <100: ICD-10-CM

## 2018-10-17 DIAGNOSIS — E11.9 TYPE 2 DIABETES MELLITUS WITHOUT COMPLICATION, WITHOUT LONG-TERM CURRENT USE OF INSULIN (H): Primary | ICD-10-CM

## 2018-10-17 DIAGNOSIS — I10 ESSENTIAL HYPERTENSION, BENIGN: ICD-10-CM

## 2018-10-17 DIAGNOSIS — Z23 ENCOUNTER FOR IMMUNIZATION: ICD-10-CM

## 2018-10-17 LAB — HBA1C MFR BLD: 6.3 % (ref 0–5.6)

## 2018-10-17 PROCEDURE — 99606 MTMS BY PHARM EST 15 MIN: CPT | Performed by: PHARMACIST

## 2018-10-17 PROCEDURE — 36415 COLL VENOUS BLD VENIPUNCTURE: CPT | Performed by: INTERNAL MEDICINE

## 2018-10-17 PROCEDURE — 99607 MTMS BY PHARM ADDL 15 MIN: CPT | Performed by: PHARMACIST

## 2018-10-17 PROCEDURE — 83036 HEMOGLOBIN GLYCOSYLATED A1C: CPT | Performed by: INTERNAL MEDICINE

## 2018-10-17 RX ORDER — FEXOFENADINE HCL 180 MG/1
180 TABLET ORAL AT BEDTIME
Status: ON HOLD | COMMUNITY
End: 2023-09-13

## 2018-10-17 RX ORDER — LANCETS
EACH MISCELLANEOUS
Qty: 100 EACH | Refills: 3 | Status: SHIPPED | OUTPATIENT
Start: 2018-10-17 | End: 2020-03-12

## 2018-10-17 NOTE — PATIENT INSTRUCTIONS
Recommendations from today's MTM visit:                                                        1. We will send in new prescriptions for you for a new blood glucose meter, strips, and lancets.    2. Try Nasacort 2 sprays in each nostril one time a day in place of Flonase to see if this better helps with congestion. You can also try eye drops at bedtime for allergies called Zatador (active ingredient ketotifen).    3. A1c today    Next MTM visit: 6 months depending on lab results    To schedule another MTM appointment, please call the clinic directly or you may call the MTM scheduling line at [phone] or toll-free at [phone].     My Clinical Pharmacist's contact information:                                                      It was a pleasure seeing you today!  Please feel free to contact me with any questions or concerns you have.      eH JoseD  Medication Therapy Management Resident, [facility]  Pager: [phone]    Danyelle Jonas PharmD, [facility]  Medication Therapy Management Provider  Pager: [phone]     You may receive a survey about the MTM services you received.  I would appreciate your feedback to help me serve you better in the future. Please fill it out and return it when you can. Your comments will be anonymous.

## 2018-10-17 NOTE — MR AVS SNAPSHOT
After Visit Summary   10/17/2018    Elvia Helm    MRN: [id number]           Patient Information     Date Of Birth          1950        Visit Information        Provider Department      10/17/2018 8:00 AM Danyelle Jonas, [facility] MTM        Today's Diagnoses     Type 2 diabetes mellitus without complication, without long-term current use of insulin (H)    -  1    Type 2 diabetes mellitus without complications (H)        Type 2 diabetes, HbA1c goal < 7% (H)          Care Instructions    Recommendations from today's MTM visit:                                                        1. We will send in new prescriptions for you for a new blood glucose meter, strips, and lancets.    2. Try Nasacort 2 sprays in each nostril one time a day in place of Flonase to see if this better helps with congestion. You can also try eye drops at bedtime for allergies called Zatador (active ingredient ketotifen).    3. A1c today    Next MTM visit: 6 months depending on lab results    To schedule another MTM appointment, please call the clinic directly or you may call the MTM scheduling line at [phone] or toll-free at [phone].     My Clinical Pharmacist's contact information:                                                      It was a pleasure seeing you today!  Please feel free to contact me with any questions or concerns you have.      Diya Lugo PharmD  Medication Therapy Management Resident, [facility]  Pager: [phone]    Danyelle Jonas PharmD, [facility]  Medication Therapy Management Provider  Pager: [phone]     You may receive a survey about the MTM services you received.  I would appreciate your feedback to help me serve you better in the future. Please fill it out and return it when you can. Your comments will be anonymous.            Follow-ups after your visit        Future tests that were ordered for you today     Open Future Orders         Priority Expected Expires Ordered    Hemoglobin A1c Routine  10/17/2019 10/17/2018            Who to contact     If you have questions or need follow up information about today's clinic visit or your schedule please contact [facility] directly at [phone].  Normal or non-critical lab and imaging results will be communicated to you by MyChart, letter or phone within 4 business days after the clinic has received the results. If you do not hear from us within 7 days, please contact the clinic through [employer] or phone. If you have a critical or abnormal lab result, we will notify you by phone as soon as possible.  Submit refill requests through [employer] or call your pharmacy and they will forward the refill request to us. Please allow 3 business days for your refill to be completed.          Additional Information About Your Visit        MyChart Information     [employer] gives you secure access to your electronic health record. If you see a primary care provider, you can also send messages to your care team and make appointments. If you have questions, please call your primary care clinic.  If you do not have a primary care provider, please call [phone] and they will assist you.        Care EveryWhere ID     This is your Care EveryWhere ID. This could be used by other organizations to access your [place] medical records  [id number]        Your Vitals Were     Pulse Last Period BMI (Body Mass Index)             85 03/17/2001 31.71 kg/m2          Blood Pressure from Last 3 Encounters:   10/17/18 130/68   03/14/18 151/86   02/19/18 132/75    Weight from Last 3 Encounters:   10/17/18 177 lb 14.4 oz (80.7 kg)   03/14/18 175 lb (79.4 kg)   02/19/18 177 lb (80.3 kg)                 Today's Medication Changes          These changes are accurate as of 10/17/18  8:33 AM.  If you have any questions, ask your nurse or doctor.               These medicines have changed or have updated prescriptions.         Dose/Directions    blood glucose monitoring lancets   This may have changed:  See the new instructions.   Used for:  Type 2 diabetes mellitus without complications (H)   Changed by:  Danyelle Jonas, RPH        Use to test blood sugar 1 time daily or as directed.   Quantity:  100 each   Refills:  3       blood glucose monitoring meter device kit   This may have changed:    - how much to take  - additional instructions   Used for:  Type 2 diabetes, HbA1c goal < 7% (H)   Changed by:  Danyelle Jonas E, RPH        Use to test blood sugar 1 time daily or as directed.   Quantity:  1 kit   Refills:  0       blood glucose monitoring test strip   Commonly known as:  ACCU-CHEK SMARTVIEW   This may have changed:  additional instructions   Used for:  Type 2 diabetes, HbA1c goal < 7% (H)   Changed by:  BurDanyelle quarles E, RPH        Use to test blood sugar 1 time daily or as directed.   Quantity:  100 each   Refills:  3            Where to get your medicines      These medications were sent to [facility] [facility] [address] [address] [address] [address] [address] [facility] [address]     Phone:  [phone]     blood glucose monitoring lancets    blood glucose monitoring meter device kit    blood glucose monitoring test strip                Primary Care Provider Office Phone # Fax #    Bhavfelipet MD [place] [phone] [address] [address]        Equal Access to Services     JASON LUNA : Hadii aad ku hadasho Soomaali, waaxda luqadaha, qaybta kaalmada adeegyada, naar slaughter. So [facility] [phone].    ATENCIÓN: Si habla español, tiene a navarrete disposición servicios gratuitos de asistencia lingüística. Llame al [phone].    We comply with applicable federal civil rights laws and [place] laws. We do not discriminate on the basis of race, color, national origin, age, disability, sex, sexual orientation, or gender identity.            Thank you!      Thank you for choosing [facility]  for your care. Our goal is always to provide you with excellent care. Hearing back from our patients is one way we can continue to improve our services. Please take a few minutes to complete the written survey that you may receive in the mail after your visit with us. Thank you!             Your Updated Medication List - Protect others around you: Learn how to safely use, store and throw away your medicines at www.disposemymeds.org.          This list is accurate as of 10/17/18  8:33 AM.  Always use your most recent med list.                   Brand Name Dispense Instructions for use Diagnosis    aspirin 81 MG tablet          1 tab po QD (Once per day)        atorvastatin 40 MG tablet    LIPITOR    90 tablet    Take 1 tablet (40 mg) by mouth daily    Hyperlipidemia LDL goal <100       blood glucose monitoring lancets     100 each    Use to test blood sugar 1 time daily or as directed.    Type 2 diabetes mellitus without complications (H)       blood glucose monitoring meter device kit     1 kit    Use to test blood sugar 1 time daily or as directed.    Type 2 diabetes, HbA1c goal < 7% (H)       blood glucose monitoring test strip    ACCU-CHEK SMARTVIEW    100 each    Use to test blood sugar 1 time daily or as directed.    Type 2 diabetes, HbA1c goal < 7% (H)       EMERGEN-C IMMUNE Pack      Take 1 packet by mouth daily as needed        EPIPEN 2-THERESA 0.3 MG/0.3ML injection 2-pack   Generic drug:  EPINEPHrine     2 mL    INJECT 0.3 MLS INTO THE MUSCLE ONCE AS NEEDED FOR ANAPHYLAXIS    Bee sting reaction       fexofenadine 180 MG tablet    ALLEGRA     Take 180 mg by mouth daily        fluticasone 50 MCG/ACT spray    FLONASE    16 g    Spray 1-2 sprays into both nostrils daily    Seasonal allergic rhinitis due to other allergic trigger       lisinopril-hydrochlorothiazide 20-25 MG per tablet    PRINZIDE/ZESTORETIC    45 tablet    Take 0.5 tablets by mouth daily     Essential hypertension, benign       metFORMIN 500 MG 24 hr tablet    GLUCOPHAGE-XR    180 tablet    take 2 tablets by mouth daily (with breakfast)    Type 2 diabetes mellitus without complication, without long-term current use of insulin (H)       OMEGA-3 FISH OIL PO      Take 1 g by mouth daily        sodium chloride 0.65 % nasal spray    OCEAN     Spray 1 spray into both nostrils daily as needed for congestion        vitamin B complex with vitamin C Tabs tablet      Take 1 tablet by mouth daily        VITAMIN D3 PO      Take 2,000 Units by mouth daily Dose unknown

## 2018-10-17 NOTE — PROGRESS NOTES
SUBJECTIVE/OBJECTIVE:                Elvia Helm is a 68 year old female coming in for a follow-up visit for Medication Therapy Management.  She was referred to me from Dr. Osborne.     Chief Complaint: Follow up from our visit on 18.  She has no questions or concerns today.    Tobacco: History of tobacco dependence - quit   Alcohol: 1 glass wine/day    Medication Adherence/Access: no issues reported    Diabetes:  Pt currently taking metformin ER 1000mg/day. She takes metformin after food but notes she is sometimes nauseous later in the day. She says her nausea is maybe associated with her decreased appetite and not necessarily from the metformin.  Pt wonders if she is able to get a new meter and refills on strips and lancets.  SMB time daily in the AM.   Ranges (per pt report): 120-130s mg/dL  Symptoms of low blood sugar? Shaky, anxious feeling. Frequency of hypoglycemia? Very rarely  Recent symptoms of high blood sugar? none.  Eye exam: up to date  Foot exam: up to date  Microalbumin is < 30 mg/g. Pt is taking an ACEi/ARB.  Aspirin: Taking 81mg daily and denies side effects. Pt wondering if this is still beneficial for her to continue to take.    Hypertension: Current medications include lisinopril/HCTZ 20/25mg daily - 1/2 tablet daily.  Patient does not self-monitor BP.  Patient reports no side effects.    BP Readings from Last 3 Encounters:   10/17/18 130/68   18 151/86   18 132/75     Hyperlipidemia: Current therapy includes atorvastatin 40mg daily.  Pt reports no significant myalgias or other side effects. Pt reports when she increased dose from 20mg to 40mg about 4 months ago, she noticed some leg muscle aches and then turned into feet muscle aches but has not been noticing any of this recently.  The 10-year ASCVD risk score (Derrick LETICIA Jr, et al., 2013) is: 26.2%    Values used to calculate the score:      Age: 68 years      Sex: Female      Is Non- : No      " Diabetic: Yes      Tobacco smoker: No      Systolic Blood Pressure: 151 mmHg      Is BP treated: Yes      HDL Cholesterol: 62 mg/dL      Total Cholesterol: 179 mg/dL     Allergies:  Pt currently taking fexofenadine 180mg (pt not completely sure of dose) at bedtime. She switched from loratadine to fexofenadine at last visit and feels this regimen may work better because her body was used to the loratadine. She also continues using a saline nasal spray, Flonase 2 sprays once daily, and has an EpiPen on hand to use if needed (has not needed to use this summer).  She denies side effects. She feels that her allergy symptoms are overall controlled but she still gets \"gooey eyes\" every morning, has some congestion, and gets headaches especially around this time of year. Pt does note that she knows the medications are working because if she misses a dose, she'll notice worsened allergy symptoms.    Supplements:  Current medications include vitamin B complex, Omega 3 fatty acids, Emergen-C pack as needed, and Vitamin D 2000 units. She is not experiencing side effects.  She feels these are effective and prefers to continue taking.    Immunizations: Pt has not received a flu vaccine this season yet but prefers to get this in a month or so later in the flu season year which she'll do on her own. She is worried about the vaccine's effectiveness if she gets the shot too early.    Today's Vitals: /68  Pulse 85  Wt 177 lb 14.4 oz (80.7 kg)  LMP 03/17/2001  BMI 31.71 kg/m2      ASSESSMENT:              Current medications were reviewed today as discussed above.      Medication Adherence: no issues identified    Diabetes: Needs improvement. Pt is due for A1c. Recent fasting blood glucose numbers have been slightly elevated in the 120-130s but still stable. Pt also due for microalbumin but pt unable to produce urine for sample today - plan to get at next visit.  Aspirin therapy appropriate - discussed risks vs benefits with " pt.    Hypertension: Stable. Patient is meeting BP goal of < 140/90mmHg.     Hyperlipidemia: Stable. Pt is on high intensity statin which is indicated based on 2013 ACC/AHA guidelines for lipid management.      Allergic rhinitis: Needs improvement.  Pt would benefit from trying Nasacort instead of Flonase and starting ketotifen eye drops to further help decrease allergy symptoms.    Supplements: stable    Immunizations: Plan in place to get flu vaccine next month, pt needed additional education that she could get it now, but she declines.     PLAN:                  1. A1c today.  2. Sent in new rx from meter, strips, and lancets.  3. Pt to try Nasacort 2 sprays in each nostril once daily instead of Flonase to see if this improves congestion.  4. Pt to try Zatador (ketotifen) eye drops at bedtime.    A1c lab came back at 6.3% - at goal and no changes to diabetes medications.    I spent 30 minutes with this patient today. All changes were made via collaborative practice agreement with Masood Osborne. A copy of the visit note was provided to the patient's primary care provider.     Will follow up in 6 months or sooner if needed.    The patient was given a summary of these recommendations as an after visit summary.    Diya Lugo PharmD  Medication Therapy Management Resident, [facility]  Pager: [phone]    Danyelle Jonas PharmD, [facility]  Medication Therapy Management Provider  Pager: [phone]

## 2018-11-01 ENCOUNTER — HOSPITAL ENCOUNTER (OUTPATIENT)
Dept: MAMMOGRAPHY | Facility: CLINIC | Age: 68
Discharge: HOME | End: 2018-11-01
Attending: INTERNAL MEDICINE | Admitting: INTERNAL MEDICINE
Payer: MEDICARE

## 2018-11-01 DIAGNOSIS — Z12.31 SCREENING MAMMOGRAM, ENCOUNTER FOR: ICD-10-CM

## 2018-11-01 PROCEDURE — 77063 BREAST TOMOSYNTHESIS BI: CPT

## 2018-12-20 DIAGNOSIS — T63.441A BEE STING REACTION: ICD-10-CM

## 2018-12-21 RX ORDER — EPINEPHRINE 0.3 MG/.3ML
INJECTION INTRAMUSCULAR
Qty: 2 ML | Refills: 1 | Status: SHIPPED | OUTPATIENT
Start: 2018-12-21 | End: 2019-11-18

## 2018-12-21 NOTE — TELEPHONE ENCOUNTER
"Requested Prescriptions   Pending Prescriptions Disp Refills     EPIPEN 2-MATTHIAS 0.3 MG/0.3ML injection 2-pack [Pharmacy Med Name: EpiPen 2-Matthias Injection Solution Auto-injector 0.3 MG/0.3ML]  0    Last Written Prescription Date:  10/13/2017  Last Fill Quantity: 2 mL,  # refills: 1   Last office visit: 3/14/2018 with prescribing provider:  Penny   Future Office Visit:   Next 5 appointments (look out 90 days)    Feb 22, 2019  9:30 AM CST  [facility] [address] [facility] [address]  [phone]          Sig: INJECT 0.3 MLS INTO THE MUSCLE ONCE AS NEEDED FOR ANAPHYLAXIS    Anaphylaxis Kits Protocol Passed - 12/20/2018 11:14 AM       Passed - Recent (12 mo) or future (30 days) visit witin the authorizing provider's specialty    Patient had office visit in the last 12 months or has a visit in the next 30 days with authorizing provider or within the authorizing provider's specialty.  See \"Patient Info\" tab in inbasket, or \"Choose Columns\" in Meds & Orders section of the refill encounter.             Passed - Patient is age 5 or older        Filled per [facility] protocol.     ALEE Brian, RN  [employer] Triage    "

## 2019-01-17 ENCOUNTER — TRANSFERRED RECORDS (OUTPATIENT)
Dept: HEALTH INFORMATION MANAGEMENT | Facility: CLINIC | Age: 69
End: 2019-01-17

## 2019-01-17 LAB — RETINOPATHY: NORMAL

## 2019-02-07 PROBLEM — M25.511 RIGHT SHOULDER PAIN: Status: RESOLVED | Noted: 2018-02-27 | Resolved: 2019-02-07

## 2019-02-18 DIAGNOSIS — E11.9 TYPE 2 DIABETES MELLITUS WITHOUT COMPLICATION, WITHOUT LONG-TERM CURRENT USE OF INSULIN (H): ICD-10-CM

## 2019-02-19 NOTE — TELEPHONE ENCOUNTER
"Last Written Prescription Date:  2/19/18  Last Fill Quantity: 180 tablet,  # refills: 3   Last office visit: 2/19/2018 with prescribing provider:  [place]   Future Office Visit:   Next 5 appointments (look out 90 days)    May 06, 2019  9:00 AM CDT  PHYSICAL with [facility]  [facility] [address] [facility] [id number] [phone]         Requested Prescriptions   Pending Prescriptions Disp Refills     metFORMIN (GLUCOPHAGE-XR) 500 MG 24 hr tablet [Pharmacy Med Name: metFORMIN HCl ER Oral Tablet Extended Release 24 Hour 500 MG] 180 tablet 2     Sig: take 2 tablets by mouth daily (with breakfast)    Biguanide Agents Failed - 2/18/2019  8:41 AM       Failed - Patient has documented LDL within the past 12 mos.    Recent Labs   Lab Test 01/12/18  0933   LDL 78            Failed - Patient has had a Microalbumin in the past 15 mos.    Recent Labs   Lab Test 10/11/17  0835   MICROL 8   UMALCR 16.01            Failed - Recent (6 mo) or future (30 days) visit within the authorizing provider's specialty    Patient had office visit in the last 6 months or has a visit in the next 30 days with authorizing provider or within the authorizing provider's specialty.  See \"Patient Info\" tab in inbasket, or \"Choose Columns\" in Meds & Orders section of the refill encounter.           Passed - Blood pressure less than 140/90 in past 6 months    BP Readings from Last 3 Encounters:   10/17/18 130/68   03/14/18 151/86   02/19/18 132/75                Passed - Patient is age 10 or older       Passed - Patient has documented A1c within the specified period of time.    If HgbA1C is 8 or greater, it needs to be on file within the past 3 months.  If less than 8, must be on file within the past 6 months.     Recent Labs   Lab Test 10/17/18  0843   A1C 6.3*            Passed - Patient's CR is NOT>1.4 OR Patient's EGFR is NOT<45 within past 12 mos.    Recent Labs   Lab Test 01/12/18  0933 "   GFRESTIMATED 74   GFRESTBLACK 90       Recent Labs   Lab Test 01/12/18  0933   CR 0.77            Passed - Patient does NOT have a diagnosis of CHF.       Passed - Medication is active on med list       Passed - Patient is not pregnant       Passed - Patient has not had a positive pregnancy test within the past 12 mos.

## 2019-02-20 RX ORDER — METFORMIN HCL 500 MG
TABLET, EXTENDED RELEASE 24 HR ORAL
Qty: 180 TABLET | Refills: 0 | Status: SHIPPED | OUTPATIENT
Start: 2019-02-20 | End: 2019-05-17

## 2019-02-20 NOTE — TELEPHONE ENCOUNTER
A 90 day supply is given, patient is due for an office visit.  Patient has appointment scheduled with provider on 5/6/2019  ALEE Smith, RN  [employer] Triage

## 2019-05-16 ASSESSMENT — ACTIVITIES OF DAILY LIVING (ADL): CURRENT_FUNCTION: NO ASSISTANCE NEEDED

## 2019-05-17 ENCOUNTER — OFFICE VISIT (OUTPATIENT)
Dept: FAMILY MEDICINE | Facility: CLINIC | Age: 69
End: 2019-05-17
Payer: COMMERCIAL

## 2019-05-17 VITALS
SYSTOLIC BLOOD PRESSURE: 128 MMHG | WEIGHT: 178 LBS | BODY MASS INDEX: 31.54 KG/M2 | DIASTOLIC BLOOD PRESSURE: 83 MMHG | OXYGEN SATURATION: 98 % | HEIGHT: 63 IN | TEMPERATURE: 98 F | HEART RATE: 80 BPM

## 2019-05-17 DIAGNOSIS — I10 ESSENTIAL HYPERTENSION, BENIGN: ICD-10-CM

## 2019-05-17 DIAGNOSIS — Z00.00 ROUTINE GENERAL MEDICAL EXAMINATION AT A HEALTH CARE FACILITY: Primary | ICD-10-CM

## 2019-05-17 DIAGNOSIS — I83.93 VARICOSE VEINS OF BOTH LOWER EXTREMITIES, UNSPECIFIED WHETHER COMPLICATED: ICD-10-CM

## 2019-05-17 DIAGNOSIS — Z12.11 SPECIAL SCREENING FOR MALIGNANT NEOPLASMS, COLON: ICD-10-CM

## 2019-05-17 DIAGNOSIS — M17.10 ARTHRITIS OF KNEE: ICD-10-CM

## 2019-05-17 DIAGNOSIS — E78.5 HYPERLIPIDEMIA LDL GOAL <100: ICD-10-CM

## 2019-05-17 DIAGNOSIS — T63.444S BEE STING REACTION, UNDETERMINED INTENT, SEQUELA: ICD-10-CM

## 2019-05-17 DIAGNOSIS — Z23 NEED FOR VACCINATION: ICD-10-CM

## 2019-05-17 DIAGNOSIS — E11.9 TYPE 2 DIABETES MELLITUS WITHOUT COMPLICATION, WITHOUT LONG-TERM CURRENT USE OF INSULIN (H): ICD-10-CM

## 2019-05-17 LAB
ANION GAP SERPL CALCULATED.3IONS-SCNC: 7 MMOL/L (ref 3–14)
BUN SERPL-MCNC: 15 MG/DL (ref 7–30)
CALCIUM SERPL-MCNC: 9 MG/DL (ref 8.5–10.1)
CHLORIDE SERPL-SCNC: 106 MMOL/L (ref 94–109)
CHOLEST SERPL-MCNC: 159 MG/DL
CO2 SERPL-SCNC: 27 MMOL/L (ref 20–32)
CREAT SERPL-MCNC: 0.8 MG/DL (ref 0.52–1.04)
GFR SERPL CREATININE-BSD FRML MDRD: 75 ML/MIN/{1.73_M2}
GLUCOSE SERPL-MCNC: 152 MG/DL (ref 70–99)
HBA1C MFR BLD: 6.1 % (ref 0–5.6)
HDLC SERPL-MCNC: 55 MG/DL
LDLC SERPL CALC-MCNC: 66 MG/DL
NONHDLC SERPL-MCNC: 104 MG/DL
POTASSIUM SERPL-SCNC: 4.1 MMOL/L (ref 3.4–5.3)
SODIUM SERPL-SCNC: 140 MMOL/L (ref 133–144)
TRIGL SERPL-MCNC: 190 MG/DL
TSH SERPL DL<=0.005 MIU/L-ACNC: 1.42 MU/L (ref 0.4–4)

## 2019-05-17 PROCEDURE — G0439 PPPS, SUBSEQ VISIT: HCPCS | Performed by: INTERNAL MEDICINE

## 2019-05-17 PROCEDURE — 90732 PPSV23 VACC 2 YRS+ SUBQ/IM: CPT | Performed by: INTERNAL MEDICINE

## 2019-05-17 PROCEDURE — 99213 OFFICE O/P EST LOW 20 MIN: CPT | Mod: 25 | Performed by: INTERNAL MEDICINE

## 2019-05-17 PROCEDURE — 80061 LIPID PANEL: CPT | Performed by: INTERNAL MEDICINE

## 2019-05-17 PROCEDURE — G0009 ADMIN PNEUMOCOCCAL VACCINE: HCPCS | Performed by: INTERNAL MEDICINE

## 2019-05-17 PROCEDURE — 36415 COLL VENOUS BLD VENIPUNCTURE: CPT | Performed by: INTERNAL MEDICINE

## 2019-05-17 PROCEDURE — 84443 ASSAY THYROID STIM HORMONE: CPT | Performed by: INTERNAL MEDICINE

## 2019-05-17 PROCEDURE — 83036 HEMOGLOBIN GLYCOSYLATED A1C: CPT | Performed by: INTERNAL MEDICINE

## 2019-05-17 PROCEDURE — 80048 BASIC METABOLIC PNL TOTAL CA: CPT | Performed by: INTERNAL MEDICINE

## 2019-05-17 RX ORDER — ATORVASTATIN CALCIUM 40 MG/1
40 TABLET, FILM COATED ORAL DAILY
Qty: 90 TABLET | Refills: 3 | Status: SHIPPED | OUTPATIENT
Start: 2019-05-17 | End: 2020-06-04

## 2019-05-17 RX ORDER — METFORMIN HCL 500 MG
TABLET, EXTENDED RELEASE 24 HR ORAL
Qty: 180 TABLET | Refills: 3 | Status: SHIPPED | OUTPATIENT
Start: 2019-05-17 | End: 2019-11-18

## 2019-05-17 RX ORDER — LISINOPRIL AND HYDROCHLOROTHIAZIDE 20; 25 MG/1; MG/1
0.5 TABLET ORAL DAILY
Qty: 45 TABLET | Refills: 3 | Status: SHIPPED | OUTPATIENT
Start: 2019-05-17 | End: 2019-11-18

## 2019-05-17 ASSESSMENT — ACTIVITIES OF DAILY LIVING (ADL): CURRENT_FUNCTION: NO ASSISTANCE NEEDED

## 2019-05-17 ASSESSMENT — MIFFLIN-ST. JEOR: SCORE: 1297.65

## 2019-05-17 NOTE — PROGRESS NOTES
Screening Questionnaire for Adult Immunization    Are you sick today?   No   Do you have allergies to medications, food, a vaccine component or latex?   Yes   Have you ever had a serious reaction after receiving a vaccination?   No   Do you have a long-term health problem with heart disease, lung disease, asthma, kidney disease, metabolic disease (e.g. diabetes), anemia, or other blood disorder?   Yes   Do you have cancer, leukemia, HIV/AIDS, or any other immune system problem?   No   In the past 3 months, have you taken medications that affect  your immune system, such as prednisone, other steroids, or anticancer drugs; drugs for the treatment of rheumatoid arthritis, Crohn s disease, or psoriasis; or have you had radiation treatments?   No   Have you had a seizure, or a brain or other nervous system problem?   No   During the past year, have you received a transfusion of blood or blood     products, or been given immune (gamma) globulin or antiviral drug?   No   For women: Are you pregnant or is there a chance you could become        pregnant during the next month?   No   Have you received any vaccinations in the past 4 weeks?   No     Immunization questionnaire was positive for at least one answer.  Notified Dr. Osborne.        Per orders of Dr. Osborne, injection of PPV23 given by Diya Mcconnell. Patient instructed to remain in clinic for 15 minutes afterwards, and to report any adverse reaction to me immediately.       Screening performed by Diya Mcconnell on 5/17/2019 at 9:21 AM.

## 2019-05-17 NOTE — PATIENT INSTRUCTIONS
[facility]  Patient Education   Personalized Prevention Plan  You are due for the preventive services outlined below.  Your care team is available to assist you in scheduling these services.  If you have already completed any of these items, please share that information with your care team to update in your medical record.  Health Maintenance Due   Topic Date Due     Zoster (Shingles) Vaccine (2 of 3) 01/14/2010     Pneumococcal Vaccine (2 of 2 - PPSV23) 04/10/2016     Discuss Advance Directive Planning  03/26/2017     Colon Cancer Screening - every 10 years.  12/10/2017     Microalbumin Lab - yearly  10/11/2018     Thyroid Function Lab (TSH) - every 2 years  12/15/2018     Eye Exam - yearly  12/18/2018     Creatinine Lab - yearly  01/12/2019     Cholesterol Lab - yearly  01/12/2019     Diabetic Foot Exam - yearly  02/19/2019     FALL RISK ASSESSMENT  02/19/2019     A1C (Diabetes) Lab - every 6 months  04/17/2019       Signs of Hearing Loss     Hearing much better with one ear can be a sign of hearing loss.     Hearing loss is a problem shared by many people. In fact, it is one of the most common health conditions, particularly as people age. Most people over age 65 have some hearing loss, and by age 80, almost everyone does. Because hearing loss usually occurs slowly over the years, you may not realize your hearing ability has gotten worse.  Have your hearing checked  Contact your healthcare provider if you:    Have to strain to hear normal conversation    Have to watch other people s faces very carefully to follow what they re saying    Need to ask people to repeat what they ve said    Often misunderstand what people are saying    Turn the volume of the television or radio up so high that others complain    Feel that people are mumbling when they re talking to you    Find that the effort to hear leaves you feeling tired and irritated    Notice, when using the phone, that you hear better with one ear than the  other  Date Last Reviewed: 12/1/2016 2000-2018 [employer]. [address] [place] [address], [place] [address]. All rights reserved. This information is not intended as a substitute for professional medical care. Always follow your healthcare professional's instructions.

## 2019-05-17 NOTE — PROGRESS NOTES
"SUBJECTIVE:   Elvia Helm is a 68 year old female who presents for Preventive Visit.  Patient notices leg edema  This is at the end of the day  She is concerned because she is going on a safari in [place]  She is otherwise doing well  Are you in the first 12 months of your Medicare coverage?  No    Healthy Habits:     In general, how would you rate your overall health?  Good    Frequency of exercise:  2-3 days/week    Duration of exercise:  15-30 minutes    Do you usually eat at least 4 servings of fruit and vegetables a day, include whole grains    & fiber and avoid regularly eating high fat or \"junk\" foods?  Yes    Taking medications regularly:  Yes    Barriers to taking medications:  None    Ability to successfully perform activities of daily living:  No assistance needed    Home Safety:  No safety concerns identified    Hearing Impairment:  Difficulty following a conversation in a noisy restaurant or crowded room    In the past 6 months, have you been bothered by leaking of urine?  No    In general, how would you rate your overall mental or emotional health?  Good      PHQ-2 Total Score: 1    Additional concerns today:  Yes    Do you feel safe in your environment? Yes    Do you have a Health Care Directive? Yes: Patient states has Advance Directive and will bring in a copy to clinic.      Fall risk  Fallen 2 or more times in the past year?: No  Any fall with injury in the past year?: No    Cognitive Screening   1) Repeat 3 items (Leader, Season, Table)    2) Clock draw: NORMAL  3) 3 item recall: Recalls 3 objects  Results: 3 items recalled: COGNITIVE IMPAIRMENT LESS LIKELY    Mini-CogTM Copyright EDITH Costa. Licensed by the author for use in [facility]; reprinted with permission (beck@.[facility]). All rights reserved.      Do you have sleep apnea, excessive snoring or daytime drowsiness?: no    Reviewed and updated as needed this visit by clinical staff  Tobacco  Allergies  Meds     "     Reviewed and updated as needed this visit by Provider        Social History     Tobacco Use     Smoking status: Former Smoker     Packs/day: 1.00     Years: 10.00     Pack years: 10.00     Types: Cigarettes     Last attempt to quit: 1982     Years since quittin.7     Smokeless tobacco: Never Used   Substance Use Topics     Alcohol use: Yes     Alcohol/week: 0.0 oz     Comment: wine 1 glass qd, occasional beer         Alcohol Use 2019   Prescreen: >3 drinks/day or >7 drinks/week? -   Prescreen: >3 drinks/day or >7 drinks/week? -   AUDIT SCORE  4     AUDIT - Alcohol Use Disorders Identification Test - Reproduced from the World Health Organization Audit 2001 (Second Edition) 2019   1.  How often do you have a drink containing alcohol? 4 or more times a week   2.  How many drinks containing alcohol do you have on a typical day when you are drinking? 1 or 2   3.  How often do you have five or more drinks on one occasion? Never   4.  How often during the last year have you found that you were not able to stop drinking once you had started? Never   5.  How often during the last year have you failed to do what was normally expected of you because of drinking? Never   6.  How often during the last year have you needed a first drink in the morning to get yourself going after a heavy drinking session? Never   7.  How often during the last year have you had a feeling of guilt or remorse after drinking? Never   8.  How often during the last year have you been unable to remember what happened the night before because of your drinking? Never   9.  Have you or someone else been injured because of your drinking? No   10. Has a relative, friend, doctor or other health care worker been concerned about your drinking or suggested you cut down? No   TOTAL SCORE 4           Diabetes Follow-up    Patient is checking blood sugars: once daily.  Results are as follows:         am - 120-122    Diabetic concerns:  None     Symptoms of hypoglycemia (low blood sugar): none     Paresthesias (numbness or burning in feet) or sores: No     Date of last diabetic eye exam: 2019    Dr Grijalva    BP Readings from Last 2 Encounters:   05/17/19 128/83   10/17/18 130/68     Hemoglobin A1C (%)   Date Value   10/17/2018 6.3 (H)   01/12/2018 5.9     LDL Cholesterol Calculated (mg/dL)   Date Value   01/12/2018 78   12/15/2016 84       Diabetes Management Resources    Current providers sharing in care for this patient include:   Patient Care Team:  Masood Osborne MD as PCP - General (Internal Medicine)  Masood Osborne MD as PCP - Internal Medicine  Masood Osborne MD as Assigned PCP  Diya Lugo [facility] as Pharmacist (Pharmacist)    The following health maintenance items are reviewed in Epic and correct as of today:  Health Maintenance   Topic Date Due     ZOSTER IMMUNIZATION (2 of 3) 01/14/2010     PNEUMOCOCCAL IMMUNIZATION 65+ LOW/MEDIUM RISK (2 of 2 - PPSV23) 04/10/2016     ADVANCE DIRECTIVE PLANNING Q5 YRS  03/26/2017     COLON CANCER SCREEN (SYSTEM ASSIGNED)  12/10/2017     MICROALBUMIN Q1 YEAR  10/11/2018     TSH W/ FREE T4 REFLEX Q2 YEAR  12/15/2018     EYE EXAM Q1 YEAR  12/18/2018     CREATININE Q1 YEAR  01/12/2019     LIPID MONITORING Q1 YEAR  01/12/2019     FOOT EXAM Q1 YEAR  02/19/2019     FALL RISK ASSESSMENT  02/19/2019     A1C Q6 MO  04/17/2019     MAMMO Q1 YR  11/01/2019     MEDICARE ANNUAL WELLNESS VISIT  05/17/2020     DEXA Q3 YR  03/01/2021     DTAP/TDAP/TD IMMUNIZATION (3 - Td) 03/11/2023     PHQ-2  Completed     INFLUENZA VACCINE  Completed     HEPATITIS C SCREENING  Completed     IPV IMMUNIZATION  Aged Out     MENINGITIS IMMUNIZATION  Aged Out     BP Readings from Last 3 Encounters:   05/17/19 128/83   10/17/18 130/68   03/14/18 151/86    Wt Readings from Last 3 Encounters:   05/17/19 80.7 kg (178 lb)   10/17/18 80.7 kg (177 lb 14.4 oz)   03/14/18 79.4 kg (175 lb)                  Patient Active Problem List   Diagnosis      Allergic rhinitis     Essential hypertension, benign     Postmenopausal atrophic vaginitis     Bee sting reaction     Torticollis     Type 2 diabetes mellitus without complications (H)     HYPERLIPIDEMIA LDL GOAL <100     Post-nasal drip     Advanced directives, counseling/discussion     Osteopenia     Urticaria     Arthritis of knee     Pes planus     Tinnitus     Past Surgical History:   Procedure Laterality Date     ABDOMEN SURGERY  1988    ceasaran birth     C  DELIVERY ONLY       C LIGATE FALLOPIAN TUBE,POSTPARTUM       C NONSPECIFIC PROCEDURE      right ankle roe and pins     COLONOSCOPY       ENT SURGERY      frequent sinus infections     HC ENDOMETRIAL BIOPSY W/O CERVICAL DILATION       ORTHOPEDIC SURGERY      broken right ankle/plates and screws     SOFT TISSUE SURGERY      tendonitis in right upper leg and below right elbow       Social History     Tobacco Use     Smoking status: Former Smoker     Packs/day: 1.00     Years: 10.00     Pack years: 10.00     Types: Cigarettes     Last attempt to quit: 1982     Years since quittin.7     Smokeless tobacco: Never Used   Substance Use Topics     Alcohol use: Yes     Alcohol/week: 0.0 oz     Comment: wine 1 glass qd, occasional beer     Family History   Problem Relation Age of Onset     Musculoskeletal Disorder Mother         b:1938   Hx Fibromyalgia     Hypertension Mother      Cerebrovascular Disease Mother      Anesthesia Reaction Mother      Cerebrovascular Disease Father         b:,  age 68  massive stroke     Diabetes Father         dx age 50s and his family     Gastrointestinal Disease Sister         b:   Hx of reflux     Cerebrovascular Disease Sister      Family History Negative Brother         b:     Hypertension Daughter      Family History Negative Daughter         b:     Diabetes Daughter         b:    Diabetes dx age 22**insulin     Lipids Son         b: high cholosterol and  triglycerides     Cancer Maternal Grandfather         throat     Cerebrovascular Disease Sister 62        ? TIA     Diabetes Daughter      Hypertension Daughter          Current Outpatient Medications   Medication Sig Dispense Refill     ASPIRIN 81 MG OR TABS 1 tab po QD (Once per day)       atorvastatin (LIPITOR) 40 MG tablet Take 1 tablet (40 mg) by mouth daily 90 tablet 3     blood glucose monitoring (ACCU-CHEK FASTCLIX) lancets Use to test blood sugar 1 time daily or as directed. 100 each 3     blood glucose monitoring (ACCU-CHEK SIXTO SMARTVIEW) meter device kit Use to test blood sugar 1 time daily or as directed. 1 kit 0     blood glucose monitoring (ACCU-CHEK SMARTVIEW) test strip Use to test blood sugar 1 time daily or as directed. 100 each 3     Cholecalciferol (VITAMIN D3 PO) Take 2,000 Units by mouth daily Dose unknown       EPIPEN 2-THERESA 0.3 MG/0.3ML injection 2-pack INJECT 0.3 MLS INTO THE MUSCLE ONCE AS NEEDED FOR ANAPHYLAXIS 2 mL 1     fexofenadine (ALLEGRA) 180 MG tablet Take 180 mg by mouth daily       fluticasone (FLONASE) 50 MCG/ACT spray Spray 1-2 sprays into both nostrils daily 16 g 0     lisinopril-hydrochlorothiazide (PRINZIDE/ZESTORETIC) 20-25 MG tablet Take 0.5 tablets by mouth daily 45 tablet 0     metFORMIN (GLUCOPHAGE-XR) 500 MG 24 hr tablet take 2 tablets by mouth daily (with breakfast) 180 tablet 0     Multiple Vitamins-Minerals (EMERGEN-C IMMUNE) PACK Take 1 packet by mouth daily as needed        Omega-3 Fatty Acids (OMEGA-3 FISH OIL PO) Take 1 g by mouth daily        sodium chloride (OCEAN) 0.65 % nasal spray Spray 1 spray into both nostrils daily as needed for congestion       vitamin B complex with vitamin C (VITAMIN  B COMPLEX) TABS tablet Take 1 tablet by mouth daily           Review of Systems  Constitutional, HEENT, cardiovascular, pulmonary, GI, , musculoskeletal, neuro, skin, endocrine and psych systems are negative, except as otherwise noted.    OBJECTIVE:   /83 (BP  "Location: Left arm, Cuff Size: Adult Regular)   Pulse 80   Temp 98  F (36.7  C) (Tympanic)   Ht 1.594 m (5' 2.76\")   Wt 80.7 kg (178 lb)   LMP 03/17/2001   SpO2 98%   Breastfeeding? No   BMI 31.78 kg/m   Estimated body mass index is 31.78 kg/m  as calculated from the following:    Height as of this encounter: 1.594 m (5' 2.76\").    Weight as of this encounter: 80.7 kg (178 lb).  Physical Exam  GENERAL APPEARANCE: healthy, alert and no distress  EYES: Eyes grossly normal to inspection, PERRL and conjunctivae and sclerae normal  HENT: ear canals and TM's normal, nose and mouth without ulcers or lesions, oropharynx clear and oral mucous membranes moist  NECK: no adenopathy, no asymmetry, masses, or scars and thyroid normal to palpation  RESP: lungs clear to auscultation - no rales, rhonchi or wheezes  BREAST: normal without masses, tenderness or nipple discharge and no palpable axillary masses or adenopathy  CV: regular rate and rhythm, normal S1 S2, no S3 or S4, no murmur, click or rub, no peripheral edema and peripheral pulses strong  ABDOMEN: soft, nontender, no hepatosplenomegaly, no masses and bowel sounds normal  MS:feet flat  Bunions   no musculoskeletal defects are noted and gait is age appropriate without ataxia  SKIN: no suspicious lesions or rashes  NEURO: Normal strength and tone, sensory exam grossly normal, mentation intact and speech normal  PSYCH: mentation appears normal and affect normal/bright    Foot exam shows no ulceration, no neuropathy, microfilament well felt. Skin on the feet not dry.  Pulses in the feet well felt.  varicose veins      ASSESSMENT / PLAN:   Elvia was seen today for physical.    Diagnoses and all orders for this visit:    Routine general medical examination at a health care facility  pneumovoax  We talked about Shingrix also  Mammogram   Hyperlipidemia LDL goal <100  -     Lipid panel reflex to direct LDL Fasting  -     atorvastatin (LIPITOR) 40 MG tablet; Take 1 " "tablet (40 mg) by mouth daily    Type 2 diabetes mellitus without complication, without long-term current use of insulin (H)  -     Hemoglobin A1c  -     Basic metabolic panel  -     Albumin Random Urine Quantitative with Creat Ratio  -     TSH with free T4 reflex  -     Albumin Random Urine Quantitative with Creat Ratio  -     Lipid panel reflex to direct LDL Fasting  -     metFORMIN (GLUCOPHAGE-XR) 500 MG 24 hr tablet; 1000 mg daily    Arthritis of knee  She also has flat feet  She is encouraged to use compression stockings  Bee sting reaction, undetermined intent, sequela  She uses EpiPen as needed  Essential hypertension, benign  -     lisinopril-hydrochlorothiazide (PRINZIDE/ZESTORETIC) 20-25 MG tablet; Take 0.5 tablets by mouth daily  She has normal blood pressure  Special screening for malignant neoplasms, colon  -     GASTROENTEROLOGY ADULT REF PROCEDURE ONLY Carlos Enriquebeatris [facility]ersge [phone]    Varicose veins  She is encouraged to lose weight  She will go to travel clinic before going to [place]  She will use compression stockings  She does not need surgery  End of Life Planning:  Patient currently has an advanced directive: No.  I have verified the patient's ablity to prepare an advanced directive/make health care decisions.  Literature was provided to assist patient in preparing an advanced directive.    COUNSELING:  Reviewed preventive health counseling, as reflected in patient instructions       Regular exercise       Healthy diet/nutrition    Estimated body mass index is 31.78 kg/m  as calculated from the following:    Height as of this encounter: 1.594 m (5' 2.76\").    Weight as of this encounter: 80.7 kg (178 lb).    Weight management plan: Discussed healthy diet and exercise guidelines     reports that she quit smoking about 36 years ago. Her smoking use included cigarettes. She has a 10.00 pack-year smoking history. She has never used smokeless tobacco.      Appropriate preventive services " were discussed with this patient, including applicable screening as appropriate for cardiovascular disease, diabetes, osteopenia/osteoporosis, and glaucoma.  As appropriate for age/gender, discussed screening for colorectal cancer,  breast cancer, and cervical cancer. Checklist reviewing preventive services available has been given to the patient.    Reviewed patients plan of care and provided an AVS. The Basic Care Plan (routine screening as documented in Health Maintenance) for Elvia meets the Care Plan requirement. This Care Plan has been established and reviewed with the Patient.    Counseling Resources:  ATP IV Guidelines  Pooled Cohorts Equation Calculator  Breast Cancer Risk Calculator  FRAX Risk Assessment  ICSI Preventive Guidelines  Dietary Guidelines for Americans, 2010  USDA's MyPlate  ASA Prophylaxis  Lung CA Screening    Masood Osborne MD  [facility]    Identified Health Risks:    The patient was provided with written information regarding signs of hearing loss.

## 2019-05-21 DIAGNOSIS — E11.9 TYPE 2 DIABETES MELLITUS WITHOUT COMPLICATION, WITHOUT LONG-TERM CURRENT USE OF INSULIN (H): Primary | ICD-10-CM

## 2019-07-02 ENCOUNTER — TRANSFERRED RECORDS (OUTPATIENT)
Dept: HEALTH INFORMATION MANAGEMENT | Facility: CLINIC | Age: 69
End: 2019-07-02

## 2019-07-02 LAB — RETINOPATHY: NORMAL

## 2019-08-19 ENCOUNTER — OFFICE VISIT (OUTPATIENT)
Dept: FAMILY MEDICINE | Facility: CLINIC | Age: 69
End: 2019-08-19
Payer: COMMERCIAL

## 2019-08-19 VITALS
SYSTOLIC BLOOD PRESSURE: 150 MMHG | TEMPERATURE: 98.5 F | OXYGEN SATURATION: 96 % | HEART RATE: 87 BPM | DIASTOLIC BLOOD PRESSURE: 91 MMHG | RESPIRATION RATE: 16 BRPM

## 2019-08-19 DIAGNOSIS — Z71.84 TRAVEL ADVICE ENCOUNTER: Primary | ICD-10-CM

## 2019-08-19 PROCEDURE — 99402 PREV MED CNSL INDIV APPRX 30: CPT | Mod: 25 | Performed by: NURSE PRACTITIONER

## 2019-08-19 PROCEDURE — 90636 HEP A/HEP B VACC ADULT IM: CPT | Performed by: NURSE PRACTITIONER

## 2019-08-19 PROCEDURE — 90471 IMMUNIZATION ADMIN: CPT | Performed by: NURSE PRACTITIONER

## 2019-08-19 RX ORDER — AZITHROMYCIN 250 MG/1
TABLET, FILM COATED ORAL
Qty: 6 TABLET | Refills: 0 | Status: SHIPPED | OUTPATIENT
Start: 2019-08-19 | End: 2019-09-11

## 2019-08-19 RX ORDER — ATOVAQUONE AND PROGUANIL HYDROCHLORIDE 250; 100 MG/1; MG/1
1 TABLET, FILM COATED ORAL DAILY
Qty: 16 TABLET | Refills: 0 | Status: SHIPPED | OUTPATIENT
Start: 2019-08-19 | End: 2019-11-18

## 2019-08-19 NOTE — PATIENT INSTRUCTIONS
Today August 19, 2019 you received the    Twinrix (Hepatitis A & B combo) Vaccine - Please return on 9/18/19 for your 2nd dose and 2/15/20 or later for your 3rd and final dose.  .    These appointments can be made as a NURSE ONLY visit.    **It is very important for the vaccinations to be given on the scheduled day(s), this helps ensure you receive the full effectiveness of the vaccine.**    Please call [facility] with any questions [phone]    Thank you for visiting [facility]

## 2019-08-19 NOTE — PROGRESS NOTES
Nurse Note      Itinerary:  [place]       Departure Date: 09/23/19      Return Date: 10/13/19      Length of Trip a little less than a month      Reason for Travel: pleasure (vacation)           Urban or rural: both      Accommodations: Hotel        IMMUNIZATION HISTORY  Have you received any immunizations within the past 4 weeks?  No  Have you ever fainted from having your blood drawn or from an injection?  No  Have you ever had a fever reaction to vaccination?  No  Have you ever had any bad reaction or side effect from any vaccination?  No  Have you ever had hepatitis A or B vaccine?  N/A  Do you live (or work closely) with anyone who has AIDS, an AIDS-like condition, any other immune disorder or who is on chemotherapy for cancer?  No  Do you have a family history of immunodeficiency?  No  Have you received any injection of immune globulin or any blood products during the past 12 months?  No    Patient roomed by Justina Helm is a 69 year old female seen today with spouse for counsultation for international travel to the stated countries.   Patient will be departing in  1 month(s) and  traveling with spouse  And a private tour.      Patient itinerary :  will be in the [facility] [place] [facility], [facility]  which presents risk for Malaria. exposure.      Patient's activities will include sightseeing and safari/game [place].    Patient's country of birth is [place]    Special medical concerns: Diabetes  Pre-travel questionnaire was completed by patient and reviewed by provider.     Vitals: BP (!) 150/91   Pulse 87   Temp 98.5  F (36.9  C) (Oral)   Resp 16   LMP 03/17/2001   SpO2 96%   BMI= There is no height or weight on file to calculate BMI.    EXAM:  General:  Well-nourished, well-developed in no acute distress.  Appears to be stated age, interacts appropriately and expresses understanding of information given to patient.    Current Outpatient Medications    Medication Sig Dispense Refill     ASPIRIN 81 MG OR TABS 1 tab po QD (Once per day)       atorvastatin (LIPITOR) 40 MG tablet Take 1 tablet (40 mg) by mouth daily 90 tablet 3     atovaquone-proguanil (MALARONE) 250-100 MG tablet Take 1 tablet by mouth daily Start 2 days before exposure to Malaria and continue daily till  7 days after exposure. 16 tablet 0     azithromycin (ZITHROMAX) 250 MG tablet Two tablets first day, then one tablet daily for four days. 6 tablet 0     blood glucose monitoring (ACCU-CHEK FASTCLIX) lancets Use to test blood sugar 1 time daily or as directed. 100 each 3     blood glucose monitoring (ACCU-CHEK SIXTO SMARTVIEW) meter device kit Use to test blood sugar 1 time daily or as directed. 1 kit 0     blood glucose monitoring (ACCU-CHEK SMARTVIEW) test strip Use to test blood sugar 1 time daily or as directed. 100 each 3     Cholecalciferol (VITAMIN D3 PO) Take 2,000 Units by mouth daily Dose unknown       EPIPEN 2-THERESA 0.3 MG/0.3ML injection 2-pack INJECT 0.3 MLS INTO THE MUSCLE ONCE AS NEEDED FOR ANAPHYLAXIS 2 mL 1     fexofenadine (ALLEGRA) 180 MG tablet Take 180 mg by mouth daily       fluticasone (FLONASE) 50 MCG/ACT spray Spray 1-2 sprays into both nostrils daily 16 g 0     lisinopril-hydrochlorothiazide (PRINZIDE/ZESTORETIC) 20-25 MG tablet Take 0.5 tablets by mouth daily 45 tablet 3     metFORMIN (GLUCOPHAGE-XR) 500 MG 24 hr tablet 1000 mg daily 180 tablet 3     Multiple Vitamins-Minerals (EMERGEN-C IMMUNE) PACK Take 1 packet by mouth daily as needed        Omega-3 Fatty Acids (OMEGA-3 FISH OIL PO) Take 1 g by mouth daily        sodium chloride (OCEAN) 0.65 % nasal spray Spray 1 spray into both nostrils daily as needed for congestion       vitamin B complex with vitamin C (VITAMIN  B COMPLEX) TABS tablet Take 1 tablet by mouth daily       Patient Active Problem List   Diagnosis     Allergic rhinitis     Essential hypertension, benign     Postmenopausal atrophic vaginitis     Bee sting  reaction     Torticollis     Type 2 diabetes mellitus without complications (H)     HYPERLIPIDEMIA LDL GOAL <100     Post-nasal drip     Advanced directives, counseling/discussion     Osteopenia     Urticaria     Arthritis of knee     Pes planus     Tinnitus     Allergies   Allergen Reactions     Amoxicillin Hives     Bee Swelling     HIVES AND SWELLING --carries Epi Pen      Ceftin      hives     Clindamycin Hives     Erythromycin GI Disturbance     Seasonal Allergies      Shrimp Hives     Happened twice     Tetracycline Other (See Comments)     ?severe headaches  & nausea          Immunizations discussed include:   Hepatitis A:  Twin Pato series started today  Hepatitis B: Twin Pato series started today  Influenza: vaccine is not available  Typhoid: Declined  Not concerned about risk of disease  Rabies: Declined  Not concerned about risk of disease  reviewed managment of a animal bite or scratch (washing wound, seek medical care within 24 hours for post exposure prophylaxis )  Yellow Fever: Not indicated  Japanese Encephalitis: Not indicated  Meningococcus: Not indicated  Tetanus/Diphtheria: Up to date  Measles/Mumps/Rubella: Immune by disease history per patient report  Cholera: Not needed  Polio: Up to date  Pneumococcal: Up to date  Varicella: Immune by disease history per patient report  Zostavax:  Up to date  Shingrix: deferred  HPV:  Not indicated  TB:  Low risk     Altitude Exposure on this trip: no  Past tolerance to Altitude: na    ASSESSMENT/PLAN:    ICD-10-CM    1. Travel advice encounter Z71.89 atovaquone-proguanil (MALARONE) 250-100 MG tablet     azithromycin (ZITHROMAX) 250 MG tablet     I have reviewed general recommendations for safe travel   including: food/water precautions, insect precautions, safer sex   practices given high prevalence of Zika, HIV and other STDs,   roadway safety. Educational materials and [employer] report provided.    Compression stockings discussed for the prevention of  DVT    Malaraia prophylaxis recommended: Malarone  Symptomatic treatment for traveler's diarrhea: azithromycin  Altitude illness prevention and treatment: none      Evacuation insurance advised and resources were provided to patient.    Total visit time 30 minutes  with over 50% of time spent counseling patient as detailed above.    Sherrill Coronel CNP

## 2019-08-23 ENCOUNTER — OFFICE VISIT (OUTPATIENT)
Dept: URGENT CARE | Facility: URGENT CARE | Age: 69
End: 2019-08-23
Payer: COMMERCIAL

## 2019-08-23 VITALS
WEIGHT: 180 LBS | HEART RATE: 95 BPM | RESPIRATION RATE: 16 BRPM | TEMPERATURE: 98.5 F | HEIGHT: 64 IN | DIASTOLIC BLOOD PRESSURE: 78 MMHG | SYSTOLIC BLOOD PRESSURE: 118 MMHG | BODY MASS INDEX: 30.73 KG/M2 | OXYGEN SATURATION: 96 %

## 2019-08-23 DIAGNOSIS — J01.90 ACUTE SINUSITIS WITH SYMPTOMS > 10 DAYS: Primary | ICD-10-CM

## 2019-08-23 PROCEDURE — 99213 OFFICE O/P EST LOW 20 MIN: CPT | Performed by: FAMILY MEDICINE

## 2019-08-23 RX ORDER — AZITHROMYCIN 250 MG/1
TABLET, FILM COATED ORAL
Qty: 6 TABLET | Refills: 0 | Status: SHIPPED | OUTPATIENT
Start: 2019-08-23 | End: 2019-09-11

## 2019-08-23 RX ORDER — MUPIROCIN 20 MG/G
OINTMENT TOPICAL 2 TIMES DAILY
Qty: 22 G | Refills: 0 | Status: SHIPPED | OUTPATIENT
Start: 2019-08-23 | End: 2019-09-11

## 2019-08-23 ASSESSMENT — MIFFLIN-ST. JEOR: SCORE: 1321.47

## 2019-08-23 NOTE — PROGRESS NOTES
SUBJECTIVE: Elvia Helm is a 69 year old female patient complaining of sinus congestion for 10 day(s).     OBJECTIVE: The patient appears healthy, alert and mild distress.   EARS: External ears normal. Canals clear. TM's normal.  NOSE/SINUS: positive findings: mucosa erythematous and swollen  Sinus palpation: Frontal sinus nontender to palpation   THROAT: mild erythema   NECK:positive findings: moderate anterior cervical nodes   CHEST: Clear    ASSESSMENT: 1.  Sinusitis 2. Also with nasal infection    PLAN: See orders.   In addition, I have suggested that the patient   Push fluids    Will oral antibiotic and will use nasal gel to the area. .

## 2019-09-01 ENCOUNTER — TRANSFERRED RECORDS (OUTPATIENT)
Dept: HEALTH INFORMATION MANAGEMENT | Facility: CLINIC | Age: 69
End: 2019-09-01

## 2019-09-01 LAB — RETINOPATHY: NORMAL

## 2019-09-11 ENCOUNTER — OFFICE VISIT (OUTPATIENT)
Dept: FAMILY MEDICINE | Facility: CLINIC | Age: 69
End: 2019-09-11
Payer: COMMERCIAL

## 2019-09-11 VITALS
SYSTOLIC BLOOD PRESSURE: 136 MMHG | HEIGHT: 64 IN | OXYGEN SATURATION: 98 % | HEART RATE: 91 BPM | BODY MASS INDEX: 30.23 KG/M2 | TEMPERATURE: 98.4 F | DIASTOLIC BLOOD PRESSURE: 82 MMHG | WEIGHT: 177.1 LBS

## 2019-09-11 DIAGNOSIS — J01.90 ACUTE SINUSITIS WITH SYMPTOMS > 10 DAYS: Primary | ICD-10-CM

## 2019-09-11 PROCEDURE — 99207 C PAF COMPLETED  NO CHARGE: CPT | Mod: 25 | Performed by: PHYSICIAN ASSISTANT

## 2019-09-11 PROCEDURE — 99213 OFFICE O/P EST LOW 20 MIN: CPT | Performed by: PHYSICIAN ASSISTANT

## 2019-09-11 RX ORDER — LEVOFLOXACIN 500 MG/1
500 TABLET, FILM COATED ORAL DAILY
Qty: 10 TABLET | Refills: 0 | Status: SHIPPED | OUTPATIENT
Start: 2019-09-11 | End: 2019-11-18

## 2019-09-11 ASSESSMENT — MIFFLIN-ST. JEOR: SCORE: 1308.32

## 2019-09-11 NOTE — PROGRESS NOTES
Subjective     Elvia Helm is a 69 year old female who presents to clinic today for the following health issues:    She notes swollen glands for about a month  Denies a sore throat  She does feels some nasal congestion  Denies rhinorrhea but does have some post nasal drip  Denies cough  Eyes feel normal now.  These started 2 days after Twinrix vaccine on 19  She used to get sinus infections a lot but found out had tooth abscess  Hasn't had a sinus infection since last 2018  Denies fever, chills or body aches  She does live with 3 yo granddtr who is not currently ill.    At the time of the urgent care visit she was having sinus pain and eye drainage  She was prescribed Zpack which did help but she just hasn't returned to her baseline  She has a safari planned for 19  She does take Allegra 180mg daily and compliant with that  She does mostly use her flonase daily        HPI   ED/UC Followup:    Facility:  [facility]  Date of visit: 2019  Reason for visit: Acute sinusitis with symptoms > 10 days   Current Status: Facial pressure  Under right eye continues. Traveling to [place] leaving 19     Past Medical History:   Diagnosis Date     Allergic rhinitis, cause unspecified a     Allergic rhinitis, cause unspecified      Arthritis of knee 2014     Bee sting reaction      DIABETES TYPE II 2003     Essential hypertension, benign      Flat foot(734) 2014     Hyperlipidaemia LDL goal < 100      Localized osteoarthrosis not specified whether primary or secondary, ankle and foot     After surgery     Tinnitus 2014     Past Surgical History:   Procedure Laterality Date     ABDOMEN SURGERY  1988    ceasaran birth     C  DELIVERY ONLY       C LIGATE FALLOPIAN TUBE,POSTPARTUM       C NONSPECIFIC PROCEDURE      right ankle roe and pins     COLONOSCOPY       ENT SURGERY      frequent sinus infections     HC ENDOMETRIAL BIOPSY  W/O CERVICAL DILATION       ORTHOPEDIC SURGERY      broken right ankle/plates and screws     SOFT TISSUE SURGERY      tendonitis in right upper leg and below right elbow     Social History     Tobacco Use     Smoking status: Former Smoker     Packs/day: 1.00     Years: 10.00     Pack years: 10.00     Types: Cigarettes     Last attempt to quit: 1982     Years since quittin.0     Smokeless tobacco: Never Used   Substance Use Topics     Alcohol use: Yes     Alcohol/week: 0.0 oz     Comment: wine 1 glass qd, occasional beer     Current Outpatient Medications   Medication Sig Dispense Refill     ASPIRIN 81 MG OR TABS 1 tab po QD (Once per day)       atorvastatin (LIPITOR) 40 MG tablet Take 1 tablet (40 mg) by mouth daily 90 tablet 3     blood glucose monitoring (ACCU-CHEK FASTCLIX) lancets Use to test blood sugar 1 time daily or as directed. 100 each 3     blood glucose monitoring (ACCU-CHEK SIXTO SMARTVIEW) meter device kit Use to test blood sugar 1 time daily or as directed. 1 kit 0     blood glucose monitoring (ACCU-CHEK SMARTVIEW) test strip Use to test blood sugar 1 time daily or as directed. 100 each 3     Cholecalciferol (VITAMIN D3 PO) Take 2,000 Units by mouth daily Dose unknown       EPIPEN 2-THREESA 0.3 MG/0.3ML injection 2-pack INJECT 0.3 MLS INTO THE MUSCLE ONCE AS NEEDED FOR ANAPHYLAXIS 2 mL 1     fexofenadine (ALLEGRA) 180 MG tablet Take 180 mg by mouth daily       fluticasone (FLONASE) 50 MCG/ACT spray Spray 1-2 sprays into both nostrils daily 16 g 0     levofloxacin (LEVAQUIN) 500 MG tablet Take 1 tablet (500 mg) by mouth daily 10 tablet 0     lisinopril-hydrochlorothiazide (PRINZIDE/ZESTORETIC) 20-25 MG tablet Take 0.5 tablets by mouth daily 45 tablet 3     metFORMIN (GLUCOPHAGE-XR) 500 MG 24 hr tablet 1000 mg daily 180 tablet 3     Multiple Vitamins-Minerals (EMERGEN-C IMMUNE) PACK Take 1 packet by mouth daily as needed        Omega-3 Fatty Acids (OMEGA-3 FISH OIL PO) Take 1 g by mouth daily    "     sodium chloride (OCEAN) 0.65 % nasal spray Spray 1 spray into both nostrils daily as needed for congestion       vitamin B complex with vitamin C (VITAMIN  B COMPLEX) TABS tablet Take 1 tablet by mouth daily       atovaquone-proguanil (MALARONE) 250-100 MG tablet Take 1 tablet by mouth daily Start 2 days before exposure to Malaria and continue daily till  7 days after exposure. (Patient not taking: Reported on 9/11/2019) 16 tablet 0     Allergies   Allergen Reactions     Amoxicillin Hives     Bee Swelling     HIVES AND SWELLING --carries Epi Pen      Ceftin      hives     Clindamycin Hives     Erythromycin GI Disturbance     Seasonal Allergies      Shrimp Hives     Happened twice     Tetracycline Other (See Comments)     ?severe headaches  & nausea      FAMILY HISTORY NOTED AND REVIEWED    PHYSICAL EXAM:    /82 (BP Location: Left arm, Cuff Size: Adult Large)   Pulse 91   Temp 98.4  F (36.9  C) (Oral)   Ht 1.626 m (5' 4\")   Wt 80.3 kg (177 lb 1.6 oz)   LMP 03/17/2001   SpO2 98%   BMI 30.40 kg/m      Patient appears non toxic  Ears: TMs pearly grey  Nose: normal  +maxillary sinus tenderness  Throat: no erythema or exudates  Neck: supple without LA  Lungs: CTA bilat    Assessment and Plan:     (J01.90) Acute sinusitis with symptoms > 10 days  (primary encounter diagnosis)  Comment: she did note some improvement on Zpack but still not feeling well.  Plan: levofloxacin (LEVAQUIN) 500 MG tablet        every day x 10d, push fluids, cont allegra. F/u if sxs worsen or persist.      Senait Cohen PA-C        "

## 2019-11-06 ENCOUNTER — HEALTH MAINTENANCE LETTER (OUTPATIENT)
Age: 69
End: 2019-11-06

## 2019-11-18 ENCOUNTER — OFFICE VISIT (OUTPATIENT)
Dept: FAMILY MEDICINE | Facility: CLINIC | Age: 69
End: 2019-11-18
Payer: COMMERCIAL

## 2019-11-18 VITALS
WEIGHT: 178 LBS | HEART RATE: 84 BPM | SYSTOLIC BLOOD PRESSURE: 135 MMHG | TEMPERATURE: 98.1 F | BODY MASS INDEX: 30.39 KG/M2 | HEIGHT: 64 IN | DIASTOLIC BLOOD PRESSURE: 83 MMHG | OXYGEN SATURATION: 98 %

## 2019-11-18 DIAGNOSIS — I10 ESSENTIAL HYPERTENSION, BENIGN: ICD-10-CM

## 2019-11-18 DIAGNOSIS — Z23 NEED FOR PROPHYLACTIC VACCINATION AND INOCULATION AGAINST INFLUENZA: ICD-10-CM

## 2019-11-18 DIAGNOSIS — Z23 NEED FOR VACCINATION: ICD-10-CM

## 2019-11-18 DIAGNOSIS — E11.9 TYPE 2 DIABETES MELLITUS WITHOUT COMPLICATION, WITHOUT LONG-TERM CURRENT USE OF INSULIN (H): Primary | ICD-10-CM

## 2019-11-18 DIAGNOSIS — Z12.31 ENCOUNTER FOR SCREENING MAMMOGRAM FOR BREAST CANCER: ICD-10-CM

## 2019-11-18 DIAGNOSIS — T63.441S BEE STING REACTION, ACCIDENTAL OR UNINTENTIONAL, SEQUELA: ICD-10-CM

## 2019-11-18 DIAGNOSIS — Z12.11 SCREEN FOR COLON CANCER: ICD-10-CM

## 2019-11-18 LAB — HBA1C MFR BLD: 6.1 % (ref 0–5.6)

## 2019-11-18 PROCEDURE — 83036 HEMOGLOBIN GLYCOSYLATED A1C: CPT | Performed by: INTERNAL MEDICINE

## 2019-11-18 PROCEDURE — 90636 HEP A/HEP B VACC ADULT IM: CPT | Performed by: INTERNAL MEDICINE

## 2019-11-18 PROCEDURE — G0008 ADMIN INFLUENZA VIRUS VAC: HCPCS | Performed by: INTERNAL MEDICINE

## 2019-11-18 PROCEDURE — 36415 COLL VENOUS BLD VENIPUNCTURE: CPT | Performed by: INTERNAL MEDICINE

## 2019-11-18 PROCEDURE — 99214 OFFICE O/P EST MOD 30 MIN: CPT | Mod: 25 | Performed by: INTERNAL MEDICINE

## 2019-11-18 PROCEDURE — 90662 IIV NO PRSV INCREASED AG IM: CPT | Performed by: INTERNAL MEDICINE

## 2019-11-18 PROCEDURE — 90472 IMMUNIZATION ADMIN EACH ADD: CPT | Performed by: INTERNAL MEDICINE

## 2019-11-18 RX ORDER — LISINOPRIL AND HYDROCHLOROTHIAZIDE 20; 25 MG/1; MG/1
0.5 TABLET ORAL DAILY
Qty: 45 TABLET | Refills: 3 | Status: SHIPPED | OUTPATIENT
Start: 2019-11-18 | End: 2020-06-04

## 2019-11-18 RX ORDER — METFORMIN HYDROCHLORIDE 750 MG/1
750 TABLET, EXTENDED RELEASE ORAL
Qty: 90 TABLET | Refills: 3 | Status: SHIPPED | OUTPATIENT
Start: 2019-11-18 | End: 2020-06-02 | Stop reason: DRUGHIGH

## 2019-11-18 RX ORDER — METFORMIN HCL 500 MG
TABLET, EXTENDED RELEASE 24 HR ORAL
Qty: 180 TABLET | Refills: 3 | Status: SHIPPED | OUTPATIENT
Start: 2019-11-18 | End: 2019-11-18

## 2019-11-18 RX ORDER — EPINEPHRINE 0.3 MG/.3ML
INJECTION SUBCUTANEOUS
Qty: 2 ML | Refills: 11 | Status: SHIPPED | OUTPATIENT
Start: 2019-11-18 | End: 2020-11-19

## 2019-11-18 ASSESSMENT — MIFFLIN-ST. JEOR: SCORE: 1312.4

## 2019-11-18 NOTE — PROGRESS NOTES
Subjective     Elvia Helm is a 69 year old female who presents to clinic today for the following health issues:    HPI     Diabetes Follow-up  Patient checks blood glucose in AM  138-140  She has no low reactions    Diabetes Management Resources    Hyperlipidemia Follow-Up  Patient is doing pretty well  LDL Cholesterol Calculated   Date Value Ref Range Status   2019 66 <100 mg/dL Final     Comment:     Desirable:       <100 mg/dl       Hypertension Follow-up  Excellent control at home    BP Readings from Last 2 Encounters:   19 135/83   19 136/82     Hemoglobin A1C (%)   Date Value   2019 6.1 (H)   10/17/2018 6.3 (H)     LDL Cholesterol Calculated (mg/dL)   Date Value   2019 66   2018 78             Patient Active Problem List   Diagnosis     Allergic rhinitis     Essential hypertension, benign     Postmenopausal atrophic vaginitis     Bee sting reaction     Torticollis     Type 2 diabetes mellitus without complications (H)     HYPERLIPIDEMIA LDL GOAL <100     Post-nasal drip     Advanced directives, counseling/discussion     Osteopenia     Urticaria     Arthritis of knee     Pes planus     Tinnitus     Past Surgical History:   Procedure Laterality Date     ABDOMEN SURGERY  1988    ceasaran birth     C  DELIVERY ONLY       C LIGATE FALLOPIAN TUBE,POSTPARTUM       C NONSPECIFIC PROCEDURE      right ankle roe and pins     COLONOSCOPY       ENT SURGERY      frequent sinus infections     HC ENDOMETRIAL BIOPSY W/O CERVICAL DILATION       ORTHOPEDIC SURGERY      broken right ankle/plates and screws     SOFT TISSUE SURGERY      tendonitis in right upper leg and below right elbow       Social History     Tobacco Use     Smoking status: Former Smoker     Packs/day: 1.00     Years: 10.00     Pack years: 10.00     Types: Cigarettes     Last attempt to quit: 1982     Years since quittin.2     Smokeless tobacco: Never Used   Substance Use Topics      Alcohol use: Yes     Alcohol/week: 0.0 standard drinks     Comment: wine 1 glass qd, occasional beer     Family History   Problem Relation Age of Onset     Musculoskeletal Disorder Mother         b:193   Hx Fibromyalgia     Hypertension Mother      Cerebrovascular Disease Mother      Anesthesia Reaction Mother      Cerebrovascular Disease Father         b:,  age 68  massive stroke     Diabetes Father         dx age 50s and his family     Gastrointestinal Disease Sister         b:1950   Hx of reflux     Cerebrovascular Disease Sister      Family History Negative Brother         b:     Hypertension Daughter      Family History Negative Daughter         b:     Diabetes Daughter         b:    Diabetes dx age 22**insulin     Lipids Son         b: high cholosterol and triglycerides     Cancer Maternal Grandfather         throat     Cerebrovascular Disease Sister 62        ? TIA     Diabetes Daughter      Hypertension Daughter          Current Outpatient Medications   Medication Sig Dispense Refill     ASPIRIN 81 MG OR TABS 1 tab po QD (Once per day)       atorvastatin (LIPITOR) 40 MG tablet Take 1 tablet (40 mg) by mouth daily 90 tablet 3     blood glucose monitoring (ACCU-CHEK FASTCLIX) lancets Use to test blood sugar 1 time daily or as directed. 100 each 3     blood glucose monitoring (ACCU-CHEK SIXTO SMARTVIEW) meter device kit Use to test blood sugar 1 time daily or as directed. 1 kit 0     blood glucose monitoring (ACCU-CHEK SMARTVIEW) test strip Use to test blood sugar 1 time daily or as directed. 100 each 3     Cholecalciferol (VITAMIN D3 PO) Take 2,000 Units by mouth daily Dose unknown       EPINEPHrine (EPIPEN 2-THERESA) 0.3 MG/0.3ML injection 2-pack INJECT 0.3 MLS INTO THE MUSCLE ONCE AS NEEDED FOR ANAPHYLAXIS 2 mL 11     fexofenadine (ALLEGRA) 180 MG tablet Take 180 mg by mouth daily       fluticasone (FLONASE) 50 MCG/ACT spray Spray 1-2 sprays into both nostrils daily 16 g 0      "lisinopril-hydrochlorothiazide (PRINZIDE/ZESTORETIC) 20-25 MG tablet Take 0.5 tablets by mouth daily 45 tablet 3     metFORMIN (GLUCOPHAGE-XR) 750 MG 24 hr tablet Take 1 tablet (750 mg) by mouth daily (with dinner) 90 tablet 3     Multiple Vitamins-Minerals (EMERGEN-C IMMUNE) PACK Take 1 packet by mouth daily as needed        Omega-3 Fatty Acids (OMEGA-3 FISH OIL PO) Take 1 g by mouth daily        sodium chloride (OCEAN) 0.65 % nasal spray Spray 1 spray into both nostrils daily as needed for congestion       vitamin B complex with vitamin C (VITAMIN  B COMPLEX) TABS tablet Take 1 tablet by mouth daily       Allergies   Allergen Reactions     Malarone Hives     Amoxicillin Hives     Bee Swelling     HIVES AND SWELLING --carries Epi Pen      Ceftin      hives     Clindamycin Hives     Erythromycin GI Disturbance     Seasonal Allergies      Shrimp Hives     Happened twice     Tetracycline Other (See Comments)     ?severe headaches  & nausea          Reviewed and updated as needed this visit by Provider  Tobacco  Allergies  Meds  Problems  Med Hx  Surg Hx  Fam Hx         Review of Systems   10 point ROS of systems including Constitutional, Eyes, Respiratory, Cardiovascular, Gastroenterology, Genitourinary, Integumentary, Muscularskeletal, Psychiatric were all negative except for pertinent positives noted in my HPI.      Objective    /83 (BP Location: Left arm, Patient Position: Sitting, Cuff Size: Adult Large)   Pulse 84   Temp 98.1  F (36.7  C) (Oral)   Ht 1.626 m (5' 4\")   Wt 80.7 kg (178 lb)   LMP 03/17/2001   SpO2 98%   BMI 30.55 kg/m    Body mass index is 30.55 kg/m .  Physical Exam   GENERAL: healthy, alert and no distress  NECK: no adenopathy, no asymmetry, masses, or scars and thyroid normal to palpation  RESP: lungs clear to auscultation - no rales, rhonchi or wheezes  CV: regular rate and rhythm, normal S1 S2, no S3 or S4, no murmur, click or rub, no peripheral edema and peripheral pulses " strong  ABDOMEN: soft, nontender, no hepatosplenomegaly, no masses and bowel sounds normal  MS: no gross musculoskeletal defects noted, no edema    Foot exam shows no ulceration, no neuropathy, microfilament well felt. Skin on the feet not dry.  Pulses in the feet well felt.  Lab Results   Component Value Date    A1C 6.1 05/17/2019    A1C 6.3 10/17/2018    A1C 5.9 01/12/2018    A1C 6.1 07/11/2017    A1C 6.0 12/15/2016             Assessment & Plan     Elvia was seen today for hypertension, diabetes, lipids and imm/inj.    Diagnoses and all orders for this visit:    Type 2 diabetes mellitus without complication, without long-term current use of insulin (H)  -     Albumin Random Urine Quantitative with Creat Ratio; Future  -     HEMOGLOBIN A1C  -     Discontinue: metFORMIN (GLUCOPHAGE-XR) 500 MG 24 hr tablet; 1000 mg daily  -     metFORMIN (GLUCOPHAGE-XR) 750 MG 24 hr tablet; Take 1 tablet (750 mg) by mouth daily (with dinner)  -     **A1C FUTURE 6mo; Future  -     **Comprehensive metabolic panel FUTURE 6mo; Future    Essential hypertension, benign  -     lisinopril-hydrochlorothiazide (PRINZIDE/ZESTORETIC) 20-25 MG tablet; Take 0.5 tablets by mouth daily  -     Lipid panel reflex to direct LDL Fasting; Future  -     **Comprehensive metabolic panel FUTURE 6mo; Future    Bee sting reaction, accidental or unintentional, sequela  -     EPINEPHrine (EPIPEN 2-THERESA) 0.3 MG/0.3ML injection 2-pack; INJECT 0.3 MLS INTO THE MUSCLE ONCE AS NEEDED FOR ANAPHYLAXIS    Screen for colon cancer  -     GASTROENTEROLOGY ADULT REF PROCEDURE ONLY Other; [facility] [phone]    Need for prophylactic vaccination and inoculation against influenza  -     INFLUENZA (HIGH DOSE) 3 VALENT VACCINE [40986]  -     ADMIN INFLUENZA (For MEDICARE Patients ONLY) []    Encounter for screening mammogram for breast cancer  -     MA SCREENING DIGITAL BILAT - Future  (s+30); Future    Need for vaccination  -     Each additional admin.  (Right click  "and add QUANTITY)  [42407]      Will take Shingrix and flu shot today  Due for Twinrix  Also will do mammogram and is up-to-date on eye checkup  Advised to lose weight  We will reduce the dose of metformin because of her GI symptoms and she will take that after dinner  BMI:   Estimated body mass index is 30.55 kg/m  as calculated from the following:    Height as of this encounter: 1.626 m (5' 4\").    Weight as of this encounter: 80.7 kg (178 lb).   Weight management plan: Discussed healthy diet and exercise guidelines            Return in about 6 months (around 5/18/2020) for Physical Exam, diabetes.    Masood Osborne MD  [facility]      "

## 2019-11-18 NOTE — Clinical Note
Please abstract the following data from this visit with this patient into the appropriate field in Epic:Tests that can be patient reported without a hard copy:[facility] with [facility] this date[facility] Tests found in the patient's chart through Chart Review/Care Everywhere:{Abstract Quality List (Optional):530785}Note to Abstraction: If this section is blank, no results were found via Chart Review/Care Everywhere.

## 2019-11-18 NOTE — NURSING NOTE
Prior to immunization administration, verified patients identity using patient s name and date of birth. Please see Immunization Activity for additional information.     Screening Questionnaire for Adult Immunization    Are you sick today?   No   Do you have allergies to medications, food, a vaccine component or latex?   Yes   Have you ever had a serious reaction after receiving a vaccination?   No   Do you have a long-term health problem with heart disease, lung disease, asthma, kidney disease, metabolic disease (e.g. diabetes), anemia, or other blood disorder?  Yes   Do you have cancer, leukemia, HIV/AIDS, or any other immune system problem?   No   In the past 3 months, have you taken medications that affect  your immune system, such as prednisone, other steroids, or anticancer drugs; drugs for the treatment of rheumatoid arthritis, Crohn s disease, or psoriasis; or have you had radiation treatments?   No   Have you had a seizure, or a brain or other nervous system problem?   No   During the past year, have you received a transfusion of blood or blood     products, or been given immune (gamma) globulin or antiviral drug?   No   For women: Are you pregnant or is there a chance you could become        pregnant during the next month?   No   Have you received any vaccinations in the past 4 weeks?   No     Immunization questionnaire was positive for at least one answer.  Notified .        Per orders of Dr. Osborne, injection of TwinRix given by Jil Garduno CMA. Patient instructed to remain in clinic for 15 minutes afterwards, and to report any adverse reaction to me immediately.       Screening performed by Jil Garduno CMA on 11/18/2019 at 10:19 AM.

## 2019-11-18 NOTE — PATIENT INSTRUCTIONS
The shingrix is our newer vaccine and is recommended to healthy adults over 55. It's administered in 2 separate doses. You can just walk into the pharmacy and get it when convenient for you. However, it's on a national shortage currently so we recommend calling the pharmacy a head of time to verify they have it in stock ([facility] and many other pharmacies carry this). Generally speaking the pharmacy is covered better by insurance than the clinic.  Please check with your insurance to make sure what is covered/what your copay would be.     Here is a link from the CDC on Shingles Vaccines, where they provide information on this new Shingrix vaccine.   https://www.cdc.gov/shingles/vaccination.html

## 2019-11-18 NOTE — Clinical Note
Please abstract the following data from this visit with this patient into the appropriate field in Epic:Tests that can be patient reported without a hard copy:Eye exam with ophthalmology on this date:  [facility] Tests found in the patient's chart through Chart Review/Care Everywhere:{Abstract Quality List (Optional):405833}Note to Abstraction: If this section is blank, no results were found via Chart Review/Care Everywhere.

## 2019-11-19 ASSESSMENT — ASTHMA QUESTIONNAIRES: ACT_TOTALSCORE: 19

## 2020-01-06 DIAGNOSIS — Z12.31 ENCOUNTER FOR SCREENING MAMMOGRAM FOR BREAST CANCER: ICD-10-CM

## 2020-01-06 PROCEDURE — 77063 BREAST TOMOSYNTHESIS BI: CPT | Mod: TC

## 2020-01-06 PROCEDURE — 77067 SCR MAMMO BI INCL CAD: CPT | Mod: TC

## 2020-02-06 ENCOUNTER — TRANSFERRED RECORDS (OUTPATIENT)
Dept: HEALTH INFORMATION MANAGEMENT | Facility: CLINIC | Age: 70
End: 2020-02-06

## 2020-03-11 DIAGNOSIS — E11.9 TYPE 2 DIABETES MELLITUS WITHOUT COMPLICATION, WITHOUT LONG-TERM CURRENT USE OF INSULIN (H): Primary | ICD-10-CM

## 2020-03-12 RX ORDER — BLOOD SUGAR DIAGNOSTIC
STRIP MISCELLANEOUS
Qty: 100 EACH | Refills: 0 | Status: SHIPPED | OUTPATIENT
Start: 2020-03-12 | End: 2020-08-20

## 2020-03-12 RX ORDER — LANCETS
EACH MISCELLANEOUS
Qty: 102 EACH | Refills: 0 | Status: SHIPPED | OUTPATIENT
Start: 2020-03-12 | End: 2020-11-17

## 2020-03-12 NOTE — TELEPHONE ENCOUNTER
"blood glucose (ACCU-CHEK SMARTVIEW) test strip    Last Written Prescription Date:  10/17/2018  Last Fill Quantity: 100,  # refills: 3   Last office visit: 10/17/2018 with prescribing provider:  Dr. Osborne    Future Office Visit:   Next 5 appointments (look out 90 days)    Jun 04, 2020  9:00 AM CDT  Office Visit with Masood Osborne MD  [facility] [address] [address]  [phone]         blood glucose monitoring (ACCU-CHEK FASTCLIX) lancets    Last Written Prescription Date:  10/17/2018  Last Fill Quantity: 100,  # refills: 3   Last office visit: 10/17/2018 with prescribing provider:  Dr. Osborne    Future Office Visit:   Next 5 appointments (look out 90 days)    Jun 04, 2020  9:00 AM CDT  Office Visit with Masood Osborne MD  [facility] ([facility] [address] [place] [address]  [phone]         Requested Prescriptions   Pending Prescriptions Disp Refills     blood glucose (ACCU-CHEK SMARTVIEW) test strip [Pharmacy Med Name: Accu-Chek SmartView In Vitro Strip] 100 each 2     Sig: Use to test blood sugar 1 time daily or as directed.       Diabetic Supplies Protocol Passed - 3/11/2020 11:15 AM        Passed - Medication is active on med list        Passed - Patient is 18 years of age or older        Passed - Recent (6 mo) or future (30 days) visit within the authorizing provider's specialty     Patient had office visit in the last 6 months or has a visit in the next 30 days with authorizing provider.  See \"Patient Info\" tab in inbasket, or \"Choose Columns\" in Meds & Orders section of the refill encounter.               blood glucose monitoring (ACCU-CHEK FASTCLIX) lancets [Pharmacy Med Name: Accu-Chek FastClix Lancets Miscellaneous] 102 each 2     Sig: Use to test blood sugar 1 time daily or as directed.       Diabetic Supplies Protocol Passed - 3/11/2020 11:15 AM        Passed - Medication is active on med list        Passed " "- Patient is 18 years of age or older        Passed - Recent (6 mo) or future (30 days) visit within the authorizing provider's specialty     Patient had office visit in the last 6 months or has a visit in the next 30 days with authorizing provider.  See \"Patient Info\" tab in inbasket, or \"Choose Columns\" in Meds & Orders section of the refill encounter.                 "

## 2020-03-12 NOTE — TELEPHONE ENCOUNTER
Prescription approved per [facility] Refill Protocol.  Libra Beck RN     Return in about 6 months (around 5/18/2020) for Physical Exam, diabetes.

## 2020-05-15 ENCOUNTER — MYC MEDICAL ADVICE (OUTPATIENT)
Dept: FAMILY MEDICINE | Facility: CLINIC | Age: 70
End: 2020-05-15

## 2020-05-15 DIAGNOSIS — E11.9 TYPE 2 DIABETES MELLITUS WITHOUT COMPLICATION, WITHOUT LONG-TERM CURRENT USE OF INSULIN (H): Primary | ICD-10-CM

## 2020-05-15 RX ORDER — METFORMIN HCL 500 MG
1000 TABLET, EXTENDED RELEASE 24 HR ORAL
Qty: 180 TABLET | Refills: 3 | Status: SHIPPED | OUTPATIENT
Start: 2020-05-15 | End: 2020-06-04

## 2020-06-01 ENCOUNTER — MYC MEDICAL ADVICE (OUTPATIENT)
Dept: FAMILY MEDICINE | Facility: CLINIC | Age: 70
End: 2020-06-01

## 2020-06-02 ENCOUNTER — ALLIED HEALTH/NURSE VISIT (OUTPATIENT)
Dept: PHARMACY | Facility: CLINIC | Age: 70
End: 2020-06-02
Payer: COMMERCIAL

## 2020-06-02 DIAGNOSIS — E56.9 VITAMIN DEFICIENCY: ICD-10-CM

## 2020-06-02 DIAGNOSIS — I10 ESSENTIAL HYPERTENSION, BENIGN: Primary | ICD-10-CM

## 2020-06-02 DIAGNOSIS — J30.2 SEASONAL ALLERGIC RHINITIS, UNSPECIFIED TRIGGER: ICD-10-CM

## 2020-06-02 DIAGNOSIS — E78.5 HYPERLIPIDEMIA LDL GOAL <100: ICD-10-CM

## 2020-06-02 DIAGNOSIS — E11.9 TYPE 2 DIABETES MELLITUS WITHOUT COMPLICATION, WITHOUT LONG-TERM CURRENT USE OF INSULIN (H): ICD-10-CM

## 2020-06-02 PROCEDURE — 99605 MTMS BY PHARM NP 15 MIN: CPT | Performed by: PHARMACIST

## 2020-06-02 NOTE — PROGRESS NOTES
"MTM ENCOUNTER  SUBJECTIVE/OBJECTIVE:                           Elvia Helm is a 69 year old female coming in for a follow-up visit. She was referred to me from Dr. Osborne.  Today's visit is a follow-up MTM visit from 10/17/18, serving as an initial visit for .     Patient consented to a telehealth visit: yes  Telemedicine Visit Details  Type of service:  Telephone visit  Start Time: 10:48 AM  End Time: 11:03 AM  Originating Location (pt. Location): Home  Distant Location (provider location):  [facility]  Mode of Communication:  Telephone    Chief Complaint: DM f/up.    Tobacco:  reports that she quit smoking about 37 years ago. Her smoking use included cigarettes. She has a 10.00 pack-year smoking history. She has never used smokeless tobacco.  Alcohol: 1 glass of wine per day    Medication Adherence/Access: no issues reported    Diabetes:  Pt currently taking metformin ER 1000mg/day, dose increased from 750mg a few weeks ago.  She's been having a hard time taking metformin with dinner as the Rx botbeatris says.  She denies side effects of therapy.  SMB time daily in the AM.   Ranges (per pt report): 170mg/dL this morning, generally in the 130-140mg/dL range.  Did eat dessert with dinner last night.  She's frustrated that BG have been running higher than her normal in the low 100s  Symptoms of low blood sugar? Shaky, anxious feeling. Frequency of hypoglycemia? Very rarely  Recent symptoms of high blood sugar? none.  Eye exam: up to date  Foot exam: due  Microalbumin is < 30 mg/g. Pt is taking an ACEi/ARB.  Lab Results   Component Value Date    UMALCR 16.01 10/11/2017   Aspirin: Taking 81mg daily and denies side effects. Pt wondering if this is still beneficial for her to continue to take.  Diet/Exercise:  Attributes higher BG readings to \"sitting around more\" during the stay at home order.  Lab Results   Component Value Date    A1C 6.1 2019    A1C 6.1 2019    A1C 6.3 " 10/17/2018    A1C 5.9 01/12/2018    A1C 6.1 07/11/2017     Hypertension: Current medications include lisinopril/HCTZ 20/25mg daily - 1/2 tablet daily.  Patient does not self-monitor BP regularly.  Patient reports no side effects.    BP Readings from Last 3 Encounters:   11/18/19 135/83   09/11/19 136/82   08/23/19 118/78      Hyperlipidemia: Current therapy includes atorvastatin 40mg daily.  Pt reports no significant myalgias or other side effects.   The 10-year ASCVD risk score (Derrick KELLY JrSantino, et al., 2013) is: 23.7%    Values used to calculate the score:      Age: 69 years      Sex: Female      Is Non- : No      Diabetic: Yes      Tobacco smoker: No      Systolic Blood Pressure: 135 mmHg      Is BP treated: Yes      HDL Cholesterol: 55 mg/dL      Total Cholesterol: 159 mg/dL     Allergies:  Pt currently taking fexofenadine 180mg QPM, Flonase nasal spray daily and saline nasal spray PRN.   She also has an EpiPen on hand to use if needed (no recent use).  She denies side effects. She feels that her allergy symptoms are overall controlled.  She denies side effects.    Supplements:  Current medications include vitamin B complex, Omega 3 fatty acids, Emergen-C pack as needed, and Vitamin D 2000 units. She is not experiencing side effects.  She feels these are effective and prefers to continue taking.  Vitamin D Deficiency Screening Results:  Lab Results   Component Value Date    VITDT 52 07/21/2016    VITDT 36 06/06/2014    VITDT 36 03/11/2013      Today's Vitals: LMP 03/17/2001  No vitals taken - telephone visit due to COVID-19 pandemic    ASSESSMENT:                            Medication Adherence: good, no issues identified    Type 2 Diabetes: Needs Improvement. Patient is meeting A1c goal of < 7%. Self monitoring of blood glucose is not consistently at goal of fasting  mg/dL.  Would benefit from increasing metformin dose.  She does not need to take metformin with dinner - can be taken  any time of day as long as she's consistent - she needed additional education regarding this.    Hypertension: Stable. Patient is meeting BP goal of < 140/90mmHg.     Hyperlipidemia: Stable. Pt is on high intensity statin which is indicated based on 2013 ACC/AHA guidelines for lipid management.      Allergic rhinitis: Stable.    Supplements: Stable.    PLAN:                            1.  Advised it is ok to take metformin ER at any time of the day.  She will move to AM administration.  2.  Increase metformin ER to 1500mg daily.    I spent 15 minutes with this patient today. All changes were made via collaborative practice agreement with Dr. Osborne. A copy of the visit note was provided to the patient's primary care provider.    Will follow [facility] in 2-3 weeks [email or web address]    The patient declined a summary of these recommendations.     Danyelle Jonas, PharmD, BCACP  Medication Therapy Management Provider  Pager: [phone]

## 2020-06-04 ENCOUNTER — VIRTUAL VISIT (OUTPATIENT)
Dept: FAMILY MEDICINE | Facility: CLINIC | Age: 70
End: 2020-06-04
Payer: COMMERCIAL

## 2020-06-04 DIAGNOSIS — E78.5 HYPERLIPIDEMIA LDL GOAL <100: ICD-10-CM

## 2020-06-04 DIAGNOSIS — E11.9 TYPE 2 DIABETES MELLITUS WITHOUT COMPLICATION, WITHOUT LONG-TERM CURRENT USE OF INSULIN (H): ICD-10-CM

## 2020-06-04 DIAGNOSIS — I10 ESSENTIAL HYPERTENSION, BENIGN: ICD-10-CM

## 2020-06-04 PROCEDURE — 99214 OFFICE O/P EST MOD 30 MIN: CPT | Mod: 95 | Performed by: INTERNAL MEDICINE

## 2020-06-04 RX ORDER — METFORMIN HCL 500 MG
1500 TABLET, EXTENDED RELEASE 24 HR ORAL DAILY
Qty: 270 TABLET | Refills: 1 | Status: SHIPPED | OUTPATIENT
Start: 2020-06-04 | End: 2020-10-19 | Stop reason: ALTCHOICE

## 2020-06-04 RX ORDER — LISINOPRIL AND HYDROCHLOROTHIAZIDE 20; 25 MG/1; MG/1
0.5 TABLET ORAL DAILY
Qty: 45 TABLET | Refills: 3 | Status: SHIPPED | OUTPATIENT
Start: 2020-06-04 | End: 2021-06-14

## 2020-06-04 RX ORDER — ATORVASTATIN CALCIUM 40 MG/1
40 TABLET, FILM COATED ORAL DAILY
Qty: 90 TABLET | Refills: 3 | Status: SHIPPED | OUTPATIENT
Start: 2020-06-04 | End: 2021-06-23

## 2020-06-04 NOTE — PROGRESS NOTES
"Elvia Helm is a 69 year old female who is being evaluated via a billable telephone visit.      The patient has been notified of following:     \"This telephone visit will be conducted via a call between you and your physician/provider. We have found that certain health care needs can be provided without the need for a physical exam.  This service lets us provide the care you need with a short phone conversation.  If a prescription is necessary we can send it directly to your pharmacy.  If lab work is needed we can place an order for that and you can then stop by our lab to have the test done at a later time.    Telephone visits are billed at different rates depending on your insurance coverage. During this emergency period, for some insurers they may be billed the same as an in-person visit.  Please reach out to your insurance provider with any questions.    If during the course of the call the physician/provider feels a telephone visit is not appropriate, you will not be charged for this service.\"    Patient has given verbal consent for Telephone visit?  Yes    What phone number would you like to be contacted at? [phone]    How would you like to obtain your AVS? Aman Grady     Elvia Helm is a 69 year old female who presents via phone visit today for the following health issues:    HPI  Patient admits to gaining 4 pounds of weight and the blood sugars are averaging about 160  Pressure has been running 140 x 80 average  She is otherwise doing well and now is starting another exercise program    Patient Active Problem List   Diagnosis     Allergic rhinitis     Essential hypertension, benign     Postmenopausal atrophic vaginitis     Bee sting reaction     Torticollis     Type 2 diabetes mellitus without complications (H)     HYPERLIPIDEMIA LDL GOAL <100     Post-nasal drip     Advanced directives, counseling/discussion     Osteopenia     Urticaria     Arthritis of knee     Pes planus "     Tinnitus     Past Surgical History:   Procedure Laterality Date     ABDOMEN SURGERY  1988    ceasaran birth     C  DELIVERY ONLY       C LIGATE FALLOPIAN TUBE,POSTPARTUM       C NONSPECIFIC PROCEDURE      right ankle ore and pins     COLONOSCOPY       ENT SURGERY      frequent sinus infections     HC ENDOMETRIAL BIOPSY W/O CERVICAL DILATION       ORTHOPEDIC SURGERY      broken right ankle/plates and screws     SOFT TISSUE SURGERY      tendonitis in right upper leg and below right elbow       Social History     Tobacco Use     Smoking status: Former Smoker     Packs/day: 1.00     Years: 10.00     Pack years: 10.00     Types: Cigarettes     Last attempt to quit: 1982     Years since quittin.8     Smokeless tobacco: Never Used   Substance Use Topics     Alcohol use: Yes     Alcohol/week: 0.0 standard drinks     Comment: wine 1 glass qd, occasional beer     Family History   Problem Relation Age of Onset     Musculoskeletal Disorder Mother         b:1938   Hx Fibromyalgia     Hypertension Mother      Cerebrovascular Disease Mother      Anesthesia Reaction Mother      Cerebrovascular Disease Father         b:193,  age 68  massive stroke     Diabetes Father         dx age 50s and his family     Gastrointestinal Disease Sister         b:   Hx of reflux     Cerebrovascular Disease Sister      Family History Negative Brother         b:     Hypertension Daughter      Family History Negative Daughter         b:     Diabetes Daughter         b:    Diabetes dx age 22**insulin     Lipids Son         b: high cholosterol and triglycerides     Cancer Maternal Grandfather         throat     Cerebrovascular Disease Sister 62        ? TIA     Diabetes Daughter      Hypertension Daughter          Current Outpatient Medications   Medication Sig Dispense Refill     ASPIRIN 81 MG OR TABS 1 tab po QD (Once per day)       atorvastatin (LIPITOR) 40 MG tablet Take 1 tablet (40  mg) by mouth daily 90 tablet 3     blood glucose (ACCU-CHEK SMARTVIEW) test strip Use to test blood sugar 1 time daily or as directed. 100 each 0     blood glucose monitoring (ACCU-CHEK FASTCLIX) lancets Use to test blood sugar 1 time daily or as directed. 102 each 0     blood glucose monitoring (ACCU-CHEK SIXTO SMARTVIEW) meter device kit Use to test blood sugar 1 time daily or as directed. 1 kit 0     Cholecalciferol (VITAMIN D3 PO) Take 2,000 Units by mouth daily        EPINEPHrine (EPIPEN 2-THERESA) 0.3 MG/0.3ML injection 2-pack INJECT 0.3 MLS INTO THE MUSCLE ONCE AS NEEDED FOR ANAPHYLAXIS 2 mL 11     fexofenadine (ALLEGRA) 180 MG tablet Take 180 mg by mouth daily       fluticasone (FLONASE) 50 MCG/ACT spray Spray 1-2 sprays into both nostrils daily 16 g 0     lisinopril-hydrochlorothiazide (ZESTORETIC) 20-25 MG tablet Take 0.5 tablets by mouth daily 45 tablet 3     Multiple Vitamins-Minerals (EMERGEN-C IMMUNE) PACK Take 1 packet by mouth daily as needed        Omega-3 Fatty Acids (OMEGA-3 FISH OIL PO) Take 1 g by mouth daily        sodium chloride (OCEAN) 0.65 % nasal spray Spray 1 spray into both nostrils daily as needed for congestion       vitamin B complex with vitamin C (VITAMIN  B COMPLEX) TABS tablet Take 1 tablet by mouth daily       metFORMIN (GLUCOPHAGE-XR) 500 MG 24 hr tablet Take 3 tablets (1,500 mg) by mouth daily 270 tablet 1     Allergies   Allergen Reactions     Malarone Hives     Amoxicillin Hives     Bee Swelling     HIVES AND SWELLING --carries Epi Pen      Ceftin      hives     Clindamycin Hives     Erythromycin GI Disturbance     Seasonal Allergies      Shrimp Hives     Happened twice     Tetracycline Other (See Comments)     ?severe headaches  & nausea        Reviewed and updated as needed this visit by Provider         Review of Systems   10 point ROS of systems including Constitutional, Eyes, Respiratory, Cardiovascular, Gastroenterology, Genitourinary, Integumentary, Muscularskeletal,  Psychiatric were all negative except for pertinent positives noted in my HPI.         Objective    178lb   Blood pressure 140/82  Blood glucose 164lb  Reported vitals:  LMP 03/17/2001    healthy, alert and no distress  PSYCH: Alert and oriented times 3; coherent speech, normal   rate and volume, able to articulate logical thoughts, able   to abstract reason, no tangential thoughts, no hallucinations   or delusions  Her affect is normal  RESP: No cough, no audible wheezing, able to talk in full sentences  Remainder of exam unable to be completed due to telephone visits    Office Visit on 11/18/2019   Component Date Value Ref Range Status     Hemoglobin A1C 11/18/2019 6.1* 0 - 5.6 % Final    Comment: Normal <5.7% Prediabetes 5.7-6.4%  Diabetes 6.5% or higher - adopted from ADA   consensus guidelines.               Assessment/Plan:  1. Type 2 diabetes mellitus without complication, without long-term current use of insulin (H)  Patient is on metformin 3 tablets a day but her blood sugars are averaging high and we might have to have sulfonylurea or GLP agonist on board  We will check an A1c    2. Essential hypertension, benign  Patient will monitor her blood pressure readings and will mail those to us by my chart  - lisinopril-hydrochlorothiazide (ZESTORETIC) 20-25 MG tablet; Take 0.5 tablets by mouth daily  Dispense: 45 tablet; Refill: 3    3. Hyperlipidemia LDL goal <100  We will arrange patient's labs again and will continue statins  - atorvastatin (LIPITOR) 40 MG tablet; Take 1 tablet (40 mg) by mouth daily  Dispense: 90 tablet; Refill: 3    Return in about 2 weeks (around 6/18/2020) for Fasting Labs.      Phone call duration:  *10 minutes    Masood Osborne MD

## 2020-06-23 DIAGNOSIS — I10 ESSENTIAL HYPERTENSION, BENIGN: ICD-10-CM

## 2020-06-23 DIAGNOSIS — E11.9 TYPE 2 DIABETES MELLITUS WITHOUT COMPLICATION, WITHOUT LONG-TERM CURRENT USE OF INSULIN (H): ICD-10-CM

## 2020-06-23 LAB
ALBUMIN SERPL-MCNC: 3.7 G/DL (ref 3.4–5)
ALP SERPL-CCNC: 50 U/L (ref 40–150)
ALT SERPL W P-5'-P-CCNC: 41 U/L (ref 0–50)
ANION GAP SERPL CALCULATED.3IONS-SCNC: 7 MMOL/L (ref 3–14)
AST SERPL W P-5'-P-CCNC: 24 U/L (ref 0–45)
BILIRUB SERPL-MCNC: 0.4 MG/DL (ref 0.2–1.3)
BUN SERPL-MCNC: 14 MG/DL (ref 7–30)
CALCIUM SERPL-MCNC: 9.2 MG/DL (ref 8.5–10.1)
CHLORIDE SERPL-SCNC: 107 MMOL/L (ref 94–109)
CHOLEST SERPL-MCNC: 140 MG/DL
CO2 SERPL-SCNC: 24 MMOL/L (ref 20–32)
CREAT SERPL-MCNC: 0.73 MG/DL (ref 0.52–1.04)
CREAT UR-MCNC: 61 MG/DL
GFR SERPL CREATININE-BSD FRML MDRD: 82 ML/MIN/{1.73_M2}
GLUCOSE SERPL-MCNC: 166 MG/DL (ref 70–99)
HBA1C MFR BLD: 6.4 % (ref 0–5.6)
HDLC SERPL-MCNC: 48 MG/DL
LDLC SERPL CALC-MCNC: 64 MG/DL
MICROALBUMIN UR-MCNC: 9 MG/L
MICROALBUMIN/CREAT UR: 15.37 MG/G CR (ref 0–25)
NONHDLC SERPL-MCNC: 92 MG/DL
POTASSIUM SERPL-SCNC: 4.1 MMOL/L (ref 3.4–5.3)
PROT SERPL-MCNC: 7.4 G/DL (ref 6.8–8.8)
SODIUM SERPL-SCNC: 138 MMOL/L (ref 133–144)
TRIGL SERPL-MCNC: 140 MG/DL

## 2020-06-23 PROCEDURE — 80061 LIPID PANEL: CPT | Performed by: INTERNAL MEDICINE

## 2020-06-23 PROCEDURE — 83036 HEMOGLOBIN GLYCOSYLATED A1C: CPT | Performed by: INTERNAL MEDICINE

## 2020-06-23 PROCEDURE — 80053 COMPREHEN METABOLIC PANEL: CPT | Performed by: INTERNAL MEDICINE

## 2020-06-23 PROCEDURE — 82043 UR ALBUMIN QUANTITATIVE: CPT | Performed by: INTERNAL MEDICINE

## 2020-06-23 PROCEDURE — 36415 COLL VENOUS BLD VENIPUNCTURE: CPT | Performed by: INTERNAL MEDICINE

## 2020-08-20 ENCOUNTER — TELEPHONE (OUTPATIENT)
Dept: FAMILY MEDICINE | Facility: CLINIC | Age: 70
End: 2020-08-20

## 2020-08-20 DIAGNOSIS — E11.9 TYPE 2 DIABETES MELLITUS WITHOUT COMPLICATION, WITHOUT LONG-TERM CURRENT USE OF INSULIN (H): Primary | ICD-10-CM

## 2020-08-20 RX ORDER — GLUCOSAMINE HCL/CHONDROITIN SU 500-400 MG
CAPSULE ORAL
Qty: 100 EACH | Refills: 3 | Status: SHIPPED | OUTPATIENT
Start: 2020-08-20 | End: 2021-09-16

## 2020-08-20 RX ORDER — BLOOD-GLUCOSE METER
EACH MISCELLANEOUS
Qty: 1 KIT | Refills: 0 | Status: SHIPPED | OUTPATIENT
Start: 2020-08-20

## 2020-08-20 RX ORDER — BLOOD SUGAR DIAGNOSTIC
STRIP MISCELLANEOUS
Qty: 100 STRIP | Refills: 6 | Status: SHIPPED | OUTPATIENT
Start: 2020-08-20 | End: 2022-06-06

## 2020-09-01 ENCOUNTER — VIRTUAL VISIT (OUTPATIENT)
Dept: FAMILY MEDICINE | Facility: CLINIC | Age: 70
End: 2020-09-01
Payer: COMMERCIAL

## 2020-09-01 DIAGNOSIS — H10.13 ALLERGIC CONJUNCTIVITIS, BILATERAL: Primary | ICD-10-CM

## 2020-09-01 PROCEDURE — 99213 OFFICE O/P EST LOW 20 MIN: CPT | Mod: 95 | Performed by: PHYSICIAN ASSISTANT

## 2020-09-01 NOTE — PROGRESS NOTES
"Elvia Helm is a 70 year old female who is being evaluated via a billable video visit.      The patient has been notified of following:     \"This video visit will be conducted via a call between you and your physician/provider. We have found that certain health care needs can be provided without the need for an in-person physical exam.  This service lets us provide the care you need with a video conversation.  If a prescription is necessary we can send it directly to your pharmacy.  If lab work is needed we can place an order for that and you can then stop by our lab to have the test done at a later time.    Video visits are billed at different rates depending on your insurance coverage.  Please reach out to your insurance provider with any questions.    If during the course of the call the physician/provider feels a video visit is not appropriate, you will not be charged for this service.\"    Patient has given verbal consent for Video visit? Yes  How would you like to obtain your AVS? MyChart  If you are dropped from the video visit, the video invite should be resent to: Text to cell phone: [employer] [phone]  Will anyone else be joining your video visit? No      Subjective     Elvia Helm is a 70 year old female who presents today via video visit for the following health issues:    HPI    Calling regarding eye discharge starting a couple of weeks ago.       Video Start Time: 3:19pm    [place]  Normally goes to [place] in the winter but needs to wait and see the pandemic  She has allergies and right eye burning and draining all of the time  Karolina the right side  Has some right nostril drainage as well  The eye drainage is clear but can be crusted shut in the morning  Has a granddtr living with them  Eyes were initially itchy, but not as much now  Nasal drainage is clear  She has some coughing and sneezing but that is rare  No sore throat or fever  She is on allegra, flonase and " saline  She is using Pataday eye drops daily without relief  Denies eye pain or vision loss      Past Medical History:   Diagnosis Date     Allergic rhinitis, cause unspecified a     Allergic rhinitis, cause unspecified      Arthritis of knee 2014     Bee sting reaction      DIABETES TYPE II      Essential hypertension, benign      Flat foot(734) 2014     Hyperlipidaemia LDL goal < 100      Localized osteoarthrosis not specified whether primary or secondary, ankle and foot     After surgery     Tinnitus 2014     Past Surgical History:   Procedure Laterality Date     ABDOMEN SURGERY  1988    ceasaran birth     C  DELIVERY ONLY       C LIGATE FALLOPIAN TUBE,POSTPARTUM       C NONSPECIFIC PROCEDURE      right ankle roe and pins     COLONOSCOPY       ENT SURGERY      frequent sinus infections     HC ENDOMETRIAL BIOPSY W/O CERVICAL DILATION       ORTHOPEDIC SURGERY      broken right ankle/plates and screws     SOFT TISSUE SURGERY      tendonitis in right upper leg and below right elbow     Current Outpatient Medications   Medication Sig Dispense Refill     ACCU-CHEK SMARTVIEW test strip Use to test blood sugar 1 time daily or as directed. 100 each 3     alcohol swab prep pads Use to swab area of injection/pranav as directed. 100 each 3     ASPIRIN 81 MG OR TABS 1 tab po QD (Once per day)       atorvastatin (LIPITOR) 40 MG tablet Take 1 tablet (40 mg) by mouth daily 90 tablet 3     blood glucose (ONETOUCH ULTRA) test strip Use to test blood sugar 1 times daily. 100 strip 6     blood glucose calibration (ONETOUCH ULTRA CONTROL) solution Use to calibrate blood glucose monitor as directed. 1 Bottle 1     blood glucose monitoring (ACCU-CHEK FASTCLIX) lancets Use to test blood sugar 1 time daily or as directed. 102 each 0     blood glucose monitoring (ACCU-CHEK SIXTO SMARTVIEW) meter device kit Use to test blood sugar 1 time daily or as directed. 1 kit 0     blood glucose  monitoring (ONE TOUCH ULTRA 2) meter device kit Use to test blood sugar 1 times daily. 1 kit 0     Cholecalciferol (VITAMIN D3 PO) Take 2,000 Units by mouth daily        EPINEPHrine (EPIPEN 2-THERESA) 0.3 MG/0.3ML injection 2-pack INJECT 0.3 MLS INTO THE MUSCLE ONCE AS NEEDED FOR ANAPHYLAXIS 2 mL 11     fexofenadine (ALLEGRA) 180 MG tablet Take 180 mg by mouth daily       fluticasone (FLONASE) 50 MCG/ACT spray Spray 1-2 sprays into both nostrils daily 16 g 0     lisinopril-hydrochlorothiazide (ZESTORETIC) 20-25 MG tablet Take 0.5 tablets by mouth daily 45 tablet 3     metFORMIN (GLUCOPHAGE-XR) 500 MG 24 hr tablet Take 3 tablets (1,500 mg) by mouth daily 270 tablet 1     Omega-3 Fatty Acids (OMEGA-3 FISH OIL PO) Take 1 g by mouth daily        sodium chloride (OCEAN) 0.65 % nasal spray Spray 1 spray into both nostrils daily as needed for congestion       vitamin B complex with vitamin C (VITAMIN  B COMPLEX) TABS tablet Take 1 tablet by mouth daily       Multiple Vitamins-Minerals (EMERGEN-C IMMUNE) PACK Take 1 packet by mouth daily as needed            Review of Systems   Constitutional, HEENT, cardiovascular, pulmonary, GI, , musculoskeletal, neuro, skin, endocrine and psych systems are negative, except as otherwise noted.      Objective           Vitals:  No vitals were obtained today due to virtual visit.    Physical Exam     GENERAL: Healthy, alert and no distress  EYES: Eyes grossly normal to inspection.  No discharge or erythema, or obvious scleral/conjunctival abnormalities.  RESP: No audible wheeze, cough, or visible cyanosis.  No visible retractions or increased work of breathing.    SKIN: Visible skin clear. No significant rash, abnormal pigmentation or lesions.  NEURO: Cranial nerves grossly intact.  Mentation and speech appropriate for age.  PSYCH: Mentation appears normal, affect normal/bright, judgement and insight intact, normal speech and appearance well-groomed.    Assessment and Plan:     (H10.13)  Allergic conjunctivitis, bilateral  (primary encounter diagnosis)  Comment: she is taking allegra, flonase and pataday without relief  Plan: Recd she switch allegra to zyrtec 10mg bid, wash face and rinse eyes with artificial tears. Cont flonase. Stop the pataday as they are causing burning and not helping. May need to close up the house but she doesn't want to do that.      Senait Cohen PA-C              Video-Visit Details    Type of service:  Video Visit    Video End Time:3:30 PM    Originating Location (pt. Location): Home    Distant Location (provider location):  [facility] [place]     Platform used for Video Visit: Nelida

## 2020-11-17 ENCOUNTER — VIRTUAL VISIT (OUTPATIENT)
Dept: PHARMACY | Facility: CLINIC | Age: 70
End: 2020-11-17
Payer: COMMERCIAL

## 2020-11-17 DIAGNOSIS — I10 ESSENTIAL HYPERTENSION, BENIGN: ICD-10-CM

## 2020-11-17 DIAGNOSIS — E78.5 HYPERLIPIDEMIA LDL GOAL <100: ICD-10-CM

## 2020-11-17 DIAGNOSIS — J30.2 SEASONAL ALLERGIC RHINITIS, UNSPECIFIED TRIGGER: ICD-10-CM

## 2020-11-17 DIAGNOSIS — E56.9 VITAMIN DEFICIENCY: ICD-10-CM

## 2020-11-17 DIAGNOSIS — E11.9 TYPE 2 DIABETES MELLITUS WITHOUT COMPLICATION, WITHOUT LONG-TERM CURRENT USE OF INSULIN (H): Primary | ICD-10-CM

## 2020-11-17 DIAGNOSIS — T63.441S BEE STING REACTION, ACCIDENTAL OR UNINTENTIONAL, SEQUELA: ICD-10-CM

## 2020-11-17 PROCEDURE — 99606 MTMS BY PHARM EST 15 MIN: CPT | Mod: TEL | Performed by: PHARMACIST

## 2020-11-17 NOTE — PROGRESS NOTES
"MTM ENCOUNTER  SUBJECTIVE/OBJECTIVE:                           Elvia Helm is a 70 year old female called for a follow-up visit. She was referred to me from Dr. Osborne.  Today's visit is a follow-up MTM visit from 20.     Reason for visit: DM f/up.    Tobacco: She reports that she quit smoking about 38 years ago. Her smoking use included cigarettes. She has a 10.00 pack-year smoking history. She has never used smokeless tobacco.  Alcohol: 1 glass of wine/day    Medication Adherence/Access: no issues reported    Diabetes:  Pt currently taking metformin 750mg BID.  She was previously taking ER metformin, changed to IR due to recall of ER products.  At that time she had wanted to continue with the same total daily dosing, so we changed to 750mg BID.  Since changing her to IR Metformin, she has been having a hard time breaking the tablets.  She wonders if any other doses are available.  She denies side effects of therapy.  SMB time daily in the AM.   Ranges (per pt report): 117-177mg/dL (average around 130 mg/dL)  Symptoms of low blood sugar? Shaky, anxious feeling. Frequency of hypoglycemia? Very rarely  Recent symptoms of high blood sugar? none.  Eye exam: up to date  Foot exam: due  Microalbumin is < 30 mg/g. Pt is taking an ACEi/ARB.  Lab Results   Component Value Date    UMALCR 15.37 2020   Aspirin: Taking 81mg daily and denies side effects.   Diet/Exercise: She acknowledges that the control of her blood sugar is very tightly controlled with her diet.  For example when she saw the reading of 177 mg/dL, this was after \"splurging\" on her dinner the previous evening.  Lab Results   Component Value Date    A1C 6.4 2020    A1C 6.1 2019    A1C 6.1 2019    A1C 6.3 10/17/2018    A1C 5.9 2018     Hypertension: Current medications include lisinopril/HCTZ 20/25mg daily - 1/2 tablet daily.  Patient does not self-monitor BP regularly.  Patient reports no side effects.    BP Readings " from Last 3 Encounters:   11/18/19 135/83   09/11/19 136/82   08/23/19 118/78       Hyperlipidemia: Current therapy includes atorvastatin 40mg daily.  Pt reports no significant myalgias or other side effects.   The 10-year ASCVD risk score (Derrick KELLY Jr., et al., 2013) is: 26%    Values used to calculate the score:      Age: 70 years      Sex: Female      Is Non- : No      Diabetic: Yes      Tobacco smoker: No      Systolic Blood Pressure: 135 mmHg      Is BP treated: Yes      HDL Cholesterol: 48 mg/dL      Total Cholesterol: 140 mg/dL     Allergies:  Pt currently taking fexofenadine 180mg QPM, Flonase nasal spray daily and saline nasal spray PRN.   She also has an EpiPen on hand to use if needed (no recent use, but requests refill as hers expires today).  She denies side effects. She feels that her allergy symptoms are overall controlled.  She denies side effects.    Supplements:  Current medications include vitamin B complex, Omega 3 fatty acids, Emergen-C pack as needed, and Vitamin D 2000 units daily. She is not experiencing side effects.  She feels these are effective and prefers to continue taking.  Vitamin D Deficiency Screening Results:  Lab Results   Component Value Date    VITDT 52 07/21/2016    VITDT 36 06/06/2014    VITDT 36 03/11/2013      Today's Vitals: LMP 03/17/2001     ASSESSMENT:                            Medication Adherence: No issues identified    Type 2 diabetes: Patient may benefit from changing to an alternative dose of Metformin so that she does not have to cut tablets.  Since blood sugars are sometimes a little high, increasing to the 850 mg dose of Metformin would be appropriate.    Hypertension: Patient would benefit from checking blood pressure today, and reporting to me via [employer].  Previous blood pressure readings have been at goal, but we do not have a blood pressure in her chart from 2020.    Hyperlipidemia: Stable.  Patient is on high intensity statin  therapy.    Allergies: Stable, patient needs to have EpiPen refilled.    Supplements: She likely does not need to take all of her supplements, but she finds them helpful and they likely are not harmful.    PLAN:                            1.  Change metformin to 850mg BID.  Updated Rx sent to her pharmacy  2.  Pt to check BP today and send to me via [employer]  3.  Routed refill request to refill team for EpiPen    I spent 15 minutes with this patient today. All changes were made via collaborative practice agreement with Dr. Osborne. A copy of the visit note was provided to the patient's primary care provider.    Will follow up in 3 months, can recheck A1c at that time to assess Metformin dose change.    The patient declined a summary of these recommendations.     Danyelle Jonas, PharmD, [facility]  Medication Therapy Management Provider  Pager: [phone]     Patient consented to a telehealth visit: yes  Telemedicine Visit Details  Type of service:  Telephone visit  Start Time: 9:05 AM  End Time: 9:20 AM  Originating Location (patient location): Home  Distant Location (provider location):  [facility]  Mode of Communication:  Telephone

## 2020-11-17 NOTE — TELEPHONE ENCOUNTER
Pt requests refill of EpiPen - hers expires today.  It's not on my collaborative practice agreement so I'm unable to refill.  Routing to refill pool - Rx pended.    He CarterD, [facility]  Medication Therapy Management Provider  Pager: [phone]

## 2020-11-19 RX ORDER — EPINEPHRINE 0.3 MG/.3ML
INJECTION SUBCUTANEOUS
Qty: 2 ML | Refills: 1 | Status: SHIPPED | OUTPATIENT
Start: 2020-11-19 | End: 2022-08-16

## 2020-11-19 NOTE — TELEPHONE ENCOUNTER
.Prescription approved per [facility] Refill Protocol.  Libra Beck RN

## 2020-11-29 ENCOUNTER — HEALTH MAINTENANCE LETTER (OUTPATIENT)
Age: 70
End: 2020-11-29

## 2021-01-10 ENCOUNTER — TRANSFERRED RECORDS (OUTPATIENT)
Dept: HEALTH INFORMATION MANAGEMENT | Facility: CLINIC | Age: 71
End: 2021-01-10
Payer: COMMERCIAL

## 2021-01-15 ENCOUNTER — HEALTH MAINTENANCE LETTER (OUTPATIENT)
Age: 71
End: 2021-01-15

## 2021-02-06 ENCOUNTER — E-VISIT (OUTPATIENT)
Dept: FAMILY MEDICINE | Facility: CLINIC | Age: 71
End: 2021-02-06
Payer: COMMERCIAL

## 2021-02-06 DIAGNOSIS — J06.9 VIRAL URI: ICD-10-CM

## 2021-02-06 DIAGNOSIS — U07.1 CLINICAL DIAGNOSIS OF COVID-19: Primary | ICD-10-CM

## 2021-02-06 PROCEDURE — 99421 OL DIG E/M SVC 5-10 MIN: CPT | Performed by: INTERNAL MEDICINE

## 2021-02-08 NOTE — PATIENT INSTRUCTIONS
The symptoms you describe suggest a viral cause, which is much more common than a bacterial cause. Antibiotics will treat bacterial infections, but have no effect on viral infections. If possible, especially if improving, start with symptom care for the first 7-10 days, then consider seeking further treatment or taking an antibiotic. Bacterial infections generally are more severe, including symptoms such as pus, fever over 101degrees F, or rapidly worsening.    Viral Upper Respiratory Illness (Adult)    You have a viral upper respiratory illness (URI), which is another term for the common cold. This illness is contagious during the first few days. It is spread through the air by coughing and sneezing. It may also be spread by direct contact (touching the sick person and then touching your own eyes, nose, or mouth). Frequent handwashing will decrease risk of spread. Most viral illnesses go away within 7 to 10 days with rest and simple home remedies. Sometimes the illness may last for several weeks. Antibiotics will not kill a virus, and they are generally not prescribed for this condition.  Home care    If symptoms are severe, rest at home for the first 2 to 3 days. When you resume activity, don't let yourself get too tired.    Don't smoke. If you need help stopping, talk with your healthcare provider.    Avoid being exposed to cigarette smoke (yours or others ).    You may use acetaminophen or ibuprofen to control pain and fever, unless another medicine was prescribed. If you have chronic liver or kidney disease, have ever had a stomach ulcer or gastrointestinal bleeding, or are taking blood-thinning medicines, talk with your healthcare provider before using these medicines. Aspirin should never be given to anyone under 18 years of age who is ill with a viral infection or fever. It may cause severe liver or brain damage.    Your appetite may be poor, so a light diet is fine. Stay well hydrated by drinking 6 to  8 glasses of fluids per day (water, soft drinks, juices, tea, or soup). Extra fluids will help loosen secretions in the nose and lungs.    Over-the-counter cold medicines will not shorten the length of time you re sick, but they may be helpful for the following symptoms: cough, sore throat, and nasal and sinus congestion. If you take prescription medicines, ask your healthcare provider or pharmacist which over-the-counter medicines are safe to use. (Note: Don't use decongestants if you have high blood pressure.)  Follow-up care  Follow up with your healthcare provider, or as advised.  When to seek medical advice  Call your healthcare provider right away if any of these occur:    Cough with lots of colored sputum (mucus)    Severe headache; face, neck, or ear pain    Difficulty swallowing due to throat pain    Fever of 100.4 F (38 C) or higher, or as directed by your healthcare provider  Call 911  Call 911 if any of these occur:    Chest pain, shortness of breath, wheezing, or difficulty breathing    Coughing up blood    Very severe pain with swallowing, especially if it goes along with a muffled voice   StayWell last reviewed this educational content on 6/1/2018 2000-2020 [employer]. [address] [place] [address], [place] [address]. All rights reserved. This information is not intended as a substitute for professional medical care. Always follow your healthcare professional's instructions.

## 2021-02-18 ENCOUNTER — MYC MEDICAL ADVICE (OUTPATIENT)
Dept: FAMILY MEDICINE | Facility: CLINIC | Age: 71
End: 2021-02-18

## 2021-02-18 DIAGNOSIS — J32.9 SINUSITIS, UNSPECIFIED CHRONICITY, UNSPECIFIED LOCATION: Primary | ICD-10-CM

## 2021-02-19 RX ORDER — AZITHROMYCIN 250 MG/1
TABLET, FILM COATED ORAL
Qty: 6 TABLET | Refills: 0 | Status: SHIPPED | OUTPATIENT
Start: 2021-02-19 | End: 2021-02-24

## 2021-02-19 NOTE — TELEPHONE ENCOUNTER
Dr. Osborne,  See mychart ongoing sinus sx.  Do not see any covid test completed. Asked pt for clarification on this.  Pended pharmacy, otherwise advise if pt should be seen in UC.  Aleah Pham, NAZARIO  [facility] RN Triage Team

## 2021-03-02 ENCOUNTER — TELEPHONE (OUTPATIENT)
Dept: FAMILY MEDICINE | Facility: CLINIC | Age: 71
End: 2021-03-02

## 2021-03-02 NOTE — TELEPHONE ENCOUNTER
Patient Quality Outreach      Summary:    Patient has the following on her problem list/HM:     Diabetes    Last A1C:  Lab Results   Component Value Date    A1C 6.4 2020    A1C 6.1 2019       Last LDL:    Lab Results   Component Value Date    LDL 64 2020       Is the patient on a Statin? Yes          Is the patient on Aspirin? Yes    Medications     HMG CoA Reductase Inhibitors     atorvastatin (LIPITOR) 40 MG tablet       Salicylates     ASPIRIN 81 MG OR TABS             Last three blood pressure readings:  BP Readings from Last 3 Encounters:   19 135/83   19 136/82   19 118/78            Tobacco Use      Smoking status: Former Smoker        Packs/day: 1.00        Years: 10.00        Pack years: 10        Types: Cigarettes        Quit date: 1982        Years since quittin.5      Smokeless tobacco: Never Used          Patient is due/failing the following:   Eye Exam    Type of outreach:    Sent [employer] message.    Questions for provider review:    None                                                                                                                                     Jazmin Bartholomew MA on 3/2/2021 at 3:21 PM

## 2021-03-11 ENCOUNTER — ANCILLARY PROCEDURE (OUTPATIENT)
Dept: MAMMOGRAPHY | Facility: CLINIC | Age: 71
End: 2021-03-11
Payer: COMMERCIAL

## 2021-03-11 DIAGNOSIS — Z12.31 VISIT FOR SCREENING MAMMOGRAM: ICD-10-CM

## 2021-03-11 PROCEDURE — 77067 SCR MAMMO BI INCL CAD: CPT | Mod: TC | Performed by: RADIOLOGY

## 2021-03-16 ENCOUNTER — VIRTUAL VISIT (OUTPATIENT)
Dept: PHARMACY | Facility: CLINIC | Age: 71
End: 2021-03-16
Payer: COMMERCIAL

## 2021-03-16 DIAGNOSIS — E56.9 VITAMIN DEFICIENCY: ICD-10-CM

## 2021-03-16 DIAGNOSIS — I10 ESSENTIAL HYPERTENSION, BENIGN: ICD-10-CM

## 2021-03-16 DIAGNOSIS — J30.2 SEASONAL ALLERGIC RHINITIS, UNSPECIFIED TRIGGER: ICD-10-CM

## 2021-03-16 DIAGNOSIS — E11.9 TYPE 2 DIABETES MELLITUS WITHOUT COMPLICATION, WITHOUT LONG-TERM CURRENT USE OF INSULIN (H): Primary | ICD-10-CM

## 2021-03-16 DIAGNOSIS — Z71.85 VACCINE COUNSELING: ICD-10-CM

## 2021-03-16 DIAGNOSIS — E78.5 HYPERLIPIDEMIA LDL GOAL <100: ICD-10-CM

## 2021-03-16 PROCEDURE — 99607 MTMS BY PHARM ADDL 15 MIN: CPT | Mod: TEL | Performed by: PHARMACIST

## 2021-03-16 PROCEDURE — 99605 MTMS BY PHARM NP 15 MIN: CPT | Mod: TEL | Performed by: PHARMACIST

## 2021-03-16 NOTE — PROGRESS NOTES
Medication Therapy Management (MTM) Encounter    ASSESSMENT:                            Medication Adherence/Access: No issues identified    Type 2 Diabetes: Patient is meeting A1c goal of < 7%, due for re-check. Self monitoring of blood glucose is at goal of fasting  mg/dL and post prandial < 180 mg/dL.  Could change back to ER metformin, but she'd like to stay on current regimen.  Would benefit from taking PM dose AFTER dinner to help with tolerability.  Also due for TSH screening    Hypertension: Patient is not meeting blood pressure goal of < 130/80mmHg, due for re-check.    Hyperlipidemia: Stable.  Patient is on high intensity statin therapy.    Allergies: Stable.    Supplements: She likely does not need to take all of her supplements, but she finds them helpful and they likely are not harmful.    Immunizations: Plan in place.    PLAN:                            1.  Recommended taking PM dose of metformin after dinner.  2.  Future labs ordered - A1c, TSH.  Assisted patient in scheduling nurse only blood pressure check and labs on 2/26.  3.  Encouraged patient to schedule primary care physician visit for this summer.    Follow-up: Pending lab/BP results    SUBJECTIVE/OBJECTIVE:                          Elvia Helm is a 70 year old female called for a follow-up visit. She was referred to me from Dr. Osborne.  Today's visit is a follow-up MTM visit from 11/17/2020.     Reason for visit: Routine f/up.    Tobacco: She reports that she quit smoking about 38 years ago. Her smoking use included cigarettes. She has a 10.00 pack-year smoking history. She has never used smokeless tobacco.  Alcohol: 1 glass of wine/day    Medication Adherence/Access: no issues reported    Diabetes:  Pt currently taking metformin 850mg twice daily.  She has noticed some nausea in the evening after taking metformin.  She does take this about 1 hour prior to dinner, otherwise she forgets to take.  She takes her AM dose after  breakfast, and tolerates this fine.  She'd like to stay on current regimen since blood sugars are so well controlled.  SMB time daily in the AM.   Ranges (per pt report): 110mg/dL yesterday, today 128mg/dL.  These readings are pretty typical for her.  Symptoms of low blood sugar? Shaky, anxious feeling. Frequency of hypoglycemia? Very rarely  Recent symptoms of high blood sugar? none.  Eye exam: due  Foot exam: due  Microalbumin is < 30 mg/g. Pt is taking an ACEi/ARB.  Lab Results   Component Value Date    UMALCR 15.37 2020   Aspirin: Taking 81mg daily and denies side effects.   Lab Results   Component Value Date    A1C 6.4 2020    A1C 6.1 2019    A1C 6.1 2019    A1C 6.3 10/17/2018    A1C 5.9 2018     GFR Estimate   Date Value Ref Range Status   2020 82 >60 mL/min/[1.73_m2] Final     Comment:     Non  GFR Calc  Starting 2018, serum creatinine based estimated GFR (eGFR) will be   calculated using the Chronic Kidney Disease Epidemiology Collaboration   (CKD-EPI) equation.       Hypertension: Current medications include lisinopril/HCTZ 20/25mg daily - 1/2 tablet daily.  Patient does not self-monitor BP regularly.  Patient reports no side effects.    BP Readings from Last 3 Encounters:   19 135/83   19 136/82   19 118/78        Hyperlipidemia: Current therapy includes atorvastatin 40mg daily.  Pt reports no significant myalgias or other side effects.   The ASCVD Risk score (Derrick LETICIA Escobar., et al., 2013) failed to calculate for the following reasons:    The systolic blood pressure is missing     Recent Labs   Lab Test 20  0919 19  0921 06/30/15  0855 06/30/15  0855 14  0906   CHOL 140 159   < > 146 170   HDL 48* 55   < > 50* 44*   LDL 64 66   < > 67 84   TRIG 140 190*   < > 145 212*   CHOLHDLRATIO  --   --   --  2.9 3.9    < > = values in this interval not displayed.      Allergies:  Pt currently taking fexofenadine 180mg every  evening, Flonase nasal spray daily and saline nasal spray as needed.   She also has an EpiPen on hand to use if needed (no recent use, but did refill after last visit).  She denies side effects. She feels that her allergy symptoms are overall controlled.  She denies side effects.    Supplements:  Current medications include vitamin B complex, Omega 3 fatty acids, Emergen-C pack as needed, and Vitamin D 2000 units daily. She is not experiencing side effects.  She feels these are effective and prefers to continue taking.  Vitamin D Deficiency Screening Results:  Lab Results   Component Value Date    VITDT 52 07/21/2016    VITDT 36 06/06/2014    VITDT 36 03/11/2013      Immunizations:  Patient received 1st COVID-19 dose (Moderna) on 3/1/2021.  Plan in place for 2nd dose.  Patient did have COVID-19 in January.    Today's Vitals: LMP 03/17/2001   ----------------    I spent 23 minutes with this patient today. All changes were made via collaborative practice agreement with Dr. Osborne. A copy of the visit note was provided to the patient's primary care provider.    The patient was sent via [place] a summary of these recommendations.     Danyelle Jonas, PharmD, [facility]  Medication Therapy Management Provider  Pager: [phone]     Telemedicine Visit Details  Type of service:  Telephone visit  Start Time: 11:08 AM  End Time: 11:31 AM  Originating Location (patient location): Home  Distant Location (provider location):  [facility]        Medication Therapy Recommendations  Type 2 diabetes mellitus without complications (H)    Current Medication: metFORMIN (GLUCOPHAGE) 850 MG tablet   Rationale: Undesirable effect - Adverse medication event - Safety   Recommendation: Provide Education - Recommended taking PM dose of metformin after dinner   Status: Patient Agreed - Adherence/Education          Current Medication: metFORMIN (GLUCOPHAGE) 850 MG tablet   Rationale: Medication requires monitoring - Needs additional  monitoring - Effectiveness   Recommendation: Order Lab - ordered A1c and TSH   Status: Accepted per CPA

## 2021-03-16 NOTE — PATIENT INSTRUCTIONS
Recommendations from today's MTM visit:                                                    MTM (medication therapy management) is a service provided by a clinical pharmacist designed to help you get the most of out of your medicines.   Today we reviewed what your medicines are for, how to know if they are working, that your medicines are safe and how to make your medicine regimen as easy as possible.      1.  I'd recommend taking your evening dose of metformin AFTER dinner.  This should help with the nausea.    2.  I put in orders for your A1c and thyroid to be checked, along with your blood pressure on Friday, March 26th.  While you're at the clinic, please also schedule an appointment to see Dr. Osborne this summer    It was great to speak with you today.  I value your experience and would be very thankful for your time with providing feedback on our clinic survey. You may receive a survey via email or text message in the next few days.     Next MTM visit: Pending lab results    To schedule another MTM appointment, please call the clinic directly or you may call the MTM scheduling line at [phone] or toll-free at [phone].     My Clinical Pharmacist's contact information:                                                      It was a pleasure talking with you today!  Please feel free to contact me with any questions or concerns you have.      Danyelle Jonas PharmD, [facility]  Medication Therapy Management Provider  Pager: [phone]     Angelita Null PharmD  Medication Therapy Management Resident  Pager: [phone]

## 2021-03-16 NOTE — LETTER
"        Date: 3/16/2021    Elvia Helm  [address] [place] [address]    Dear Ms. Helm,    Thank you for talking with me on 3/16/2021 about your health and medications. Medicare s MTM (Medication Therapy Management) program helps you understand your medications and use them safely.      This letter includes an action plan (Medication Action Plan) and medication list (Personal Medication List). The action plan has steps you should take to help you get the best results from your medications. The medication list will help you keep track of your medications and how to use them the right way.       Have your action plan and medication list with you when you talk with your doctors, pharmacists, and other healthcare providers in your care team.     Ask your doctors, pharmacists, and other healthcare providers to update the action plan and medication list at every visit.     Take your medication list with you if you go to the hospital or emergency room.     Give a copy of the action plan and medication list to your family or caregivers.     If you want to talk about this letter or any of the papers with it, please call   [phone].We look forward to working with you, your doctors, and other healthcare providers to help you stay healthy through the [facility] MTM program.    Sincerely,  Danyelle Jonas [facility]    Enclosed: Medication Action Plan and Personal Medication List    MEDICATION ACTION PLAN FOR Elvia Helm,  1950     This action plan will help you get the best results from your medications if you:   1. Read \"What we talked about.\"   2. Take the steps listed in the \"What I need to do\" boxes.   3. Fill in \"What I did and when I did it.\"   4. Fill in \"My follow-up plan\" and \"Questions I want to ask.\"     Have this action plan with you when you talk with your doctors, pharmacists, and other healthcare providers in your care team. Share this with your family or caregivers too.  DATE " PREPARED: 3/16/2021  What we talked about: Metformin                                                  What I need to do: Please try taking your evening dose of metformin AFTER your evening meal.  This should help with the nausea. What I did and when I did it:                                              What we talked about: Monitoring                                                  What I need to do: I put in orders for your A1c and thyroid screening to be done.  We'll also check your blood pressure.  What I did and when I did it:                                                My follow-up plan:                 Questions I want to ask:              If you have any questions about your action plan, call Danyelle Jonas [facility], Phone: [phone] , Monday-Friday 8-4:30pm.           PERSONAL MEDICATION LIST FOR Elvia Helm  1950     This medication list was made for you after we talked. We also used information from your doctor's chart.      Use blank rows to add new medications. Then fill in the dates you started using them.    Cross out medications when you no longer use them. Then write the date and why you stopped using them.    Ask your doctors, pharmacists, and other healthcare providers to update this list at every visit. Keep this list up-to-date with:       Prescription medications    Over the counter drugs     Herbals    Vitamins    Minerals      If you go to the hospital or emergency room, take this list with you. Share this with your family or caregivers too.     DATE PREPARED: 3/16/2021  Allergies or side effects: Malarone, Amoxicillin, Bee, Ceftin, Clindamycin, Erythromycin, Seasonal allergies, Shrimp, and Tetracycline     Medication:  ACCU-CHEK SMARTVIEW VI STRP      How I use it:  Use to test blood sugar 1 time daily or as directed.      Why I use it: Type 2 diabetes mellitus without complication, without long-term current use of insulin (H)    Prescriber:  Masood Osborne MD      Date I  started using it:       Date I stopped using it:         Why I stopped using it:            Medication:  ALCOHOL SWABS 70 % PADS      How I use it:  Use to swab area of injection/pranav as directed.      Why I use it: Type 2 diabetes mellitus without complication, without long-term current use of insulin (H)    Prescriber:  Masood Osborne MD      Date I started using it:       Date I stopped using it:         Why I stopped using it:            Medication:  ASPIRIN 81 MG OR TABS      How I use it:  1 tab po QD (Once per day)      Why I use it:  Prevent Mi/Stroke    Prescriber:  Liu France MD      Date I started using it:       Date I stopped using it:         Why I stopped using it:            Medication:  ATORVASTATIN CALCIUM 40 MG PO TABS      How I use it:  Take 1 tablet (40 mg) by mouth daily      Why I use it: Hyperlipidemia LDL goal <100    Prescriber:  Masood Osborne MD      Date I started using it:       Date I stopped using it:         Why I stopped using it:            Medication:  EMERGEN-C IMMUNE PO PACK      How I use it:  Take 1 packet by mouth daily as needed       Why I use it:  General Health     Prescriber:  Self      Date I started using it:       Date I stopped using it:         Why I stopped using it:            Medication:  EPINEPHRINE 0.3 MG/0.3ML IJ SOAJ      How I use it:  INJECT 0.3 MLS INTO THE MUSCLE ONCE AS NEEDED FOR ANAPHYLAXIS      Why I use it: Bee sting reaction, accidental or unintentional, sequela    Prescriber:  Masood Osborne MD      Date I started using it:       Date I stopped using it:         Why I stopped using it:            Medication:  FEXOFENADINE  MG PO TABS      How I use it:  Take 180 mg by mouth daily      Why I use it:  Allergy    Prescriber:  Self      Date I started using it:       Date I stopped using it:         Why I stopped using it:            Medication:  FLUTICASONE PROPIONATE 50 MCG/ACT NA SUSP      How I use it:  Spray 1-2 sprays into both  nostrils daily      Why I use it: Seasonal allergic rhinitis due to other allergic trigger    Prescriber:  Vincent Encinas PA-C      Date I started using it:       Date I stopped using it:         Why I stopped using it:            Medication:  LISINOPRIL-HYDROCHLOROTHIAZIDE 20-25 MG PO TABS      How I use it:  Take 0.5 tablets by mouth daily      Why I use it: Essential hypertension, benign    Prescriber:  Masood Osborne MD      Date I started using it:       Date I stopped using it:         Why I stopped using it:            Medication:  METFORMIN  MG PO TABS      How I use it:  Take 1 tablet (850 mg) by mouth 2 times daily (with meals)      Why I use it: Type 2 diabetes mellitus without complication, without long-term current use of insulin (H)    Prescriber:  Masood Osborne MD      Date I started using it:       Date I stopped using it:         Why I stopped using it:            Medication:  OMEGA-3 FISH OIL PO      How I use it:  Take 1 g by mouth daily       Why I use it:  General Health     Prescriber:  Self      Date I started using it:       Date I stopped using it:         Why I stopped using it:            Medication:  ONETOUCH ULTRA 2 W/DEVICE KIT      How I use it:  Use to test blood sugar 1 times daily.      Why I use it: Type 2 diabetes mellitus without complication, without long-term current use of insulin (H)    Prescriber:  Masood Osborne MD      Date I started using it:       Date I stopped using it:         Why I stopped using it:            Medication:  ONETOUCH ULTRA CONTROL VI SOLN      How I use it:  Use to calibrate blood glucose monitor as directed.      Why I use it: Type 2 diabetes mellitus without complication, without long-term current use of insulin (H)    Prescriber:  Masood Osborne MD      Date I started using it:       Date I stopped using it:         Why I stopped using it:            Medication:  ONETOUCH ULTRA VI STRP      How I use it:  Use to test blood sugar 1 times daily.       Why I use it: Type 2 diabetes mellitus without complication, without long-term current use of insulin (H)    Prescriber:  Masood Osborne MD      Date I started using it:       Date I stopped using it:         Why I stopped using it:            Medication:  SALINE 0.65 % NA SOLN      How I use it:  Spray 1 spray into both nostrils daily as needed for congestion      Why I use it:  Allergy    Prescriber:  Self      Date I started using it:       Date I stopped using it:         Why I stopped using it:            Medication:  STRESS FORMULA PO TABS      How I use it:  Take 1 tablet by mouth daily      Why I use it:  General Health     Prescriber:  Self      Date I started using it:       Date I stopped using it:         Why I stopped using it:            Medication:  VITAMIN D3 PO      How I use it:  Take 2,000 Units by mouth daily       Why I use it:  General Health     Prescriber:  Self      Date I started using it:       Date I stopped using it:         Why I stopped using it:            Medication:         How I use it:         Why I use it:      Prescriber:         Date I started using it:       Date I stopped using it:         Why I stopped using it:            Medication:         How I use it:         Why I use it:      Prescriber:         Date I started using it:       Date I stopped using it:         Why I stopped using it:            Medication:         How I use it:         Why I use it:      Prescriber:         Date I started using it:       Date I stopped using it:         Why I stopped using it:              Other Information:     If you have any questions about your medication list, call Danyelle Jonas [facility], Phone: [phone] , Monday-Friday 8-4:30pm.    According to the Paperwork Reduction Act of 1995, no persons are required to respond to a collection of information unless it displays a valid OMB control number. The valid B number for this information collection is 2330-8065. The time required  to complete this information collection is estimated to average 40 minutes per response, including the time to review instructions, searching existing data resources, gather the data needed, and complete and review the information collection. If you have any comments concerning the accuracy of the time estimate(s) or suggestions for improving this form, please write to: CMS, Attn: [employer] Reports Clearance Officer, [address] [place] [phone].

## 2021-03-26 ENCOUNTER — TELEPHONE (OUTPATIENT)
Dept: FAMILY MEDICINE | Facility: CLINIC | Age: 71
End: 2021-03-26

## 2021-03-26 ENCOUNTER — ALLIED HEALTH/NURSE VISIT (OUTPATIENT)
Dept: NURSING | Facility: CLINIC | Age: 71
End: 2021-03-26
Payer: COMMERCIAL

## 2021-03-26 VITALS — OXYGEN SATURATION: 98 % | HEART RATE: 89 BPM | DIASTOLIC BLOOD PRESSURE: 80 MMHG | SYSTOLIC BLOOD PRESSURE: 156 MMHG

## 2021-03-26 DIAGNOSIS — I10 ESSENTIAL HYPERTENSION: Primary | ICD-10-CM

## 2021-03-26 DIAGNOSIS — E11.9 TYPE 2 DIABETES MELLITUS WITHOUT COMPLICATION, WITHOUT LONG-TERM CURRENT USE OF INSULIN (H): ICD-10-CM

## 2021-03-26 LAB
HBA1C MFR BLD: 5.9 % (ref 0–5.6)
TSH SERPL DL<=0.005 MIU/L-ACNC: 1.61 MU/L (ref 0.4–4)

## 2021-03-26 PROCEDURE — 84443 ASSAY THYROID STIM HORMONE: CPT | Performed by: INTERNAL MEDICINE

## 2021-03-26 PROCEDURE — 83036 HEMOGLOBIN GLYCOSYLATED A1C: CPT | Performed by: INTERNAL MEDICINE

## 2021-03-26 PROCEDURE — 36415 COLL VENOUS BLD VENIPUNCTURE: CPT | Performed by: INTERNAL MEDICINE

## 2021-03-26 PROCEDURE — 99207 PR NO CHARGE NURSE ONLY: CPT

## 2021-03-26 NOTE — PROGRESS NOTES
Elvia Helm is a 70 year old patient who comes in today for a Blood Pressure check.  Initial BP:  BP (!) 156/80 (BP Location: Left arm, Cuff Size: Adult Large)   Pulse 89   LMP 03/17/2001   SpO2 98%      89  Disposition: follow-up as previously indicated by provider and results routed to provider    Amelia Prado MA

## 2021-03-26 NOTE — TELEPHONE ENCOUNTER
Reason for Call:  Same Day Appointment, Requested Provider:  Appointment    PCP: Masood Osborne    Reason for visit: Yearly Physical    Duration of symptoms: NA    Have you been treated for this in the past? Yes    Additional comments: Patient requesting to see PCP in June for her yearly physical. Provider had no openings available. Patient said she is open for any day in June.  Please call patient if approved and setup appointment.    Can we leave a detailed message on this number? YES    Phone number patient can be reached at: Other phone number:  [phone]    Best Time: Any    Call taken on 3/26/2021 at 11:00 AM by Milka Marshall

## 2021-03-30 ENCOUNTER — MYC MEDICAL ADVICE (OUTPATIENT)
Dept: PHARMACY | Facility: CLINIC | Age: 71
End: 2021-03-30

## 2021-03-30 NOTE — PROGRESS NOTES
Sent patient Tristin msg re: elevated blood pressure.    Danyelle Jonas, PharmD, [facility]  Medication Therapy Management Provider  Pager: [phone]

## 2021-04-13 ENCOUNTER — HOSPITAL ENCOUNTER (EMERGENCY)
Facility: CLINIC | Age: 71
Discharge: HOME | End: 2021-04-14
Attending: EMERGENCY MEDICINE | Admitting: EMERGENCY MEDICINE
Payer: COMMERCIAL

## 2021-04-13 DIAGNOSIS — M79.661 PAIN OF RIGHT LOWER LEG: ICD-10-CM

## 2021-04-13 PROCEDURE — 99284 EMERGENCY DEPT VISIT MOD MDM: CPT | Mod: 25

## 2021-04-14 ENCOUNTER — APPOINTMENT (OUTPATIENT)
Dept: ULTRASOUND IMAGING | Facility: CLINIC | Age: 71
End: 2021-04-14
Attending: EMERGENCY MEDICINE
Payer: COMMERCIAL

## 2021-04-14 VITALS
DIASTOLIC BLOOD PRESSURE: 94 MMHG | OXYGEN SATURATION: 95 % | RESPIRATION RATE: 20 BRPM | TEMPERATURE: 98.6 F | SYSTOLIC BLOOD PRESSURE: 152 MMHG | HEART RATE: 105 BPM

## 2021-04-14 PROCEDURE — 93971 EXTREMITY STUDY: CPT | Mod: RT

## 2021-04-14 ASSESSMENT — ENCOUNTER SYMPTOMS
CHEST TIGHTNESS: 0
MYALGIAS: 1
SHORTNESS OF BREATH: 0
COLOR CHANGE: 1
FEVER: 0
COUGH: 0

## 2021-04-14 NOTE — ED PROVIDER NOTES
History   Chief Complaint:  Leg Pain     HPI   Elvia Helm is a 70 year old female with history of diabetes who presents with left leg pain. Starting a couple days ago she began having left leg pain with some inflammation and redness. She denies any trauma but has had prior surgeries. She says the pain is alleviated more when moving around. Tonight when she went to bed, the pain became severe and then shot up her thigh. She denies any chest pain, chest tightness, shortness of breath, cough, syncope, or fevers. She does have some tingling but says its from her bursitis. She denies any history of sciatica, pinched nerves, or DVTs. She has gotten the Moderna vaccine.    Review of Systems   Constitutional: Negative for fever.   Respiratory: Negative for cough, chest tightness and shortness of breath.    Cardiovascular: Positive for leg swelling (left inflammation). Negative for chest pain.   Musculoskeletal: Positive for myalgias (left leg).   Skin: Positive for color change (redness).   Neurological: Negative for syncope.   All other systems reviewed and are negative.      Allergies:  Malarone  Amoxicillin  Bee  Ceftin  Clindamycin  Erythromycin  Seasonal allergies  Shrimp  Tetracycline    Medications:  Lipitor  Epinephrine  Allegra  Flonase  Zestoretic  Glucophage    Past Medical History:    Allergic rhinitis  Arthritis of knee  Type II diabetes  Hypertension  Hyperlipidemia  Osteoarthritis  Tinnitus  Pes planus  Urticaria  Osteopenia     Past Surgical History:     section  Colonoscopy  ENT surgery  Endometrial biopsy  Tendonitis in right upper leg  Right ankle roe and pins     Family History:    Fibromyalgia  Hypertension  Cerebrovascular disease  Anesthesia  Diabetes  Gastrointestinal disease  Throat cancer  TIA    Social History:  Patient came from home.  Patient is unaccompanied in the ED.    Physical Exam     Patient Vitals for the past 24 hrs:   BP Temp Temp src Pulse Resp SpO2   21 0131  (!) 152/94 -- -- -- -- --   04/14/21 0046 -- -- -- -- -- 95 %   04/14/21 0038 -- -- -- -- -- 97 %   04/14/21 0030 (!) 145/85 -- -- 105 -- --   04/14/21 0029 -- -- -- -- -- 95 %   04/14/21 0010 -- -- -- -- -- 95 %   04/14/21 0000 (!) 149/91 -- -- 105 -- 95 %   04/13/21 2349 -- -- -- -- -- 96 %   04/13/21 2346 (!) 171/100 -- -- -- -- --   04/13/21 2339 (!) 185/95 98.6  F (37  C) Oral 109 20 97 %     Physical Exam  SKIN:  Warm, dry.  No erythema of the right lower extremity.  HEMATOLOGIC/IMMUNOLOGIC/LYMPHATIC:  No pallor or edema of right lower extremity.  No lymphangitic streaking of right lower extremity.  HENT: No JVD.  EYES:  Conjunctivae normal.  CARDIOVASCULAR:  Regular rate and rhythm.  No murmur.  RESPIRATORY:  No respiratory distress, breath sounds equal and normal.  MUSCULOSKELETAL: Full painless active range of motion of right lower extremity.  Straight leg raise of right lower extremity very well tolerated, not exacerbating right lower extremity pain.  No right knee joint swelling.  No right knee joint effusion.  NEUROLOGIC:  Alert, conversant.  No gross motor or sensory deficit of right lower extremity.  PSYCHIATRIC:  Normal mood.    Emergency Department Course     Imaging:    US Lower Extremity Venous Duplex Right  1.  No deep venous thrombosis in the right lower extremity.  Reading per radiology    Emergency Department Course:    Reviewed:  I reviewed nursing notes, vitals, past medical history and care everywhere    Assessments:  0032 I obtained history and examined the patient as noted above.   0123 I rechecked the patient and explained findings.     Disposition:  The patient was discharged to home.       Impression & Plan     Medical Decision Making:  Elvia Helm is a 70 year old female presenting with atraumatic pain to the right lower extremity focused lateral and inferior to the right knee, but also then tracking up the right lateral thigh. Considered DVT, see imaging as above. Neuropathic  pain is certainly possible. She reported some sort of erythema about this region, but the skin on exam appears normal and she is afebrile. I doubt cellulitis given thats the case. She has very well tolerated active range of motion of the right knee joint and I certainly doubt septic joint. Advised conservative management to the symptoms and primary follow up if this lingers.    Diagnosis:    ICD-10-CM    1. Pain of right lower leg  M79.661        Discharge Medications:  New Prescriptions    No medications on file       Scribe Disclosure:  I, Mahesh Devi, am serving as a scribe at 12:22 AM on 4/14/2021 to document services personally performed by Jin Trevino MD based on my observations and the provider's statements to me.          Jin Trevino MD  04/14/21 0582

## 2021-04-21 ENCOUNTER — TRANSFERRED RECORDS (OUTPATIENT)
Dept: HEALTH INFORMATION MANAGEMENT | Facility: CLINIC | Age: 71
End: 2021-04-21

## 2021-04-21 LAB
RETINOPATHY: NEGATIVE
RETINOPATHY: NEGATIVE

## 2021-06-13 ASSESSMENT — ACTIVITIES OF DAILY LIVING (ADL): CURRENT_FUNCTION: NO ASSISTANCE NEEDED

## 2021-06-14 ENCOUNTER — OFFICE VISIT (OUTPATIENT)
Dept: FAMILY MEDICINE | Facility: CLINIC | Age: 71
End: 2021-06-14
Payer: COMMERCIAL

## 2021-06-14 VITALS
SYSTOLIC BLOOD PRESSURE: 134 MMHG | HEART RATE: 88 BPM | WEIGHT: 172.3 LBS | TEMPERATURE: 98 F | DIASTOLIC BLOOD PRESSURE: 87 MMHG | BODY MASS INDEX: 29.41 KG/M2 | OXYGEN SATURATION: 98 % | HEIGHT: 64 IN

## 2021-06-14 DIAGNOSIS — Z12.11 SPECIAL SCREENING FOR MALIGNANT NEOPLASMS, COLON: ICD-10-CM

## 2021-06-14 DIAGNOSIS — E78.5 HYPERLIPIDEMIA LDL GOAL <100: ICD-10-CM

## 2021-06-14 DIAGNOSIS — Z82.49 FAMILY HISTORY OF CORONARY ARTERIOSCLEROSIS: ICD-10-CM

## 2021-06-14 DIAGNOSIS — M17.10 ARTHRITIS OF KNEE: ICD-10-CM

## 2021-06-14 DIAGNOSIS — H01.00B BLEPHARITIS OF UPPER AND LOWER EYELIDS OF BOTH EYES, UNSPECIFIED TYPE: ICD-10-CM

## 2021-06-14 DIAGNOSIS — Z00.00 ENCOUNTER FOR ANNUAL WELLNESS VISIT (AWV) IN MEDICARE PATIENT: Primary | ICD-10-CM
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
## 2021-06-14 DIAGNOSIS — R19.5 POSITIVE COLORECTAL CANCER SCREENING USING COLOGUARD TEST: ICD-10-CM

## 2021-06-14 DIAGNOSIS — H01.00A BLEPHARITIS OF UPPER AND LOWER EYELIDS OF BOTH EYES, UNSPECIFIED TYPE: ICD-10-CM

## 2021-06-14 DIAGNOSIS — I10 ESSENTIAL HYPERTENSION, BENIGN: ICD-10-CM

## 2021-06-14 DIAGNOSIS — M85.89 OTHER SPECIFIED DISORDERS OF BONE DENSITY AND STRUCTURE, MULTIPLE SITES: ICD-10-CM

## 2021-06-14 DIAGNOSIS — E11.9 TYPE 2 DIABETES MELLITUS WITHOUT COMPLICATION, WITHOUT LONG-TERM CURRENT USE OF INSULIN (H): ICD-10-CM

## 2021-06-14 LAB
ALBUMIN SERPL-MCNC: 3.9 G/DL (ref 3.4–5)
ALP SERPL-CCNC: 44 U/L (ref 40–150)
ALT SERPL W P-5'-P-CCNC: 32 U/L (ref 0–50)
ANION GAP SERPL CALCULATED.3IONS-SCNC: 4 MMOL/L (ref 3–14)
AST SERPL W P-5'-P-CCNC: 23 U/L (ref 0–45)
BILIRUB SERPL-MCNC: 0.5 MG/DL (ref 0.2–1.3)
BUN SERPL-MCNC: 15 MG/DL (ref 7–30)
CALCIUM SERPL-MCNC: 9.4 MG/DL (ref 8.5–10.1)
CHLORIDE SERPL-SCNC: 104 MMOL/L (ref 94–109)
CHOLEST SERPL-MCNC: 151 MG/DL
CO2 SERPL-SCNC: 29 MMOL/L (ref 20–32)
CREAT SERPL-MCNC: 0.81 MG/DL (ref 0.52–1.04)
ERYTHROCYTE [DISTWIDTH] IN BLOOD BY AUTOMATED COUNT: 14 % (ref 10–15)
GFR SERPL CREATININE-BSD FRML MDRD: 72 ML/MIN/{1.73_M2}
GLUCOSE SERPL-MCNC: 127 MG/DL (ref 70–99)
HBA1C MFR BLD: 6.1 % (ref 0–5.6)
HCT VFR BLD AUTO: 40.5 % (ref 35–47)
HDLC SERPL-MCNC: 60 MG/DL
HGB BLD-MCNC: 13.5 G/DL (ref 11.7–15.7)
LDLC SERPL CALC-MCNC: 65 MG/DL
MCH RBC QN AUTO: 31 PG (ref 26.5–33)
MCHC RBC AUTO-ENTMCNC: 33.3 G/DL (ref 31.5–36.5)
MCV RBC AUTO: 93 FL (ref 78–100)
NONHDLC SERPL-MCNC: 91 MG/DL
PLATELET # BLD AUTO: 278 10E9/L (ref 150–450)
POTASSIUM SERPL-SCNC: 4 MMOL/L (ref 3.4–5.3)
PROT SERPL-MCNC: 7.3 G/DL (ref 6.8–8.8)
RBC # BLD AUTO: 4.36 10E12/L (ref 3.8–5.2)
SODIUM SERPL-SCNC: 137 MMOL/L (ref 133–144)
TRIGL SERPL-MCNC: 128 MG/DL
TSH SERPL DL<=0.005 MIU/L-ACNC: 1.24 MU/L (ref 0.4–4)
WBC # BLD AUTO: 7.5 10E9/L (ref 4–11)

## 2021-06-14 PROCEDURE — 99397 PER PM REEVAL EST PAT 65+ YR: CPT | Performed by: INTERNAL MEDICINE

## 2021-06-14 PROCEDURE — 36415 COLL VENOUS BLD VENIPUNCTURE: CPT | Performed by: INTERNAL MEDICINE

## 2021-06-14 PROCEDURE — 99214 OFFICE O/P EST MOD 30 MIN: CPT | Mod: 25 | Performed by: INTERNAL MEDICINE

## 2021-06-14 PROCEDURE — 84443 ASSAY THYROID STIM HORMONE: CPT | Performed by: INTERNAL MEDICINE

## 2021-06-14 PROCEDURE — 82043 UR ALBUMIN QUANTITATIVE: CPT | Performed by: INTERNAL MEDICINE

## 2021-06-14 PROCEDURE — 80053 COMPREHEN METABOLIC PANEL: CPT | Performed by: INTERNAL MEDICINE

## 2021-06-14 PROCEDURE — 83036 HEMOGLOBIN GLYCOSYLATED A1C: CPT | Performed by: INTERNAL MEDICINE

## 2021-06-14 PROCEDURE — 85027 COMPLETE CBC AUTOMATED: CPT | Performed by: INTERNAL MEDICINE

## 2021-06-14 PROCEDURE — 80061 LIPID PANEL: CPT | Performed by: INTERNAL MEDICINE

## 2021-06-14 RX ORDER — MOXIFLOXACIN 5 MG/ML
1 SOLUTION/ DROPS OPHTHALMIC 2 TIMES DAILY
Qty: 5 ML | Refills: 3 | Status: ON HOLD | OUTPATIENT
Start: 2021-06-14 | End: 2021-07-26

## 2021-06-14 RX ORDER — LISINOPRIL AND HYDROCHLOROTHIAZIDE 20; 25 MG/1; MG/1
0.5 TABLET ORAL DAILY
Qty: 45 TABLET | Refills: 3 | Status: SHIPPED | OUTPATIENT
Start: 2021-06-14 | End: 2022-07-21

## 2021-06-14 ASSESSMENT — MIFFLIN-ST. JEOR: SCORE: 1281.55

## 2021-06-14 ASSESSMENT — ACTIVITIES OF DAILY LIVING (ADL): CURRENT_FUNCTION: NO ASSISTANCE NEEDED

## 2021-06-14 NOTE — PROGRESS NOTES
"SUBJECTIVE:   Elvia Helm is a 70 year old female who presents for Preventive Visit.    Patient is very nice 70 years old lady with well-controlled diabetes  She had COVID infection in Feb and her  got too sick  She herself is ok.  Her daughter at age 43, had CABG  Alma is checking her blood glucose couple of times a week  Blood glucose is below 140  No low reactions  Blood pressure not done at home  No new symptoms   She has bug bites on back and front of abdromen  They were at cabin and could be Tick bites  Patient notes mattery eyes upon waking up , yellow sticky discharge  Patient has been advised of split billing requirements and indicates understanding: Yes   Are you in the first 12 months of your Medicare coverage?  No    Healthy Habits:     In general, how would you rate your overall health?  Good    Frequency of exercise:  1 day/week    Duration of exercise:  Less than 15 minutes    Do you usually eat at least 4 servings of fruit and vegetables a day, include whole grains    & fiber and avoid regularly eating high fat or \"junk\" foods?  Yes    Taking medications regularly:  Yes    Medication side effects:  None    Ability to successfully perform activities of daily living:  No assistance needed    Home Safety:  No safety concerns identified    Hearing Impairment:  Difficulty following a conversation in a noisy restaurant or crowded room    In the past 6 months, have you been bothered by leaking of urine?  No    In general, how would you rate your overall mental or emotional health?  Good      PHQ-2 Total Score: 2    Additional concerns today:  Yes    Do you feel safe in your environment? Yes    Have you ever done Advance Care Planning? (For example, a Health Directive, POLST, or a discussion with a medical provider or your loved ones about your wishes): Yes, patient states has an Advance Care Planning document and will bring a copy to the clinic.       Fall risk  Fallen 2 or more times in the " past year?: No  Any fall with injury in the past year?: No    Cognitive Screening   1) Repeat 3 items (Leader, Season, Table)    2) Clock draw: NORMAL  3) 3 item recall: Recalls 2 objects   Results: NORMAL clock, 1-2 items recalled: COGNITIVE IMPAIRMENT LESS LIKELY    Mini-CogTM Copyright EDITH Costa. Licensed by the author for use in [facility]; reprinted with permission (beck@[facility]). All rights reserved.      Do you have sleep apnea, excessive snoring or daytime drowsiness?: yes    Reviewed and updated as needed this visit by clinical staff  Tobacco  Allergies  Meds  Problems  Med Hx  Surg Hx  Fam Hx  Soc Hx          Reviewed and updated as needed this visit by Provider    Meds  Problems    Fam Hx         Social History     Tobacco Use     Smoking status: Former Smoker     Packs/day: 1.00     Years: 10.00     Pack years: 10.00     Types: Cigarettes     Quit date: 1982     Years since quittin.8     Smokeless tobacco: Never Used   Substance Use Topics     Alcohol use: Yes     Alcohol/week: 0.0 standard drinks     Comment: wine 1 glass qd, occasional beer     If you drink alcohol do you typically have >3 drinks per day or >7 drinks per week? No    Alcohol Use 2021   Prescreen: >3 drinks/day or >7 drinks/week? -   Prescreen: >3 drinks/day or >7 drinks/week? No   AUDIT SCORE  -               Current providers sharing in care for this patient include:   Patient Care Team:  Masood Osborne MD as PCP - General (Internal Medicine)  Masood Osborne MD as PCP - Internal Medicine  Masood Osborne MD as Assigned PCP  Danyelle Jonas [facility] as Pharmacist (Pharmacist)    The following health maintenance items are reviewed in Epic and correct as of today:  Health Maintenance Due   Topic Date Due     ANNUAL REVIEW OF HM ORDERS  Never done     ZOSTER IMMUNIZATION (2 of 3) 2010     COLORECTAL CANCER SCREENING  12/10/2017     DIABETIC FOOT EXAM  2019     DEXA  2021     FALL RISK  ASSESSMENT  2021     BMP  2021     LIPID  2021     MICROALBUMIN  2021     CREATININE  2021     Patient Active Problem List   Diagnosis     Allergic rhinitis     Essential hypertension, benign     Postmenopausal atrophic vaginitis     Bee sting reaction     Torticollis     Type 2 diabetes mellitus without complications (H)     HYPERLIPIDEMIA LDL GOAL <100     Post-nasal drip     Advanced directives, counseling/discussion     Osteopenia     Urticaria     Arthritis of knee     Pes planus     Tinnitus     Past Surgical History:   Procedure Laterality Date     ABDOMEN SURGERY  1988    ceasaran birth     C  DELIVERY ONLY       C LIGATE FALLOPIAN TUBE,POSTPARTUM       COLONOSCOPY       ENT SURGERY      frequent sinus infections     HC ENDOMETRIAL BIOPSY W/O CERVICAL DILATION       ORTHOPEDIC SURGERY      broken right ankle/plates and screws     SOFT TISSUE SURGERY      tendonitis in right upper leg and below right elbow     ZZC NONSPECIFIC PROCEDURE      right ankle roe and pins       Social History     Tobacco Use     Smoking status: Former Smoker     Packs/day: 1.00     Years: 10.00     Pack years: 10.00     Types: Cigarettes     Quit date: 1982     Years since quittin.8     Smokeless tobacco: Never Used   Substance Use Topics     Alcohol use: Yes     Alcohol/week: 0.0 standard drinks     Comment: wine 1 glass qd, occasional beer     Family History   Problem Relation Age of Onset     Musculoskeletal Disorder Mother         b:1938   Hx Fibromyalgia     Hypertension Mother      Cerebrovascular Disease Mother      Anesthesia Reaction Mother      Cerebrovascular Disease Father         b:1939,  age 68  massive stroke     Diabetes Father         dx age 50s and his family     Gastrointestinal Disease Sister         b:1950   Hx of reflux     Cerebrovascular Disease Sister      Family History Negative Brother         b:     Hypertension Daughter       Family History Negative Daughter         b:1988     Diabetes Daughter         b:1978    Diabetes dx age 22**insulin     CABG Daughter 43     Lipids Son         b:1981 high cholosterol and triglycerides     Cancer Maternal Grandfather         throat     Cerebrovascular Disease Sister 62        ? TIA         Current Outpatient Medications   Medication Sig Dispense Refill     ACCU-CHEK SMARTVIEW test strip Use to test blood sugar 1 time daily or as directed. 100 each 3     alcohol swab prep pads Use to swab area of injection/pranav as directed. 100 each 3     ASPIRIN 81 MG OR TABS 1 tab po QD (Once per day)       atorvastatin (LIPITOR) 40 MG tablet Take 1 tablet (40 mg) by mouth daily 90 tablet 3     blood glucose (NO BRAND SPECIFIED) lancets standard Use to test blood sugar one* times daily or as directed. 100 each 3     blood glucose (ONETOUCH ULTRA) test strip Use to test blood sugar 1 times daily. 100 strip 6     blood glucose calibration (ONETOUCH ULTRA CONTROL) solution Use to calibrate blood glucose monitor as directed. 1 Bottle 1     blood glucose monitoring (ONE TOUCH ULTRA 2) meter device kit Use to test blood sugar 1 times daily. 1 kit 0     Cholecalciferol (VITAMIN D3 PO) Take 2,000 Units by mouth daily        EPINEPHrine (EPIPEN 2-THERESA) 0.3 MG/0.3ML injection 2-pack INJECT 0.3 MLS INTO THE MUSCLE ONCE AS NEEDED FOR ANAPHYLAXIS 2 mL 1     fexofenadine (ALLEGRA) 180 MG tablet Take 180 mg by mouth daily       fluticasone (FLONASE) 50 MCG/ACT spray Spray 1-2 sprays into both nostrils daily 16 g 0     lisinopril-hydrochlorothiazide (ZESTORETIC) 20-25 MG tablet Take 0.5 tablets by mouth daily 45 tablet 3     metFORMIN (GLUCOPHAGE) 850 MG tablet Take 1 tablet (850 mg) by mouth 2 times daily (with meals) 180 tablet 3     moxifloxacin (VIGAMOX) 0.5 % ophthalmic solution Place 1 drop into both eyes 2 times daily 5 mL 3     Multiple Vitamins-Minerals (EMERGEN-C IMMUNE) PACK Take 1 packet by mouth daily as needed         "Omega-3 Fatty Acids (OMEGA-3 FISH OIL PO) Take 1 g by mouth daily        sodium chloride (OCEAN) 0.65 % nasal spray Spray 1 spray into both nostrils daily as needed for congestion       vitamin B complex with vitamin C (VITAMIN  B COMPLEX) TABS tablet Take 1 tablet by mouth daily       Allergies   Allergen Reactions     Malarone Hives     Amoxicillin Hives     Bee Swelling     HIVES AND SWELLING --carries Epi Pen      Ceftin      hives     Clindamycin Hives     Erythromycin GI Disturbance     Seasonal Allergies      Shrimp Hives     Happened twice     Tetracycline Other (See Comments)     ?severe headaches  & nausea              Review of Systems  10 point ROS of systems including Constitutional, Eyes, Respiratory, Cardiovascular, Gastroenterology, Genitourinary, Integumentary, Muscularskeletal, Psychiatric were all negative except for pertinent positives noted in my HPI.      OBJECTIVE:   /87 (BP Location: Right arm, Patient Position: Sitting, Cuff Size: Adult Regular)   Pulse 88   Temp 98  F (36.7  C) (Temporal)   Ht 1.626 m (5' 4\")   Wt 78.2 kg (172 lb 4.8 oz)   LMP 03/17/2001   SpO2 98%   BMI 29.58 kg/m   Estimated body mass index is 29.58 kg/m  as calculated from the following:    Height as of this encounter: 1.626 m (5' 4\").    Weight as of this encounter: 78.2 kg (172 lb 4.8 oz).  Physical Exam  GENERAL APPEARANCE: healthy, alert and no distress  EYES: Eyes grossly normal to inspection, PERRL and conjunctivae and sclerae normal  HENT: ear canals and TM's normal, nose and mouth without ulcers or lesions, oropharynx clear and oral mucous membranes moist  NECK: no adenopathy, no asymmetry, masses, or scars and thyroid normal to palpation  RESP: lungs clear to auscultation - no rales, rhonchi or wheezes  BREAST: normal without masses, tenderness or nipple discharge and no palpable axillary masses or adenopathy  CV: regular rate and rhythm, normal S1 S2, no S3 or S4, no murmur, click or rub, no " peripheral edema and peripheral pulses strong  ABDOMEN: soft, nontender, no hepatosplenomegaly, no masses and bowel sounds normal  MS: no musculoskeletal defects are noted and gait is age appropriate without ataxia  SKIN: no suspicious lesions or rashes  NEURO: Normal strength and tone, sensory exam grossly normal, mentation intact and speech normal  PSYCH: mentation appears normal and affect normal/bright    Foot exam shows no ulceration, no neuropathy, microfilament well felt. Skin on the feet not dry.  Pulses in the feet well felt.      ASSESSMENT / PLAN:   Elvia was seen today for physical.    Diagnoses and all orders for this visit:    Encounter for annual wellness visit (AWV) in Medicare patient  Patient is a very nice 70 years old lady who successfully recovered from COVID-19 without any residual symptoms  She is up-to-date on immunizations including COVID-19 vaccination  She is working on weight loss and has been successful  She is up-to-date on colonoscopy and needs a mammogram  Wt Readings from Last 2 Encounters:   06/14/21 78.2 kg (172 lb 4.8 oz)   11/18/19 80.7 kg (178 lb)       Type 2 diabetes mellitus without complication, without long-term current use of insulin (H)  Patient will check blood sugar every day  -     CBC with platelets  -     Hemoglobin A1c  -     TSH with free T4 reflex  -     Albumin Random Urine Quantitative with Creat Ratio  -     CT Coronary Calcium Scan; Future  -     blood glucose (NO BRAND SPECIFIED) lancets standard; Use to test blood sugar one* times daily or as directed.  -     metFORMIN (GLUCOPHAGE) 850 MG tablet; Take 1 tablet (850 mg) by mouth 2 times daily (with meals)    Essential hypertension, benign  -     lisinopril-hydrochlorothiazide (ZESTORETIC) 20-25 MG tablet; Take 0.5 tablets by mouth daily  Blood pressure is good and advised to check blood pressure at home  Hyperlipidemia LDL goal <100  Although patient is on statin we will check a CT calcium screen because  "of her family history and her relatively sedentary lifestyle  -     Comprehensive metabolic panel  -     Lipid panel reflex to direct LDL Fasting  -     CT Coronary Calcium Scan; Future    Arthritis of knee  -     DX Hip/Pelvis/Spine; Future  Patient is doing well with off and on pain  Weight loss is advised  Special screening for malignant neoplasms, colon  -     COLOGUARD(EXACT SCIENCES)    Other specified disorders of bone density and structure, multiple sites   -     DX Hip/Pelvis/Spine; Future    Family history of coronary arteriosclerosis  Patient is diabetic   -     CT Coronary Calcium Scan; Future    Blepharitis of upper and lower eyelids of both eyes, unspecified type  Patient will wash the eye with baby shampoo  -     moxifloxacin (VIGAMOX) 0.5 % ophthalmic solution; Place 1 drop into both eyes 2 times daily    Tick bite, initial encounter  Patient will watch for cellulitis and watch for bull's-eye infection which she is familiar with and in future seek help within for 72 hours        COUNSELING:  Reviewed preventive health counseling, as reflected in patient instructions       Regular exercise       Vision screening    Estimated body mass index is 29.58 kg/m  as calculated from the following:    Height as of this encounter: 1.626 m (5' 4\").    Weight as of this encounter: 78.2 kg (172 lb 4.8 oz).    Weight management plan: Discussed healthy diet and exercise guidelines    She reports that she quit smoking about 38 years ago. Her smoking use included cigarettes. She has a 10.00 pack-year smoking history. She has never used smokeless tobacco.      Appropriate preventive services were discussed with this patient, including applicable screening as appropriate for cardiovascular disease, diabetes, osteopenia/osteoporosis, and glaucoma.  As appropriate for age/gender, discussed screening for colorectal cancer, , breast cancer, and cervical cancer. Checklist reviewing preventive services available has been given " to the patient.    Reviewed patients plan of care and provided an AVS. The Basic Care Plan (routine screening as documented in Health Maintenance) for Elvia meets the Care Plan requirement. This Care Plan has been established and reviewed with the Patient.    Counseling Resources:  ATP IV Guidelines  Pooled Cohorts Equation Calculator  Breast Cancer Risk Calculator  Breast Cancer: Medication to Reduce Risk  FRAX Risk Assessment  ICSI Preventive Guidelines  Dietary Guidelines for Americans, 2010  magnet[employer]'s MyPlate  ASA Prophylaxis  Lung CA Screening    Masood Osborne MD  [facility]    Identified Health Risks:

## 2021-06-15 LAB
CREAT UR-MCNC: 44 MG/DL
MICROALBUMIN UR-MCNC: 6 MG/L
MICROALBUMIN/CREAT UR: 13.12 MG/G CR (ref 0–25)

## 2021-06-17 ENCOUNTER — HOSPITAL ENCOUNTER (OUTPATIENT)
Dept: BONE DENSITY | Facility: CLINIC | Age: 71
Discharge: HOME | End: 2021-06-17
Attending: INTERNAL MEDICINE | Admitting: INTERNAL MEDICINE
Payer: COMMERCIAL

## 2021-06-17 DIAGNOSIS — M17.10 ARTHRITIS OF KNEE: ICD-10-CM

## 2021-06-17 DIAGNOSIS — M85.89 OTHER SPECIFIED DISORDERS OF BONE DENSITY AND STRUCTURE, MULTIPLE SITES: ICD-10-CM

## 2021-06-17 PROCEDURE — 77080 DXA BONE DENSITY AXIAL: CPT

## 2021-06-30 ENCOUNTER — TRANSFERRED RECORDS (OUTPATIENT)
Dept: HEALTH INFORMATION MANAGEMENT | Facility: CLINIC | Age: 71
End: 2021-06-30

## 2021-06-30 LAB — COLOGUARD-ABSTRACT: POSITIVE

## 2021-07-07 ENCOUNTER — TELEPHONE (OUTPATIENT)
Dept: FAMILY MEDICINE | Facility: CLINIC | Age: 71
End: 2021-07-07

## 2021-07-07 DIAGNOSIS — R19.5 POSITIVE COLORECTAL CANCER SCREENING USING COLOGUARD TEST: Primary | ICD-10-CM

## 2021-07-07 NOTE — TELEPHONE ENCOUNTER
Please call pt to tell her that her cologuard test is positive and a colonsocopy is now recommended. The positive cologuard does not mean cancer. It just means further screening is needed. Order is placed. This is a Dr Osborne pt and I am covering today     KRISTI Valentino CNP

## 2021-07-07 NOTE — TELEPHONE ENCOUNTER
Mahogany from [employer] called to report positive cologuard result. She will be faxing report to clinic today. Future colonoscopy order pended. Please approve if appropriate.

## 2021-07-08 NOTE — TELEPHONE ENCOUNTER
Called pt to advise of cologuard results and colonoscopy follow up.  She said Dr. Osborne had already called her regarding this, and is aware of the follow up colonoscopy.    Tati Cui RN

## 2021-07-09 DIAGNOSIS — Z11.59 ENCOUNTER FOR SCREENING FOR OTHER VIRAL DISEASES: ICD-10-CM

## 2021-07-13 ENCOUNTER — HOSPITAL ENCOUNTER (OUTPATIENT)
Dept: CARDIOLOGY | Facility: CLINIC | Age: 71
Discharge: HOME | End: 2021-07-13
Attending: INTERNAL MEDICINE | Admitting: INTERNAL MEDICINE
Payer: COMMERCIAL

## 2021-07-13 DIAGNOSIS — E11.9 TYPE 2 DIABETES MELLITUS WITHOUT COMPLICATION, WITHOUT LONG-TERM CURRENT USE OF INSULIN (H): ICD-10-CM

## 2021-07-13 DIAGNOSIS — Z82.49 FAMILY HISTORY OF CORONARY ARTERIOSCLEROSIS: ICD-10-CM

## 2021-07-13 DIAGNOSIS — E78.5 HYPERLIPIDEMIA LDL GOAL <100: ICD-10-CM

## 2021-07-13 PROCEDURE — 75571 CT HRT W/O DYE W/CA TEST: CPT | Mod: 26 | Performed by: INTERNAL MEDICINE

## 2021-07-13 PROCEDURE — 75571 CT HRT W/O DYE W/CA TEST: CPT

## 2021-07-22 ENCOUNTER — APPOINTMENT (OUTPATIENT)
Dept: LAB | Facility: CLINIC | Age: 71
End: 2021-07-22
Attending: COLON & RECTAL SURGERY
Payer: COMMERCIAL

## 2021-07-26 ENCOUNTER — HOSPITAL ENCOUNTER (OUTPATIENT)
Facility: CLINIC | Age: 71
Discharge: HOME | End: 2021-07-26
Attending: COLON & RECTAL SURGERY | Admitting: COLON & RECTAL SURGERY
Payer: COMMERCIAL

## 2021-07-26 VITALS
BODY MASS INDEX: 29.02 KG/M2 | HEIGHT: 64 IN | DIASTOLIC BLOOD PRESSURE: 79 MMHG | WEIGHT: 170 LBS | HEART RATE: 75 BPM | OXYGEN SATURATION: 97 % | TEMPERATURE: 97.3 F | SYSTOLIC BLOOD PRESSURE: 110 MMHG | RESPIRATION RATE: 14 BRPM

## 2021-07-26 LAB
COLONOSCOPY: NORMAL
GLUCOSE BLDC GLUCOMTR-MCNC: 136 MG/DL (ref 70–99)

## 2021-07-26 PROCEDURE — 45380 COLONOSCOPY AND BIOPSY: CPT | Mod: XU | Performed by: COLON & RECTAL SURGERY

## 2021-07-26 PROCEDURE — 45385 COLONOSCOPY W/LESION REMOVAL: CPT | Performed by: COLON & RECTAL SURGERY

## 2021-07-26 PROCEDURE — 99153 MOD SED SAME PHYS/QHP EA: CPT | Performed by: COLON & RECTAL SURGERY

## 2021-07-26 PROCEDURE — 272N000105 HC DEVICE CLIP QUICK: Performed by: COLON & RECTAL SURGERY

## 2021-07-26 PROCEDURE — 45381 COLONOSCOPY SUBMUCOUS NJX: CPT | Performed by: COLON & RECTAL SURGERY

## 2021-07-26 PROCEDURE — 82962 GLUCOSE BLOOD TEST: CPT

## 2021-07-26 PROCEDURE — G0500 MOD SEDAT ENDO SERVICE >5YRS: HCPCS | Performed by: COLON & RECTAL SURGERY

## 2021-07-26 PROCEDURE — 88305 TISSUE EXAM BY PATHOLOGIST: CPT | Mod: TC | Performed by: COLON & RECTAL SURGERY

## 2021-07-26 PROCEDURE — 250N000011 HC RX IP 250 OP 636: Performed by: COLON & RECTAL SURGERY

## 2021-07-26 PROCEDURE — 45382 COLONOSCOPY W/CONTROL BLEED: CPT | Performed by: COLON & RECTAL SURGERY

## 2021-07-26 RX ORDER — ONDANSETRON 2 MG/ML
4 INJECTION INTRAMUSCULAR; INTRAVENOUS
Status: DISCONTINUED | OUTPATIENT
Start: 2021-07-26 | End: 2021-07-26 | Stop reason: HOSPADM

## 2021-07-26 RX ORDER — ONDANSETRON 2 MG/ML
4 INJECTION INTRAMUSCULAR; INTRAVENOUS EVERY 6 HOURS PRN
Status: DISCONTINUED | OUTPATIENT
Start: 2021-07-26 | End: 2021-07-26 | Stop reason: HOSPADM

## 2021-07-26 RX ORDER — NALOXONE HYDROCHLORIDE 0.4 MG/ML
0.2 INJECTION, SOLUTION INTRAMUSCULAR; INTRAVENOUS; SUBCUTANEOUS
Status: DISCONTINUED | OUTPATIENT
Start: 2021-07-26 | End: 2021-07-26 | Stop reason: HOSPADM

## 2021-07-26 RX ORDER — PROCHLORPERAZINE MALEATE 5 MG
5 TABLET ORAL EVERY 6 HOURS PRN
Status: DISCONTINUED | OUTPATIENT
Start: 2021-07-26 | End: 2021-07-26 | Stop reason: HOSPADM

## 2021-07-26 RX ORDER — ONDANSETRON 4 MG/1
4 TABLET, ORALLY DISINTEGRATING ORAL EVERY 6 HOURS PRN
Status: DISCONTINUED | OUTPATIENT
Start: 2021-07-26 | End: 2021-07-26 | Stop reason: HOSPADM

## 2021-07-26 RX ORDER — NALOXONE HYDROCHLORIDE 0.4 MG/ML
0.4 INJECTION, SOLUTION INTRAMUSCULAR; INTRAVENOUS; SUBCUTANEOUS
Status: DISCONTINUED | OUTPATIENT
Start: 2021-07-26 | End: 2021-07-26 | Stop reason: HOSPADM

## 2021-07-26 RX ORDER — LIDOCAINE 40 MG/G
CREAM TOPICAL
Status: DISCONTINUED | OUTPATIENT
Start: 2021-07-26 | End: 2021-07-26 | Stop reason: HOSPADM

## 2021-07-26 RX ORDER — FENTANYL CITRATE 50 UG/ML
INJECTION, SOLUTION INTRAMUSCULAR; INTRAVENOUS PRN
Status: COMPLETED | OUTPATIENT
Start: 2021-07-26 | End: 2021-07-26

## 2021-07-26 RX ORDER — FLUMAZENIL 0.1 MG/ML
0.2 INJECTION, SOLUTION INTRAVENOUS
Status: DISCONTINUED | OUTPATIENT
Start: 2021-07-26 | End: 2021-07-26 | Stop reason: HOSPADM

## 2021-07-26 RX ADMIN — FENTANYL CITRATE 100 MCG: 50 INJECTION, SOLUTION INTRAMUSCULAR; INTRAVENOUS at 09:32

## 2021-07-26 RX ADMIN — MIDAZOLAM 0.5 MG: 1 INJECTION INTRAMUSCULAR; INTRAVENOUS at 09:37

## 2021-07-26 RX ADMIN — MIDAZOLAM 1 MG: 1 INJECTION INTRAMUSCULAR; INTRAVENOUS at 09:33

## 2021-07-26 ASSESSMENT — MIFFLIN-ST. JEOR: SCORE: 1266.11

## 2021-07-26 NOTE — H&P
Pre-Endoscopy History and Physical     Elvia Helm MRN# [id number]   YOB: 1950 Age: 71 year old     Date of Procedure: 7/26/2021  Primary care provider: Masood Osborne  Type of Endoscopy: Colonoscopy  Reason for Procedure: + cologuard test  Type of Anesthesia Anticipated: Moderate Sedation    HPI:    Elvia is a 71 year old female who will be undergoing the above procedure.      A history and physical has been performed. The patient's medications and allergies have been reviewed. The risks and benefits of the procedure and the sedation options and risks were discussed with the patient.  All questions were answered and informed consent was obtained.      She denies a personal or family history of anesthesia complications or bleeding disorders.     Allergies   Allergen Reactions     Malarone Hives     Amoxicillin Hives     Bee Swelling     HIVES AND SWELLING --carries Epi Pen      Ceftin      hives     Clindamycin Hives     Erythromycin GI Disturbance     Seasonal Allergies      Shrimp Hives     Happened twice     Tetracycline Other (See Comments)     ?severe headaches  & nausea         No current facility-administered medications on file prior to encounter.  ASPIRIN 81 MG OR TABS, 1 tab po QD (Once per day)  atorvastatin (LIPITOR) 40 MG tablet, Take 1 tablet (40 mg) by mouth daily  Cholecalciferol (VITAMIN D3 PO), Take 2,000 Units by mouth daily   fexofenadine (ALLEGRA) 180 MG tablet, Take 180 mg by mouth daily  fluticasone (FLONASE) 50 MCG/ACT spray, Spray 1-2 sprays into both nostrils daily  lisinopril-hydrochlorothiazide (ZESTORETIC) 20-25 MG tablet, Take 0.5 tablets by mouth daily  metFORMIN (GLUCOPHAGE) 850 MG tablet, Take 1 tablet (850 mg) by mouth 2 times daily (with meals)  Multiple Vitamins-Minerals (EMERGEN-C IMMUNE) PACK, Take 1 packet by mouth daily as needed   Omega-3 Fatty Acids (OMEGA-3 FISH OIL PO), Take 1 g by mouth daily   vitamin B complex with vitamin C (VITAMIN  B  COMPLEX) TABS tablet, Take 1 tablet by mouth daily  ACCU-CHEK SMARTVIEW test strip, Use to test blood sugar 1 time daily or as directed.  alcohol swab prep pads, Use to swab area of injection/pranav as directed.  blood glucose (NO BRAND SPECIFIED) lancets standard, Use to test blood sugar one* times daily or as directed.  blood glucose (ONETOUCH ULTRA) test strip, Use to test blood sugar 1 times daily.  blood glucose calibration (ONETOUCH ULTRA CONTROL) solution, Use to calibrate blood glucose monitor as directed.  blood glucose monitoring (ONE TOUCH ULTRA 2) meter device kit, Use to test blood sugar 1 times daily.  EPINEPHrine (EPIPEN 2-THERESA) 0.3 MG/0.3ML injection 2-pack, INJECT 0.3 MLS INTO THE MUSCLE ONCE AS NEEDED FOR ANAPHYLAXIS  sodium chloride (OCEAN) 0.65 % nasal spray, Spray 1 spray into both nostrils daily as needed for congestion        Patient Active Problem List   Diagnosis     Allergic rhinitis     Essential hypertension, benign     Postmenopausal atrophic vaginitis     Bee sting reaction     Torticollis     Type 2 diabetes mellitus without complications (H)     HYPERLIPIDEMIA LDL GOAL <100     Post-nasal drip     Advanced directives, counseling/discussion     Osteopenia     Urticaria     Arthritis of knee     Pes planus     Tinnitus        Past Medical History:   Diagnosis Date     Allergic rhinitis, cause unspecified a     Allergic rhinitis, cause unspecified      Arthritis of knee 2014     Bee sting reaction      DIABETES TYPE II      Essential hypertension, benign      Flat foot(734) 2014     Hyperlipidaemia LDL goal < 100      Localized osteoarthrosis not specified whether primary or secondary, ankle and foot     After surgery     Tinnitus 2014        Past Surgical History:   Procedure Laterality Date     ABDOMEN SURGERY  1988    ceasaran birth     C  DELIVERY ONLY       C LIGATE FALLOPIAN TUBE,POSTPARTUM       COLONOSCOPY       ENT SURGERY       "frequent sinus infections     HC ENDOMETRIAL BIOPSY W/O CERVICAL DILATION       ORTHOPEDIC SURGERY      broken right ankle/plates and screws     SOFT TISSUE SURGERY      tendonitis in right upper leg and below right elbow     ZZC NONSPECIFIC PROCEDURE      right ankle roe and pins       Social History     Tobacco Use     Smoking status: Former Smoker     Packs/day: 1.00     Years: 10.00     Pack years: 10.00     Types: Cigarettes     Quit date: 1982     Years since quittin.9     Smokeless tobacco: Never Used   Substance Use Topics     Alcohol use: Yes     Alcohol/week: 0.0 standard drinks     Comment: wine 1 glass qd, occasional beer       Family History   Problem Relation Age of Onset     Musculoskeletal Disorder Mother         b:1938   Hx Fibromyalgia     Hypertension Mother      Cerebrovascular Disease Mother      Anesthesia Reaction Mother      Cerebrovascular Disease Father         b:,  age 68  massive stroke     Diabetes Father         dx age 50s and his family     Gastrointestinal Disease Sister         b:   Hx of reflux     Cerebrovascular Disease Sister      Family History Negative Brother         b:     Hypertension Daughter      Family History Negative Daughter         b:     Diabetes Daughter         b:    Diabetes dx age 22**insulin     CABG Daughter 43     Lipids Son         b: high cholosterol and triglycerides     Cancer Maternal Grandfather         throat     Cerebrovascular Disease Sister 62        ? TIA       REVIEW OF SYSTEMS:     5 point ROS negative except as noted above in HPI, including Gen., Resp., CV, GI &  system review.      PHYSICAL EXAM:   /73   Pulse 74   Temp 97.3  F (36.3  C) (Skin)   Resp 15   Ht 1.626 m (5' 4\")   Wt 77.1 kg (170 lb)   LMP 2001   SpO2 96%   BMI 29.18 kg/m   Estimated body mass index is 29.18 kg/m  as calculated from the following:    Height as of this encounter: 1.626 m (5' 4\").    Weight as of this " encounter: 77.1 kg (170 lb).   GENERAL APPEARANCE: healthy and alert  MENTAL STATUS: alert  AIRWAY EXAM: Mallampatti Class I (visualization of the soft palate, fauces, uvula, anterior and posterior pillars)  RESP: lungs clear to auscultation - no rales, rhonchi or wheezes  CV: regular rates and rhythm      IMPRESSION   ASA Class 3 - Severe systemic disease, but not incapacitating        PLAN:     Plan for colonoscopy. We discussed the risks, benefits and alternatives and the patient wished to proceed.    The above has been forwarded to the consulting provider.      Daija Grubbs MD  [facility]  Phone: [phone]  Fax: [phone]  July 26, 2021

## 2021-07-27 LAB
PATH REPORT.COMMENTS IMP SPEC: NORMAL
PATH REPORT.COMMENTS IMP SPEC: NORMAL
PATH REPORT.FINAL DX SPEC: NORMAL
PATH REPORT.GROSS SPEC: NORMAL
PATH REPORT.MICROSCOPIC SPEC OTHER STN: NORMAL
PATH REPORT.RELEVANT HX SPEC: NORMAL
PHOTO IMAGE: NORMAL

## 2021-07-27 PROCEDURE — 88305 TISSUE EXAM BY PATHOLOGIST: CPT | Mod: 26 | Performed by: PATHOLOGY

## 2021-09-16 ENCOUNTER — VIRTUAL VISIT (OUTPATIENT)
Dept: PHARMACY | Facility: CLINIC | Age: 71
End: 2021-09-16
Payer: COMMERCIAL

## 2021-09-16 DIAGNOSIS — I10 ESSENTIAL HYPERTENSION, BENIGN: ICD-10-CM

## 2021-09-16 DIAGNOSIS — E78.5 HYPERLIPIDEMIA LDL GOAL <100: ICD-10-CM

## 2021-09-16 DIAGNOSIS — J30.2 SEASONAL ALLERGIC RHINITIS, UNSPECIFIED TRIGGER: ICD-10-CM

## 2021-09-16 DIAGNOSIS — E11.9 TYPE 2 DIABETES MELLITUS WITHOUT COMPLICATION, WITHOUT LONG-TERM CURRENT USE OF INSULIN (H): Primary | ICD-10-CM

## 2021-09-16 DIAGNOSIS — E56.9 VITAMIN DEFICIENCY: ICD-10-CM

## 2021-09-16 PROCEDURE — 99607 MTMS BY PHARM ADDL 15 MIN: CPT | Performed by: PHARMACIST

## 2021-09-16 PROCEDURE — 99606 MTMS BY PHARM EST 15 MIN: CPT | Performed by: PHARMACIST

## 2021-09-16 NOTE — PROGRESS NOTES
Medication Therapy Management (MTM) Encounter    ASSESSMENT:                            Medication Adherence/Access: No issues identified    Type 2 Diabetes: Patient is meeting A1c goal of < 7%. Self monitoring of blood glucose is at goal of fasting  mg/dL and post prandial < 180 mg/dL.  No changes needed today.    Hypertension: Stable. Patient is meeting blood pressure goal of < 130/80mmHg.     Hyperlipidemia: Stable.  Patient is on high intensity statin therapy.    Allergies: she may benefit from trial of another 2nd generation antihistamine to see if symptoms are controlled better.    Supplements: She likely does not need to take all of her supplements, but she finds them helpful and they likely are not harmful.    PLAN:                            1.  Advised she could try Claritin (loratadine) 10mg daily or Zyrtec (cetirizine) 10mg daily instead of Allegra (fexofenadine) if she wanted to try something different.    Follow-up: Return in about 6 months (around 3/16/2022) for Routine Visit.    SUBJECTIVE/OBJECTIVE:                          Elvia Helm is a 71 year old female called for a follow-up visit. She was referred to me from Dr. Osborne.  Today's visit is a follow-up MTM visit from 3/16/2021.     Reason for visit: Routine follow-up; she has no specific questions or concerns today    Tobacco: She reports that she quit smoking about 39 years ago. Her smoking use included cigarettes. She has a 10.00 pack-year smoking history. She has never used smokeless tobacco.  Alcohol: 1 glass of wine/day    Medication Adherence/Access: no issues reported    Diabetes:  Pt currently taking metformin 850mg twice daily.  She reports nausea is better now that she's taking metformin with food.  SMB time daily in the AM.   Ranges (per pt report): 115-145mg/dL (higher numbers usually due to dietary indiscretion)  Symptoms of low blood sugar? Shaky, anxious feeling. Frequency of hypoglycemia? Very rarely  Recent  symptoms of high blood sugar? none.  Eye exam: up to date  Foot exam: due  Microalbumin is < 30 mg/g. Pt is taking an ACEi/ARB.  Lab Results   Component Value Date    UMALCR 13.12 06/14/2021   Aspirin: Taking 81mg daily and denies side effects.   Lab Results   Component Value Date    A1C 6.1 06/14/2021    A1C 5.9 03/26/2021    A1C 6.4 06/23/2020    A1C 6.1 11/18/2019    A1C 6.1 05/17/2019     GFR Estimate   Date Value Ref Range Status   06/14/2021 72 >60 mL/min/[1.73_m2] Final     Comment:     Non  GFR Calc  Starting 12/18/2018, serum creatinine based estimated GFR (eGFR) will be   calculated using the Chronic Kidney Disease Epidemiology Collaboration   (CKD-EPI) equation.       Hypertension: Current medications include lisinopril/HCTZ 20/25mg daily - 1/2 tablet daily.  Patient does not self-monitor blood pressure regularly.  Patient reports no side effects.    BP Readings from Last 3 Encounters:   07/26/21 110/79   06/14/21 134/87   04/14/21 (!) 152/94         Hyperlipidemia: Current therapy includes atorvastatin 40mg daily.  Pt reports no significant myalgias or other side effects.   The 10-year ASCVD risk score ([place] KELLY Jr., et al., 2013) is: 20%    Values used to calculate the score:      Age: 71 years      Sex: Female      Is Non- : No      Diabetic: Yes      Tobacco smoker: No      Systolic Blood Pressure: 110 mmHg      Is BP treated: Yes      HDL Cholesterol: 60 mg/dL      Total Cholesterol: 151 mg/dL     Recent Labs   Lab Test 06/14/21  1010 06/23/20  0919 07/19/16  0745 06/30/15  0855 06/06/14  0906 06/06/14  0906   CHOL 151 140   < > 146   < > 170   HDL 60 48*   < > 50*   < > 44*   LDL 65 64   < > 67   < > 84   TRIG 128 140   < > 145   < > 212*   CHOLHDLRATIO  --   --   --  2.9  --  3.9    < > = values in this interval not displayed.       Allergies:  Pt currently taking fexofenadine 180mg every evening, Flonase nasal spray daily and saline nasal spray as needed.    She also has an EpiPen on hand to use if needed (no recent use, but did recently refill).  She denies side effects.  She has been having a hard time with allergy symptoms this summer - wonders if she should try something other than fexofenadine.      Supplements:  Current medications include vitamin B complex, Omega 3 fatty acids, Emergen-C pack as needed, and Vitamin D 2000 units daily. She is not experiencing side effects.  She feels these are effective and prefers to continue taking.  Vitamin D Deficiency Screening Results:  Lab Results   Component Value Date    VITDT 52 07/21/2016    VITDT 36 06/06/2014    VITDT 36 03/11/2013       Today's Vitals: LMP 03/17/2001   ----------------    I spent 11 minutes with this patient today. A copy of the visit note was provided to the patient's primary care provider.    The patient was sent via [employer] a summary of these recommendations.     Danyelle Jonas, PharmD, BCACP  Medication Therapy Management Provider  Pager: [phone]     Telemedicine Visit Details  Type of service:  Telephone visit  Start Time: 11:15 AM  End Time: 11:26 AM  Originating Location (patient location): [place]  Distant Location (provider location):  [facility]     Medication Therapy Recommendations  Allergic rhinitis    Current Medication: fexofenadine (ALLEGRA) 180 MG tablet   Rationale: More effective medication available - Ineffective medication - Effectiveness   Recommendation: Change Medication - loratadine 10 MG tablet   Status: Accepted - no CPA Needed

## 2021-09-17 NOTE — PATIENT INSTRUCTIONS
Recommendations from today's MTM visit:                                                    MTM (medication therapy management) is a service provided by a clinical pharmacist designed to help you get the most of out of your medicines.   Today we reviewed what your medicines are for, how to know if they are working, that your medicines are safe and how to make your medicine regimen as easy as possible.      You could try Claritin (loratadine) 10mg daily or Zyrtec (cetirizine) 10mg daily instead of Allegra (fexofenadine) to see if either of these works better for your allergy symptoms    Follow-up: Return in about 6 months (around 3/16/2022) for Routine Visit.    It was great to speak with you today.  I value your experience and would be very thankful for your time with providing feedback on our clinic survey. You may receive a survey via email or text message in the next few days.     To schedule another MTM appointment, please call the clinic directly or you may call the MTM scheduling line at [phone] or toll-free at [phone].     My Clinical Pharmacist's contact information:                                                      Please feel free to contact me with any questions or concerns you have.      Danyelle Jonas PharmD, [facility]  Medication Therapy Management Provider  Pager: [phone]     Alise Trevizo, PharmD  Medication Therapy Management Resident  Pager: [phone]

## 2021-09-19 ENCOUNTER — HEALTH MAINTENANCE LETTER (OUTPATIENT)
Age: 71
End: 2021-09-19

## 2021-11-03 ENCOUNTER — TRANSFERRED RECORDS (OUTPATIENT)
Dept: HEALTH INFORMATION MANAGEMENT | Facility: CLINIC | Age: 71
End: 2021-11-03
Payer: COMMERCIAL

## 2021-11-10 ENCOUNTER — TRANSFERRED RECORDS (OUTPATIENT)
Dept: HEALTH INFORMATION MANAGEMENT | Facility: CLINIC | Age: 71
End: 2021-11-10
Payer: COMMERCIAL

## 2022-01-04 DIAGNOSIS — Z11.59 ENCOUNTER FOR SCREENING FOR OTHER VIRAL DISEASES: ICD-10-CM

## 2022-01-09 ENCOUNTER — HEALTH MAINTENANCE LETTER (OUTPATIENT)
Age: 72
End: 2022-01-09

## 2022-01-14 ENCOUNTER — DOCUMENTATION ONLY (OUTPATIENT)
Dept: LAB | Facility: CLINIC | Age: 72
End: 2022-01-14
Payer: COMMERCIAL

## 2022-01-14 DIAGNOSIS — E11.9 TYPE 2 DIABETES MELLITUS WITHOUT COMPLICATION, WITHOUT LONG-TERM CURRENT USE OF INSULIN (H): Primary | ICD-10-CM

## 2022-01-14 DIAGNOSIS — I10 ESSENTIAL HYPERTENSION, BENIGN: ICD-10-CM

## 2022-01-14 NOTE — PROGRESS NOTES
Elvia Helm has an upcoming lab appointment:    Future Appointments   Date Time Provider Department Center   1/21/2022  8:00 AM CS LAB [facility]   1/27/2022  1:00 PM BL COVID TESTING HUB 2 [facility]     There is no order available. Please review and place either future orders or HMPO (Review of Health Maintenance Protocol Orders), as appropriate.    Health Maintenance Due   Topic     ANNUAL REVIEW OF HM ORDERS      A1C      Mackenzie Butts

## 2022-01-21 ENCOUNTER — LAB (OUTPATIENT)
Dept: LAB | Facility: CLINIC | Age: 72
End: 2022-01-21
Payer: COMMERCIAL

## 2022-01-21 DIAGNOSIS — I10 ESSENTIAL HYPERTENSION, BENIGN: ICD-10-CM

## 2022-01-21 DIAGNOSIS — E11.9 TYPE 2 DIABETES MELLITUS WITHOUT COMPLICATION, WITHOUT LONG-TERM CURRENT USE OF INSULIN (H): ICD-10-CM

## 2022-01-21 LAB
ANION GAP SERPL CALCULATED.3IONS-SCNC: 8 MMOL/L (ref 3–14)
BUN SERPL-MCNC: 16 MG/DL (ref 7–30)
CALCIUM SERPL-MCNC: 9.4 MG/DL (ref 8.5–10.1)
CHLORIDE BLD-SCNC: 104 MMOL/L (ref 94–109)
CO2 SERPL-SCNC: 27 MMOL/L (ref 20–32)
CREAT SERPL-MCNC: 0.79 MG/DL (ref 0.52–1.04)
GFR SERPL CREATININE-BSD FRML MDRD: 79 ML/MIN/1.73M2
GLUCOSE BLD-MCNC: 126 MG/DL (ref 70–99)
HBA1C MFR BLD: 5.7 % (ref 0–5.6)
POTASSIUM BLD-SCNC: 4.2 MMOL/L (ref 3.4–5.3)
SODIUM SERPL-SCNC: 139 MMOL/L (ref 133–144)

## 2022-01-21 PROCEDURE — 36415 COLL VENOUS BLD VENIPUNCTURE: CPT

## 2022-01-21 PROCEDURE — 80048 BASIC METABOLIC PNL TOTAL CA: CPT

## 2022-01-21 PROCEDURE — 83036 HEMOGLOBIN GLYCOSYLATED A1C: CPT

## 2022-01-27 ENCOUNTER — LAB (OUTPATIENT)
Dept: URGENT CARE | Facility: URGENT CARE | Age: 72
End: 2022-01-27
Attending: INTERNAL MEDICINE
Payer: COMMERCIAL

## 2022-01-27 DIAGNOSIS — Z11.59 ENCOUNTER FOR SCREENING FOR OTHER VIRAL DISEASES: ICD-10-CM

## 2022-01-27 PROCEDURE — U0003 INFECTIOUS AGENT DETECTION BY NUCLEIC ACID (DNA OR RNA); SEVERE ACUTE RESPIRATORY SYNDROME CORONAVIRUS 2 (SARS-COV-2) (CORONAVIRUS DISEASE [COVID-19]), AMPLIFIED PROBE TECHNIQUE, MAKING USE OF HIGH THROUGHPUT TECHNOLOGIES AS DESCRIBED BY CMS-2020-01-R: HCPCS

## 2022-01-27 PROCEDURE — U0005 INFEC AGEN DETEC AMPLI PROBE: HCPCS

## 2022-01-28 LAB — SARS-COV-2 RNA RESP QL NAA+PROBE: NEGATIVE

## 2022-01-31 ENCOUNTER — HOSPITAL ENCOUNTER (OUTPATIENT)
Facility: CLINIC | Age: 72
Discharge: HOME | End: 2022-01-31
Attending: COLON & RECTAL SURGERY | Admitting: COLON & RECTAL SURGERY
Payer: COMMERCIAL

## 2022-01-31 VITALS
HEIGHT: 64 IN | DIASTOLIC BLOOD PRESSURE: 67 MMHG | SYSTOLIC BLOOD PRESSURE: 105 MMHG | OXYGEN SATURATION: 99 % | HEART RATE: 72 BPM | RESPIRATION RATE: 14 BRPM | BODY MASS INDEX: 29.02 KG/M2 | WEIGHT: 170 LBS

## 2022-01-31 LAB
COLONOSCOPY: NORMAL
GLUCOSE BLDC GLUCOMTR-MCNC: 110 MG/DL (ref 70–99)

## 2022-01-31 PROCEDURE — 88305 TISSUE EXAM BY PATHOLOGIST: CPT | Mod: TC | Performed by: COLON & RECTAL SURGERY

## 2022-01-31 PROCEDURE — 45380 COLONOSCOPY AND BIOPSY: CPT | Performed by: COLON & RECTAL SURGERY

## 2022-01-31 PROCEDURE — 250N000011 HC RX IP 250 OP 636: Performed by: COLON & RECTAL SURGERY

## 2022-01-31 PROCEDURE — G0500 MOD SEDAT ENDO SERVICE >5YRS: HCPCS | Performed by: COLON & RECTAL SURGERY

## 2022-01-31 PROCEDURE — 82962 GLUCOSE BLOOD TEST: CPT

## 2022-01-31 PROCEDURE — 99153 MOD SED SAME PHYS/QHP EA: CPT | Performed by: COLON & RECTAL SURGERY

## 2022-01-31 RX ORDER — NALOXONE HYDROCHLORIDE 0.4 MG/ML
0.4 INJECTION, SOLUTION INTRAMUSCULAR; INTRAVENOUS; SUBCUTANEOUS
Status: DISCONTINUED | OUTPATIENT
Start: 2022-01-31 | End: 2022-01-31 | Stop reason: HOSPADM

## 2022-01-31 RX ORDER — ONDANSETRON 4 MG/1
4 TABLET, ORALLY DISINTEGRATING ORAL EVERY 6 HOURS PRN
Status: DISCONTINUED | OUTPATIENT
Start: 2022-01-31 | End: 2022-01-31 | Stop reason: HOSPADM

## 2022-01-31 RX ORDER — ONDANSETRON 2 MG/ML
4 INJECTION INTRAMUSCULAR; INTRAVENOUS EVERY 6 HOURS PRN
Status: DISCONTINUED | OUTPATIENT
Start: 2022-01-31 | End: 2022-01-31 | Stop reason: HOSPADM

## 2022-01-31 RX ORDER — FENTANYL CITRATE 50 UG/ML
INJECTION, SOLUTION INTRAMUSCULAR; INTRAVENOUS PRN
Status: COMPLETED | OUTPATIENT
Start: 2022-01-31 | End: 2022-01-31

## 2022-01-31 RX ORDER — FLUMAZENIL 0.1 MG/ML
0.2 INJECTION, SOLUTION INTRAVENOUS
Status: DISCONTINUED | OUTPATIENT
Start: 2022-01-31 | End: 2022-01-31 | Stop reason: HOSPADM

## 2022-01-31 RX ORDER — PROCHLORPERAZINE MALEATE 5 MG
5 TABLET ORAL EVERY 6 HOURS PRN
Status: DISCONTINUED | OUTPATIENT
Start: 2022-01-31 | End: 2022-01-31 | Stop reason: HOSPADM

## 2022-01-31 RX ORDER — NALOXONE HYDROCHLORIDE 0.4 MG/ML
0.2 INJECTION, SOLUTION INTRAMUSCULAR; INTRAVENOUS; SUBCUTANEOUS
Status: DISCONTINUED | OUTPATIENT
Start: 2022-01-31 | End: 2022-01-31 | Stop reason: HOSPADM

## 2022-01-31 RX ORDER — LIDOCAINE 40 MG/G
CREAM TOPICAL
Status: DISCONTINUED | OUTPATIENT
Start: 2022-01-31 | End: 2022-01-31 | Stop reason: HOSPADM

## 2022-01-31 RX ORDER — ONDANSETRON 2 MG/ML
4 INJECTION INTRAMUSCULAR; INTRAVENOUS
Status: DISCONTINUED | OUTPATIENT
Start: 2022-01-31 | End: 2022-01-31 | Stop reason: HOSPADM

## 2022-01-31 RX ADMIN — FENTANYL CITRATE 50 MCG: 50 INJECTION, SOLUTION INTRAMUSCULAR; INTRAVENOUS at 10:29

## 2022-01-31 RX ADMIN — MIDAZOLAM 1 MG: 1 INJECTION INTRAMUSCULAR; INTRAVENOUS at 10:19

## 2022-01-31 RX ADMIN — MIDAZOLAM 1 MG: 1 INJECTION INTRAMUSCULAR; INTRAVENOUS at 10:32

## 2022-01-31 RX ADMIN — MIDAZOLAM 1 MG: 1 INJECTION INTRAMUSCULAR; INTRAVENOUS at 10:24

## 2022-01-31 RX ADMIN — FENTANYL CITRATE 100 MCG: 50 INJECTION, SOLUTION INTRAMUSCULAR; INTRAVENOUS at 10:18

## 2022-01-31 ASSESSMENT — MIFFLIN-ST. JEOR: SCORE: 1266.11

## 2022-01-31 NOTE — H&P
Pre-Endoscopy History and Physical     Elvia Helm MRN# [id number]   YOB: 1950 Age: 71 year old     Date of Procedure: 1/31/2022  Primary care provider: Masood Osborne  Type of Endoscopy: Colonoscopy  Reason for Procedure: H/o polyps  Type of Anesthesia Anticipated: Moderate Sedation    HPI:    Elvia is a 71 year old female who will be undergoing the above procedure.      A history and physical has been performed. The patient's medications and allergies have been reviewed. The risks and benefits of the procedure and the sedation options and risks were discussed with the patient.  All questions were answered and informed consent was obtained.      She denies a personal or family history of anesthesia complications or bleeding disorders.     Allergies   Allergen Reactions     Malarone Hives     Amoxicillin Hives     Bee Swelling     HIVES AND SWELLING --carries Epi Pen      Ceftin      hives     Clindamycin Hives     Erythromycin GI Disturbance     Seasonal Allergies      Shrimp Hives     Happened twice     Tetracycline Other (See Comments)     ?severe headaches  & nausea         No current facility-administered medications on file prior to encounter.  ASPIRIN 81 MG OR TABS, 1 tab po QD (Once per day)  atorvastatin (LIPITOR) 40 MG tablet, Take 1 tablet (40 mg) by mouth daily  blood glucose (NO BRAND SPECIFIED) lancets standard, Use to test blood sugar one* times daily or as directed.  blood glucose (ONETOUCH ULTRA) test strip, Use to test blood sugar 1 times daily.  blood glucose calibration (ONETOUCH ULTRA CONTROL) solution, Use to calibrate blood glucose monitor as directed.  blood glucose monitoring (ONE TOUCH ULTRA 2) meter device kit, Use to test blood sugar 1 times daily.  Cholecalciferol (VITAMIN D3 PO), Take 2,000 Units by mouth daily   EPINEPHrine (EPIPEN 2-THERESA) 0.3 MG/0.3ML injection 2-pack, INJECT 0.3 MLS INTO THE MUSCLE ONCE AS NEEDED FOR ANAPHYLAXIS (Patient not taking: Reported  on 9/16/2021)  fexofenadine (ALLEGRA) 180 MG tablet, Take 180 mg by mouth daily  fluticasone (FLONASE) 50 MCG/ACT spray, Spray 1-2 sprays into both nostrils daily  lisinopril-hydrochlorothiazide (ZESTORETIC) 20-25 MG tablet, Take 0.5 tablets by mouth daily  metFORMIN (GLUCOPHAGE) 850 MG tablet, Take 1 tablet (850 mg) by mouth 2 times daily (with meals)  Multiple Vitamins-Minerals (EMERGEN-C IMMUNE) PACK, Take 1 packet by mouth daily as needed   Omega-3 Fatty Acids (OMEGA-3 FISH OIL PO), Take 1 g by mouth daily   sodium chloride (OCEAN) 0.65 % nasal spray, Spray 1 spray into both nostrils daily as needed for congestion  vitamin B complex with vitamin C (VITAMIN  B COMPLEX) TABS tablet, Take 1 tablet by mouth daily        Patient Active Problem List   Diagnosis     Allergic rhinitis     Essential hypertension, benign     Postmenopausal atrophic vaginitis     Bee sting reaction     Torticollis     Type 2 diabetes mellitus without complications (H)     HYPERLIPIDEMIA LDL GOAL <100     Post-nasal drip     Advanced directives, counseling/discussion     Osteopenia     Urticaria     Arthritis of knee     Pes planus     Tinnitus        Past Medical History:   Diagnosis Date     Allergic rhinitis, cause unspecified 2003a     Allergic rhinitis, cause unspecified      Arthritis of knee 6/6/2014     Bee sting reaction 7-09     DIABETES TYPE II 2003     Essential hypertension, benign      Flat foot(734) 6/6/2014     Hyperlipidaemia LDL goal < 100      Localized osteoarthrosis not specified whether primary or secondary, ankle and foot     After surgery     Tinnitus 6/6/2014        Past Surgical History:   Procedure Laterality Date     ABDOMEN SURGERY  7/1988    ceasaran birth     COLONOSCOPY  12/07     ENT SURGERY      frequent sinus infections     HC ENDOMETRIAL BIOPSY W/O CERVICAL DILATION  2001     ORTHOPEDIC SURGERY      broken right ankle/plates and screws     SOFT TISSUE SURGERY      tendonitis in right upper leg and below  "right elbow     ZZC  DELIVERY ONLY       ZZC LIGATE FALLOPIAN TUBE,POSTPARTUM       ZZC NONSPECIFIC PROCEDURE      right ankle roe and pins       Social History     Tobacco Use     Smoking status: Former Smoker     Packs/day: 1.00     Years: 10.00     Pack years: 10.00     Types: Cigarettes     Quit date: 1982     Years since quittin.4     Smokeless tobacco: Never Used   Substance Use Topics     Alcohol use: Yes     Alcohol/week: 0.0 standard drinks     Comment: wine 1 glass qd, occasional beer       Family History   Problem Relation Age of Onset     Musculoskeletal Disorder Mother         b:1938   Hx Fibromyalgia     Hypertension Mother      Cerebrovascular Disease Mother      Anesthesia Reaction Mother      Cerebrovascular Disease Father         b:,  age 68  massive stroke     Diabetes Father         dx age 50s and his family     Gastrointestinal Disease Sister         b:   Hx of reflux     Cerebrovascular Disease Sister      Family History Negative Brother         b:     Hypertension Daughter      Family History Negative Daughter         b:     Diabetes Daughter         b:    Diabetes dx age 22**insulin     CABG Daughter 43     Lipids Son         b: high cholosterol and triglycerides     Cancer Maternal Grandfather         throat     Cerebrovascular Disease Sister 62        ? TIA       REVIEW OF SYSTEMS:     5 point ROS negative except as noted above in HPI, including Gen., Resp., CV, GI &  system review.      PHYSICAL EXAM:   LMP 2001  Estimated body mass index is 29.18 kg/m  as calculated from the following:    Height as of 21: 1.626 m (5' 4\").    Weight as of 21: 77.1 kg (170 lb).   GENERAL APPEARANCE: healthy and alert  MENTAL STATUS: alert  AIRWAY EXAM: Mallampatti Class I (visualization of the soft palate, fauces, uvula, anterior and posterior pillars)  RESP: lungs clear to auscultation - no rales, rhonchi or wheezes  CV: regular rates " and rhythm      IMPRESSION   ASA Class 2 - Mild systemic disease        PLAN:     Plan for colonoscopy. We discussed the risks, benefits and alternatives and the patient wished to proceed.    The above has been forwarded to the consulting provider.      Daija Grubbs MD  [facility]  Phone: [phone]  Fax: [phone]  January 31, 2022

## 2022-02-01 PROCEDURE — 88305 TISSUE EXAM BY PATHOLOGIST: CPT | Mod: 26 | Performed by: PATHOLOGY

## 2022-03-24 ENCOUNTER — E-VISIT (OUTPATIENT)
Dept: FAMILY MEDICINE | Facility: CLINIC | Age: 72
End: 2022-03-24
Payer: COMMERCIAL

## 2022-03-24 DIAGNOSIS — J01.90 ACUTE BACTERIAL SINUSITIS: ICD-10-CM

## 2022-03-24 DIAGNOSIS — B96.89 ACUTE BACTERIAL SINUSITIS: ICD-10-CM

## 2022-03-24 DIAGNOSIS — J01.00 ACUTE NON-RECURRENT MAXILLARY SINUSITIS: Primary | ICD-10-CM

## 2022-03-24 PROCEDURE — 99421 OL DIG E/M SVC 5-10 MIN: CPT | Performed by: INTERNAL MEDICINE

## 2022-03-24 RX ORDER — AZITHROMYCIN 250 MG/1
TABLET, FILM COATED ORAL
Qty: 6 TABLET | Refills: 0 | Status: SHIPPED | OUTPATIENT
Start: 2022-03-24 | End: 2022-04-04

## 2022-03-24 NOTE — PATIENT INSTRUCTIONS
Dear Elvia Helm    After reviewing your responses, I've been able to diagnose you with?a sinus infection caused by bacteria.?     Based on your responses and diagnosis, I have prescribed Zpak to treat your symptoms. I have sent this to your pharmacy.?     It is also important to stay well hydrated, get lots of rest and take over-the-counter decongestants,?tylenol?or ibuprofen if you?are able to?take those medications per your primary care provider to help relieve discomfort.?     It is important that you take?all of?your prescribed medication even if your symptoms are improving after a few doses.? Taking?all of?your medicine helps prevent the symptoms from returning.?     If your symptoms worsen, you develop severe headache, vomiting, high fever (>102), or are not improving in 7 days, please contact your primary care provider for an appointment or visit any of our convenient Walk-in Care or Urgent Care Centers to be seen which can be found on our website?here.?     Thanks again for choosing?us?as your health care partner,?   ?  Masood Osborne MD?

## 2022-03-25 NOTE — TELEPHONE ENCOUNTER
Dr Osborne,  [facility] advise.  Thanks,  Adriana CORNELIUS RN     General Sunscreen Counseling: I recommended a zinc or titanium- based sunscreen with a SPF of 30 or higher. I explained that SPF 30 sunscreens block approximately 97 percent of the sun's harmful rays. Sunscreens should be applied at least 15 minutes prior to expected sun exposure and then every 2 hours after that as long as sun exposure continues. If swimming or exercising sunscreen should be reapplied every 45 minutes to an hour after getting wet or sweating. One ounce, or the equivalent of a shot glass full of sunscreen, is adequate to protect the skin not covered by a bathing suit. I also recommended a lip balm with a sunscreen as well. Sun protective clothing can be used in lieu of sunscreen but must be worn the entire time you are exposed to the sun's rays. Detail Level: Detailed Products Recommended: Sunblock recommendation sheet available to patient

## 2022-03-31 ENCOUNTER — LAB (OUTPATIENT)
Dept: URGENT CARE | Facility: URGENT CARE | Age: 72
End: 2022-03-31
Attending: INTERNAL MEDICINE
Payer: COMMERCIAL

## 2022-03-31 DIAGNOSIS — J01.00 ACUTE NON-RECURRENT MAXILLARY SINUSITIS: ICD-10-CM

## 2022-03-31 DIAGNOSIS — E78.5 HYPERLIPIDEMIA LDL GOAL <100: ICD-10-CM

## 2022-03-31 LAB — SARS-COV-2 RNA RESP QL NAA+PROBE: NEGATIVE

## 2022-03-31 PROCEDURE — U0005 INFEC AGEN DETEC AMPLI PROBE: HCPCS

## 2022-03-31 PROCEDURE — U0003 INFECTIOUS AGENT DETECTION BY NUCLEIC ACID (DNA OR RNA); SEVERE ACUTE RESPIRATORY SYNDROME CORONAVIRUS 2 (SARS-COV-2) (CORONAVIRUS DISEASE [COVID-19]), AMPLIFIED PROBE TECHNIQUE, MAKING USE OF HIGH THROUGHPUT TECHNOLOGIES AS DESCRIBED BY CMS-2020-01-R: HCPCS

## 2022-04-01 RX ORDER — ATORVASTATIN CALCIUM 40 MG/1
40 TABLET, FILM COATED ORAL DAILY
Qty: 90 TABLET | Refills: 3 | Status: SHIPPED | OUTPATIENT
Start: 2022-04-01 | End: 2022-08-16

## 2022-04-01 NOTE — TELEPHONE ENCOUNTER
Prescription approved per [facility] Refill Protocol.    Nanette CM RN  EP Triage

## 2022-04-01 NOTE — TELEPHONE ENCOUNTER
Atorvastatin 40 mg tablet    Summary: Take 1 tablet (40 mg) by mouth daily, Disp-90 tablet, R-3, E-Prescribe   Dose, Route, Frequency: 40 mg, Oral, DAILY  Start: 6/23/2021  Ord/Sold: 6/23/2021

## 2022-04-02 NOTE — PROGRESS NOTES
Medication Therapy Management (MTM) Encounter    ASSESSMENT:                            Medication Adherence/Access: No issues identified    Type 2 Diabetes: Stable. Patient is meeting A1c goal of < 8%. Patient may benefit from obtaining a new lancing device. After visit, called pharmacy who advised us to contact [employer] about replacing the lancing device.     Hypertension: Stable. Patient is meeting blood pressure goal of < 130/80mmHg.    Hyperlipidemia: Stable. Patient is on high intensity statin which is indicated based on 2019 ACC/AHA guidelines for lipid management.      Allergic rhinitis: Stable. Plan in place for follow-up on sinus infection. Patient may benefit from trying Zyrtec in future to see if this helps with her chronic allergies.      Supplements: Stable. It is unclear how much benefit she is getting from the omega-3 and vitamin B complex. However, patient prefers to continue. Although some of her vitamins could contain shellfish products, she has tolerated them well so okay to continue for now.     Immunizations: Patient is due for second dose of the Shingrix vaccine. She may also benefit from getting the second dose of the Covid vaccine given that she is >50 years old, per new CDC guidance.     PLAN:                            1. I called the [employer] company to see if they would be able to replace your lancing device for you. They will be sending you a replacement lancing device for free.     2. I spoke with your pharmacy and they will be contacting your insurance about a lost medication override with atorvastatin. However, you may have to pay out of pocket for this medication pending what the insurance says.    3. I spoke to [facility] - they have refilled lisinopril-hydrochlorothiazide and metformin for you.    4. Try using Zyrtec instead of Allegra to see if this helps with your allergies.    5. You should talk with your pharmacy to see if they are administering the second dose  of the Covid boosters yet. I would also recommend getting the second dose of the Shingrix vaccine when you are in next.    Follow-up: send mychart after talking with pharmacy, then after annual visit with Dr. Osborne in August    SUBJECTIVE/OBJECTIVE:                          Elvia Helm is a 71 year old female contacted via secure video for a follow-up visit.  Today's visit is a follow-up [facility] visit from 9/16/21     Reason for visit: comprehensive medication review.    Allergies/ADRs: Reviewed in chart  Past Medical History: Reviewed in chart  Tobacco: She reports that she quit smoking about 39 years ago. Her smoking use included cigarettes. She has a 10.00 pack-year smoking history. She has never used smokeless tobacco.    Medication Adherence/Access: Patient reports that her  lost atorvastatin after picking it up - she has been out of it for 1 day. She also requests refills on lisinopril-hydrochlorothiazide and metformin.     Type 2 Diabetes:  Currently taking metformin 850 mg twice daily. Patient is not experiencing side effects. She reports that she had leg cramping after evening dose with metformin, but started drinking more water and this has resolved.  She reports lancing device may be broken - [employer] 2 so she hasn't been testing recently.  Symptoms of low blood sugar? none  Symptoms of high blood sugar? none  Eye exam: due - appt in May  Foot exam: due   Diet/Exercise: She needs a knee replacement, so been difficult to exercise.     Hypertension: Current medications include lisinopril/HCTZ 20/25 mg daily - 1/2 tablet daily.  Patient does not self-monitor blood pressure regularly.  Patient reports no side effects.     Hyperlipidemia: Current therapy includes atorvastatin 40 mg daily. Patient reports no significant myalgias or other side effects.    Allergies:  Pt currently taking fexofenadine 180 mg every evening, Flonase nasal spray daily, and saline nasal spray as needed. Reports that she  chronic allergies due to pollen. She also has an EpiPen on hand to use if needed (no recent use and pen is not ). She reports that she has allergies to bees, shellfish, and shrimp, but wonders if she has a true allergy to seafood and hopes to get allergy testing done in the future. She denies side effects from current regimen. She has found fexofenadine to not be very effective for her and Claritin was not helpful in past. Patient had a sinus infection recently and took a short course of Zithromax. However, she feels the sinus infection has not fully resolved and plans to make a follow-up appt for this.    Supplements:  Current medications include vitamin B complex, Omega 3 fatty acids, and Vitamin D 2000 units daily. She is not experiencing side effects. She takes Emergen-C when she feels a cold coming on. She feels these are effective and prefers to continue taking. Patient reports that Dr. Osborne recommended she continue omega 3 at this time.     Immunizations: Patient has not yet had second dose of the Shingrix vaccination. Patient also wonders if she should get a second booster COVID vaccine.  ----------------  I spent 28 minutes with this patient today. All changes were made via collaborative practice agreement with Masood Osborne MD. A copy of the visit note was provided to the patient's provider(s).    The patient was sent via [employer] a summary of these recommendations.     Alise Trevizo PharmD   Pharmaceutical Care Resident   Medication Therapy Management    Preceptor cosignature: Elvia Helm was seen independently by Dr. Trevizo. I have reviewed the assessment and plan. Leonora Cárdenas PharmD, TESS, BCACP    Telemedicine Visit Details  Type of service:  Video Conference via [employer]  Start Time: 11:00 AM  End Time: 11:28 AM  Originating Location (patient location): Home  Distant Location (provider location):  [facility]     Medication Therapy Recommendations  Allergic  rhinitis    Current Medication: fexofenadine (ALLEGRA) 180 MG tablet   Rationale: More effective medication available - Ineffective medication - Effectiveness   Recommendation: Change Medication - cetirizine 10 MG tablet   Status: Accepted - no CPA Needed         Vaccine counseling    Rationale: Preventive therapy - Needs additional medication therapy - Indication   Recommendation: Order Vaccine - Shingrix 50 MCG/0.5ML Susr   Status: Accepted - no CPA Needed

## 2022-04-04 ENCOUNTER — VIRTUAL VISIT (OUTPATIENT)
Dept: PHARMACY | Facility: CLINIC | Age: 72
End: 2022-04-04
Payer: COMMERCIAL

## 2022-04-04 DIAGNOSIS — J30.2 SEASONAL ALLERGIC RHINITIS, UNSPECIFIED TRIGGER: ICD-10-CM

## 2022-04-04 DIAGNOSIS — E11.9 TYPE 2 DIABETES MELLITUS WITHOUT COMPLICATION, WITHOUT LONG-TERM CURRENT USE OF INSULIN (H): Primary | ICD-10-CM

## 2022-04-04 DIAGNOSIS — E56.9 VITAMIN DEFICIENCY: ICD-10-CM

## 2022-04-04 DIAGNOSIS — E78.5 HYPERLIPIDEMIA LDL GOAL <100: ICD-10-CM

## 2022-04-04 DIAGNOSIS — I10 ESSENTIAL HYPERTENSION, BENIGN: ICD-10-CM

## 2022-04-04 DIAGNOSIS — Z71.85 VACCINE COUNSELING: ICD-10-CM

## 2022-04-04 PROCEDURE — 99607 MTMS BY PHARM ADDL 15 MIN: CPT | Performed by: PHARMACIST

## 2022-04-04 PROCEDURE — 99605 MTMS BY PHARM NP 15 MIN: CPT | Performed by: PHARMACIST

## 2022-04-04 NOTE — LETTER
"Recommended To-Do List      Prepared on: 4/4/2022     You can get the best results from your medications by completing the items on this \"To-Do List.\"      Bring your To-Do List when you go to your doctor. And, share it with your family or caregivers.    My To-Do List:  What we talked about: What I should do:   A medication that is not working    Change the medication you are taking from fexofenadine (ALLEGRA) to cetirizine (zyrTEC)          What we talked about: What I should do:   A new medication that may be of benefit to you    Get the following vaccine(s): Shingrix and second Covid booster           What we talked about: What I should do:                     "

## 2022-04-04 NOTE — LETTER
_  Medication List        Prepared on: 4/4/2022     Bring your Medication List when you go to the doctor, hospital, or   emergency room. And, share it with your family or caregivers.     Note any changes to how you take your medications.  Cross out medications when you no longer use them.    Medication How I take it Why I use it Prescriber   atorvastatin (LIPITOR) 40 MG tablet Take 1 tablet (40 mg) by mouth daily Hyperlipidemia LDL Goal <100 Masood Osborne MD   blood glucose (NO BRAND SPECIFIED) lancets standard Use to test blood sugar one* times daily or as directed. Type 2 diabetes mellitus without complication, without long-term current use of insulin (H) Masood Osborne MD   blood glucose (ONETOUCH ULTRA) test strip Use to test blood sugar 1 times daily. Type 2 diabetes mellitus without complication, without long-term current use of insulin (H) Masood Osborne MD   blood glucose calibration (ONETOUCH ULTRA CONTROL) solution Use to calibrate blood glucose monitor as directed. Type 2 diabetes mellitus without complication, without long-term current use of insulin (H) Masood Osborne MD   blood glucose monitoring (ONE TOUCH ULTRA 2) meter device kit Use to test blood sugar 1 times daily. Type 2 diabetes mellitus without complication, without long-term current use of insulin (H) Masood Osborne MD   Cholecalciferol (VITAMIN D3 PO) Take 2,000 Units by mouth daily  General health Patient Reported   EPINEPHrine (EPIPEN 2-THERESA) 0.3 MG/0.3ML injection 2-pack INJECT 0.3 MLS INTO THE MUSCLE ONCE AS NEEDED FOR ANAPHYLAXIS Bee sting reaction, accidental or unintentional, sequela Masood Osborne MD   fexofenadine (ALLEGRA) 180 MG tablet Take 180 mg by mouth daily allergies Patient Reported   fluticasone (FLONASE) 50 MCG/ACT spray Spray 1-2 sprays into both nostrils daily Seasonal allergic rhinitis due to other allergic trigger Vincent Encinas PA-C   lisinopril-hydrochlorothiazide (ZESTORETIC) 20-25 MG tablet Take 0.5 tablets by mouth  daily Essential Hypertension, Benign Masood Osborne MD   metFORMIN (GLUCOPHAGE) 850 MG tablet Take 1 tablet (850 mg) by mouth 2 times daily (with meals) Type 2 diabetes mellitus without complication, without long-term current use of insulin (H) Masood Osborne MD   Multiple Vitamins-Minerals (EMERGEN-C IMMUNE) PACK Take 1 packet by mouth daily as needed  General health Patient Reported   Omega-3 Fatty Acids (OMEGA-3 FISH OIL PO) Take 1 g by mouth daily  General health Patient Reported   sodium chloride (OCEAN) 0.65 % nasal spray Spray 1 spray into both nostrils daily as needed for congestion Congestion  Patient Reported   vitamin B complex with vitamin C (VITAMIN  B COMPLEX) TABS tablet Take 1 tablet by mouth daily General health Patient Reported         Add new medications, over-the-counter drugs, herbals, vitamins, or  minerals in the blank rows below.    Medication How I take it Why I use it Prescriber                          Allergies:      malarone; amoxicillin; bee; ceftin; clindamycin; erythromycin; seasonal allergies; shellfish-derived products; shrimp; tetracycline        Side effects I have had:              Other Information:             My notes and questions:

## 2022-04-04 NOTE — LETTER
April 4, 2022  Elvia Helm  [address] [id number]    Dear BENNETT Serna [facility]        Thank you for talking with me on Apr 4, 2022 about your health and medications. As a follow-up to our conversation, I have included two documents:      1. Your Recommended To-Do List has steps you should take to get the best results from your medications.  2. Your Medication List will help you keep track of your medications and how to take them.    If you want to talk about these documents, please call Alise Trevizo RPH at phone: [phone], Monday-Friday 8-4:30pm.    I look forward to working with you and your doctors to make sure your medications work well for you.    Sincerely,    Alise Trevizo RPH  [facility] Pharmacist, [facility]

## 2022-04-04 NOTE — PATIENT INSTRUCTIONS
Recommendations from today's MTM visit:                                                       1. I called the [employer] company to see if they would be able to replace your lancing device for you. They will be sending you a replacement lancing device for free.     2. I spoke with your pharmacy and they will be contacting your insurance about a lost medication override with atorvastatin. However, you may have to pay out of pocket for this medication pending what the insurance says.    3. I spoke to [facility] - they have refilled lisinopril-hydrochlorothiazide and metformin for you.    4. Try using Zyrtec instead of Allegra to see if this helps with your allergies.    5. You should talk with your pharmacy to see if they are administering the second dose of the Covid boosters yet. I would also recommend getting the second dose of the Shingrix vaccine when you are in next.    Follow-up: send mychart after talking with pharmacy, then after annual visit with Dr. Osborne in August    It was great to speak with you today.  I value your experience and would be very thankful for your time with providing feedback on our clinic survey. You may receive a survey via email or text message in the next few days.     To schedule another MTM appointment, please call the clinic directly or you may call the MTM scheduling line at [phone] or toll-free at [phone].     My Clinical Pharmacist's contact information:                                                      Please feel free to contact me with any questions or concerns you have.      Alise Trevizo, PharmD   Medication Therapy Management   Pharmacist Resident  Pager: [phone]

## 2022-04-13 ENCOUNTER — ANCILLARY PROCEDURE (OUTPATIENT)
Dept: MAMMOGRAPHY | Facility: CLINIC | Age: 72
End: 2022-04-13
Attending: INTERNAL MEDICINE
Payer: COMMERCIAL

## 2022-04-13 DIAGNOSIS — Z12.31 VISIT FOR SCREENING MAMMOGRAM: ICD-10-CM

## 2022-04-13 PROCEDURE — 77067 SCR MAMMO BI INCL CAD: CPT | Mod: TC | Performed by: RADIOLOGY

## 2022-04-20 ENCOUNTER — IMMUNIZATION (OUTPATIENT)
Dept: NURSING | Facility: CLINIC | Age: 72
End: 2022-04-20
Payer: COMMERCIAL

## 2022-04-20 PROCEDURE — 91306 COVID-19,PF,MODERNA (18+ YRS BOOSTER .25ML): CPT

## 2022-04-20 PROCEDURE — 0064A COVID-19,PF,MODERNA (18+ YRS BOOSTER .25ML): CPT

## 2022-05-24 ENCOUNTER — TRANSFERRED RECORDS (OUTPATIENT)
Dept: HEALTH INFORMATION MANAGEMENT | Facility: CLINIC | Age: 72
End: 2022-05-24
Payer: COMMERCIAL

## 2022-05-24 LAB
RETINOPATHY: NORMAL
RETINOPATHY: NORMAL

## 2022-05-26 ENCOUNTER — TELEPHONE (OUTPATIENT)
Dept: FAMILY MEDICINE | Facility: CLINIC | Age: 72
End: 2022-05-26
Payer: COMMERCIAL

## 2022-05-26 NOTE — TELEPHONE ENCOUNTER
Please abstract the following data from this visit with this patient into the appropriate field in Epic:    Tests that can be patient reported without a hard copy:    Eye exam with ophthalmology on this date: 05/24/2022 at [facility].

## 2022-06-03 DIAGNOSIS — E11.9 TYPE 2 DIABETES MELLITUS WITHOUT COMPLICATION, WITHOUT LONG-TERM CURRENT USE OF INSULIN (H): ICD-10-CM

## 2022-06-06 RX ORDER — BLOOD SUGAR DIAGNOSTIC
STRIP MISCELLANEOUS
Qty: 100 STRIP | Refills: 0 | Status: SHIPPED | OUTPATIENT
Start: 2022-06-06 | End: 2023-06-06

## 2022-06-06 NOTE — TELEPHONE ENCOUNTER
Prescription approved per [facility] Refill Protocol.  Sharee PATEL RN  [facility]

## 2022-06-13 ENCOUNTER — OFFICE VISIT (OUTPATIENT)
Dept: ALLERGY | Facility: CLINIC | Age: 72
End: 2022-06-13
Attending: INTERNAL MEDICINE
Payer: COMMERCIAL

## 2022-06-13 VITALS
OXYGEN SATURATION: 97 % | HEART RATE: 98 BPM | SYSTOLIC BLOOD PRESSURE: 139 MMHG | WEIGHT: 179.4 LBS | DIASTOLIC BLOOD PRESSURE: 88 MMHG | BODY MASS INDEX: 30.79 KG/M2

## 2022-06-13 DIAGNOSIS — L50.9 URTICARIA, UNSPECIFIED: ICD-10-CM

## 2022-06-13 DIAGNOSIS — T78.40XA ALLERGIC REACTION, INITIAL ENCOUNTER: ICD-10-CM

## 2022-06-13 DIAGNOSIS — J34.89 NASAL VESTIBULITIS: ICD-10-CM

## 2022-06-13 DIAGNOSIS — J30.1 SEASONAL ALLERGIC RHINITIS DUE TO POLLEN: ICD-10-CM

## 2022-06-13 DIAGNOSIS — T78.1XXA OTHER ADVERSE FOOD REACTIONS, NOT ELSEWHERE CLASSIFIED, INITIAL ENCOUNTER: Primary | ICD-10-CM

## 2022-06-13 PROCEDURE — 95004 PERQ TESTS W/ALRGNC XTRCS: CPT | Performed by: INTERNAL MEDICINE

## 2022-06-13 PROCEDURE — 99204 OFFICE O/P NEW MOD 45 MIN: CPT | Mod: 25 | Performed by: INTERNAL MEDICINE

## 2022-06-13 RX ORDER — AZELASTINE 1 MG/ML
1 SPRAY, METERED NASAL 2 TIMES DAILY
Qty: 30 ML | Refills: 3 | Status: SHIPPED | OUTPATIENT
Start: 2022-06-13 | End: 2023-12-15

## 2022-06-13 RX ORDER — MUPIROCIN 20 MG/G
OINTMENT TOPICAL
Qty: 15 G | Refills: 1 | Status: SHIPPED | OUTPATIENT
Start: 2022-06-13 | End: 2022-08-16

## 2022-06-13 ASSESSMENT — ENCOUNTER SYMPTOMS
PSYCHIATRIC NEGATIVE: 1
GASTROINTESTINAL NEGATIVE: 1
NEUROLOGICAL NEGATIVE: 1
EYE DISCHARGE: 1
MUSCULOSKELETAL NEGATIVE: 1
COUGH: 1
CONSTITUTIONAL NEGATIVE: 1
HEMATOLOGIC/LYMPHATIC NEGATIVE: 1
ENDOCRINE NEGATIVE: 1
EYE ITCHING: 1
CARDIOVASCULAR NEGATIVE: 1

## 2022-06-13 NOTE — PATIENT INSTRUCTIONS
Today skin testing was borderline positive to shrimp and crab.  Negative skin testing to lobster.  We will confirm these results with blood testing.  Depending on these results may consider an oral challenge to lobster if they are borderline positive.    For allergic rhinitis recommend Allegra 180 mg daily or Zyrtec 10 mg daily for itching or sneezing.  This does not typically help nasal congestion.    For the nasal congestion recommend continuation of the Flonase 1 to 2 sprays each nostril daily but will also prescribe azelastine nasal spray which can be combined with Flonase which is also 1 to 2 sprays each nostril once daily.    For the nasal crusting will recommend mupirocin antibiotic ointment which will be applied to both nostrils twice daily for 5 days.  This can be repeated repeated in the future if the crusting redevelops.    For the eye itching and drainage, particularly in the fall, Pataday eyedrops or Zaditor eyedrops are available over-the-counter and are antihistamine eyedrops and should work well.

## 2022-06-13 NOTE — LETTER
6/13/2022         RE: Eliva Helm  [address] [facility] [id number]        Dear Colleague,    Thank you for referring your patient, Elvia Helm, to the [facility] [place]. Please see a copy of my visit note below.    Elvia Helm was seen in the Allergy Clinic at [facility].    Elvia Helm is a 71 year old White female being seen today at the request of Dr. Osborne in consultation for shellfish allergy.     Approximately 10 years ago she ate shrimp on 2 different occasions and developed hives on her face neck as well as chest.  This happens within minutes of eating the shrimp.  She was able to tolerate shrimp without any symptoms.  She did not have any other systemic symptoms such as nausea, vomiting, shortness of breath, angioedema, or cough.    She is traveling to [place] this summer and would like to know if she can eat lobster rolls.  She has had lobster in the past without any problems.  The last was approximately 12 years ago.  She has not had any shellfish including scallops, crab, lobster or shrimp for the last decade.  In the past scallops and clams may have caused hives for her and was avoided.  Shrimp and lobster previously were tolerated.      Past Medical History:   Diagnosis Date     Allergic rhinitis, cause unspecified 2003a     Allergic rhinitis, cause unspecified      Arthritis of knee 6/6/2014     Bee sting reaction 7-09     DIABETES TYPE II 2003     Essential hypertension, benign      Flat foot(734) 6/6/2014     Hyperlipidaemia LDL goal < 100      Localized osteoarthrosis not specified whether primary or secondary, ankle and foot     After surgery     Tinnitus 6/6/2014     Family History   Problem Relation Age of Onset     Musculoskeletal Disorder Mother         b:1938   Hx Fibromyalgia     Hypertension Mother      Cerebrovascular Disease Mother      Anesthesia Reaction Mother      Cerebrovascular Disease Father          b:1939,  age 68  massive stroke     Diabetes Father         dx age 50s and his family     Gastrointestinal Disease Sister         b:   Hx of reflux     Cerebrovascular Disease Sister      Family History Negative Brother         b:     Hypertension Daughter      Family History Negative Daughter         b:     Diabetes Daughter         b:    Diabetes dx age 22**insulin     CABG Daughter 43     Lipids Son         b: high cholosterol and triglycerides     Cancer Maternal Grandfather         throat     Cerebrovascular Disease Sister 62        ? TIA     Past Surgical History:   Procedure Laterality Date     ABDOMEN SURGERY  1988    ceasaran birth     COLONOSCOPY       COLONOSCOPY N/A 2022    Procedure: COLONOSCOPY, WITH POLYPECTOMY AND BIOPSY;  Surgeon: Nicole Grubbs MD;  Location:  GI     ENT SURGERY      frequent sinus infections     HC ENDOMETRIAL BIOPSY W/O CERVICAL DILATION       ORTHOPEDIC SURGERY      broken right ankle/plates and screws     SOFT TISSUE SURGERY      tendonitis in right upper leg and below right elbow     ZZC  DELIVERY ONLY       ZZC LIGATE FALLOPIAN TUBE,POSTPARTUM       ZZC NONSPECIFIC PROCEDURE      right ankle roe and pins       ENVIRONMENTAL HISTORY:   Pets inside the house include 1 dog(s).  Do you smoke cigarettes or other recreational drugs? No There is/are 0 smokers living in the house. The house does have a damp basement.     SOCIAL HISTORY:   Elvia is retired. She lives with her spouse.  Her spouse works as a/an legal .    Review of Systems   Constitutional: Negative.    HENT: Positive for postnasal drip and sneezing.    Eyes: Positive for discharge and itching.   Respiratory: Positive for cough.    Cardiovascular: Negative.    Gastrointestinal: Negative.    Endocrine: Negative.    Genitourinary: Negative.    Musculoskeletal: Negative.    Skin: Negative.    Allergic/Immunologic: Positive for  environmental allergies and food allergies.   Neurological: Negative.    Hematological: Negative.    Psychiatric/Behavioral: Negative.    All other systems reviewed and are negative.        Current Outpatient Medications:      atorvastatin (LIPITOR) 40 MG tablet, Take 1 tablet (40 mg) by mouth daily, Disp: 90 tablet, Rfl: 3     blood glucose (NO BRAND SPECIFIED) lancets standard, Use to test blood sugar one* times daily or as directed., Disp: 100 each, Rfl: 3     blood glucose calibration (ONETOUCH ULTRA CONTROL) solution, Use to calibrate blood glucose monitor as directed., Disp: 1 Bottle, Rfl: 1     blood glucose monitoring (ONE TOUCH ULTRA 2) meter device kit, Use to test blood sugar 1 times daily., Disp: 1 kit, Rfl: 0     Cholecalciferol (VITAMIN D3 PO), Take 2,000 Units by mouth daily , Disp: , Rfl:      fexofenadine (ALLEGRA) 180 MG tablet, Take 180 mg by mouth daily, Disp: , Rfl:      fluticasone (FLONASE) 50 MCG/ACT spray, Spray 1-2 sprays into both nostrils daily, Disp: 16 g, Rfl: 0     lisinopril-hydrochlorothiazide (ZESTORETIC) 20-25 MG tablet, Take 0.5 tablets by mouth daily, Disp: 45 tablet, Rfl: 3     metFORMIN (GLUCOPHAGE) 850 MG tablet, Take 1 tablet (850 mg) by mouth 2 times daily (with meals), Disp: 180 tablet, Rfl: 3     Multiple Vitamins-Minerals (EMERGEN-C IMMUNE) PACK, Take 1 packet by mouth daily as needed , Disp: , Rfl:      Omega-3 Fatty Acids (OMEGA-3 FISH OIL PO), Take 1 g by mouth daily , Disp: , Rfl:      ONETOUCH ULTRA test strip, Use to test blood sugar 1 times daily., Disp: 100 strip, Rfl: 0     sodium chloride (OCEAN) 0.65 % nasal spray, Spray 1 spray into both nostrils daily as needed for congestion, Disp: , Rfl:      vitamin B complex with vitamin C (STRESS TAB) tablet, Take 1 tablet by mouth daily, Disp: , Rfl:      EPINEPHrine (EPIPEN 2-THERESA) 0.3 MG/0.3ML injection 2-pack, INJECT 0.3 MLS INTO THE MUSCLE ONCE AS NEEDED FOR ANAPHYLAXIS (Patient not taking: No sig reported), Disp: 2  mL, Rfl: 1  Allergies   Allergen Reactions     Malarone Hives     Amoxicillin Hives     Bee Swelling     HIVES AND SWELLING --carries Epi Pen      Ceftin      hives     Clindamycin Hives     Erythromycin GI Disturbance     Seasonal Allergies      Shellfish-Derived Products Unknown     Shrimp Hives     Happened twice     Tetracycline Other (See Comments)     ?severe headaches  & nausea          EXAM:   /88 (Patient Position: Sitting)   Pulse 98   Wt 81.4 kg (179 lb 6.4 oz)   LMP 03/17/2001   SpO2 97%   BMI 30.79 kg/m      Physical Exam  Constitutional:       General: She is not in acute distress.     Appearance: Normal appearance. She is not ill-appearing.   HENT:      Head: Normocephalic and atraumatic.      Nose: She has thickened mucus on her left nasal septum.  Bilateral mild inferior turbinate hypertrophy.     Mouth/Throat:      Mouth: Mucous membranes are moist.      Pharynx: Oropharynx is clear. No posterior oropharyngeal erythema.   Eyes:      General:         Right eye: No discharge.         Left eye: No discharge.   Cardiovascular:      Rate and Rhythm: Normal rate and regular rhythm.      Heart sounds: Normal heart sounds.   Pulmonary:      Effort: Pulmonary effort is normal.      Breath sounds: Normal breath sounds. No wheezing or rhonchi.   Skin:     General: Skin is warm.      Findings: No erythema or rash.   Neurological:      General: No focal deficit present.      Mental Status: She is alert. Mental status is at baseline.   Psychiatric:         Mood and Affect: Mood normal.         Behavior: Behavior normal.       WORKUP: Skin testing was negative to lobster and scallops but borderline positive to shrimp and crab.    FOOD ALLERGEN PERCUTANEOUS SKIN TESTING  [employer]  6/13/2022   Consent Y   Ordering Physician Dr. Marshall   Interpreting Physician Dr. Marshall   Testing Technician Georgia MONREAL RN   Location Arm   Time start: 10:10 AM   Time End: 10:25 AM   Positive Control: Histatrol*ALK 1 mg/ml  6/15   Negative Control: 50% Glycerin**[place] Kristen 0   Shrimp 1:20 (W/F in millimeters) 4/6   Lobster 1:20 (W/F in millimeters) 0   Crab 1:20 (W/F in millimeters) 4/6   Scallops 1:20 (W/F in millimeters) 0        ASSESSMENT/PLAN:  Elvia Helm is a 71 year old female seen today for evaluation for shellfish allergy.  She is most concerned about lobster as she would like to eat that while in [place] on vacation this summer.  She also has a history of allergic rhinitis and has evidence of nasal vestibulitis.    1.  Skin testing was borderline positive to shrimp as well as crab.  It was negative to lobster.  She is most concerned about the lobster.  We will check blood tests for shellfish today.  She will be contacted with results.  If mildly positive may consider an oral challenge in the office.  She does have an EpiPen.  This is for a venom allergy.    2.  Allergic rhinitis recommend Allegra 180 mg daily or Zyrtec 10 mg daily for itching or sneezing.  This does not typically help nasal congestion.    For the nasal congestion recommend continuation of the Flonase 1 to 2 sprays each nostril daily but will also prescribe azelastine nasal spray which can be combined with Flonase which is also 1 to 2 sprays each nostril once daily.    3.  For the nasal crusting will recommend mupirocin antibiotic ointment which will be applied to both nostrils twice daily for 5 days.  This can be repeated in the future if the crusting redevelops.    4.  For the eye itching and drainage, particularly in the fall, Pataday eyedrops or Zaditor eyedrops are available over-the-counter and are antihistamine eyedrops and should work well.    Do not recommend allergy shots at this time.    Follow-up in the future if considering an oral challenge.       Thank you for allowing me to participate in the care of Elvia Helm.      A total of 45 minutes was spent on the day of the encounter performing chart review, history and exam,  documentation, and counseling and coordination of care as noted above.       Zain Marshall MD  Allergy/Immunology  [facility]      Per provider verbal order, placed shrimp, lobster, crab, and scallop scratch test.  Consent was obtained prior to procedure.  Once panels were placed, patient was monitored for 15 minutes in clinic.  Provider read test after 15 minutes..  Pt tolerated procedure well.  All questions and concerns were addressed at office visit.     ALEE Simpson, RN              Again, thank you for allowing me to participate in the care of your patient.        Sincerely,        Zain Marshall MD

## 2022-06-13 NOTE — PROGRESS NOTES
Per provider verbal order, placed shrimp, lobster, crab, and scallop scratch test.  Consent was obtained prior to procedure.  Once panels were placed, patient was monitored for 15 minutes in clinic.  Provider read test after 15 minutes..  Pt tolerated procedure well.  All questions and concerns were addressed at office visit.     KELLEN SimpsonN, RN

## 2022-06-13 NOTE — PROGRESS NOTES
Elvia Heml was seen in the Allergy Clinic at [facility].    Elvia Helm is a 71 year old White female being seen today at the request of Dr. Osborne in consultation for shellfish allergy.     Approximately 10 years ago she ate shrimp on 2 different occasions and developed hives on her face neck as well as chest.  This happens within minutes of eating the shrimp.  She was able to tolerate shrimp without any symptoms.  She did not have any other systemic symptoms such as nausea, vomiting, shortness of breath, angioedema, or cough.    She is traveling to [place] this summer and would like to know if she can eat lobster rolls.  She has had lobster in the past without any problems.  The last was approximately 12 years ago.  She has not had any shellfish including scallops, crab, lobster or shrimp for the last decade.  In the past scallops and clams may have caused hives for her and was avoided.  Shrimp and lobster previously were tolerated.      Past Medical History:   Diagnosis Date     Allergic rhinitis, cause unspecified      Allergic rhinitis, cause unspecified      Arthritis of knee 2014     Bee sting reaction      DIABETES TYPE II      Essential hypertension, benign      Flat foot(734) 2014     Hyperlipidaemia LDL goal < 100      Localized osteoarthrosis not specified whether primary or secondary, ankle and foot     After surgery     Tinnitus 2014     Family History   Problem Relation Age of Onset     Musculoskeletal Disorder Mother         b:1939   Hx Fibromyalgia     Hypertension Mother      Cerebrovascular Disease Mother      Anesthesia Reaction Mother      Cerebrovascular Disease Father         b:193,  age 68  massive stroke     Diabetes Father         dx age 50s and his family     Gastrointestinal Disease Sister         b:1950   Hx of reflux     Cerebrovascular Disease Sister      Family History Negative Brother         b:     Hypertension  Daughter      Family History Negative Daughter         b:     Diabetes Daughter         b:    Diabetes dx age 22**insulin     CABG Daughter 43     Lipids Son         b: high cholosterol and triglycerides     Cancer Maternal Grandfather         throat     Cerebrovascular Disease Sister 62        ? TIA     Past Surgical History:   Procedure Laterality Date     ABDOMEN SURGERY  1988    ceasaran birth     COLONOSCOPY       COLONOSCOPY N/A 2022    Procedure: COLONOSCOPY, WITH POLYPECTOMY AND BIOPSY;  Surgeon: Nicole Grubbs MD;  Location:  GI     ENT SURGERY      frequent sinus infections     HC ENDOMETRIAL BIOPSY W/O CERVICAL DILATION       ORTHOPEDIC SURGERY      broken right ankle/plates and screws     SOFT TISSUE SURGERY      tendonitis in right upper leg and below right elbow     ZZC  DELIVERY ONLY       ZZC LIGATE FALLOPIAN TUBE,POSTPARTUM       ZZC NONSPECIFIC PROCEDURE      right ankle roe and pins       ENVIRONMENTAL HISTORY:   Pets inside the house include 1 dog(s).  Do you smoke cigarettes or other recreational drugs? No There is/are 0 smokers living in the house. The house does have a damp basement.     SOCIAL HISTORY:   Elvia is retired. She lives with her spouse.  Her spouse works as a/an legal .    Review of Systems   Constitutional: Negative.    HENT: Positive for postnasal drip and sneezing.    Eyes: Positive for discharge and itching.   Respiratory: Positive for cough.    Cardiovascular: Negative.    Gastrointestinal: Negative.    Endocrine: Negative.    Genitourinary: Negative.    Musculoskeletal: Negative.    Skin: Negative.    Allergic/Immunologic: Positive for environmental allergies and food allergies.   Neurological: Negative.    Hematological: Negative.    Psychiatric/Behavioral: Negative.    All other systems reviewed and are negative.        Current Outpatient Medications:      atorvastatin (LIPITOR) 40 MG tablet, Take 1  tablet (40 mg) by mouth daily, Disp: 90 tablet, Rfl: 3     blood glucose (NO BRAND SPECIFIED) lancets standard, Use to test blood sugar one* times daily or as directed., Disp: 100 each, Rfl: 3     blood glucose calibration (ONETOUCH ULTRA CONTROL) solution, Use to calibrate blood glucose monitor as directed., Disp: 1 Bottle, Rfl: 1     blood glucose monitoring (ONE TOUCH ULTRA 2) meter device kit, Use to test blood sugar 1 times daily., Disp: 1 kit, Rfl: 0     Cholecalciferol (VITAMIN D3 PO), Take 2,000 Units by mouth daily , Disp: , Rfl:      fexofenadine (ALLEGRA) 180 MG tablet, Take 180 mg by mouth daily, Disp: , Rfl:      fluticasone (FLONASE) 50 MCG/ACT spray, Spray 1-2 sprays into both nostrils daily, Disp: 16 g, Rfl: 0     lisinopril-hydrochlorothiazide (ZESTORETIC) 20-25 MG tablet, Take 0.5 tablets by mouth daily, Disp: 45 tablet, Rfl: 3     metFORMIN (GLUCOPHAGE) 850 MG tablet, Take 1 tablet (850 mg) by mouth 2 times daily (with meals), Disp: 180 tablet, Rfl: 3     Multiple Vitamins-Minerals (EMERGEN-C IMMUNE) PACK, Take 1 packet by mouth daily as needed , Disp: , Rfl:      Omega-3 Fatty Acids (OMEGA-3 FISH OIL PO), Take 1 g by mouth daily , Disp: , Rfl:      ONETOUCH ULTRA test strip, Use to test blood sugar 1 times daily., Disp: 100 strip, Rfl: 0     sodium chloride (OCEAN) 0.65 % nasal spray, Spray 1 spray into both nostrils daily as needed for congestion, Disp: , Rfl:      vitamin B complex with vitamin C (STRESS TAB) tablet, Take 1 tablet by mouth daily, Disp: , Rfl:      EPINEPHrine (EPIPEN 2-THERESA) 0.3 MG/0.3ML injection 2-pack, INJECT 0.3 MLS INTO THE MUSCLE ONCE AS NEEDED FOR ANAPHYLAXIS (Patient not taking: No sig reported), Disp: 2 mL, Rfl: 1  Allergies   Allergen Reactions     Malarone Hives     Amoxicillin Hives     Bee Swelling     HIVES AND SWELLING --carries Epi Pen      Ceftin      hives     Clindamycin Hives     Erythromycin GI Disturbance     Seasonal Allergies      Shellfish-Derived  Products Unknown     Shrimp Hives     Happened twice     Tetracycline Other (See Comments)     ?severe headaches  & nausea          EXAM:   /88 (Patient Position: Sitting)   Pulse 98   Wt 81.4 kg (179 lb 6.4 oz)   LMP 03/17/2001   SpO2 97%   BMI 30.79 kg/m      Physical Exam  Constitutional:       General: She is not in acute distress.     Appearance: Normal appearance. She is not ill-appearing.   HENT:      Head: Normocephalic and atraumatic.      Nose: She has thickened mucus on her left nasal septum.  Bilateral mild inferior turbinate hypertrophy.     Mouth/Throat:      Mouth: Mucous membranes are moist.      Pharynx: Oropharynx is clear. No posterior oropharyngeal erythema.   Eyes:      General:         Right eye: No discharge.         Left eye: No discharge.   Cardiovascular:      Rate and Rhythm: Normal rate and regular rhythm.      Heart sounds: Normal heart sounds.   Pulmonary:      Effort: Pulmonary effort is normal.      Breath sounds: Normal breath sounds. No wheezing or rhonchi.   Skin:     General: Skin is warm.      Findings: No erythema or rash.   Neurological:      General: No focal deficit present.      Mental Status: She is alert. Mental status is at baseline.   Psychiatric:         Mood and Affect: Mood normal.         Behavior: Behavior normal.       WORKUP: Skin testing was negative to lobster and scallops but borderline positive to shrimp and crab.    FOOD ALLERGEN PERCUTANEOUS SKIN TESTING  [employer]  6/13/2022   Consent Y   Ordering Physician Dr. Marshall   Interpreting Physician Dr. Marshall   Testing Technician Georgia MONREAL RN   Location Arm   Time start: 10:10 AM   Time End: 10:25 AM   Positive Control: Histatrol*ALK 1 mg/ml 6/15   Negative Control: 50% Glycerin**[place] Kristen 0   Shrimp 1:20 (W/F in millimeters) 4/6   Lobster 1:20 (W/F in millimeters) 0   Crab 1:20 (W/F in millimeters) 4/6   Scallops 1:20 (W/F in millimeters) 0        ASSESSMENT/PLAN:  Elvia Helm is a 71  year old female seen today for evaluation for shellfish allergy.  She is most concerned about lobster as she would like to eat that while in [place] on vacation this summer.  She also has a history of allergic rhinitis and has evidence of nasal vestibulitis.    1.  Skin testing was borderline positive to shrimp as well as crab.  It was negative to lobster.  She is most concerned about the lobster.  We will check blood tests for shellfish today.  She will be contacted with results.  If mildly positive may consider an oral challenge in the office.  She does have an EpiPen.  This is for a venom allergy.    2.  Allergic rhinitis recommend Allegra 180 mg daily or Zyrtec 10 mg daily for itching or sneezing.  This does not typically help nasal congestion.    For the nasal congestion recommend continuation of the Flonase 1 to 2 sprays each nostril daily but will also prescribe azelastine nasal spray which can be combined with Flonase which is also 1 to 2 sprays each nostril once daily.    3.  For the nasal crusting will recommend mupirocin antibiotic ointment which will be applied to both nostrils twice daily for 5 days.  This can be repeated in the future if the crusting redevelops.    4.  For the eye itching and drainage, particularly in the fall, Pataday eyedrops or Zaditor eyedrops are available over-the-counter and are antihistamine eyedrops and should work well.    Do not recommend allergy shots at this time.    Follow-up in the future if considering an oral challenge.       Thank you for allowing me to participate in the care of Elvia Helm.      A total of 45 minutes was spent on the day of the encounter performing chart review, history and exam, documentation, and counseling and coordination of care as noted above.       Zain Marshall MD  Allergy/Immunology  [facility]

## 2022-06-16 ENCOUNTER — LAB (OUTPATIENT)
Dept: LAB | Facility: CLINIC | Age: 72
End: 2022-06-16
Payer: COMMERCIAL

## 2022-06-16 DIAGNOSIS — T78.1XXA OTHER ADVERSE FOOD REACTIONS, NOT ELSEWHERE CLASSIFIED, INITIAL ENCOUNTER: ICD-10-CM

## 2022-06-16 DIAGNOSIS — T78.40XA ALLERGIC REACTION, INITIAL ENCOUNTER: ICD-10-CM

## 2022-06-16 DIAGNOSIS — L50.9 URTICARIA, UNSPECIFIED: ICD-10-CM

## 2022-06-16 DIAGNOSIS — E11.9 TYPE 2 DIABETES MELLITUS WITHOUT COMPLICATIONS (H): ICD-10-CM

## 2022-06-16 DIAGNOSIS — Z13.220 SCREENING FOR HYPERLIPIDEMIA: ICD-10-CM

## 2022-06-16 LAB
CHOLEST SERPL-MCNC: 143 MG/DL
CREAT UR-MCNC: 84 MG/DL
FASTING STATUS PATIENT QL REPORTED: NO
HDLC SERPL-MCNC: 56 MG/DL
LDLC SERPL CALC-MCNC: 47 MG/DL
MICROALBUMIN UR-MCNC: 11 MG/L
MICROALBUMIN/CREAT UR: 13.1 MG/G CR (ref 0–25)
NONHDLC SERPL-MCNC: 87 MG/DL
TRIGL SERPL-MCNC: 202 MG/DL

## 2022-06-16 PROCEDURE — 80061 LIPID PANEL: CPT

## 2022-06-16 PROCEDURE — 86003 ALLG SPEC IGE CRUDE XTRC EA: CPT

## 2022-06-16 PROCEDURE — 36415 COLL VENOUS BLD VENIPUNCTURE: CPT

## 2022-06-16 PROCEDURE — 82043 UR ALBUMIN QUANTITATIVE: CPT

## 2022-06-16 PROCEDURE — 86003 ALLG SPEC IGE CRUDE XTRC EA: CPT | Mod: 59

## 2022-06-20 LAB
CRAB IGE QN: <0.1 KU(A)/L
LOBSTER IGE QN: <0.1 KU(A)/L
SHRIMP IGE QN: 0.93 KU(A)/L

## 2022-07-19 DIAGNOSIS — I10 ESSENTIAL HYPERTENSION, BENIGN: ICD-10-CM

## 2022-07-19 DIAGNOSIS — E11.9 TYPE 2 DIABETES MELLITUS WITHOUT COMPLICATION, WITHOUT LONG-TERM CURRENT USE OF INSULIN (H): ICD-10-CM

## 2022-07-21 RX ORDER — LISINOPRIL AND HYDROCHLOROTHIAZIDE 20; 25 MG/1; MG/1
0.5 TABLET ORAL DAILY
Qty: 45 TABLET | Refills: 1 | Status: SHIPPED | OUTPATIENT
Start: 2022-07-21 | End: 2022-11-03

## 2022-07-21 NOTE — TELEPHONE ENCOUNTER
Prescription approved per [facility] Refill Protocol.  Aleah Pham, RN  [facility] RN Triage Team

## 2022-08-13 ASSESSMENT — ENCOUNTER SYMPTOMS
DIZZINESS: 0
DIARRHEA: 0
FREQUENCY: 0
WEAKNESS: 0
CHILLS: 0
BREAST MASS: 0
FEVER: 0
CONSTIPATION: 0
SORE THROAT: 0
SHORTNESS OF BREATH: 0
MYALGIAS: 0
HEMATOCHEZIA: 0
ABDOMINAL PAIN: 0
ARTHRALGIAS: 0
PARESTHESIAS: 0
DYSURIA: 0
HEMATURIA: 0
NERVOUS/ANXIOUS: 0
NAUSEA: 0
HEARTBURN: 0
EYE PAIN: 0
JOINT SWELLING: 1
COUGH: 0
HEADACHES: 0
PALPITATIONS: 0

## 2022-08-13 ASSESSMENT — ACTIVITIES OF DAILY LIVING (ADL): CURRENT_FUNCTION: NO ASSISTANCE NEEDED

## 2022-08-16 ENCOUNTER — VIRTUAL VISIT (OUTPATIENT)
Dept: FAMILY MEDICINE | Facility: CLINIC | Age: 72
End: 2022-08-16
Payer: COMMERCIAL

## 2022-08-16 DIAGNOSIS — E78.5 HYPERLIPIDEMIA LDL GOAL <100: ICD-10-CM

## 2022-08-16 DIAGNOSIS — E11.9 TYPE 2 DIABETES MELLITUS WITHOUT COMPLICATION, WITHOUT LONG-TERM CURRENT USE OF INSULIN (H): ICD-10-CM

## 2022-08-16 DIAGNOSIS — Z00.00 ENCOUNTER FOR ANNUAL WELLNESS VISIT (AWV) IN MEDICARE PATIENT: Primary | ICD-10-CM

## 2022-08-16 DIAGNOSIS — T63.441S BEE STING REACTION, ACCIDENTAL OR UNINTENTIONAL, SEQUELA: ICD-10-CM

## 2022-08-16 DIAGNOSIS — I10 ESSENTIAL HYPERTENSION, BENIGN: ICD-10-CM

## 2022-08-16 PROCEDURE — G0439 PPPS, SUBSEQ VISIT: HCPCS | Mod: 95 | Performed by: INTERNAL MEDICINE

## 2022-08-16 RX ORDER — ATORVASTATIN CALCIUM 10 MG/1
10 TABLET, FILM COATED ORAL DAILY
Qty: 90 TABLET | Refills: 3 | Status: SHIPPED | OUTPATIENT
Start: 2022-08-16 | End: 2023-09-06

## 2022-08-16 RX ORDER — EPINEPHRINE 0.3 MG/.3ML
INJECTION SUBCUTANEOUS
Qty: 2 ML | Refills: 1 | Status: SHIPPED | OUTPATIENT
Start: 2022-08-16 | End: 2023-07-12

## 2022-08-16 ASSESSMENT — ACTIVITIES OF DAILY LIVING (ADL): CURRENT_FUNCTION: NO ASSISTANCE NEEDED

## 2022-08-16 NOTE — PROGRESS NOTES
"Alma is a 72 year old who is being evaluated via a billable video visit.      How would you like to obtain your AVS? Oswaldo  If the video visit is dropped, the invitation should be resent by: Text to cell phone: OSWALDO  Will anyone else be joining your video visit? No        Assessment & Plan     Encounter for annual wellness visit (AWV) in Medicare patient  This very nice patient had to do a virtual visit because she is includes [place] because of her sister's death which was unexpected but from COPD  She is up-to-date on mammogram and has colonoscopy scheduled  She is up-to-date on COVID immunization and will take Shingrix at the pharmacy  She does not need Pap smear    Type 2 diabetes mellitus without complication, without long-term current use of insulin (H)  Her last blood sugars checked at home was 150 and she will continue metformin  - HEMOGLOBIN A1C    Essential hypertension, benign  Patient is on Zestoretic and has negative microalbumin    Hyperlipidemia LDL goal <100  Patient will hold Lipitor because of cramps and then resume that at the dose of 10 mg daily  She will also need orthotics for her feet  - Comprehensive metabolic panel (BMP + Alb, Alk Phos, ALT, AST, Total. Bili, TP)  - atorvastatin (LIPITOR) 10 MG tablet  Dispense: 90 tablet; Refill: 3    Bee sting reaction, accidental or unintentional, sequela    - EPINEPHrine (EPIPEN 2-THERESA) 0.3 MG/0.3ML injection 2-pack  Dispense: 2 mL; Refill: 1               BMI:   Estimated body mass index is 30.79 kg/m  as calculated from the following:    Height as of 1/31/22: 1.626 m (5' 4\").    Weight as of 6/13/22: 81.4 kg (179 lb 6.4 oz).           Return in about 3 months (around 11/16/2022) for DM, Lab Work.    Masood Osborne MD  [facility]    Miguel A Marquez is a 72 year old, presenting for the following health issues:  Physical    Annual Wellness Visit patient is in [place] due to her sister's sudden death  She could not come in " "person and therefore a video visit is done for annual wellness visit  Patient has been having leg cramps at night  Otherwise she is doing very well  Checking her blood sugars once in a while and averages 150  She is not able to exercise much because she is helping her daughter move  Otherwise she tries to lose weight and exercise    Patient has been advised of split billing requirements and indicates understanding: Yes     Are you in the first 12 months of your Medicare Part B coverage?  No    Healthy Habits:     In general, how would you rate your overall health?  Good    Frequency of exercise:  None    Do you usually eat at least 4 servings of fruit and vegetables a day, include whole grains    & fiber and avoid regularly eating high fat or \"junk\" foods?  Yes    Taking medications regularly:  Yes    Barriers to taking medications:  None    Medication side effects:  Muscle aches    Ability to successfully perform activities of daily living:  No assistance needed    Home Safety:  No safety concerns identified    Hearing Impairment:  Difficulty following a conversation in a noisy restaurant or crowded room    In the past 6 months, have you been bothered by leaking of urine? Yes    In general, how would you rate your overall mental or emotional health?  Good      PHQ-2 Total Score: 0    Additional concerns today:  Yes     Physical Health:    LMP 03/17/2001   Weight: Provided by patient  Height: Provided by patient  BMI: Based on patient-provided information  Blood Pressure: Unable to obtain due to video visit    Mental Health:    In general, how would you rate your overall mental or emotional health? good  PHQ-2 Score: 0    Do you feel safe in your environment? Yes    Have you ever done Advance Care Planning? (For example, a Health Directive, POLST, or a discussion with a medical provider or your loved ones about your wishes)? Yes, patient states has an Advance Care Planning document and will bring a copy to the " clinic.    Fall risk:  Fallen 2 or more times in the past year?: No  Any fall with injury in the past year?: No    Cognitive Screening: Unable to complete due to virtual visit; need for additional assessment in future face-to-face visit    Do you have sleep apnea, excessive snoring or daytime drowsiness?: no    Current providers sharing in care for this patient include:   Patient Care Team:  Masood Osborne MD as PCP - General (Internal Medicine)  Masood Osborne MD as PCP - Internal Medicine  Masood Osborne MD as Assigned PCP  Danyelle Jonas, PharmD as Pharmacist (Pharmacist)  Danyelle Jonas PharmD as Assigned MTM Pharmacist  Zain Marshall MD as Assigned Allergy Provider    Patient has been advised of split billing requirements and indicates understanding: Yes      Review of Systems   10 point ROS of systems including Constitutional, Eyes, Respiratory, Cardiovascular, Gastroenterology, Genitourinary, Integumentary, Muscularskeletal, Psychiatric were all negative except for pertinent positives noted in my HPI.        Objective           Vitals:  No vitals were obtained today due to virtual visit.    Physical Exam   GENERAL: Healthy, alert and no distress  EYES: Eyes grossly normal to inspection.  No discharge or erythema, or obvious scleral/conjunctival abnormalities.  RESP: No audible wheeze, cough, or visible cyanosis.  No visible retractions or increased work of breathing.    SKIN: Visible skin clear. No significant rash, abnormal pigmentation or lesions.  NEURO: Cranial nerves grossly intact.  Mentation and speech appropriate for age.  PSYCH: Mentation appears normal, affect normal/bright, judgement and insight intact, normal speech and appearance well-groomed.    Patient Active Problem List   Diagnosis     Allergic rhinitis     Essential hypertension, benign     Postmenopausal atrophic vaginitis     Bee sting reaction     Torticollis     Type 2 diabetes mellitus without complications (H)      HYPERLIPIDEMIA LDL GOAL <100     Post-nasal drip     Advanced directives, counseling/discussion     Osteopenia     Urticaria     Arthritis of knee     Pes planus     Tinnitus     Current Outpatient Medications   Medication     atorvastatin (LIPITOR) 10 MG tablet     azelastine (ASTELIN) 0.1 % nasal spray     blood glucose (NO BRAND SPECIFIED) lancets standard     blood glucose calibration (ONETOUCH ULTRA CONTROL) solution     blood glucose monitoring (ONE TOUCH ULTRA 2) meter device kit     Cholecalciferol (VITAMIN D3 PO)     EPINEPHrine (EPIPEN 2-THERESA) 0.3 MG/0.3ML injection 2-pack     fexofenadine (ALLEGRA) 180 MG tablet     fluticasone (FLONASE) 50 MCG/ACT spray     lisinopril-hydrochlorothiazide (ZESTORETIC) 20-25 MG tablet     metFORMIN (GLUCOPHAGE) 850 MG tablet     Omega-3 Fatty Acids (OMEGA-3 FISH OIL PO)     ONETOUCH ULTRA test strip     sodium chloride (OCEAN) 0.65 % nasal spray     vitamin B complex with vitamin C (STRESS TAB) tablet     No current facility-administered medications for this visit.                 Video-Visit Details    Video Start Time: 1250    Type of service:  Video Visit    Video End Time:1:08 PM    Originating Location (pt. Location): Home    Distant Location (provider location):  [facility]     Platform used for Video Visit: [employer]    .  Santino.

## 2022-08-21 ENCOUNTER — HEALTH MAINTENANCE LETTER (OUTPATIENT)
Age: 72
End: 2022-08-21

## 2022-09-29 ENCOUNTER — LAB (OUTPATIENT)
Dept: LAB | Facility: CLINIC | Age: 72
End: 2022-09-29
Payer: COMMERCIAL

## 2022-09-29 DIAGNOSIS — E78.5 HYPERLIPIDEMIA LDL GOAL <100: ICD-10-CM

## 2022-09-29 DIAGNOSIS — E11.9 TYPE 2 DIABETES MELLITUS WITHOUT COMPLICATION, WITHOUT LONG-TERM CURRENT USE OF INSULIN (H): ICD-10-CM

## 2022-09-29 LAB
ALBUMIN SERPL-MCNC: 4.1 G/DL (ref 3.4–5)
ALP SERPL-CCNC: 52 U/L (ref 40–150)
ALT SERPL W P-5'-P-CCNC: 62 U/L (ref 0–50)
ANION GAP SERPL CALCULATED.3IONS-SCNC: 3 MMOL/L (ref 3–14)
AST SERPL W P-5'-P-CCNC: 35 U/L (ref 0–45)
BILIRUB SERPL-MCNC: 0.5 MG/DL (ref 0.2–1.3)
BUN SERPL-MCNC: 16 MG/DL (ref 7–30)
CALCIUM SERPL-MCNC: 9.8 MG/DL (ref 8.5–10.1)
CHLORIDE BLD-SCNC: 104 MMOL/L (ref 94–109)
CO2 SERPL-SCNC: 30 MMOL/L (ref 20–32)
CREAT SERPL-MCNC: 0.8 MG/DL (ref 0.52–1.04)
GFR SERPL CREATININE-BSD FRML MDRD: 77 ML/MIN/1.73M2
GLUCOSE BLD-MCNC: 166 MG/DL (ref 70–99)
HBA1C MFR BLD: 6.2 % (ref 0–5.6)
POTASSIUM BLD-SCNC: 4.3 MMOL/L (ref 3.4–5.3)
PROT SERPL-MCNC: 7.4 G/DL (ref 6.8–8.8)
SODIUM SERPL-SCNC: 137 MMOL/L (ref 133–144)

## 2022-09-29 PROCEDURE — 36415 COLL VENOUS BLD VENIPUNCTURE: CPT

## 2022-09-29 PROCEDURE — 80053 COMPREHEN METABOLIC PANEL: CPT

## 2022-09-29 PROCEDURE — 83036 HEMOGLOBIN GLYCOSYLATED A1C: CPT

## 2022-10-27 ENCOUNTER — VIRTUAL VISIT (OUTPATIENT)
Dept: PHARMACY | Facility: CLINIC | Age: 72
End: 2022-10-27
Payer: COMMERCIAL

## 2022-10-27 DIAGNOSIS — I10 ESSENTIAL HYPERTENSION, BENIGN: ICD-10-CM

## 2022-10-27 DIAGNOSIS — E11.9 TYPE 2 DIABETES MELLITUS WITHOUT COMPLICATION, WITHOUT LONG-TERM CURRENT USE OF INSULIN (H): Primary | ICD-10-CM

## 2022-10-27 DIAGNOSIS — Z78.9 TAKES DIETARY SUPPLEMENTS: ICD-10-CM

## 2022-10-27 DIAGNOSIS — J30.2 SEASONAL ALLERGIC RHINITIS, UNSPECIFIED TRIGGER: ICD-10-CM

## 2022-10-27 DIAGNOSIS — E78.5 HYPERLIPIDEMIA LDL GOAL <100: ICD-10-CM

## 2022-10-27 PROCEDURE — 99606 MTMS BY PHARM EST 15 MIN: CPT | Performed by: PHARMACIST

## 2022-10-27 NOTE — PROGRESS NOTES
Medication Therapy Management (MTM) Encounter    ASSESSMENT:                            Medication Adherence/Access: No issues identified    Type 2 Diabetes: Stable. Patient is meeting A1c goal of < 7%.     Hypertension: Stable. Patient is meeting blood pressure goal of < 140/90mmHg, generally also meeting more aggressive goal <130/80mmHg.    Hyperlipidemia: Stable. Patient is on maximally tolerated statin therapy, no changes needed.     Allergic rhinitis: Stable.     Supplements: Stable. It is unclear how much benefit she is getting from the omega-3 and vitamin B complex. However, patient prefers to continue and they likely are not harmful.    PLAN:                            Continue current medication regimen    Follow-up: Return in about 6 months (around 4/27/2023) for Follow-up Medication Review.    SUBJECTIVE/OBJECTIVE:                          Elvia Helm is a 72 year old female contacted via secure video for a follow-up visit.  Today's visit is a follow-up MTM visit from 4/4/2022.     Reason for visit: Routine follow-up.    Tobacco: She reports that she quit smoking about 40 years ago. Her smoking use included cigarettes. She has a 10.00 pack-year smoking history. She has never used smokeless tobacco.  Alcohol: 1 glass of wine/day    Medication Adherence/Access: no issues reported    Type 2 Diabetes:  Currently taking metformin 850mg twice daily. Patient is not experiencing side effects.  Blood sugar monitoring: a few times a week, generally fasting. Ranges (patient reported): 130-150mg/dL  Symptoms of low blood sugar? none  Symptoms of high blood sugar? none  Eye exam: up to date  Foot exam: due  Diet/Exercise: No changes - she continues to watch her food intake closely; exercise is hard due to arthritis pain  Aspirin: Taking aspirin 325mg daily - recommended by GI following last colonoscopy  Statin: Yes: atorvastatin 10mg   ACEi/ARB: Yes: lisinopril 10mg.   Urine Albumin:   Lab Results   Component  Value Date    UMALCR 13.10 06/16/2022      Lab Results   Component Value Date    A1C 6.2 09/29/2022    A1C 5.7 01/21/2022    A1C 6.1 06/14/2021    A1C 5.9 03/26/2021    A1C 6.4 06/23/2020    A1C 6.1 11/18/2019    A1C 6.1 05/17/2019     GFR Estimate   Date Value Ref Range Status   09/29/2022 77 >60 mL/min/1.73m2 Final     Comment:     Effective December 21, 2021 eGFRcr in adults is calculated using the 2021 CKD-EPI creatinine equation which includes age and gender (Tania monte al., NEJ, DOI: 10.1056/FPDDkw8000617)   06/14/2021 72 >60 mL/min/[1.73_m2] Final     Comment:     Non  GFR Calc  Starting 12/18/2018, serum creatinine based estimated GFR (eGFR) will be   calculated using the Chronic Kidney Disease Epidemiology Collaboration   (CKD-EPI) equation.       Hypertension: Current medications include lisinopril/HCTZ 20/25 mg daily - 1/2 tablet daily.  Patient does not self-monitor blood pressure regularly.  Patient reports no side effects.    BP Readings from Last 3 Encounters:   06/13/22 139/88   01/31/22 105/67   07/26/21 110/79      Hyperlipidemia: Current therapy includes atorvastatin 10 mg daily, dose reduced due to myalgias. Patient reports no significant myalgias or other side effects with current dosing.  The 10-year ASCVD risk score (Rodger BRIZUELA, et al., 2019) is: 33.3%    Values used to calculate the score:      Age: 72 years      Sex: Female      Is Non- : No      Diabetic: Yes      Tobacco smoker: No      Systolic Blood Pressure: 139 mmHg      Is BP treated: Yes      HDL Cholesterol: 56 mg/dL      Total Cholesterol: 143 mg/dL     Recent Labs   Lab Test 06/16/22  1334 06/14/21  1010 07/19/16  0745 06/30/15  0855   CHOL 143 151   < > 146   HDL 56 60   < > 50*   LDL 47 65   < > 67   TRIG 202* 128   < > 145   CHOLHDLRATIO  --   --   --  2.9    < > = values in this interval not displayed.      Allergies:  Pt currently taking fexofenadine 180 mg every evening, Flonase nasal  spray daily, Astelin nasal spray twice daily and saline nasal spray as needed. Reports that she chronic allergies due to pollen. She also has an EpiPen on hand to use if needed (no recent use and pen is not ).  She reports this time of the year is worst for her allergy symptoms, but she feels she's managing ok.  No side effects reported at this time.    Supplements:  Current medications include vitamin B complex, Omega 3 fatty acids, and Vitamin D 2000 units daily. She is not experiencing side effects.  She feels these are effective and prefers to continue taking. Patient reports that Dr. Osborne recommended she continue omega 3 at this time.     Today's Vitals: LMP 2001   ----------------    I spent 23 minutes with this patient today. A copy of the visit note was provided to the patient's provider(s).    The patient was sent via MADS a summary of these recommendations.     He CarterD, BCACP  Medication Therapy Management Provider  Pager: [phone]     Telemedicine Visit Details  Type of service:  Video Conference via D[employer] Edge  Start Time: 11:34 AM  End Time: 11:57 AM  Originating Location (pt. Location): Home      Distant Location (provider location):  Off-site  Provider has received verbal consent for a visit from the patient? Yes     Medication Therapy Recommendations  No medication therapy recommendations to display

## 2022-10-28 NOTE — PATIENT INSTRUCTIONS
"Recommendations from today's MTM visit:                                                    MTM (medication therapy management) is a service provided by a clinical pharmacist designed to help you get the most of out of your medicines.   Today we reviewed what your medicines are for, how to know if they are working, that your medicines are safe and how to make your medicine regimen as easy as possible.      Continue current medication regimen    Follow-up: Return in about 6 months (around 4/27/2023) for Follow-up Medication Review.    It was great speaking with you today.  I value your experience and would be very thankful for your time in providing feedback in our clinic survey. In the next few days, you may receive an email or text message from [employer] with a link to a survey related to your  clinical pharmacist.\"     To schedule another MTM appointment, please call the clinic directly or you may call the MTM scheduling line at [phone] or toll-free at [phone].     My Clinical Pharmacist's contact information:                                                      Please feel free to contact me with any questions or concerns you have.      Danyelle Jonas, Tresa, [facility] Hospital[facility]  Medication Therapy Management Provider  Pager: [phone]    "

## 2022-11-03 ENCOUNTER — OFFICE VISIT (OUTPATIENT)
Dept: FAMILY MEDICINE | Facility: CLINIC | Age: 72
End: 2022-11-03
Payer: COMMERCIAL

## 2022-11-03 VITALS
DIASTOLIC BLOOD PRESSURE: 83 MMHG | SYSTOLIC BLOOD PRESSURE: 136 MMHG | RESPIRATION RATE: 22 BRPM | WEIGHT: 177 LBS | HEART RATE: 118 BPM | BODY MASS INDEX: 30.22 KG/M2 | HEIGHT: 64 IN | TEMPERATURE: 96.9 F | OXYGEN SATURATION: 97 %

## 2022-11-03 DIAGNOSIS — J01.00 ACUTE NON-RECURRENT MAXILLARY SINUSITIS: ICD-10-CM

## 2022-11-03 DIAGNOSIS — J10.1 INFLUENZA A: Primary | ICD-10-CM

## 2022-11-03 DIAGNOSIS — E11.9 TYPE 2 DIABETES MELLITUS WITHOUT COMPLICATION, WITHOUT LONG-TERM CURRENT USE OF INSULIN (H): ICD-10-CM

## 2022-11-03 DIAGNOSIS — H66.90 ACUTE OTITIS MEDIA, UNSPECIFIED OTITIS MEDIA TYPE: ICD-10-CM

## 2022-11-03 DIAGNOSIS — I10 ESSENTIAL HYPERTENSION, BENIGN: ICD-10-CM

## 2022-11-03 LAB
FLUAV AG SPEC QL IA: NEGATIVE
FLUBV AG SPEC QL IA: NEGATIVE
SARS-COV-2 RNA RESP QL NAA+PROBE: NEGATIVE

## 2022-11-03 PROCEDURE — U0005 INFEC AGEN DETEC AMPLI PROBE: HCPCS | Performed by: INTERNAL MEDICINE

## 2022-11-03 PROCEDURE — 87804 INFLUENZA ASSAY W/OPTIC: CPT | Mod: 59 | Performed by: INTERNAL MEDICINE

## 2022-11-03 PROCEDURE — U0003 INFECTIOUS AGENT DETECTION BY NUCLEIC ACID (DNA OR RNA); SEVERE ACUTE RESPIRATORY SYNDROME CORONAVIRUS 2 (SARS-COV-2) (CORONAVIRUS DISEASE [COVID-19]), AMPLIFIED PROBE TECHNIQUE, MAKING USE OF HIGH THROUGHPUT TECHNOLOGIES AS DESCRIBED BY CMS-2020-01-R: HCPCS | Performed by: INTERNAL MEDICINE

## 2022-11-03 PROCEDURE — 99214 OFFICE O/P EST MOD 30 MIN: CPT | Mod: CS | Performed by: INTERNAL MEDICINE

## 2022-11-03 RX ORDER — AZITHROMYCIN 250 MG/1
TABLET, FILM COATED ORAL
Qty: 6 TABLET | Refills: 0 | Status: SHIPPED | OUTPATIENT
Start: 2022-11-03 | End: 2022-11-20

## 2022-11-03 RX ORDER — LISINOPRIL AND HYDROCHLOROTHIAZIDE 20; 25 MG/1; MG/1
0.5 TABLET ORAL DAILY
Qty: 45 TABLET | Refills: 4 | Status: SHIPPED | OUTPATIENT
Start: 2022-11-03 | End: 2023-11-06

## 2022-11-03 RX ORDER — OSELTAMIVIR PHOSPHATE 75 MG/1
75 CAPSULE ORAL DAILY
Qty: 10 CAPSULE | Refills: 0 | Status: SHIPPED | OUTPATIENT
Start: 2022-11-03 | End: 2022-11-08

## 2022-11-03 ASSESSMENT — PAIN SCALES - GENERAL: PAINLEVEL: NO PAIN (0)

## 2022-11-03 NOTE — PROGRESS NOTES
"  Assessment & Plan     Influenza A  I am not sure whether this is influenza A but in the family this is going around  We will check influenza testing and start on Tamiflu 75 mg twice daily for 5 days  If test is negative we will do 75 mg a day for 10 days because of ongoing exposure at home  - Influenza A & B Antigen - Clinic Collect  - oseltamivir (TAMIFLU) 75 MG capsule  Dispense: 10 capsule; Refill: 0  - Symptomatic; Yes; 11/1/2022 COVID-19 Virus (Coronavirus) by PCR Nose    Acute non-recurrent maxillary sinusitis  Patient definitely has right-sided sinusitis with right ear infection otitis media and we will start with azithromycin  We might have to use Augmentin if symptoms do not resolve  But patient has allergies  - azithromycin (ZITHROMAX) 250 MG tablet  Dispense: 6 tablet; Refill: 0    Acute otitis media, unspecified otitis media type  As above  - azithromycin (ZITHROMAX) 250 MG tablet  Dispense: 6 tablet; Refill: 0    Type 2 diabetes mellitus without complication, without long-term current use of insulin (H)  Blood sugars at home are excellent although A1c 6.2% it has been better in the past  - metFORMIN (GLUCOPHAGE) 850 MG tablet  Dispense: 180 tablet; Refill: 3  - Hemoglobin A1c  - Vitamin B12    Essential hypertension, benign  Excellent blood pressure control also  - lisinopril-hydrochlorothiazide (ZESTORETIC) 20-25 MG tablet  Dispense: 45 tablet; Refill: 4               BMI:   Estimated body mass index is 30.38 kg/m  as calculated from the following:    Height as of this encounter: 1.626 m (5' 4\").    Weight as of this encounter: 80.3 kg (177 lb).         High pulse rate is noted from infection and patient will hydrate  Return in about 6 months (around 5/3/2023) for Physical Exam.    Masood Osborne MD  [facility] [place] Marquez is a 72 year old, presenting for the following health issues:  Diabetes (Follow up), Sinus Problem, and Otalgia  This very pleasant 71 years old has " "her family dealing with influenza A  Grandchildren and daughter since last week  Patient now has pain in the cheek  Her right ear also hurts since yesterday  She does not have fever and chills but her pulse has gone higher  She does not have shortness of breath but has cough  Although she has chronic cough this is worse for the last 2 days  History of Present Illness       Diabetes:   She presents for follow up of diabetes.  She is checking home blood glucose a few times a week. She checks blood glucose before meals.  Blood glucose is never over 200 and never under 70. She is aware of hypoglycemia symptoms including shakiness. She has no concerns regarding her diabetes at this time.  She is not experiencing numbness or burning in feet, excessive thirst, blurry vision, weight changes or redness, sores or blisters on feet.         She eats 0-1 servings of fruits and vegetables daily.She consumes 0 sweetened beverage(s) daily.She exercises with enough effort to increase her heart rate 10 to 19 minutes per day.  She exercises with enough effort to increase her heart rate 3 or less days per week.   She is taking medications regularly.         BP Readings from Last 2 Encounters:   11/03/22 136/83   06/13/22 139/88     Hemoglobin A1C (%)   Date Value   09/29/2022 6.2 (H)   01/21/2022 5.7 (H)   06/14/2021 6.1 (H)   03/26/2021 5.9 (H)     LDL Cholesterol Calculated (mg/dL)   Date Value   06/16/2022 47   06/14/2021 65   06/23/2020 64               Review of Systems   10 point ROS of systems including Constitutional, Eyes, Respiratory, Cardiovascular, Gastroenterology, Genitourinary, Integumentary, Muscularskeletal, Psychiatric were all negative except for pertinent positives noted in my HPI.        Objective    /83 (BP Location: Right arm, Patient Position: Sitting, Cuff Size: Adult Regular)   Pulse 118   Temp 96.9  F (36.1  C) (Temporal)   Resp 22   Ht 1.626 m (5' 4\")   Wt 80.3 kg (177 lb)   LMP 03/17/2001   " SpO2 97%   BMI 30.38 kg/m    Body mass index is 30.38 kg/m .  Physical Exam   GENERAL: healthy, alert and no distress  HENT: normal cephalic/atraumatic, right ear: erythematous and nose and mouth without ulcers or lesions  NECK: no adenopathy, no asymmetry, masses, or scars and thyroid normal to palpation  RESP: lungs clear to auscultation - no rales, rhonchi or wheezes  CV: regular rate and rhythm, normal S1 S2, no S3 or S4, no murmur, click or rub, no peripheral edema and peripheral pulses strong  NEURO: Normal strength and tone, mentation intact and speech normal  Foot exam shows no ulceration, no neuropathy, microfilament well felt. Skin on the feet not dry.  Pulses in the feet well felt.    Patient Active Problem List   Diagnosis     Allergic rhinitis     Essential hypertension, benign     Postmenopausal atrophic vaginitis     Bee sting reaction     Torticollis     Type 2 diabetes mellitus without complications (H)     HYPERLIPIDEMIA LDL GOAL <100     Post-nasal drip     Advanced directives, counseling/discussion     Osteopenia     Urticaria     Arthritis of knee     Pes planus     Tinnitus     Current Outpatient Medications   Medication     aspirin (ASA) 325 MG EC tablet     atorvastatin (LIPITOR) 10 MG tablet     azelastine (ASTELIN) 0.1 % nasal spray     azithromycin (ZITHROMAX) 250 MG tablet     blood glucose (NO BRAND SPECIFIED) lancets standard     blood glucose calibration (ONETOUCH ULTRA CONTROL) solution     blood glucose monitoring (ONE TOUCH ULTRA 2) meter device kit     Cholecalciferol (VITAMIN D3 PO)     EPINEPHrine (EPIPEN 2-THERESA) 0.3 MG/0.3ML injection 2-pack     fexofenadine (ALLEGRA) 180 MG tablet     fluticasone (FLONASE) 50 MCG/ACT spray     lisinopril-hydrochlorothiazide (ZESTORETIC) 20-25 MG tablet     metFORMIN (GLUCOPHAGE) 850 MG tablet     Omega-3 Fatty Acids (OMEGA-3 FISH OIL PO)     ONETOUCH ULTRA test strip     oseltamivir (TAMIFLU) 75 MG capsule     sodium chloride (OCEAN) 0.65 %  nasal spray     vitamin B complex with vitamin C (STRESS TAB) tablet     No current facility-administered medications for this visit.

## 2022-11-03 NOTE — PATIENT INSTRUCTIONS
Thank you for choosing us for your care. I think an in-clinic visit would be best next steps based on your symptoms. Please schedule a clinic appointment; you won t be charged for this eVisit.      You can schedule an appointment right here in [facility], or call [phone]

## 2022-11-20 ENCOUNTER — MYC REFILL (OUTPATIENT)
Dept: FAMILY MEDICINE | Facility: CLINIC | Age: 72
End: 2022-11-20

## 2022-11-20 DIAGNOSIS — J01.00 ACUTE NON-RECURRENT MAXILLARY SINUSITIS: ICD-10-CM

## 2022-11-20 DIAGNOSIS — H66.90 ACUTE OTITIS MEDIA, UNSPECIFIED OTITIS MEDIA TYPE: ICD-10-CM

## 2022-11-21 ENCOUNTER — HEALTH MAINTENANCE LETTER (OUTPATIENT)
Age: 72
End: 2022-11-21

## 2022-11-22 RX ORDER — AZITHROMYCIN 250 MG/1
TABLET, FILM COATED ORAL
Qty: 6 TABLET | Refills: 0 | Status: SHIPPED | OUTPATIENT
Start: 2022-11-22 | End: 2022-11-27

## 2022-12-14 ENCOUNTER — IMMUNIZATION (OUTPATIENT)
Dept: NURSING | Facility: CLINIC | Age: 72
End: 2022-12-14
Payer: COMMERCIAL

## 2022-12-14 PROCEDURE — 91313 COVID-19 VACCINE BIVALENT BOOSTER 18+ (MODERNA): CPT

## 2022-12-14 PROCEDURE — 0134A COVID-19 VACCINE BIVALENT BOOSTER 18+ (MODERNA): CPT

## 2023-02-02 ENCOUNTER — TELEPHONE (OUTPATIENT)
Dept: FAMILY MEDICINE | Facility: CLINIC | Age: 73
End: 2023-02-02

## 2023-02-02 ENCOUNTER — LAB (OUTPATIENT)
Dept: LAB | Facility: CLINIC | Age: 73
End: 2023-02-02
Payer: COMMERCIAL

## 2023-02-02 DIAGNOSIS — E11.9 TYPE 2 DIABETES MELLITUS WITHOUT COMPLICATION, WITHOUT LONG-TERM CURRENT USE OF INSULIN (H): ICD-10-CM

## 2023-02-02 LAB
HBA1C MFR BLD: 6.5 % (ref 0–5.6)
VIT B12 SERPL-MCNC: 410 PG/ML (ref 232–1245)

## 2023-02-02 PROCEDURE — 82607 VITAMIN B-12: CPT

## 2023-02-02 PROCEDURE — 36415 COLL VENOUS BLD VENIPUNCTURE: CPT

## 2023-02-02 PROCEDURE — 83036 HEMOGLOBIN GLYCOSYLATED A1C: CPT

## 2023-02-02 NOTE — TELEPHONE ENCOUNTER
Form placed at covering providers desk (abigail).  Taryn Gonzalez, [facility]

## 2023-02-02 NOTE — RESULT ENCOUNTER NOTE
Your A1C is not at a level of diabetes. Please be mindful of your diet. MAke sure you are getting adequate protein with each meal. Avoid eating carbs along; always have them with a fat or a protein. We can always refer you to our diabetes educator if you wish.   You B12 level is within normal range but suboptimal. >700 is considered optimal. You can take a B-complex that contains B12 or a stand alone B12 supplement a few times a week.   Please follow-up with Dr Osborne with any questions  KRISTI Valentino CNP

## 2023-02-02 NOTE — TELEPHONE ENCOUNTER
Forms/Letter Request    Type of form/letter: MEDICARE ANNUAL WELLNESS VISIT $25 REWARD FORM    Have you been seen for this request: N/A    Do we have the form/letter: Yes: AT THE .    When is form/letter needed by: REQUESTS ASAP FOR SUBMITTION    How would you like the form/letter returned: Mail and SENT OVER TO [facility]    Patient Notified form requests are processed in 3-5 business days:Yes    Could we send this information to you in [employer] or would you prefer to receive a phone call?:   No preference   Okay to leave a detailed message?: No at Other phone number:

## 2023-03-20 ENCOUNTER — MYC MEDICAL ADVICE (OUTPATIENT)
Dept: FAMILY MEDICINE | Facility: CLINIC | Age: 73
End: 2023-03-20
Payer: COMMERCIAL

## 2023-03-20 NOTE — TELEPHONE ENCOUNTER
Spoke with pt to let her know that appointment on 04/11 is for DM follow up. Patient scheduled a wellness visit on 09/12/2023.    Jil Garduno CMA

## 2023-04-10 NOTE — PROGRESS NOTES
"  Assessment & Plan     Type 2 diabetes mellitus without complication, without long-term current use of insulin (H)  Patient is exercising and is on metformin  Blood sugars are excellent 1 30-1 50 range maximum    - Hemoglobin A1c  - Albumin Random Urine Quantitative with Creat Ratio  - FOOT EXAM    Essential hypertension, benign  We will continue with lisinopril hydrochlorothiazide and repeat blood pressure is excellent  - BASIC METABOLIC PANEL    Hyperlipidemia LDL goal <100  Patient is on atorvastatin and CT coronary calcium has been done  - Lipid panel reflex to direct LDL Fasting  - ALT  Left main coronary artery: 0  Left anterior descending coronary artery: 0  Circumflex coronary artery: 0   Right coronary artery: 0     TOTAL CALCIUM SCORE: 0     The total Agatston calcium score is 0.       Sinus congestion  Because she was on a cruise we will check for COVID test  Symptomatic treatment is advised  - Symptomatic COVID-19 Virus (Coronavirus) by PCR    Encounter for screening mammogram for breast cancer    - MA Screening Digital Bilateral               BMI:   Estimated body mass index is 30.16 kg/m  as calculated from the following:    Height as of this encounter: 1.613 m (5' 3.5\").    Weight as of this encounter: 78.5 kg (173 lb).           Masood Osborne MD  [facility] HIPOLITO Marquez is a 72 year old, presenting for the following health issues:  Diabetes and Ear Problem (Right ear pain, sinus pressure )      HPI     Very pleasant type II diabetic which checks blood sugars at home and mostly morning is 130  She recently went on a cruise and since Saturday does have sinus congestion many family members are sick as well  She does not have any chest pain or shortness of breath and is doing well  She does not spit up any phlegm but she does have a sinus headache and fatigue  No fever or loss of smellReview of Systems     10 point ROS of systems including Constitutional, Eyes, Respiratory, " "Cardiovascular, Gastroenterology, Genitourinary, Integumentary, Muscularskeletal, Psychiatric were all negative except for pertinent positives noted in my HPI.              Objective    BP (!) 160/95 (BP Location: Left arm, Patient Position: Chair, Cuff Size: Adult Regular)   Pulse 86   Temp 97.4  F (36.3  C) (Temporal)   Resp 16   Ht 1.613 m (5' 3.5\")   Wt 78.5 kg (173 lb)   LMP 03/17/2001   SpO2 99%   BMI 30.16 kg/m    Body mass index is 30.16 kg/m .  Physical Exam   GENERAL: healthy, alert and no distress  HENT: normal cephalic/atraumatic and ear canals and TM's normal  NECK: no adenopathy, no asymmetry, masses, or scars and thyroid normal to palpation  RESP: lungs clear to auscultation - no rales, rhonchi or wheezes  CV: regular rate and rhythm, normal S1 S2, no S3 or S4, no murmur, click or rub, no peripheral edema and peripheral pulses strong    Foot exam shows no ulceration, no neuropathy, microfilament well felt. Skin on the feet not dry.  Pulses in the feet well felt.  Throat and sinus exam not done because of possibility of COVID    Patient Active Problem List   Diagnosis     Allergic rhinitis     Essential hypertension, benign     Postmenopausal atrophic vaginitis     Bee sting reaction     Torticollis     Type 2 diabetes mellitus without complications (H)     HYPERLIPIDEMIA LDL GOAL <100     Post-nasal drip     Advanced directives, counseling/discussion     Osteopenia     Urticaria     Arthritis of knee     Pes planus     Tinnitus     Current Outpatient Medications   Medication     aspirin (ASA) 325 MG EC tablet     atorvastatin (LIPITOR) 10 MG tablet     azelastine (ASTELIN) 0.1 % nasal spray     blood glucose (NO BRAND SPECIFIED) lancets standard     blood glucose calibration (ONETOUCH ULTRA CONTROL) solution     blood glucose monitoring (ONE TOUCH ULTRA 2) meter device kit     Cholecalciferol (VITAMIN D3 PO)     EPINEPHrine (EPIPEN 2-THERESA) 0.3 MG/0.3ML injection 2-pack     fexofenadine " (ALLEGRA) 180 MG tablet     fluticasone (FLONASE) 50 MCG/ACT spray     lisinopril-hydrochlorothiazide (ZESTORETIC) 20-25 MG tablet     metFORMIN (GLUCOPHAGE) 850 MG tablet     Omega-3 Fatty Acids (OMEGA-3 FISH OIL PO)     ONETOUCH ULTRA test strip     sodium chloride (OCEAN) 0.65 % nasal spray     vitamin B complex with vitamin C (STRESS TAB) tablet     No current facility-administered medications for this visit.

## 2023-04-11 ENCOUNTER — OFFICE VISIT (OUTPATIENT)
Dept: FAMILY MEDICINE | Facility: CLINIC | Age: 73
End: 2023-04-11
Payer: COMMERCIAL

## 2023-04-11 VITALS
DIASTOLIC BLOOD PRESSURE: 85 MMHG | WEIGHT: 173 LBS | RESPIRATION RATE: 16 BRPM | HEART RATE: 86 BPM | SYSTOLIC BLOOD PRESSURE: 133 MMHG | HEIGHT: 64 IN | OXYGEN SATURATION: 99 % | TEMPERATURE: 97.4 F | BODY MASS INDEX: 29.53 KG/M2

## 2023-04-11 DIAGNOSIS — Z12.31 ENCOUNTER FOR SCREENING MAMMOGRAM FOR BREAST CANCER: ICD-10-CM

## 2023-04-11 DIAGNOSIS — R09.81 SINUS CONGESTION: ICD-10-CM

## 2023-04-11 DIAGNOSIS — E78.5 HYPERLIPIDEMIA LDL GOAL <100: ICD-10-CM

## 2023-04-11 DIAGNOSIS — E11.9 TYPE 2 DIABETES MELLITUS WITHOUT COMPLICATION, WITHOUT LONG-TERM CURRENT USE OF INSULIN (H): Primary | ICD-10-CM

## 2023-04-11 DIAGNOSIS — I10 ESSENTIAL HYPERTENSION, BENIGN: ICD-10-CM

## 2023-04-11 LAB — SARS-COV-2 RNA RESP QL NAA+PROBE: NEGATIVE

## 2023-04-11 PROCEDURE — 99207 PR FOOT EXAM NO CHARGE: CPT | Mod: 25 | Performed by: INTERNAL MEDICINE

## 2023-04-11 PROCEDURE — U0005 INFEC AGEN DETEC AMPLI PROBE: HCPCS | Performed by: INTERNAL MEDICINE

## 2023-04-11 PROCEDURE — U0003 INFECTIOUS AGENT DETECTION BY NUCLEIC ACID (DNA OR RNA); SEVERE ACUTE RESPIRATORY SYNDROME CORONAVIRUS 2 (SARS-COV-2) (CORONAVIRUS DISEASE [COVID-19]), AMPLIFIED PROBE TECHNIQUE, MAKING USE OF HIGH THROUGHPUT TECHNOLOGIES AS DESCRIBED BY CMS-2020-01-R: HCPCS | Performed by: INTERNAL MEDICINE

## 2023-04-11 PROCEDURE — 99214 OFFICE O/P EST MOD 30 MIN: CPT | Mod: CS | Performed by: INTERNAL MEDICINE

## 2023-04-11 ASSESSMENT — PAIN SCALES - GENERAL: PAINLEVEL: NO PAIN (1)

## 2023-04-21 ENCOUNTER — E-VISIT (OUTPATIENT)
Dept: FAMILY MEDICINE | Facility: CLINIC | Age: 73
End: 2023-04-21
Payer: COMMERCIAL

## 2023-04-21 DIAGNOSIS — J20.9 ACUTE BRONCHITIS WITH SYMPTOMS > 10 DAYS: Primary | ICD-10-CM

## 2023-04-21 PROCEDURE — 99421 OL DIG E/M SVC 5-10 MIN: CPT | Performed by: INTERNAL MEDICINE

## 2023-04-21 RX ORDER — AZITHROMYCIN 250 MG/1
TABLET, FILM COATED ORAL
Qty: 6 TABLET | Refills: 0 | Status: SHIPPED | OUTPATIENT
Start: 2023-04-21 | End: 2023-04-26

## 2023-04-21 NOTE — PATIENT INSTRUCTIONS
Thank you for choosing us for your care. I have placed an order for a prescription so that you can start treatment. View your full visit summary for details by clicking on the link below. Your pharmacist will able to address any questions you may have about the medication.     If you're not feeling better within 5-7 days, please schedule an appointment.  You can schedule an appointment right here in [facility], or call [phone]  If the visit is for the same symptoms as your eVisit, we'll refund the cost of your eVisit if seen within seven days.

## 2023-05-02 ENCOUNTER — ANCILLARY PROCEDURE (OUTPATIENT)
Dept: MAMMOGRAPHY | Facility: CLINIC | Age: 73
End: 2023-05-02
Attending: INTERNAL MEDICINE
Payer: COMMERCIAL

## 2023-05-02 DIAGNOSIS — Z12.31 ENCOUNTER FOR SCREENING MAMMOGRAM FOR BREAST CANCER: ICD-10-CM

## 2023-05-02 PROCEDURE — 77063 BREAST TOMOSYNTHESIS BI: CPT | Mod: TC | Performed by: RADIOLOGY

## 2023-05-02 PROCEDURE — 77067 SCR MAMMO BI INCL CAD: CPT | Mod: TC | Performed by: RADIOLOGY

## 2023-06-05 ENCOUNTER — TRANSFERRED RECORDS (OUTPATIENT)
Dept: HEALTH INFORMATION MANAGEMENT | Facility: CLINIC | Age: 73
End: 2023-06-05
Payer: COMMERCIAL

## 2023-06-05 LAB — RETINOPATHY: NEGATIVE

## 2023-06-06 ENCOUNTER — VIRTUAL VISIT (OUTPATIENT)
Dept: PHARMACY | Facility: CLINIC | Age: 73
End: 2023-06-06
Payer: COMMERCIAL

## 2023-06-06 DIAGNOSIS — E11.9 TYPE 2 DIABETES MELLITUS WITHOUT COMPLICATION, WITHOUT LONG-TERM CURRENT USE OF INSULIN (H): ICD-10-CM

## 2023-06-06 DIAGNOSIS — J30.2 SEASONAL ALLERGIC RHINITIS, UNSPECIFIED TRIGGER: ICD-10-CM

## 2023-06-06 DIAGNOSIS — Z78.9 TAKES DIETARY SUPPLEMENTS: ICD-10-CM

## 2023-06-06 DIAGNOSIS — I10 ESSENTIAL HYPERTENSION, BENIGN: ICD-10-CM

## 2023-06-06 DIAGNOSIS — E78.5 HYPERLIPIDEMIA LDL GOAL <100: ICD-10-CM

## 2023-06-06 DIAGNOSIS — F43.22 ADJUSTMENT DISORDER WITH ANXIOUS MOOD: Primary | ICD-10-CM

## 2023-06-06 PROCEDURE — 99607 MTMS BY PHARM ADDL 15 MIN: CPT | Mod: VID | Performed by: PHARMACIST

## 2023-06-06 PROCEDURE — 99605 MTMS BY PHARM NP 15 MIN: CPT | Mod: VID | Performed by: PHARMACIST

## 2023-06-06 RX ORDER — BLOOD SUGAR DIAGNOSTIC
STRIP MISCELLANEOUS
Qty: 100 STRIP | Refills: 3 | Status: SHIPPED | OUTPATIENT
Start: 2023-06-06

## 2023-06-06 ASSESSMENT — ANXIETY QUESTIONNAIRES
6. BECOMING EASILY ANNOYED OR IRRITABLE: SEVERAL DAYS
3. WORRYING TOO MUCH ABOUT DIFFERENT THINGS: NEARLY EVERY DAY
7. FEELING AFRAID AS IF SOMETHING AWFUL MIGHT HAPPEN: SEVERAL DAYS
2. NOT BEING ABLE TO STOP OR CONTROL WORRYING: NEARLY EVERY DAY
IF YOU CHECKED OFF ANY PROBLEMS ON THIS QUESTIONNAIRE, HOW DIFFICULT HAVE THESE PROBLEMS MADE IT FOR YOU TO DO YOUR WORK, TAKE CARE OF THINGS AT HOME, OR GET ALONG WITH OTHER PEOPLE: NOT DIFFICULT AT ALL
GAD7 TOTAL SCORE: 9
GAD7 TOTAL SCORE: 9
1. FEELING NERVOUS, ANXIOUS, OR ON EDGE: SEVERAL DAYS
5. BEING SO RESTLESS THAT IT IS HARD TO SIT STILL: NOT AT ALL

## 2023-06-06 ASSESSMENT — PATIENT HEALTH QUESTIONNAIRE - PHQ9
SUM OF ALL RESPONSES TO PHQ QUESTIONS 1-9: 4
5. POOR APPETITE OR OVEREATING: NOT AT ALL

## 2023-06-06 NOTE — PATIENT INSTRUCTIONS
"Recommendations from today's MTM visit:                                                    MTM (medication therapy management) is a service provided by a clinical pharmacist designed to help you get the most of out of your medicines.   Today we reviewed what your medicines are for, how to know if they are working, that your medicines are safe and how to make your medicine regimen as easy as possible.      1.  Refilled diabetes testing supplies.    2.  Will discuss starting selective serotonin reuptake inhibitor therapy with primary care physician.    Follow-up: Return for check in via [employer] connect after talking with Dr. Osborne.    It was great speaking with you today.  I value your experience and would be very thankful for your time in providing feedback in our clinic survey. In the next few days, you may receive an email or text message from [employer] with a link to a survey related to your  clinical pharmacist.\"     To schedule another MTM appointment, please call the clinic directly or you may call the MTM scheduling line at [phone] or toll-free at [phone].     My Clinical Pharmacist's contact information:                                                      Please feel free to contact me with any questions or concerns you have.      Danyelle Jonas, Tresa, [facility]  Medication Therapy Management Provider  Pager: [phone]    "

## 2023-06-06 NOTE — PROGRESS NOTES
Medication Therapy Management (MTM) Encounter    ASSESSMENT:                            Medication Adherence/Access: No issues identified    Mood: Patient does not have diagnosis of depression or anxiety, will discuss with primary care physician.  We do often find that selective serotonin reuptake inhibitor therapy that works well for one member of the family tends to work well for others, so she may benefit from asking her sisters what they take if she's comfortable doing so.    Type 2 Diabetes: Stable. Patient is meeting A1c goal of < 7%, plan in place to re-check.    Hypertension: Stable. Patient is meeting blood pressure goal of < 140/90mmHg, not quite meeting more aggressive goal <130/80mmHg.    Hyperlipidemia: Stable. Patient is on maximally tolerated statin therapy, no changes needed.     Allergic rhinitis: Stable.     Supplements: Stable. It is unclear how much benefit she is getting from the omega-3 and vitamin B complex. However, patient prefers to continue and they likely are not harmful.    PLAN:                            1.  Refilled diabetes testing supplies.  2.  Will discuss starting selective serotonin reuptake inhibitor therapy with primary care physician.    Follow-up: Return for check in via [employer] connect after talking with Dr. Osborne.     SUBJECTIVE/OBJECTIVE:                          Alma Helm is a 72 year old female contacted via secure video for a follow-up visit.  Today's visit is a follow-up MTM visit from 10/27/2022.     Reason for visit: Routine follow-up    Tobacco: She reports that she quit smoking about 40 years ago. Her smoking use included cigarettes. She has a 10.00 pack-year smoking history. She has never used smokeless tobacco.  Alcohol: 1 glass of wine/day    Medication Adherence/Access: no issues reported    Mood:  Alma reports her sisters feel she needs to go on an antidepressant.  She reports having increasing marriage challenges.  She's not interested in  "therapy services at this time, but may consider in the future.  3 of her sisters take antidepressants but she's not sure which ones.  She does not have a diagnosis of depression or anxiety.  She denies suicidal ideation.      6/6/2023     9:36 AM   PHQ   PHQ-9 Total Score 4   Q9: Thoughts of better off dead/self-harm past 2 weeks Not at all          6/6/2023     9:41 AM   ANGUS-7 SCORE   Total Score 9     Type 2 Diabetes:  Currently taking metformin 850mg twice daily. Patient is not experiencing side effects.  Blood sugar monitoring: a few times a week, generally fasting. Ranges (patient reported): <150mg/dL  Requests refill of diabetes testing supplies  Symptoms of low blood sugar? none  Symptoms of high blood sugar? none  Eye exam: due  Foot exam: up to date  Diet/Exercise: She feels she was \"eating everything in sight\" - she's been monitoring this more closely over the last week and feels this is going well.  She is scheduled for labs next week and is a bit worried that her A1c has maybe gone up.  Aspirin: Taking aspirin 325mg daily - recommended by GI following last colonoscopy  Statin: Yes: atorvastatin 10mg   ACEi/ARB: Yes: lisinopril 10mg.   Urine Albumin:   Lab Results   Component Value Date    UMALCR 13.10 06/16/2022      Lab Results   Component Value Date    A1C 6.5 02/02/2023    A1C 6.2 09/29/2022    A1C 5.7 01/21/2022    A1C 6.1 06/14/2021    A1C 5.9 03/26/2021    A1C 6.4 06/23/2020    A1C 6.1 11/18/2019    A1C 6.1 05/17/2019     Lab Results   Component Value Date    CR 0.80 09/29/2022    CR 0.79 01/21/2022    CR 0.81 06/14/2021    CR 0.73 06/23/2020    GFRESTIMATED 77 09/29/2022    GFRESTIMATED 79 01/21/2022    GFRESTIMATED 72 06/14/2021    GFRESTIMATED 82 06/23/2020       Hypertension: Current medications include lisinopril/HCTZ 20/25 mg daily - 1/2 tablet daily.  Patient does not self-monitor blood pressure regularly.  Patient reports no side effects.    BP Readings from Last 3 Encounters:   04/11/23 " 133/85   22 136/83   22 139/88       Hyperlipidemia: Current therapy includes atorvastatin 10 mg daily, dose reduced due to myalgias. Patient reports no significant myalgias or other side effects with current dosing.  Recent Labs   Lab Test 22  1334 21  1010 16  0745 06/30/15  0855   CHOL 143 151   < > 146   HDL 56 60   < > 50*   LDL 47 65   < > 67   TRIG 202* 128   < > 145   CHOLHDLRATIO  --   --   --  2.9    < > = values in this interval not displayed.       Allergies:  Pt currently taking fexofenadine 180 mg every evening, Flonase nasal spray daily, Astelin nasal spray twice daily and saline nasal spray as needed. Reports that she chronic allergies due to pollen. She also has an EpiPen on hand to use if needed (no recent use and pen is not ).  She reports symptoms are stable, worse earlier in the spring.  No side effects reported at this time.    Supplements:  Current medications include vitamin B complex, Omega 3 fatty acids, and Vitamin D 2000 units daily. She is not experiencing side effects.  She feels these are effective and prefers to continue taking. Patient reports that Dr. Osborne recommended she continue omega 3 at this time.     Today's Vitals: LMP 2001   ----------------    I spent 17 minutes with this patient today. All changes were made via collaborative practice agreement with Dr. Osborne. A copy of the visit note was provided to the patient's provider(s).    A summary of these recommendations was sent via [employer].    Danyelle Jonas, PharmD, BCACP  Medication Therapy Management Provider  Pager: [phone]     Telemedicine Visit Details  Type of service:  Video Conference via [employer]  Start Time: 9:31 AM  End Time: 9:48 AM     Medication Therapy Recommendations  Adjustment disorder with anxious mood    Rationale: Untreated condition - Needs additional medication therapy - Indication   Recommendation: Start Medication - sertraline 50 MG tablet   Status: Contact  Provider - Awaiting Response

## 2023-06-06 NOTE — PROGRESS NOTES
Per Dr. Osborne, ok to start selective serotonin reuptake inhibitor therapy.  Patient messaged me that one of her sisters has done well with sertraline, will start with that and follow-up in 6-8 weeks.    Danyelle Jonas, PharmD, [facility]  Medication Therapy Management Provider  Pager: [phone]

## 2023-06-06 NOTE — LETTER
June 6, 2023  Elvia Helm  [address] [address] [address]    Dear Ms. Helm, [facility]     Thank you for talking with me on Jun 6, 2023 about your health and medications. As a follow-up to our conversation, I have included two documents:      1. Your Recommended To-Do List has steps you should take to get the best results from your medications.  2. Your Medication List will help you keep track of your medications and how to take them.    If you want to talk about these documents, please call Danyelle Jonas PharmD at phone: [phone], Monday-Friday 8-4:30pm.    I look forward to working with you and your doctors to make sure your medications work well for you.    Sincerely,  Danyelle Jonas PharmD  [facility] Pharmacist, [facility]

## 2023-06-06 NOTE — LETTER
"Recommended To-Do List      Prepared on: Jun 6, 2023       You can get the best results from your medications by completing the items on this \"To-Do List.\"      Bring your To-Do List when you go to your doctor. And, share it with your family or caregivers.    My To-Do List:  What we talked about: What I should do:   A new medication that may be of benefit to you    Start taking sertraline (ZOLOFT) - I will talk with Dr. Osborne about this          What we talked about: What I should do:                     "

## 2023-06-06 NOTE — LETTER
_  Medication List        Prepared on: Jun 6, 2023     Bring your Medication List when you go to the doctor, hospital, or   emergency room. And, share it with your family or caregivers.     Note any changes to how you take your medications.  Cross out medications when you no longer use them.    Medication How I take it Why I use it Prescriber   aspirin (ASA) 325 MG EC tablet Take 325 mg by mouth daily Prevent MI/Stroke Patient Reported   atorvastatin (LIPITOR) 10 MG tablet Take 1 tablet (10 mg) by mouth daily Hyperlipidemia LDL Goal <100 Masood Osborne MD   azelastine (ASTELIN) 0.1 % nasal spray Spray 1 spray into both nostrils 2 times daily Seasonal allergic rhinitis due to pollen Zain Marshall MD   blood glucose (NO BRAND SPECIFIED) lancets standard Use to test blood sugar one* times daily or as directed. Type 2 diabetes mellitus without complication, without long-term current use of insulin (H) Masood Osborne MD   blood glucose (ONETOUCH ULTRA) test strip Use to test blood sugar daily or as directed. Type 2 diabetes mellitus without complication, without long-term current use of insulin (H) Masood Osborne MD   blood glucose calibration (ONETOUCH ULTRA CONTROL) solution Use to calibrate blood glucose monitor as directed. Type 2 diabetes mellitus without complication, without long-term current use of insulin (H) Masood Osborne MD   blood glucose monitoring (ONE TOUCH ULTRA 2) meter device kit Use to test blood sugar 1 times daily. Type 2 diabetes mellitus without complication, without long-term current use of insulin (H) Masood Osborne MD   Cholecalciferol (VITAMIN D3 PO) Take 2,000 Units by mouth daily  General Health  Self   EPINEPHrine (EPIPEN 2-THERESA) 0.3 MG/0.3ML injection 2-pack INJECT 0.3 MLS INTO THE MUSCLE ONCE AS NEEDED FOR ANAPHYLAXIS Bee sting reaction, accidental or unintentional, sequela Masood Osborne MD   fexofenadine (ALLEGRA) 180 MG tablet Take 180 mg by mouth daily Allergy Self   fluticasone  (FLONASE) 50 MCG/ACT spray Spray 1-2 sprays into both nostrils daily Seasonal allergic rhinitis due to other allergic trigger Vincent Encinas PA-C   lisinopril-hydrochlorothiazide (ZESTORETIC) 20-25 MG tablet Take 0.5 tablets by mouth daily Essential Hypertension, Benign Masood Osborne MD   metFORMIN (GLUCOPHAGE) 850 MG tablet Take 1 tablet (850 mg) by mouth 2 times daily (with meals) Type 2 diabetes mellitus without complication, without long-term current use of insulin (H) Masood Osborne MD   Omega-3 Fatty Acids (OMEGA-3 FISH OIL PO) Take 1 g by mouth daily  General Health  Self   sodium chloride (OCEAN) 0.65 % nasal spray Spray 1 spray into both nostrils daily as needed for congestion Allergy Self   vitamin B complex with vitamin C (STRESS TAB) tablet Take 1 tablet by mouth daily General Health  Self         Add new medications, over-the-counter drugs, herbals, vitamins, or  minerals in the blank rows below.    Medication How I take it Why I use it Prescriber                                      Allergies:      atovaquone-proguanil hcl; amoxicillin; bee; ceftin; clindamycin; erythromycin; seasonal allergies; shellfish-derived products; shrimp; tetracycline        Side effects I have had:               Other Information:              My notes and questions:

## 2023-06-13 ENCOUNTER — TRANSFERRED RECORDS (OUTPATIENT)
Dept: HEALTH INFORMATION MANAGEMENT | Facility: CLINIC | Age: 73
End: 2023-06-13

## 2023-06-13 ENCOUNTER — APPOINTMENT (OUTPATIENT)
Dept: LAB | Facility: CLINIC | Age: 73
End: 2023-06-13
Payer: COMMERCIAL

## 2023-06-13 LAB
ALT SERPL W P-5'-P-CCNC: 41 U/L (ref 0–50)
ANION GAP SERPL CALCULATED.3IONS-SCNC: 14 MMOL/L (ref 7–15)
BUN SERPL-MCNC: 13.3 MG/DL (ref 8–23)
CALCIUM SERPL-MCNC: 9.9 MG/DL (ref 8.8–10.2)
CHLORIDE SERPL-SCNC: 102 MMOL/L (ref 98–107)
CHOLEST SERPL-MCNC: 154 MG/DL
CREAT SERPL-MCNC: 0.79 MG/DL (ref 0.51–0.95)
CREAT UR-MCNC: 32.6 MG/DL
DEPRECATED HCO3 PLAS-SCNC: 24 MMOL/L (ref 22–29)
GFR SERPL CREATININE-BSD FRML MDRD: 79 ML/MIN/1.73M2
GLUCOSE SERPL-MCNC: 130 MG/DL (ref 70–99)
HDLC SERPL-MCNC: 51 MG/DL
LDLC SERPL CALC-MCNC: 69 MG/DL
MICROALBUMIN UR-MCNC: <12 MG/L
MICROALBUMIN/CREAT UR: NORMAL MG/G{CREAT}
NONHDLC SERPL-MCNC: 103 MG/DL
POTASSIUM SERPL-SCNC: 4 MMOL/L (ref 3.4–5.3)
SODIUM SERPL-SCNC: 140 MMOL/L (ref 136–145)
TRIGL SERPL-MCNC: 169 MG/DL

## 2023-06-13 PROCEDURE — 84460 ALANINE AMINO (ALT) (SGPT): CPT | Performed by: INTERNAL MEDICINE

## 2023-06-13 PROCEDURE — 82570 ASSAY OF URINE CREATININE: CPT | Performed by: INTERNAL MEDICINE

## 2023-06-13 PROCEDURE — 80061 LIPID PANEL: CPT | Performed by: INTERNAL MEDICINE

## 2023-06-13 PROCEDURE — 82043 UR ALBUMIN QUANTITATIVE: CPT | Performed by: INTERNAL MEDICINE

## 2023-06-13 PROCEDURE — 80048 BASIC METABOLIC PNL TOTAL CA: CPT | Performed by: INTERNAL MEDICINE

## 2023-06-13 PROCEDURE — 36415 COLL VENOUS BLD VENIPUNCTURE: CPT | Performed by: INTERNAL MEDICINE

## 2023-07-12 DIAGNOSIS — T63.441S BEE STING REACTION, ACCIDENTAL OR UNINTENTIONAL, SEQUELA: ICD-10-CM

## 2023-07-12 RX ORDER — EPINEPHRINE 0.3 MG/.3ML
INJECTION SUBCUTANEOUS
Qty: 2 EACH | Refills: 0 | Status: SHIPPED | OUTPATIENT
Start: 2023-07-12 | End: 2024-08-08

## 2023-07-12 NOTE — TELEPHONE ENCOUNTER
Prescription approved per [facility] Refill Protocol.  Lisette Israel, RN  [facility] Triage Nurse

## 2023-09-06 DIAGNOSIS — E78.5 HYPERLIPIDEMIA LDL GOAL <100: ICD-10-CM

## 2023-09-06 RX ORDER — ATORVASTATIN CALCIUM 10 MG/1
10 TABLET, FILM COATED ORAL DAILY
Qty: 90 TABLET | Refills: 0 | Status: SHIPPED | OUTPATIENT
Start: 2023-09-06 | End: 2023-12-04

## 2023-09-11 ENCOUNTER — APPOINTMENT (OUTPATIENT)
Dept: MRI IMAGING | Facility: CLINIC | Age: 73
End: 2023-09-11
Attending: STUDENT IN AN ORGANIZED HEALTH CARE EDUCATION/TRAINING PROGRAM
Payer: COMMERCIAL

## 2023-09-11 ENCOUNTER — HOSPITAL ENCOUNTER (OUTPATIENT)
Facility: CLINIC | Age: 73
Setting detail: OBSERVATION
Discharge: HOME | End: 2023-09-13
Attending: STUDENT IN AN ORGANIZED HEALTH CARE EDUCATION/TRAINING PROGRAM | Admitting: NURSE PRACTITIONER
Payer: COMMERCIAL

## 2023-09-11 ENCOUNTER — APPOINTMENT (OUTPATIENT)
Dept: CT IMAGING | Facility: CLINIC | Age: 73
End: 2023-09-11
Attending: STUDENT IN AN ORGANIZED HEALTH CARE EDUCATION/TRAINING PROGRAM
Payer: COMMERCIAL

## 2023-09-11 DIAGNOSIS — I63.9 ACUTE ISCHEMIC STROKE (H): ICD-10-CM

## 2023-09-11 DIAGNOSIS — R53.1 LEFT-SIDED WEAKNESS: ICD-10-CM

## 2023-09-11 LAB
ANION GAP SERPL CALCULATED.3IONS-SCNC: 16 MMOL/L (ref 7–15)
APAP SERPL-MCNC: <5 UG/ML (ref 10–30)
APTT PPP: 28 SECONDS (ref 22–38)
ATRIAL RATE - MUSE: 95 BPM
BASOPHILS # BLD AUTO: 0 10E3/UL (ref 0–0.2)
BASOPHILS NFR BLD AUTO: 0 %
BUN SERPL-MCNC: 13.6 MG/DL (ref 8–23)
CALCIUM SERPL-MCNC: 10.6 MG/DL (ref 8.8–10.2)
CHLORIDE SERPL-SCNC: 98 MMOL/L (ref 98–107)
CREAT SERPL-MCNC: 0.74 MG/DL (ref 0.51–0.95)
DEPRECATED HCO3 PLAS-SCNC: 25 MMOL/L (ref 22–29)
DIASTOLIC BLOOD PRESSURE - MUSE: NORMAL MMHG
EGFRCR SERPLBLD CKD-EPI 2021: 84 ML/MIN/1.73M2
EOSINOPHIL # BLD AUTO: 0.3 10E3/UL (ref 0–0.7)
EOSINOPHIL NFR BLD AUTO: 4 %
ERYTHROCYTE [DISTWIDTH] IN BLOOD BY AUTOMATED COUNT: 13.2 % (ref 10–15)
ETHANOL SERPL-MCNC: <0.01 G/DL
GLUCOSE BLDC GLUCOMTR-MCNC: 123 MG/DL (ref 70–99)
GLUCOSE SERPL-MCNC: 129 MG/DL (ref 70–99)
HCT VFR BLD AUTO: 45.8 % (ref 35–47)
HGB BLD-MCNC: 14.7 G/DL (ref 11.7–15.7)
IMM GRANULOCYTES # BLD: 0.1 10E3/UL
IMM GRANULOCYTES NFR BLD: 1 %
INR PPP: 0.91 (ref 0.85–1.15)
INTERPRETATION ECG - MUSE: NORMAL
LYMPHOCYTES # BLD AUTO: 2.2 10E3/UL (ref 0.8–5.3)
LYMPHOCYTES NFR BLD AUTO: 24 %
MCH RBC QN AUTO: 30.2 PG (ref 26.5–33)
MCHC RBC AUTO-ENTMCNC: 32.1 G/DL (ref 31.5–36.5)
MCV RBC AUTO: 94 FL (ref 78–100)
MONOCYTES # BLD AUTO: 1 10E3/UL (ref 0–1.3)
MONOCYTES NFR BLD AUTO: 11 %
NEUTROPHILS # BLD AUTO: 5.4 10E3/UL (ref 1.6–8.3)
NEUTROPHILS NFR BLD AUTO: 60 %
NRBC # BLD AUTO: 0 10E3/UL
NRBC BLD AUTO-RTO: 0 /100
P AXIS - MUSE: 54 DEGREES
PLATELET # BLD AUTO: 353 10E3/UL (ref 150–450)
POTASSIUM SERPL-SCNC: 4.1 MMOL/L (ref 3.4–5.3)
PR INTERVAL - MUSE: 150 MS
QRS DURATION - MUSE: 72 MS
QT - MUSE: 352 MS
QTC - MUSE: 442 MS
R AXIS - MUSE: 6 DEGREES
RBC # BLD AUTO: 4.87 10E6/UL (ref 3.8–5.2)
SALICYLATES SERPL-MCNC: <0.3 MG/DL
SODIUM SERPL-SCNC: 139 MMOL/L (ref 136–145)
SYSTOLIC BLOOD PRESSURE - MUSE: NORMAL MMHG
T AXIS - MUSE: 65 DEGREES
TROPONIN T SERPL HS-MCNC: 13 NG/L
TROPONIN T SERPL HS-MCNC: 7 NG/L
VENTRICULAR RATE- MUSE: 95 BPM
WBC # BLD AUTO: 9 10E3/UL (ref 4–11)

## 2023-09-11 PROCEDURE — 250N000011 HC RX IP 250 OP 636: Performed by: STUDENT IN AN ORGANIZED HEALTH CARE EDUCATION/TRAINING PROGRAM

## 2023-09-11 PROCEDURE — 36415 COLL VENOUS BLD VENIPUNCTURE: CPT | Performed by: NURSE PRACTITIONER

## 2023-09-11 PROCEDURE — 93005 ELECTROCARDIOGRAM TRACING: CPT

## 2023-09-11 PROCEDURE — 85610 PROTHROMBIN TIME: CPT | Performed by: STUDENT IN AN ORGANIZED HEALTH CARE EDUCATION/TRAINING PROGRAM

## 2023-09-11 PROCEDURE — 258N000003 HC RX IP 258 OP 636: Performed by: STUDENT IN AN ORGANIZED HEALTH CARE EDUCATION/TRAINING PROGRAM

## 2023-09-11 PROCEDURE — G0378 HOSPITAL OBSERVATION PER HR: HCPCS

## 2023-09-11 PROCEDURE — 85730 THROMBOPLASTIN TIME PARTIAL: CPT | Performed by: STUDENT IN AN ORGANIZED HEALTH CARE EDUCATION/TRAINING PROGRAM

## 2023-09-11 PROCEDURE — 85025 COMPLETE CBC W/AUTO DIFF WBC: CPT | Performed by: STUDENT IN AN ORGANIZED HEALTH CARE EDUCATION/TRAINING PROGRAM

## 2023-09-11 PROCEDURE — 99291 CRITICAL CARE FIRST HOUR: CPT | Mod: GC | Performed by: STUDENT IN AN ORGANIZED HEALTH CARE EDUCATION/TRAINING PROGRAM

## 2023-09-11 PROCEDURE — 96360 HYDRATION IV INFUSION INIT: CPT | Mod: XU

## 2023-09-11 PROCEDURE — 84484 ASSAY OF TROPONIN QUANT: CPT | Performed by: STUDENT IN AN ORGANIZED HEALTH CARE EDUCATION/TRAINING PROGRAM

## 2023-09-11 PROCEDURE — 250N000013 HC RX MED GY IP 250 OP 250 PS 637: Performed by: NURSE PRACTITIONER

## 2023-09-11 PROCEDURE — 255N000002 HC RX 255 OP 636: Performed by: STUDENT IN AN ORGANIZED HEALTH CARE EDUCATION/TRAINING PROGRAM

## 2023-09-11 PROCEDURE — 80179 DRUG ASSAY SALICYLATE: CPT | Performed by: STUDENT IN AN ORGANIZED HEALTH CARE EDUCATION/TRAINING PROGRAM

## 2023-09-11 PROCEDURE — 82962 GLUCOSE BLOOD TEST: CPT

## 2023-09-11 PROCEDURE — 82077 ASSAY SPEC XCP UR&BREATH IA: CPT | Performed by: STUDENT IN AN ORGANIZED HEALTH CARE EDUCATION/TRAINING PROGRAM

## 2023-09-11 PROCEDURE — A9585 GADOBUTROL INJECTION: HCPCS | Performed by: STUDENT IN AN ORGANIZED HEALTH CARE EDUCATION/TRAINING PROGRAM

## 2023-09-11 PROCEDURE — 96361 HYDRATE IV INFUSION ADD-ON: CPT

## 2023-09-11 PROCEDURE — 84484 ASSAY OF TROPONIN QUANT: CPT | Performed by: NURSE PRACTITIONER

## 2023-09-11 PROCEDURE — 70553 MRI BRAIN STEM W/O & W/DYE: CPT

## 2023-09-11 PROCEDURE — 250N000009 HC RX 250: Performed by: STUDENT IN AN ORGANIZED HEALTH CARE EDUCATION/TRAINING PROGRAM

## 2023-09-11 PROCEDURE — 70496 CT ANGIOGRAPHY HEAD: CPT

## 2023-09-11 PROCEDURE — 99223 1ST HOSP IP/OBS HIGH 75: CPT | Performed by: NURSE PRACTITIONER

## 2023-09-11 PROCEDURE — 80143 DRUG ASSAY ACETAMINOPHEN: CPT | Performed by: STUDENT IN AN ORGANIZED HEALTH CARE EDUCATION/TRAINING PROGRAM

## 2023-09-11 PROCEDURE — 80048 BASIC METABOLIC PNL TOTAL CA: CPT | Performed by: STUDENT IN AN ORGANIZED HEALTH CARE EDUCATION/TRAINING PROGRAM

## 2023-09-11 PROCEDURE — 36415 COLL VENOUS BLD VENIPUNCTURE: CPT | Performed by: STUDENT IN AN ORGANIZED HEALTH CARE EDUCATION/TRAINING PROGRAM

## 2023-09-11 PROCEDURE — 70450 CT HEAD/BRAIN W/O DYE: CPT

## 2023-09-11 PROCEDURE — 99285 EMERGENCY DEPT VISIT HI MDM: CPT | Mod: 25

## 2023-09-11 PROCEDURE — 258N000003 HC RX IP 258 OP 636: Performed by: NURSE PRACTITIONER

## 2023-09-11 RX ORDER — GADOBUTROL 604.72 MG/ML
8 INJECTION INTRAVENOUS ONCE
Status: COMPLETED | OUTPATIENT
Start: 2023-09-11 | End: 2023-09-11

## 2023-09-11 RX ORDER — ONDANSETRON 2 MG/ML
4 INJECTION INTRAMUSCULAR; INTRAVENOUS EVERY 6 HOURS PRN
Status: DISCONTINUED | OUTPATIENT
Start: 2023-09-11 | End: 2023-09-13 | Stop reason: HOSPADM

## 2023-09-11 RX ORDER — AMOXICILLIN 250 MG
2 CAPSULE ORAL 2 TIMES DAILY PRN
Status: DISCONTINUED | OUTPATIENT
Start: 2023-09-11 | End: 2023-09-13 | Stop reason: HOSPADM

## 2023-09-11 RX ORDER — AMOXICILLIN 250 MG
1 CAPSULE ORAL 2 TIMES DAILY PRN
Status: DISCONTINUED | OUTPATIENT
Start: 2023-09-11 | End: 2023-09-13 | Stop reason: HOSPADM

## 2023-09-11 RX ORDER — CLOPIDOGREL 300 MG/1
300 TABLET, FILM COATED ORAL ONCE
Status: COMPLETED | OUTPATIENT
Start: 2023-09-11 | End: 2023-09-11

## 2023-09-11 RX ORDER — PROCHLORPERAZINE 25 MG
12.5 SUPPOSITORY, RECTAL RECTAL EVERY 12 HOURS PRN
Status: DISCONTINUED | OUTPATIENT
Start: 2023-09-11 | End: 2023-09-13 | Stop reason: HOSPADM

## 2023-09-11 RX ORDER — ATORVASTATIN CALCIUM 10 MG/1
10 TABLET, FILM COATED ORAL AT BEDTIME
Status: DISCONTINUED | OUTPATIENT
Start: 2023-09-11 | End: 2023-09-13 | Stop reason: HOSPADM

## 2023-09-11 RX ORDER — PROCHLORPERAZINE MALEATE 5 MG
5 TABLET ORAL EVERY 6 HOURS PRN
Status: DISCONTINUED | OUTPATIENT
Start: 2023-09-11 | End: 2023-09-13 | Stop reason: HOSPADM

## 2023-09-11 RX ORDER — ONDANSETRON 4 MG/1
4 TABLET, ORALLY DISINTEGRATING ORAL EVERY 6 HOURS PRN
Status: DISCONTINUED | OUTPATIENT
Start: 2023-09-11 | End: 2023-09-13 | Stop reason: HOSPADM

## 2023-09-11 RX ORDER — CLOPIDOGREL BISULFATE 75 MG/1
75 TABLET ORAL AT BEDTIME
Status: DISCONTINUED | OUTPATIENT
Start: 2023-09-11 | End: 2023-09-13 | Stop reason: HOSPADM

## 2023-09-11 RX ORDER — IOPAMIDOL 755 MG/ML
75 INJECTION, SOLUTION INTRAVASCULAR ONCE
Status: COMPLETED | OUTPATIENT
Start: 2023-09-11 | End: 2023-09-11

## 2023-09-11 RX ORDER — ASPIRIN 325 MG
325 TABLET, DELAYED RELEASE (ENTERIC COATED) ORAL AT BEDTIME
Status: DISCONTINUED | OUTPATIENT
Start: 2023-09-12 | End: 2023-09-13 | Stop reason: HOSPADM

## 2023-09-11 RX ORDER — ASPIRIN 325 MG
325 TABLET ORAL ONCE
Status: COMPLETED | OUTPATIENT
Start: 2023-09-11 | End: 2023-09-11

## 2023-09-11 RX ORDER — SODIUM CHLORIDE 9 MG/ML
INJECTION, SOLUTION INTRAVENOUS CONTINUOUS
Status: DISCONTINUED | OUTPATIENT
Start: 2023-09-11 | End: 2023-09-12

## 2023-09-11 RX ADMIN — CLOPIDOGREL BISULFATE 300 MG: 300 TABLET, FILM COATED ORAL at 21:28

## 2023-09-11 RX ADMIN — SODIUM CHLORIDE 1000 ML: 9 INJECTION, SOLUTION INTRAVENOUS at 19:15

## 2023-09-11 RX ADMIN — ASPIRIN 325 MG ORAL TABLET 325 MG: 325 PILL ORAL at 21:29

## 2023-09-11 RX ADMIN — SODIUM CHLORIDE: 9 INJECTION, SOLUTION INTRAVENOUS at 22:23

## 2023-09-11 RX ADMIN — SODIUM CHLORIDE 100 ML: 9 INJECTION, SOLUTION INTRAVENOUS at 16:40

## 2023-09-11 RX ADMIN — GADOBUTROL 8 ML: 604.72 INJECTION INTRAVENOUS at 20:15

## 2023-09-11 RX ADMIN — IOPAMIDOL 75 ML: 755 INJECTION, SOLUTION INTRAVENOUS at 16:38

## 2023-09-11 ASSESSMENT — ACTIVITIES OF DAILY LIVING (ADL)
ADLS_ACUITY_SCORE: 35

## 2023-09-11 NOTE — ED NOTES
Bed: ED24  Expected date:   Expected time:   Means of arrival:   Comments:  STROKE ALERT [facility] 541 73 F aphasia left sided weakness 188/112  96 % on RA glucose 162 last known well 1300

## 2023-09-11 NOTE — ED TRIAGE NOTES
BIBA from home. Last normal maybe 1300 today. Trouble getting words out and some left sided weakness       Triage Assessment       Row Name 09/11/23 [id number]       Triage Assessment (Adult)    Airway WDL WDL       Respiratory WDL    Respiratory WDL WDL       Skin Circulation/Temperature WDL    Skin Circulation/Temperature WDL WDL       Cardiac WDL    Cardiac WDL X  HTN       Peripheral/Neurovascular WDL    Peripheral Neurovascular WDL WDL       Cognitive/Neuro/Behavioral WDL    Cognitive/Neuro/Behavioral WDL X

## 2023-09-11 NOTE — ED PROVIDER NOTES
History     Chief Complaint:  Aphasia and One-sided Weakness       HPI   Elvia Helm is a 73 year old female with past medical history hypertension, diabetes who presents with sudden onset aphasia and left-sided weakness.  EMS report last known normal 1300.  Patient [place] cabin [place].  She has word finding difficulty, difficulty walking due to left leg weakness.  Blood sugar was in the 120s.  Blood pressures were initially over 200 systolic although now 185 systolic.      Independent Historian:   EMS - They report history as above    Review of External Notes:   None      Medications:    No current outpatient medications on file.      Past Medical History:    Past Medical History:   Diagnosis Date    Allergic rhinitis, cause unspecified a    Allergic rhinitis, cause unspecified     Arthritis of knee 2014    Bee sting reaction     DIABETES TYPE II     Essential hypertension, benign     Flat foot(734) 2014    Hyperlipidaemia LDL goal < 100     Localized osteoarthrosis not specified whether primary or secondary, ankle and foot     Tinnitus 2014       Past Surgical History:    Past Surgical History:   Procedure Laterality Date    ABDOMEN SURGERY  1988    ceasaran birth    COLONOSCOPY      COLONOSCOPY N/A 2022    Procedure: COLONOSCOPY, WITH POLYPECTOMY AND BIOPSY;  Surgeon: Nicole Grubbs MD;  Location:  GI    ENT SURGERY      frequent sinus infections    HC ENDOMETRIAL BIOPSY W/O CERVICAL DILATION      ORTHOPEDIC SURGERY      broken right ankle/plates and screws    SOFT TISSUE SURGERY      tendonitis in right upper leg and below right elbow    ZZC  DELIVERY ONLY      Z LIGATE FALLOPIAN TUBE,POSTPARTUM      Z NONSPECIFIC PROCEDURE      right ankle roe and pins        Physical Exam   Patient Vitals for the past 24 hrs:   BP Temp Temp src Pulse Resp SpO2 Height Weight   23 2254 (!) (P) 152/87 (P) 98.2  F (36.8  C)  "(P) Oral (P) 95 (P) 16 (P) 94 % -- --   09/11/23 2100 137/88 -- -- 100 -- 94 % -- --   09/11/23 1900 (!) 164/87 -- -- 101 20 96 % -- --   09/11/23 1845 (!) 142/88 -- -- 97 16 94 % -- --   09/11/23 1745 -- -- -- 96 23 94 % -- --   09/11/23 1730 (!) 146/89 -- -- 105 16 94 % -- --   09/11/23 1650 (!) 174/104 98.3  F (36.8  C) Temporal 98 16 94 % 1.626 m (5' 4\") 78.9 kg (174 lb)   09/11/23 1635 -- -- -- 99 22 95 % -- --   09/11/23 1631 (!) 174/104 -- -- 101 -- -- -- --        Physical Exam  General: Awake, alert, in mild distress secondary to medical condition  HEENT: Atraumatic   EOM normal   External ears normal   Trachea midline  Neck: Supple, normal ROM  CV: Regular rate and rhythm   No murmur   No lower extremity edema  PULM: Breath sounds normal bilaterally. No wheezes or rales  ABD: Soft, non-tender, non-distended. Normal bowel sounds   No rebound or guarding   MSK: No gross deformities  NEURO:   National Institutes [employer] Stroke Scale  Exam Interval: Baseline   Score    Level of consciousness: (0)   Alert, keenly responsive    LOC questions: (0)   Answers both questions correctly    LOC commands: (0)   Performs both tasks correctly    Best gaze: (0)   Normal    Visual: (0)   No visual loss    Facial palsy: (0)   Normal symmetrical movements    Motor arm (left): (1)   Drift    Motor arm (right): (0)   No drift    Motor leg (left): (2)   Some effort against gravity    Motor leg (right): (0)   No drift    Limb ataxia: (1)   Present in one limb    Sensory: (0)   Normal- no sensory loss    Best language: (1)   Mild to moderate aphasia    Dysarthria: (0)   Normal    Extinction and inattention: (0)   No abnormality        Total Score:  5           Skin: Warm, dry and intact      Emergency Department Course   ECG  ECG taken at 1702, ECG read at 1708  Normal sinus.  Q waves in lead II.  T waves normalized in lead III as compared to EKG dated 4/20/2005 from outside hospital     ECG results from 09/11/23   EKG 12-lead, " tracing only     Value    Systolic Blood Pressure     Diastolic Blood Pressure     Ventricular Rate 95    Atrial Rate 95    ME Interval 150    QRS Duration 72        QTc 442    P Axis 54    R AXIS 6    T Axis 65    Interpretation ECG      Sinus rhythm  Possible Anterior infarct , age undetermined  Abnormal ECG  No previous ECGs available  Confirmed by GENERATED REPORT, COMPUTER ([id number]),  Diya Naylor ([id number]) on 9/11/2023 5:21:35 PM           Imaging:  MR Brain w/o & w Contrast   Final Result   IMPRESSION:   1.  Findings most compatible with subacute cortical ischemia in the left middle frontal gyrus encroaching upon the lateral precentral gyrus.            CTA Head Neck with Contrast   Final Result   IMPRESSION:       HEAD CTA:    1.  No evidence of large vessel occlusion or high-grade stenosis.      NECK CTA:   1.  No evidence of large vessel occlusion or high-grade stenosis.      CT Head w/o Contrast   Final Result   IMPRESSION: No evidence of hemorrhage. No convincing acute infarct. Parenchyma within normal limits for age. No acute osseous abnormality.      Findings were discussed by phone between Dr. Bucio and Dr. Cecilia Alexander as at approximately 4:50 p.m. on 9/11/2023.         Echocardiogram Complete    (Results Pending)      Report per radiology    Laboratory:  Labs Ordered and Resulted from Time of ED Arrival to Time of ED Departure   BASIC METABOLIC PANEL - Abnormal       Result Value    Sodium 139      Potassium 4.1      Chloride 98      Carbon Dioxide (CO2) 25      Anion Gap 16 (*)     Urea Nitrogen 13.6      Creatinine 0.74      Calcium 10.6 (*)     Glucose 129 (*)     GFR Estimate 84     ACETAMINOPHEN LEVEL - Abnormal    Acetaminophen <5.0 (*)    INR - Normal    INR 0.91     PARTIAL THROMBOPLASTIN TIME - Normal    aPTT 28     TROPONIN T, HIGH SENSITIVITY - Normal    Troponin T, High Sensitivity 7     ETHYL ALCOHOL LEVEL - Normal    Alcohol ethyl <0.01     SALICYLATE LEVEL -  Normal    Salicylate <0.3     GLUCOSE MONITOR NURSING POCT   CBC WITH PLATELETS AND DIFFERENTIAL    WBC Count 9.0      RBC Count 4.87      Hemoglobin 14.7      Hematocrit 45.8      MCV 94      MCH 30.2      MCHC 32.1      RDW 13.2      Platelet Count 353      % Neutrophils 60      % Lymphocytes 24      % Monocytes 11      % Eosinophils 4      % Basophils 0      % Immature Granulocytes 1      NRBCs per 100 WBC 0      Absolute Neutrophils 5.4      Absolute Lymphocytes 2.2      Absolute Monocytes 1.0      Absolute Eosinophils 0.3      Absolute Basophils 0.0      Absolute Immature Granulocytes 0.1      Absolute NRBCs 0.0          Procedures   None    Emergency Department Course & Assessments:             Interventions:  Medications   aspirin (ASA) EC tablet 325 mg (has no administration in time range)   atorvastatin (LIPITOR) tablet 10 mg (has no administration in time range)   sodium chloride (PF) 0.9% PF flush 3 mL (3 mLs Intracatheter $Given 9/11/23 2224)   sodium chloride (PF) 0.9% PF flush 3 mL (has no administration in time range)   Medication Instruction (has no administration in time range)   ondansetron (ZOFRAN ODT) ODT tab 4 mg (has no administration in time range)     Or   ondansetron (ZOFRAN) injection 4 mg (has no administration in time range)   prochlorperazine (COMPAZINE) injection 5 mg (has no administration in time range)     Or   prochlorperazine (COMPAZINE) tablet 5 mg (has no administration in time range)     Or   prochlorperazine (COMPAZINE) suppository 12.5 mg (has no administration in time range)   sodium chloride 0.9% infusion ( Intravenous $New Bag 9/11/23 2223)   senna-docusate (SENOKOT-S/PERICOLACE) 8.6-50 MG per tablet 1 tablet (has no administration in time range)     Or   senna-docusate (SENOKOT-S/PERICOLACE) 8.6-50 MG per tablet 2 tablet (has no administration in time range)   clopidogrel (PLAVIX) tablet 75 mg ( Oral Canceled Entry 9/11/23 2228)   iopamidol (ISOVUE-370) solution 75 mL (75  mLs Intravenous $Given 9/11/23 1638)   100mL Saline FLush (100 mLs As instructed $Given 9/11/23 1640)   0.9% sodium chloride BOLUS (0 mLs Intravenous Stopped 9/11/23 2126)   gadobutrol (GADAVIST) injection 8 mL (8 mLs Intravenous $Given 9/11/23 2015)   sodium chloride (PF) 0.9% PF flush 10 mL (10 mLs Intravenous $Given 9/11/23 2015)   clopidogrel (PLAVIX) tablet 300 mg (300 mg Oral $Given 9/11/23 2128)   aspirin (ASA) tablet 325 mg (325 mg Oral $Given 9/11/23 2129)        Assessments:  See ED course    Independent Interpretation (X-rays, CTs, rhythm strip):  CT HEAD: No intracranial hemorrhage      Consultations/Discussion of Management or Tests:  See below   ED Course as of 09/11/23 2335   Mon Sep 11, 2023   1652 Spoke with radiology.  No acute findings on CT head or CT angio   1703 Spoke with MD Roa. Will deescalate code stroke at this time. Recommending MRI with and without.  Their exam is normal.   1833 Spoke with hospital medicine Dr. Grullon who kindly accepts admission   2100 Reevaluation.  Left leg weakness improved.  Patient has some ongoing aphasia.  Completed MRI pending results       Social Determinants of Health affecting care:   None    Disposition:  The patient was admitted to the hospital under the care of Dr. Grullon.     Impression & Plan    CMS Diagnoses: The patient has stroke symptoms:         ED Stroke specific documentation           NIHSS PDF     Patient last known well time: 1300  ED Provider first to bedside at: 1631  CT Results received at: 1652    Thrombolytics:   Not given due to:   - minor/isolated/quickly resolving symptoms    If treating with thrombolytics: Ensure SBP<180 and DBP<105 prior to treatment with thrombolytics.  Administering thrombolytics after treatment with IV labetalol, hydralazine, or nicardipine is reasonable once BP control is established.    Endovascular Retrieval:  Not initiated due to absence of proximal vessel occlusion    National Institutes of [employer] Stroke  Scale  Exam Interval: Baseline   Score    Level of consciousness: (0)   Alert, keenly responsive    LOC questions: (0)   Answers both questions correctly    LOC commands: (0)   Performs both tasks correctly    Best gaze: (0)   Normal    Visual: (0)   No visual loss    Facial palsy: (0)   Normal symmetrical movements    Motor arm (left): (1)   Drift    Motor arm (right): (0)   No drift    Motor leg (left): (2)   Some effort against gravity    Motor leg (right): (0)   No drift    Limb ataxia: (1)   Present in one limb    Sensory: (0)   Normal- no sensory loss    Best language: (1)   Mild to moderate aphasia    Dysarthria: (0)   Normal    Extinction and inattention: (0)   No abnormality        Total Score:  5        Stroke Mimics were considered (including migraine headache, seizure disorder, hypoglycemia (or hyperglycemia), head or spinal trauma, CNS infection, Toxin ingestion and shock state (e.g. sepsis) .    Medical Decision Making:  Vitals hypertensive 170s systolic otherwise WNL on arrival  Patient presents with sudden onset aphasia, left-sided weakness, hypertension.  Concern for CVA given syndromic presentation  Her exam is waxing and waning  Activated tier 1 stroke given within 4.5-hour time window  Initial CT head and CT angiography are fortunately without signs of acute stroke  Stroke neurology evaluated at bedside.  Their exam is improved from my exam.  Therefore TNK not administered.  Imaging MRI with and without contrast  Patient underwent MRI which unfortunately does show evidence of a small subacute stroke  Her blood pressure normalized without intervention  Blood sugars within normal limits, EKG is nonischemic, troponin is WNL  Labs are otherwise notable for slightly elevated anion gap, will give IV fluids  Salicylate, ethanol, Tylenol levels drawn due to elevated anion gap acidosis are undetectable    I spoke with hospital medicine Dr. Grullon who kindly accepts admission  Spoke with patient and family  who understand results and plan for admission      Diagnosis:    ICD-10-CM    1. Left-sided weakness  R53.1       2. Acute ischemic stroke (H)  I63.9              Cecilia Gagnon DO  9/11/2023   Cecilia Fuentes DO Pappas Richter, Ellen,   09/11/23 2336

## 2023-09-11 NOTE — H&P
[facility]    History and Physical - Hospitalist Service       Date of Admission:  9/11/2023    Assessment & Plan      Elvia Helm is a 73 year old female admitted on 9/11/2023. She has a past medical history notable for type 2 diabetes, hypertension, dyslipidemia who presents to the emergency department with acute left-sided weakness, expressive aphasia.    Subacute ischemic CVA, cortical ischemia in the left middle frontal gyrus   Acute onset expressive aphasia, left-sided weakness  Evaluated by stroke neurology after tier 1 stroke imaging completed and negative for any acute changes.  Please see imaging report for details.  Patient was hyperglycemic with blood sugar 162, hypertensive with blood pressure 174/104, afebrile and not hypoxic on room air.  Not a candidate for thrombectomy due to absence of large vessel occlusion, risks of TNK discussed with the patient and thoughts that risks of bleeding would outweigh the benefit given her absence of symptoms.    1900  *Discussed w/ stroke neuro, pt is severely aphasic, emotionally distressed, hypertensive, LLE weakness improved. Plan to rule out stroke with MRI brain. Per stroke neuro note - if MRI brain is normal or without stroke, or structural lesion this event would not be considered a TIA, but rather a possible stress reaction    2100 Addendum  -Brain MRI WWO contrast showing subacute cortical ischemia, please see full report for details  *BP initially elevated with systolic greater than 200, but has trended down without intervention, allow for permissive HTN  -Continue cardiac monitoring  *9/11 on exam- pt has significant aphasia, slowly able to get one word out at a time, intermittently   - plavix/aspirin loaded  - family and patient updated at bedside     Elevated anion gap  Anion gap 16; suspect patient is mildly hypovolemic.  Patient admits to 1-2 drinks of alcohol daily, alcohol ethyl level negative on admission.  -We will  "rehydrate with 1 L LR and recheck in a.m.    Elevated calcium  Total calcium 10.6, calcium has never been historically elevated, suspect this is related to hypovolemia.  -Rehydrate as above, recheck in a.m.    Diabetes mellitus, type II, well controlled  PTA metformin, most recent hemoglobin A1c 6.5% on February 2023.  -Continue PTA regimen    Hypertension  Typically well controlled on lisinopril hydrochlorothiazide .  -Resume PTA medications once MRI brain complete and resulted no need for rest of hypertension    Dyslipidemia  -Continue PTA statin       Diet: NPO for Medical/Clinical Reasons Except for: Meds    DVT Prophylaxis: Pneumatic Compression Devices  Navarrete Catheter: Not present  Lines: None     Cardiac Monitoring: None  Code Status:  Full Code     Clinically Significant Risk Factors Present on Admission           # Hypercalcemia: Highest Ca = 10.6 mg/dL in last 2 days, will monitor as appropriate      # Drug Induced Platelet Defect: home medication list includes an antiplatelet medication   # Hypertension: Noted on problem list      # Overweight: Estimated body mass index is 29.87 kg/m  as calculated from the following:    Height as of this encounter: 1.626 m (5' 4\").    Weight as of this encounter: 78.9 kg (174 lb).              Disposition Plan      Expected Discharge Date: 09/12/2023              The patient's care was discussed with the Attending Physician, Dr. Stewart, Bedside Nurse, Patient, Patient's Family, and Neurology - Stroke Consultant(s).    KRISTI Shah [facility]  Hospitalist Service  [facility]  Securely message with [employer] (more info)  Text page via [facility] Paging/Directory     ______________________________________________________________________    Chief Complaint   aphasia    History is obtained from the patient    History of Present Illness   Elvia Helm is a 73 year old female who has a past medical history notable for type 2 diabetes, hypertension, " dyslipidemia who presents to the emergency department with acute left-sided weakness, expressive aphasia.    Patient was at a cabin with her family all weekend, apparently at her baseline.  Prior to the events of 9/11/2023, day of admission, the patient notes she was at her baseline.  She denies any recent dyspnea, exertional symptoms, chest pain, nausea/vomiting, inability to eat or drink, urinary symptoms.  No fever/chills, sick contacts.  The patient drove home uneventfully, was last seen by her sister at 1345 at her baseline mentation, speech and strength.  At approximately 1545, she saw her daughter, and was unable to speak, and felt her left lower extremity was weak.  Patient presented to the emergency department, a tier 1 stroke code was called, emergent neuroimaging negative for any acute findings, please see imaging report for full details.  Remainder of the emergency department work-up is notable for normal BMP with the exception of total calcium 10.6, anion gap 16 and glucose 129.  Initial high-sensitivity troponin of 7.  CBC unremarkable with normal hemoglobin, platelets 353.  INR 0.91 PTT 28.  Salicylate level negative, EtOH level negative.    I evaluated patient with her family at the bedside in the emergency department.  She is still having significant difficulty with getting even 1 word out, takes a significant amount of time and coaching.  Her words are appropriate, yes/no answers head-nodding is appropriate.  Patient is not in physical distress, but is quite tearful and emotionally distraught due to her significant speech difficulties.  She is hemodynamically stable with exception of some hypertension with a BP 160s/70s, heart rate normal sinus rhythm 80s, not hypoxic on room air.  Afebrile.      Past Medical History    Past Medical History:   Diagnosis Date    Allergic rhinitis, cause unspecified 2003a    Allergic rhinitis, cause unspecified     Arthritis of knee 6/6/2014    Bee sting reaction      DIABETES TYPE II     Essential hypertension, benign     Flat foot(734) 2014    Hyperlipidaemia LDL goal < 100     Localized osteoarthrosis not specified whether primary or secondary, ankle and foot     After surgery    Tinnitus 2014       Past Surgical History   Past Surgical History:   Procedure Laterality Date    ABDOMEN SURGERY  1988    ceasaran birth    COLONOSCOPY      COLONOSCOPY N/A 2022    Procedure: COLONOSCOPY, WITH POLYPECTOMY AND BIOPSY;  Surgeon: Nicole Grubbs MD;  Location:  GI    ENT SURGERY      frequent sinus infections    HC ENDOMETRIAL BIOPSY W/O CERVICAL DILATION      ORTHOPEDIC SURGERY      broken right ankle/plates and screws    SOFT TISSUE SURGERY      tendonitis in right upper leg and below right elbow    ZZC  DELIVERY ONLY      ZZC LIGATE FALLOPIAN TUBE,POSTPARTUM      ZZC NONSPECIFIC PROCEDURE      right ankle roe and pins       Prior to Admission Medications   Prior to Admission Medications   Prescriptions Last Dose Informant Patient Reported? Taking?   Cholecalciferol (VITAMIN D3 PO) 2023 at am  Yes Yes   Sig: Take 2,000 Units by mouth daily    EPINEPHrine (ANY BX GENERIC EQUIV) 0.3 MG/0.3ML injection 2-pack Unknown  No Yes   Sig: INJECT 0.3 MLS INTO THE MUSCLE ONCE AS NEEDED FOR ANAPHYLAXIS   Omega-3 Fatty Acids (OMEGA-3 FISH OIL PO) 2023 at am  Yes Yes   Sig: Take 1 g by mouth daily    aspirin (ASA) 325 MG EC tablet 9/10/2023 at hs  Yes Yes   Sig: Take 325 mg by mouth At Bedtime   atorvastatin (LIPITOR) 10 MG tablet 9/10/2023 at hs  No Yes   Sig: Take 1 tablet (10 mg) by mouth daily   azelastine (ASTELIN) 0.1 % nasal spray 2023  No Yes   Sig: Spray 1 spray into both nostrils 2 times daily   Patient taking differently: Spray 1 spray into both nostrils every morning   blood glucose (NO BRAND SPECIFIED) lancets standard   No No   Sig: Use to test blood sugar one* times daily or as directed.   blood glucose  (ONETOUCH ULTRA) test strip   No No   Sig: Use to test blood sugar daily or as directed.   blood glucose calibration (ONETOUCH ULTRA CONTROL) solution   No No   Sig: Use to calibrate blood glucose monitor as directed.   blood glucose monitoring (ONE TOUCH ULTRA 2) meter device kit   No No   Sig: Use to test blood sugar 1 times daily.   fexofenadine (ALLEGRA) 180 MG tablet 9/10/2023 at hs  Yes Yes   Sig: Take 180 mg by mouth At Bedtime   fluticasone (FLONASE) 50 MCG/ACT spray Unknown  No Yes   Sig: Spray 1-2 sprays into both nostrils daily   Patient taking differently: Spray 1-2 sprays into both nostrils daily as needed for allergies   lisinopril-hydrochlorothiazide (ZESTORETIC) 20-25 MG tablet 2023 at am  No Yes   Sig: Take 0.5 tablets by mouth daily   metFORMIN (GLUCOPHAGE) 850 MG tablet 2023 at 1400. 1 dose today  No Yes   Sig: Take 1 tablet (850 mg) by mouth 2 times daily (with meals)   sertraline (ZOLOFT) 50 MG tablet Not Taking at never started  No No   Sig: Take 1 tablet (50 mg) by mouth daily   Patient not taking: Reported on 2023   sodium chloride (OCEAN) 0.65 % nasal spray Unknown  Yes Yes   Sig: Spray 1 spray into both nostrils daily as needed for congestion   vitamin B complex with vitamin C (STRESS TAB) tablet 2023 at am  Yes Yes   Sig: Take 1 tablet by mouth daily      Facility-Administered Medications: None        Social History   I have reviewed this patient's social history and updated it with pertinent information if needed.  Social History     Tobacco Use    Smoking status: Former     Packs/day: 1.00     Years: 10.00     Pack years: 10.00     Types: Cigarettes     Quit date: 1982     Years since quittin.0    Smokeless tobacco: Never   Vaping Use    Vaping Use: Never used   Substance Use Topics    Alcohol use: Yes     Comment: sometimes wine 1 glass qd, occasional beer    Drug use: No        Physical Exam   Vital Signs: Temp: 98.3  F (36.8  C) Temp src: Temporal BP: (!)  146/89 Pulse: 96   Resp: 23 SpO2: 94 % O2 Device: None (Room air)    Weight: 174 lbs 0 oz    Physical Exam  Vitals and nursing note reviewed.   Constitutional:       General: She is not in acute distress.     Appearance: She is not toxic-appearing.   HENT:      Head: Atraumatic.      Mouth/Throat:      Mouth: Mucous membranes are dry.   Eyes:      General: No visual field deficit.     Pupils: Pupils are equal, round, and reactive to light.   Cardiovascular:      Rate and Rhythm: Normal rate and regular rhythm.      Heart sounds: Normal heart sounds. No murmur heard.  Pulmonary:      Effort: Pulmonary effort is normal.   Abdominal:      General: Abdomen is flat. There is no distension.      Palpations: Abdomen is soft.      Tenderness: There is no abdominal tenderness.   Musculoskeletal:         General: Normal range of motion.   Skin:     General: Skin is warm and dry.   Neurological:      Mental Status: She is alert.      Cranial Nerves: Cranial nerve deficit present. No facial asymmetry.      Sensory: No sensory deficit.      Motor: No weakness or tremor.      Comments: Significant expressive aphasia            Medical Decision Making       78 MINUTES SPENT BY ME on the date of service doing chart review, history, exam, documentation & further activities per the note.      Data     I have personally reviewed the following data over the past 24 hrs:    9.0  \   14.7   / 353     139 98 13.6 /  129 (H)   4.1 25 0.74 \     Trop: 7 BNP: N/A     INR:  0.91 PTT:  28   D-dimer:  N/A Fibrinogen:  N/A       Imaging results reviewed over the past 24 hrs:   Recent Results (from the past 24 hour(s))   CT Head w/o Contrast    Narrative    EXAM: CT HEAD W/O CONTRAST  LOCATION: [facility]  DATE: 9/11/2023    INDICATION: Code Stroke to evaluate for potential thrombolysis and thrombectomy. Aphasia, left-sided weakness.    COMPARISON: None.    TECHNIQUE: Routine CT head without IV contrast. Multiplanar  reformats. Dose reduction techniques were used.      Impression    IMPRESSION: No evidence of hemorrhage. No convincing acute infarct. Parenchyma within normal limits for age. No acute osseous abnormality.    Findings were discussed by phone between Dr. Bucio and Dr. Cecilia Alexander as at approximately 4:50 p.m. on 9/11/2023.     CTA Head Neck with Contrast    Narrative    EXAM: CTA HEAD NECK W CONTRAST  LOCATION: [facility]  DATE: 9/11/2023    INDICATION: Code Stroke to evaluate for potential thrombolysis and thrombectomy. PLEASE READ IMMEDIATELY. Left-sided weakness, aphasia.  COMPARISON: None.  CONTRAST: 75 mL Isovue 370  TECHNIQUE: Head and neck CT angiogram with IV contrast. Noncontrast head CT followed by axial helical CT images of the head and neck vessels obtained during the arterial phase of intravenous contrast administration. Axial 2D reconstructed images and   multiplanar 3D MIP reconstructed images of the head and neck vessels were performed by the technologist. Dose reduction techniques were used. All stenosis measurements made according to NASCET criteria unless otherwise specified.    FINDINGS:     HEAD CTA:  No evidence of large vessel occlusion, high-grade stenosis, aneurysm, or vascular malformation.    NECK CTA:  No evidence of large vessel occlusion, high-grade stenosis, or dissection.    NONVASCULAR STRUCTURES: Cervical spine degenerative changes. Presumed atelectasis at the lung apices.      Impression    IMPRESSION:     HEAD CTA:   1.  No evidence of large vessel occlusion or high-grade stenosis.    NECK CTA:  1.  No evidence of large vessel occlusion or high-grade stenosis.

## 2023-09-11 NOTE — PHARMACY-ADMISSION MEDICATION HISTORY
Pharmacist Admission Medication History    Admission medication history is complete. The information provided in this note is only as accurate as the sources available at the time of the update.    Medication reconciliation/reorder completed by provider prior to medication history? No    Information Source(s): Patient, Family member, and CareEverywhere/SureScripts via in-person    Pertinent Information: Patient has new aphasia and was unable to speak much. Pt only took one metformin dose today at 2pm after she came home from a cabin    Changes made to PTA medication list:  Added: none  Deleted: None  Changed: Sertraline- never started, flonase to prn      Allergies reviewed with patient and updates made in EHR: yes    Medication History Completed By: Veronica Randall RPH 9/11/2023 6:31 PM    Prior to Admission medications    Medication Sig Last Dose Taking? Auth Provider Long Term End Date   aspirin (ASA) 325 MG EC tablet Take 325 mg by mouth At Bedtime 9/10/2023 at hs Yes Reported, Patient     atorvastatin (LIPITOR) 10 MG tablet Take 1 tablet (10 mg) by mouth daily 9/10/2023 at hs Yes Masood Osborne MD Yes    azelastine (ASTELIN) 0.1 % nasal spray Spray 1 spray into both nostrils 2 times daily  Patient taking differently: Spray 1 spray into both nostrils every morning 9/11/2023 Yes Zain Marshall MD     Cholecalciferol (VITAMIN D3 PO) Take 2,000 Units by mouth daily  9/11/2023 at am Yes Reported, Patient     EPINEPHrine (ANY BX GENERIC EQUIV) 0.3 MG/0.3ML injection 2-pack INJECT 0.3 MLS INTO THE MUSCLE ONCE AS NEEDED FOR ANAPHYLAXIS Unknown Yes Masood Osborne MD     fexofenadine (ALLEGRA) 180 MG tablet Take 180 mg by mouth At Bedtime 9/10/2023 at hs Yes Reported, Patient     fluticasone (FLONASE) 50 MCG/ACT spray Spray 1-2 sprays into both nostrils daily  Patient taking differently: Spray 1-2 sprays into both nostrils daily as needed for allergies Unknown Yes Vincent Encinas, MELANIE      lisinopril-hydrochlorothiazide (ZESTORETIC) 20-25 MG tablet Take 0.5 tablets by mouth daily 9/11/2023 at am Yes Masood Osborne MD Yes    metFORMIN (GLUCOPHAGE) 850 MG tablet Take 1 tablet (850 mg) by mouth 2 times daily (with meals) 9/11/2023 at 1400. 1 dose today Yes Masood Osborne MD Yes    Omega-3 Fatty Acids (OMEGA-3 FISH OIL PO) Take 1 g by mouth daily  9/11/2023 at am Yes Reported, Patient     sodium chloride (OCEAN) 0.65 % nasal spray Spray 1 spray into both nostrils daily as needed for congestion Unknown Yes Reported, Patient     vitamin B complex with vitamin C (STRESS TAB) tablet Take 1 tablet by mouth daily 9/11/2023 at am Yes Reported, Patient     blood glucose (NO BRAND SPECIFIED) lancets standard Use to test blood sugar one* times daily or as directed.   Masood Osborne MD     blood glucose (ONETOUCH ULTRA) test strip Use to test blood sugar daily or as directed.   Masood Osborne MD     blood glucose calibration (ONETOUCH ULTRA CONTROL) solution Use to calibrate blood glucose monitor as directed.   Masood Osborne MD     blood glucose monitoring (ONE TOUCH ULTRA 2) meter device kit Use to test blood sugar 1 times daily.   Masood Osborne MD     sertraline (ZOLOFT) 50 MG tablet Take 1 tablet (50 mg) by mouth daily  Patient not taking: Reported on 9/11/2023 Not Taking at never started  Masood Osborne MD Yes

## 2023-09-11 NOTE — ED NOTES
Patient was unable to walk with 2 person assist to the bathroom. Patient was able to use a commode at bedside.

## 2023-09-11 NOTE — CONSULTS
[facility]    Stroke Consult Note    Reason for Consult: Stroke Code     Chief Complaint: Aphasia and One-sided Weakness      HPI  Elvia Helm is a 73 year old female past medical history of hypertension, hyperlipidemia, diabetes, allergic rhinitis, ex-smoker 10-pack-year smoking history presents to the ED for evaluation of stroke with difficulty speaking and left leg weakness.  Her last known well was 1:45 PM on 9/11 when she dropped her sister.  She drove from [facility] herself and talk to her sister to her house at at the time at which her sister did not see any difficulty with speaking, facial numbness, weakness.  She was able to drive home and her daughter visited her around 3:45 PM when she noted that the patient was not as interactive.  At the time the patient states that she was feeling tired, the daughter said that she looked tired and was sitting, was able to talk to her but had difficulty speaking which is when she called EMS.  On arrival of EMS the patient was not talking at all and she was unable to move both sides of her face.  Her blood sugar was 162, arrival blood pressure 174/104, afebrile.  On exam she had an NIHSS of 0, all components of speech including naming, repetition, comprehension, reading, fluency are intact although she stops talking intermittently, appears to have weakness in left leg but reinforcement and distraction is able to produce full strength in the left leg.  CT head was without acute stroke or bleed, CTA was without proximal large vessel occlusion.  I discussed with the patient that she was able to produce normal speech as well as full strength and with no deficits risk of bleeding from thrombolysis would outweigh the benefit.  She was not a candidate for thrombectomy considering absence of proximal large vessel occlusion.    The patient cried multiple times during the interaction. She says she takes full dose aspirin for  prevention of heart disease and stroke based on her family history but does not have a diagnosis of prior coronary artery disease or stroke. Patient and her daughter deny headache, nausea, vomiting, numbness, double vision, loss of vision, blurry vision, fall, loss of consciousness.  Ex tobacco user as above.  His drinks 1-2 drinks of alcohol per day.  Denies drug use.  Reports family history of stroke in her father in his 60s and her mother with mini strokes in her 70s.    Imaging Findings  CT head  IMPRESSION: No evidence of hemorrhage. No convincing acute infarct. Parenchyma within normal limits for age. No acute osseous abnormality.     CTA  HEAD CTA:   1.  No evidence of large vessel occlusion or high-grade stenosis.     NECK CTA:  1.  No evidence of large vessel occlusion or high-grade stenosis.   This result has not been signed. Information might be incomplete.       Intravenous Thrombolysis  Not given due to:   - minor/isolated/quickly resolving symptoms    Endovascular Treatment  Not initiated due to absence of proximal vessel occlusion    Impression   # Difficulty speaking and lifting L leg without deficit on exam, rule out stroke  Elvia Helm is a 73 year old female past medical history of hypertension, hyperlipidemia, diabetes, allergic rhinitis, ex-smoker 10-pack-year smoking history presents to the ED for evaluation of stroke with difficulty speaking and left leg weakness.  Her last known well was 1:45 PM on 9/11. On exam she had an NIHSS of 0,components of speech including naming, repetition, comprehension, reading, fluency are intact although she stops talking intermittently, appears to have weakness in left leg but reinforcement and distraction is able to produce full strength in the left leg.  CT head was without acute stroke or bleed, CTA was without proximal large vessel occlusion.  I discussed with the patient that she was able to produce normal speech as well as full strength and with  "no deficits risk of bleeding from thrombolysis would outweigh the benefit.  She was not a candidate for thrombectomy considering absence of proximal large vessel occlusion. Will rule out stroke with MRI Brain. In case MRI brain is without stroke or structural lesion that would explain her presentation then would not consider this event as a TIA and a stress reaction could be considered.     Recommendations  - MRI Brain with and without contrast    Patient Follow-up     - final recommendation pending work-up    Thank you for this consult. We will continue to follow.      The patient was discussed with Stroke Staff Dr. Simeon.    Johnny Roa MD  Neurology Resident PGY-3  ______________________________________________________    Clinically Significant Risk Factors Present on Admission           # Hypercalcemia: Highest Ca = 10.6 mg/dL in last 2 days, will monitor as appropriate      # Drug Induced Platelet Defect: home medication list includes an antiplatelet medication   # Hypertension: Noted on problem list      # Overweight: Estimated body mass index is 29.87 kg/m  as calculated from the following:    Height as of this encounter: 1.626 m (5' 4\").    Weight as of this encounter: 78.9 kg (174 lb).                Past Medical History   Past Medical History:   Diagnosis Date    Allergic rhinitis, cause unspecified 2003a    Allergic rhinitis, cause unspecified     Arthritis of knee 6/6/2014    Bee sting reaction 7-09    DIABETES TYPE II 2003    Essential hypertension, benign     Flat foot(734) 6/6/2014    Hyperlipidaemia LDL goal < 100     Localized osteoarthrosis not specified whether primary or secondary, ankle and foot     After surgery    Tinnitus 6/6/2014     Past Surgical History   Past Surgical History:   Procedure Laterality Date    ABDOMEN SURGERY  7/1988    ceasaran birth    COLONOSCOPY  12/07    COLONOSCOPY N/A 1/31/2022    Procedure: COLONOSCOPY, WITH POLYPECTOMY AND BIOPSY;  Surgeon: Humera, " Nicole STILES MD;  Location:  GI    ENT SURGERY      frequent sinus infections    HC ENDOMETRIAL BIOPSY W/O CERVICAL DILATION      ORTHOPEDIC SURGERY      broken right ankle/plates and screws    SOFT TISSUE SURGERY      tendonitis in right upper leg and below right elbow    ZZC  DELIVERY ONLY      [facility] LIGATE FALLOPIAN TUBE,POSTPARTUM      [facility] NONSPECIFIC PROCEDURE      right ankle roe and pins     Medications   Home Meds  Prior to Admission medications    Medication Sig Start Date End Date Taking? Authorizing Provider   aspirin (ASA) 325 MG EC tablet Take 325 mg by mouth daily    Reported, Patient   atorvastatin (LIPITOR) 10 MG tablet Take 1 tablet (10 mg) by mouth daily 23   Masood Osborne MD   azelastine (ASTELIN) 0.1 % nasal spray Spray 1 spray into both nostrils 2 times daily  Patient taking differently: Spray 1 spray into both nostrils every morning 22   Zain Marshall MD   blood glucose (NO BRAND SPECIFIED) lancets standard Use to test blood sugar one* times daily or as directed. 23   Masood Osborne MD   blood glucose (ONETOUCH ULTRA) test strip Use to test blood sugar daily or as directed. 23   Masodo Osborne MD   blood glucose calibration (ONETOUCH ULTRA CONTROL) solution Use to calibrate blood glucose monitor as directed. 20   Masood Osborne MD   blood glucose monitoring (ONE TOUCH ULTRA 2) meter device kit Use to test blood sugar 1 times daily. 20   Masood Osbrone MD   Cholecalciferol (VITAMIN D3 PO) Take 2,000 Units by mouth daily     Reported, Patient   EPINEPHrine (ANY BX GENERIC EQUIV) 0.3 MG/0.3ML injection 2-pack INJECT 0.3 MLS INTO THE MUSCLE ONCE AS NEEDED FOR ANAPHYLAXIS 23   Masood Osborne MD   fexofenadine (ALLEGRA) 180 MG tablet Take 180 mg by mouth daily    Reported, Patient   fluticasone (FLONASE) 50 MCG/ACT spray Spray 1-2 sprays into both nostrils daily 16   Vincent Encinas, PAGillC   lisinopril-hydrochlorothiazide (ZESTORETIC)  20-25 MG tablet Take 0.5 tablets by mouth daily 11/3/22   Masood Osborne MD   metFORMIN (GLUCOPHAGE) 850 MG tablet Take 1 tablet (850 mg) by mouth 2 times daily (with meals) 11/3/22   Masood Osborne MD   Omega-3 Fatty Acids (OMEGA-3 FISH OIL PO) Take 1 g by mouth daily     Reported, Patient   sertraline (ZOLOFT) 50 MG tablet Take 1 tablet (50 mg) by mouth daily 23   Masood Osborne MD   sodium chloride (OCEAN) 0.65 % nasal spray Spray 1 spray into both nostrils daily as needed for congestion    Reported, Patient   vitamin B complex with vitamin C (STRESS TAB) tablet Take 1 tablet by mouth daily    Reported, Patient       Scheduled Meds      Infusion Meds      PRN Meds      Allergies   Allergies   Allergen Reactions    Atovaquone-Proguanil Hcl Hives    Amoxicillin Hives    Bee Hives and Swelling     carries Epi Pen     Ceftin Hives     hives    Clindamycin Hives    Erythromycin GI Disturbance    Seasonal Allergies     Shellfish-Derived Products Unknown    Shrimp Hives     Happened twice    Tetracycline Other (See Comments)     ?severe headaches  & nausea      Family History   Family History   Problem Relation Age of Onset    Musculoskeletal Disorder Mother         b:   Hx Fibromyalgia    Hypertension Mother     Cerebrovascular Disease Mother     Anesthesia Reaction Mother     Cerebrovascular Disease Father         b:,  age 68  massive stroke    Diabetes Father         dx age 50s and his family    Gastrointestinal Disease Sister         b:   Hx of reflux    Cerebrovascular Disease Sister     Cerebrovascular Disease Sister 62        ? TIA    Bronchitis Sister     Anxiety Disorder Sister     Anesthesia Reaction Sister     Family History Negative Brother         b:    Cancer Maternal Grandfather         throat    Diabetes Paternal Grandmother     Hypertension Daughter     Family History Negative Daughter         b:    Diabetes Daughter         b:    Diabetes dx age 22**insulin    CABG  Daughter 43    Hypertension Daughter     Lipids Son         b: high cholosterol and triglycerides     Social History   Social History     Tobacco Use    Smoking status: Former     Packs/day: 1.00     Years: 10.00     Pack years: 10.00     Types: Cigarettes     Quit date: 1982     Years since quittin.0    Smokeless tobacco: Never   Vaping Use    Vaping Use: Never used   Substance Use Topics    Alcohol use: Yes     Comment: sometimes wine 1 glass qd, occasional beer    Drug use: No       Review of Systems   The 10 point Review of Systems is negative other than noted in the HPI or here.        PHYSICAL EXAMINATION  Temp:  [98.3  F (36.8  C)] 98.3  F (36.8  C)  Pulse:  [] 98  Resp:  [16-22] 16  BP: (174)/(104) 174/104  SpO2:  [94 %-95 %] 94 %     General Exam  General:  patient lying in bed without any acute distress    HEENT:  normocephalic/atraumatic  Cardio:  RRR  Pulmonary:  no respiratory distress  Abdomen:  soft  Extremities:  no edema  Skin:  intact     Neuro Exam    Mental status: Awake, alert, attentive; oriented to P/P/T/M    Speech: Fluent, comprehension, naming, reading and repetition intact;intermittently stops talking No dysarthria    Cranial nerves: Visual fields intact, Eyes conjugate, PERRLA, EOMI w/ normal   and smooth pursuit, face expression symmetric, facial sensation intact and   symmetric, hearing intact to conversation, shoulder shrug strong, palate rise   symmetric, tongue/uvula midline.    Motor: Tone normal. 5/5 strength in x4E. No atrophy or twitches. Pronator   drift absent. appears to have weakness in left leg but reinforcement and distraction is able to produce full strength in the left leg    Reflexes: 2+ and symmetric biceps, brachioradialis, patellar, and achilles. No   crossed adductors or spread. Toes down-going.    Sensory: Intact to LT, No lateral extinction     Coordination: FNF intact bilaterally, no dysmetria; Normal heel-shin test   bilaterally    Gait:  Deferred      Dysphagia Screen  Per Nursing    Stroke Scales    NIHSS  1a. Level of Consciousness 0-->Alert, keenly responsive   1b. LOC Questions 0-->Answers both questions correctly   1c. LOC Commands 0-->Performs both tasks correctly   2.   Best Gaze 0-->Normal   3.   Visual 0-->No visual loss   4.   Facial Palsy 0-->Normal symmetrical movements   5a. Motor Arm, Left 0-->No drift, limb holds 90 (or 45) degrees for full 10 secs   5b. Motor Arm, Right 0-->No drift, limb holds 90 (or 45) degrees for full 10 secs   6a. Motor Leg, Left 0-->No drift, leg holds 30 degree position for full 5 secs   6b. Motor Leg, right 0-->No drift, leg holds 30 degree position for full 5 secs   7.   Limb Ataxia 0-->Absent   8.   Sensory 0-->Normal, no sensory loss   9.   Best Language 0-->No aphasia, normal   10. Dysarthria 0-->Normal   11. Extinction and Inattention  0-->No abnormality   Total 0 (09/11/23 1725)       Modified [place] Score (Pre-morbid)  0-No deficits    Imaging  I personally reviewed all imaging; relevant findings per HPI.     Lab Results Data   CBC  Recent Labs   Lab 09/11/23  1652   WBC 9.0   RBC 4.87   HGB 14.7   HCT 45.8        Basic Metabolic Panel    Recent Labs   Lab 09/11/23  1652      POTASSIUM 4.1   CHLORIDE 98   CO2 25   BUN 13.6   CR 0.74   *   KIM 10.6*     Liver Panel  No results for input(s): PROTTOTAL, ALBUMIN, BILITOTAL, ALKPHOS, AST, ALT, BILIDIRECT in the last 168 hours.  INR    Recent Labs   Lab Test 09/11/23  1652   INR 0.91      Lipid Profile    Recent Labs   Lab Test 06/13/23  1032 06/16/22  1334 06/14/21  1010 07/19/16  0745 06/30/15  0855   CHOL 154 143 151   < > 146   HDL 51 56 60   < > 50*   LDL 69 47 65   < > 67   TRIG 169* 202* 128   < > 145   CHOLHDLRATIO  --   --   --   --  2.9    < > = values in this interval not displayed.     A1C    Recent Labs   Lab Test 02/02/23  0849 09/29/22  0901 01/21/22  0759   A1C 6.5* 6.2* 5.7*     Troponin    Recent Labs   Lab  09/11/23  1652   CTROPT 7          Stroke Code Data Data   Stroke Code Data  (for stroke code without tele)  Stroke code activated 09/11/23   1634   First stroke provider response 09/11/23   1635   Last known normal 09/11/23   1350   Time of discovery   (or onset of symptoms)         Head CT read by Stroke Neuro Dr/Provider 09/11/23   1645   Was stroke code de-escalated? Yes 09/11/23 0231

## 2023-09-12 ENCOUNTER — APPOINTMENT (OUTPATIENT)
Dept: CARDIOLOGY | Facility: CLINIC | Age: 73
End: 2023-09-12
Attending: NURSE PRACTITIONER
Payer: COMMERCIAL

## 2023-09-12 ENCOUNTER — APPOINTMENT (OUTPATIENT)
Dept: OCCUPATIONAL THERAPY | Facility: CLINIC | Age: 73
End: 2023-09-12
Attending: NURSE PRACTITIONER
Payer: COMMERCIAL

## 2023-09-12 ENCOUNTER — APPOINTMENT (OUTPATIENT)
Dept: PHYSICAL THERAPY | Facility: CLINIC | Age: 73
End: 2023-09-12
Attending: NURSE PRACTITIONER
Payer: COMMERCIAL

## 2023-09-12 LAB
ANION GAP SERPL CALCULATED.3IONS-SCNC: 12 MMOL/L (ref 7–15)
BUN SERPL-MCNC: 12.6 MG/DL (ref 8–23)
CALCIUM SERPL-MCNC: 9.5 MG/DL (ref 8.8–10.2)
CHLORIDE SERPL-SCNC: 103 MMOL/L (ref 98–107)
CHOLEST SERPL-MCNC: 155 MG/DL
CREAT SERPL-MCNC: 0.73 MG/DL (ref 0.51–0.95)
DEPRECATED HCO3 PLAS-SCNC: 24 MMOL/L (ref 22–29)
EGFRCR SERPLBLD CKD-EPI 2021: 86 ML/MIN/1.73M2
GLUCOSE BLDC GLUCOMTR-MCNC: 149 MG/DL (ref 70–99)
GLUCOSE BLDC GLUCOMTR-MCNC: 166 MG/DL (ref 70–99)
GLUCOSE BLDC GLUCOMTR-MCNC: 192 MG/DL (ref 70–99)
GLUCOSE BLDC GLUCOMTR-MCNC: 198 MG/DL (ref 70–99)
GLUCOSE SERPL-MCNC: 194 MG/DL (ref 70–99)
HBA1C MFR BLD: 6.3 %
HDLC SERPL-MCNC: 47 MG/DL
LDLC SERPL CALC-MCNC: 66 MG/DL
LVEF ECHO: NORMAL
NONHDLC SERPL-MCNC: 108 MG/DL
POTASSIUM SERPL-SCNC: 4 MMOL/L (ref 3.4–5.3)
SODIUM SERPL-SCNC: 139 MMOL/L (ref 136–145)
TRIGL SERPL-MCNC: 212 MG/DL
TROPONIN T SERPL HS-MCNC: 9 NG/L
TROPONIN T SERPL HS-MCNC: 9 NG/L

## 2023-09-12 PROCEDURE — 250N000013 HC RX MED GY IP 250 OP 250 PS 637: Performed by: NURSE PRACTITIONER

## 2023-09-12 PROCEDURE — 99233 SBSQ HOSP IP/OBS HIGH 50: CPT | Performed by: HOSPITALIST

## 2023-09-12 PROCEDURE — 80061 LIPID PANEL: CPT

## 2023-09-12 PROCEDURE — 250N000013 HC RX MED GY IP 250 OP 250 PS 637: Performed by: HOSPITALIST

## 2023-09-12 PROCEDURE — 97535 SELF CARE MNGMENT TRAINING: CPT | Mod: GO

## 2023-09-12 PROCEDURE — 93306 TTE W/DOPPLER COMPLETE: CPT

## 2023-09-12 PROCEDURE — 97530 THERAPEUTIC ACTIVITIES: CPT | Mod: GP

## 2023-09-12 PROCEDURE — 250N000013 HC RX MED GY IP 250 OP 250 PS 637: Performed by: STUDENT IN AN ORGANIZED HEALTH CARE EDUCATION/TRAINING PROGRAM

## 2023-09-12 PROCEDURE — G0378 HOSPITAL OBSERVATION PER HR: HCPCS

## 2023-09-12 PROCEDURE — 83036 HEMOGLOBIN GLYCOSYLATED A1C: CPT

## 2023-09-12 PROCEDURE — 97165 OT EVAL LOW COMPLEX 30 MIN: CPT | Mod: GO

## 2023-09-12 PROCEDURE — 84484 ASSAY OF TROPONIN QUANT: CPT | Performed by: NURSE PRACTITIONER

## 2023-09-12 PROCEDURE — 97161 PT EVAL LOW COMPLEX 20 MIN: CPT | Mod: GP

## 2023-09-12 PROCEDURE — 80048 BASIC METABOLIC PNL TOTAL CA: CPT | Performed by: HOSPITALIST

## 2023-09-12 PROCEDURE — 36415 COLL VENOUS BLD VENIPUNCTURE: CPT

## 2023-09-12 PROCEDURE — 258N000003 HC RX IP 258 OP 636: Performed by: NURSE PRACTITIONER

## 2023-09-12 PROCEDURE — 93306 TTE W/DOPPLER COMPLETE: CPT | Mod: 26 | Performed by: INTERNAL MEDICINE

## 2023-09-12 PROCEDURE — 96361 HYDRATE IV INFUSION ADD-ON: CPT

## 2023-09-12 PROCEDURE — 82962 GLUCOSE BLOOD TEST: CPT

## 2023-09-12 PROCEDURE — 36415 COLL VENOUS BLD VENIPUNCTURE: CPT | Performed by: NURSE PRACTITIONER

## 2023-09-12 PROCEDURE — 99233 SBSQ HOSP IP/OBS HIGH 50: CPT | Mod: GC | Performed by: STUDENT IN AN ORGANIZED HEALTH CARE EDUCATION/TRAINING PROGRAM

## 2023-09-12 PROCEDURE — 250N000012 HC RX MED GY IP 250 OP 636 PS 637: Performed by: HOSPITALIST

## 2023-09-12 RX ORDER — DEXTROSE MONOHYDRATE 25 G/50ML
25-50 INJECTION, SOLUTION INTRAVENOUS
Status: DISCONTINUED | OUTPATIENT
Start: 2023-09-12 | End: 2023-09-13 | Stop reason: HOSPADM

## 2023-09-12 RX ORDER — NICOTINE POLACRILEX 4 MG
15-30 LOZENGE BUCCAL
Status: DISCONTINUED | OUTPATIENT
Start: 2023-09-12 | End: 2023-09-13 | Stop reason: HOSPADM

## 2023-09-12 RX ORDER — ACETAMINOPHEN 325 MG/1
325 TABLET ORAL EVERY 4 HOURS PRN
Status: DISCONTINUED | OUTPATIENT
Start: 2023-09-12 | End: 2023-09-13 | Stop reason: HOSPADM

## 2023-09-12 RX ADMIN — ATORVASTATIN CALCIUM 10 MG: 10 TABLET, FILM COATED ORAL at 00:02

## 2023-09-12 RX ADMIN — ASPIRIN 325 MG: 325 TABLET, COATED ORAL at 21:28

## 2023-09-12 RX ADMIN — INSULIN ASPART 2 UNITS: 100 INJECTION, SOLUTION INTRAVENOUS; SUBCUTANEOUS at 13:01

## 2023-09-12 RX ADMIN — ACETAMINOPHEN 325 MG: 325 TABLET, FILM COATED ORAL at 16:14

## 2023-09-12 RX ADMIN — ACETAMINOPHEN 325 MG: 325 TABLET, FILM COATED ORAL at 11:15

## 2023-09-12 RX ADMIN — ATORVASTATIN CALCIUM 10 MG: 10 TABLET, FILM COATED ORAL at 21:28

## 2023-09-12 RX ADMIN — SODIUM CHLORIDE: 9 INJECTION, SOLUTION INTRAVENOUS at 03:31

## 2023-09-12 RX ADMIN — INSULIN ASPART 1 UNITS: 100 INJECTION, SOLUTION INTRAVENOUS; SUBCUTANEOUS at 18:03

## 2023-09-12 RX ADMIN — CLOPIDOGREL BISULFATE 75 MG: 75 TABLET ORAL at 21:28

## 2023-09-12 ASSESSMENT — ACTIVITIES OF DAILY LIVING (ADL)
ADLS_ACUITY_SCORE: 30
ADLS_ACUITY_SCORE: 30
PREVIOUS_RESPONSIBILITIES: MEAL PREP;HOUSEKEEPING;LAUNDRY;SHOPPING;YARDWORK;MEDICATION MANAGEMENT;FINANCES;DRIVING
ADLS_ACUITY_SCORE: 35
ADLS_ACUITY_SCORE: 30
ADLS_ACUITY_SCORE: 30
ADLS_ACUITY_SCORE: 35
ADLS_ACUITY_SCORE: 30
ADLS_ACUITY_SCORE: 35
ADLS_ACUITY_SCORE: 30
ADLS_ACUITY_SCORE: 35

## 2023-09-12 NOTE — PLAN OF CARE
Pt here with L frontal stroke. A&O x4. Neuros RLE numbness and tingling, and whisper speech. VSS. Tele SR. Regular diet, thin liquids. Takes pills whole. Up with Ax1 GBW. PRN Tylenol given for H/A. Pt scoring green on the Aggression Stop Light Tool. Plan continue therapies. Discharge pending.

## 2023-09-12 NOTE — PLAN OF CARE
Goal Outcome Evaluation:  [id number]     Pt. A &O. Neuro, whispers and difficulty in finding words. Weaker LLE. Vitals stable on RA. Denies pain. Clear lung sounds. Tele- SR. Active BS, no BM. Continent of B&B. Skin WLD.  2 x PIV. NS running at 75 ml/hr.  Plan- continue plan of care.

## 2023-09-12 NOTE — PROGRESS NOTES
RECEIVING UNIT ED HANDOFF REVIEW    ED Nurse Handoff Report was reviewed by: Nancy Espinoza RN on September 11, 2023 at 9:58 PM

## 2023-09-12 NOTE — PROVIDER NOTIFICATION
Writed paged Dr. Grande--Pt has had two loose watery stools, rule out C diff?     Order received: Probably from tube feeds, should wait.

## 2023-09-12 NOTE — PHARMACY
Pharmacy Consult to evaluate for medication related stroke core measures    Elvia THOMPSON Alicja, 73 year old female admitted for acute L sided weakness on 9/11/2023.    Thrombolytic was not given because of symptomes resolved    VTE Prophylaxis SCDs /PCDs placed on D#1, as appropriate prior to end of hospital day 2.    Antithrombotic: aspirin started on 9/12, Plavix 9/11 as appropriate by end of hospital day 2. Continue antithrombotic therapy on discharge to meet quality measures, unless contraindicated.    Anticoagulation if history of A-fib/flutter: Patient does not have history of A-fib/flutter - anticoagulation not required for medication related stroke core measures.     LDL Cholesterol Calculated   Date Value Ref Range Status   09/12/2023 66 <=100 mg/dL Final   06/14/2021 65 <100 mg/dL Final     Comment:     Desirable:       <100 mg/dl       Patient currently receiving Lipitor (atorvastatin) continue statin on discharge to meet quality measures, unless contraindicated.    Recommendations: None at this time    Thank you for the consult.    Rin Cordova RPH 9/12/2023 2:30 PM

## 2023-09-12 NOTE — PLAN OF CARE
Neuro: Aphasic. Only able to speak in a whisper. Slight LLE weakness. Resports numbness and tingling in right thigh. States that this has been present for months.  Cardio: BP in 150s  Resp: LS clear RA  GI: BS active  : Continent  Skin: Intact  Pain: Denies.

## 2023-09-12 NOTE — PROVIDER NOTIFICATION
Writer notified Dr. Pepe--Pt reports H/A and has no PRN medications available, could you order some? Also, pt has history of type 2 diabetes and was wondering about her metformin. She does not have blood sugar checks or insulin ordered.     Orders received: PRN tylenol and sliding scale insulin initiated.

## 2023-09-12 NOTE — PROGRESS NOTES
[facility]    Stroke Progress Note    Interval Events  - Not acute change  - MRI with L cortical stroke    HPI Summary  Elvia Helm is a 73 year old female past medical history of hypertension, hyperlipidemia, diabetes, allergic rhinitis, ex-smoker 10-pack-year smoking history presents to the ED for evaluation of stroke with difficulty speaking and left leg weakness.  Her last known well was 1:45 PM on 9/11 when she dropped her sister.  She drove from [employer] herself and talk to her sister to her house at at the time at which her sister did not see any difficulty with speaking, facial numbness, weakness.  She was able to drive home and her daughter visited her around 3:45 PM when she noted that the patient was not as interactive.  At the time the patient states that she was feeling tired, the daughter said that she looked tired and was sitting, was able to talk to her but had difficulty speaking which is when she called EMS.  On arrival of EMS the patient was not talking at all and she was unable to move both sides of her face.  Her blood sugar was 162, arrival blood pressure 174/104, afebrile.  On exam she had an NIHSS of 0, all components of speech including naming, repetition, comprehension, reading, fluency are intact although she stops talking intermittently, appears to have weakness in left leg but reinforcement and distraction is able to produce full strength in the left leg.  CT head was without acute stroke or bleed, CTA was without proximal large vessel occlusion.         Stroke Evaluation Summarized    MRI Findings most compatible with subacute cortical ischemia in the left middle frontal gyrus encroaching upon the lateral precentral gyrus.    Intracranial Vasculature HEAD CTA:   1.  No evidence of large vessel occlusion or high-grade stenosis.   Cervical Vasculature NECK CTA:  1.  No evidence of large vessel occlusion or high-grade stenosis.      Echocardiogram 1. Normal biventricular size and function. Left ventricular ejection fraction of 60-65%.  2. No segmental wall motion abnormalities noted.  3. No hemodynamically significant valvular disease.  No prior study for comparison.   EKG/Telemetry Sinus rhythm   Other Testing Not Applicable      LDL  No lab value available in past 30 days   A1C  9/12/2023: 6.3 %   Troponin 9/12/2023: 9 ng/L       Impression   # Acute L frontal infarct etiology ESUS  Elvia Helm is a 73 year old female past medical history of hypertension, hyperlipidemia, diabetes, allergic rhinitis, ex-smoker 10-pack-year smoking history presents to the ED for evaluation of stroke with difficulty speaking and left leg weakness.  On initial exam she had an NIHSS of 0,components of speech including naming, repetition, comprehension, reading, fluency were intact although had varying volume of voice, L leg was full strength with reinforcement. CT head was without acute stroke or bleed, CTA was without proximal large vessel occlusion.  We discussed with the patient that she was able to produce normal speech as well as full strength and with no deficits risk of bleeding from thrombolysis would outweigh the benefit.  She was not a candidate for thrombectomy considering absence of proximal large vessel occlusion. MRI revealed acute L frontal infarct, exam today with difficulty with repetition and expressive aphasia. Vessel imaging mild arch atherosclerosis, L ICA/CCA atherosclerosis bifurcation, mild b/l ICA/MCA bifurcation and possible to have as etiology. Will rule out cardio embolic etiology with TTE and cardiac monitor. Her HgbA1C is at goal less than 7.0. LDL at goal less than 70 long term, she takes atorvastatin 10 mg at home. For now etiology is ESUS.     Plan  - Neurochecks Q 4 hours  - Aim Normotension  - Avoid hypotonic IV fluids  - Daily aspirin 325 mg for secondary stroke prevention  - Plavix (clopidogrel) 300 mg PO loading  "dose x 1  - Plavix (clopidogrel) 75 mg PO Daily from tomorrow for 21 days and then stop  - Statin: Atorvastatin continue PTA 10 mg daily  - Bedside Glucose Monitoring  - PT/OT/SLP  - PM&R  - Stroke Education  - Depression Screen negative  - Apnea Screen, reports snoring, denies apnea spells or daytime drowsiness  - Euthermia, Euglycemia  - 30 day cardiac monitor on discharge  - Vascular neurology will sign off, please feel free to call/page us for questions    Patient Follow-up    - Follow up in general neurology clinic in 6-8 weeks    The patient was discussed with Stroke Staff Dr. Simeon.    Johnny Roa MD  Neurology Resident PGY-3  [phone]  ______________________________________________________    Clinically Significant Risk Factors Present on Admission           # Hypercalcemia: Highest Ca = 10.6 mg/dL in last 2 days, will monitor as appropriate        # Drug Induced Platelet Defect: home medication list includes an antiplatelet medication     # Hypertension: Noted on problem list      # Overweight: Estimated body mass index is 29.87 kg/m  as calculated from the following:    Height as of this encounter: 1.626 m (5' 4\").    Weight as of this encounter: 78.9 kg (174 lb).               Medications   Scheduled Meds   aspirin  325 mg Oral At Bedtime    atorvastatin  10 mg Oral At Bedtime    clopidogrel  75 mg Oral At Bedtime    insulin aspart  1-7 Units Subcutaneous TID AC    insulin aspart  1-5 Units Subcutaneous At Bedtime    sodium chloride (PF)  3 mL Intracatheter Q8H       Infusion Meds   - MEDICATION INSTRUCTIONS -      sodium chloride 75 mL/hr at 09/12/23 0331       PRN Meds  acetaminophen, glucose **OR** dextrose **OR** glucagon, - MEDICATION INSTRUCTIONS -, ondansetron **OR** ondansetron, prochlorperazine **OR** prochlorperazine **OR** prochlorperazine, senna-docusate **OR** senna-docusate, sodium chloride (PF)       PHYSICAL EXAMINATION  Temp:  [98.2  F (36.8  C)-98.8  F (37.1  C)] 98.8  F " (37.1  C)  Pulse:  [] 96  Resp:  [16-23] 18  BP: (119-174)/() 140/79  SpO2:  [94 %-96 %] 94 %      General Exam  General:  patient lying in bed without any acute distress    HEENT:  normocephalic/atraumatic  Cardio:  RRR  Pulmonary:  no respiratory distress  Abdomen:  soft  Extremities:  no edema  Skin:  intact     Neuro Exam  Mental status: Awake, alert, attentive; oriented to P/P/T/M    Speech: Fluent, comprehension, naming, reading intact, repetition limited with sentences and cannot complete sentences has mild expressive aphasia.  No dysarthria    Cranial nerves: Visual fields intact, Eyes conjugate, PERRLA, EOMI w/ normal and smooth pursuit, face expression symmetric, facial sensation intact and symmetric, hearing intact to conversation, shoulder shrug strong, palate rise symmetric, tongue/uvula midline.    Motor: Tone normal. 5/5 strength in x4E. R shoulder 4+/5 limited by pain. No atrophy or twitches. Pronator drift absent.    Reflexes: 2+ and symmetric biceps, brachioradialis, patellar, and achilles. No   crossed adductors or spread.     Sensory: Intact to LT, No lateral extinction     Coordination: FNF intact bilaterally, no dysmetria; Normal heel-shin test bilaterally    Gait: Deferred    Stroke Scales    NIHSS  1a. Level of Consciousness 0-->Alert, keenly responsive   1b. LOC Questions 0-->Answers both questions correctly   1c. LOC Commands 0-->Performs both tasks correctly   2.   Best Gaze 0-->Normal   3.   Visual 0-->No visual loss   4.   Facial Palsy 0-->Normal symmetrical movements   5a. Motor Arm, Left 0-->No drift, limb holds 90 (or 45) degrees for full 10 secs   5b. Motor Arm, Right 0-->No drift, limb holds 90 (or 45) degrees for full 10 secs   6a. Motor Leg, Left 0-->No drift, leg holds 30 degree position for full 5 secs   6b. Motor Leg, right 0-->No drift, leg holds 30 degree position for full 5 secs   7.   Limb Ataxia 0-->Absent   8.   Sensory 0-->Normal, no sensory loss   9.   Best  Language 1-->Mild-to-moderate aphasia, some obvious loss of fluency or facility of comprehension, without significant limitation on ideas expressed or form of expression. Reduction of speech and/or comprehension, however, makes conversation. . . (see row details)   10. Dysarthria 0-->Normal   11. Extinction and Inattention  0-->No abnormality   Total 1 (09/12/23 1307)       Modified [place] Score (Discharge)    -  1    Imaging  I personally reviewed all imaging; relevant findings per HPI.     Lab Results Data   CBC  Recent Labs   Lab 09/11/23  1652   WBC 9.0   RBC 4.87   HGB 14.7   HCT 45.8        Basic Metabolic Panel    Recent Labs   Lab 09/12/23  1106 09/12/23  1100 09/12/23  0810 09/11/23  2246 09/11/23  1652     --   --   --  139   POTASSIUM 4.0  --   --   --  4.1   CHLORIDE 103  --   --   --  98   CO2 24  --   --   --  25   BUN 12.6  --   --   --  13.6   CR 0.73  --   --   --  0.74   * 192* 166*   < > 129*   KIM 9.5  --   --   --  10.6*    < > = values in this interval not displayed.     Liver Panel  No results for input(s): PROTTOTAL, ALBUMIN, BILITOTAL, ALKPHOS, AST, ALT, BILIDIRECT in the last 168 hours.  INR    Recent Labs   Lab Test 09/11/23  1652   INR 0.91      Lipid Profile    Recent Labs   Lab Test 06/13/23  1032 06/16/22  1334 06/14/21  1010 07/19/16  0745 06/30/15  0855   CHOL 154 143 151   < > 146   HDL 51 56 60   < > 50*   LDL 69 47 65   < > 67   TRIG 169* 202* 128   < > 145   CHOLHDLRATIO  --   --   --   --  2.9    < > = values in this interval not displayed.     A1C    Recent Labs   Lab Test 09/12/23  1106 02/02/23  0849 09/29/22  0901   A1C 6.3* 6.5* 6.2*     Troponin    Recent Labs   Lab 09/12/23  1106 09/12/23  0605 09/11/23  2213   CTROPT 9 9 13          Data

## 2023-09-12 NOTE — PROGRESS NOTES
[facility]    Hospitalist Progress Note    Interval History   Patient awake and alert.  No acute events overnight.  Continues to have significant expressive aphasia and toxin dispersed but better than yesterday.  Very tearful and emotional.    -Data reviewed today: I reviewed all new labs and imaging results over the last 24 hours. I personally reviewed the brain MRI image(s) showing subacute cortical ischemia in the left middle frontal gyrus .    Physical Exam   Temp: 98.8  F (37.1  C) Temp src: Oral BP: (!) 140/79 Pulse: 96   Resp: 18 SpO2: 94 % O2 Device: None (Room air)    Vitals:    09/11/23 1650   Weight: 78.9 kg (174 lb)     Vital Signs with Ranges  Temp:  [98.2  F (36.8  C)-98.8  F (37.1  C)] 98.8  F (37.1  C)  Pulse:  [] 96  Resp:  [16-23] 18  BP: (119-174)/() 140/79  SpO2:  [94 %-96 %] 94 %  No intake/output data recorded.    Physical exam  -General-awake and alert.  Tearful and emotional.  [id number] heard normal.  Murmur present.  Respiratory-bilateral normal vascular breath sounds.  Abdomen-soft and nontender.  No obvious organomegaly.  Musculoskeletal-normal range of motion all over.  Neurologic-continues to have expressive aphasia.  Speaks in whispers.  Slight loss of right nasolabial fold.  No other obvious focal neurologic deficits.  Has 5 x 5 strength in all extremities.  Gait not evaluated.  Skin-no obvious rashes.      Medications    - MEDICATION INSTRUCTIONS -      sodium chloride Stopped (09/12/23 0900)      aspirin  325 mg Oral At Bedtime    atorvastatin  10 mg Oral At Bedtime    clopidogrel  75 mg Oral At Bedtime    insulin aspart  1-7 Units Subcutaneous TID AC    insulin aspart  1-5 Units Subcutaneous At Bedtime    sodium chloride (PF)  3 mL Intracatheter Q8H       Data   Recent Labs   Lab 09/12/23  1106 09/12/23  1100 09/12/23  0810 09/11/23  2246 09/11/23  1652   WBC  --   --   --   --  9.0   HGB  --   --   --   --  14.7   MCV  --   --   --   --  94    PLT  --   --   --   --  353   INR  --   --   --   --  0.91     --   --   --  139   POTASSIUM 4.0  --   --   --  4.1   CHLORIDE 103  --   --   --  98   CO2 24  --   --   --  25   BUN 12.6  --   --   --  13.6   CR 0.73  --   --   --  0.74   ANIONGAP 12  --   --   --  16*   KIM 9.5  --   --   --  10.6*   * 192* 166*   < > 129*    < > = values in this interval not displayed.       Recent Results (from the past 24 hour(s))   CT Head w/o Contrast    Narrative    EXAM: CT HEAD W/O CONTRAST  LOCATION: [facility]  DATE: 9/11/2023    INDICATION: Code Stroke to evaluate for potential thrombolysis and thrombectomy. Aphasia, left-sided weakness.    COMPARISON: None.    TECHNIQUE: Routine CT head without IV contrast. Multiplanar reformats. Dose reduction techniques were used.      Impression    IMPRESSION: No evidence of hemorrhage. No convincing acute infarct. Parenchyma within normal limits for age. No acute osseous abnormality.    Findings were discussed by phone between Dr. Bucio and Dr. Cecilia Alexander as at approximately 4:50 p.m. on 9/11/2023.     CTA Head Neck with Contrast    Narrative    EXAM: CTA HEAD NECK W CONTRAST  LOCATION: [facility]  DATE: 9/11/2023    INDICATION: Code Stroke to evaluate for potential thrombolysis and thrombectomy. PLEASE READ IMMEDIATELY. Left-sided weakness, aphasia.  COMPARISON: None.  CONTRAST: 75 mL Isovue 370  TECHNIQUE: Head and neck CT angiogram with IV contrast. Noncontrast head CT followed by axial helical CT images of the head and neck vessels obtained during the arterial phase of intravenous contrast administration. Axial 2D reconstructed images and   multiplanar 3D MIP reconstructed images of the head and neck vessels were performed by the technologist. Dose reduction techniques were used. All stenosis measurements made according to NASCET criteria unless otherwise specified.    FINDINGS:     HEAD CTA:  No  evidence of large vessel occlusion, high-grade stenosis, aneurysm, or vascular malformation.    NECK CTA:  No evidence of large vessel occlusion, high-grade stenosis, or dissection.    NONVASCULAR STRUCTURES: Cervical spine degenerative changes. Presumed atelectasis at the lung apices.      Impression    IMPRESSION:     HEAD CTA:   1.  No evidence of large vessel occlusion or high-grade stenosis.    NECK CTA:  1.  No evidence of large vessel occlusion or high-grade stenosis.   MR Brain w/o & w Contrast    Narrative    EXAM: MR BRAIN W/O and W CONTRAST  LOCATION: [facility]  DATE/TIME: 9/11/2023 8:16 PM CDT    INDICATION: left sided weakness, resolved  COMPARISON: Same day CTA head and neck.  CONTRAST: 8 mL Gadavist  TECHNIQUE: Routine multiplanar multisequence head MRI without and with intravenous contrast.    FINDINGS:    INTRACRANIAL CONTENTS: Cortical reduced diffusivity and FLAIR hyperintensity in the left middle frontal gyrus and encroaching upon the lateral precentral gyrus. Subtle corresponding enhancement is compatible with subacute ischemia related to blood brain   barrier breakdown. No mass effect, acute hemorrhage, or extra-axial collection. Background parenchymal signal is otherwise normal for age. Normal ventricles and sulci. Normal position of the cerebellar tonsils.    SELLA: No abnormality accounting for technique.    OSSEOUS STRUCTURES/SOFT TISSUES: Normal marrow signal. The major intracranial vascular flow voids are maintained.    ORBITS: No visible intraorbital abnormality.     SINUSES/MASTOIDS: Mild mucosal thickening scattered about the paranasal sinuses. No middle ear or mastoid effusion.      Impression    IMPRESSION:  1.  Findings most compatible with subacute cortical ischemia in the left middle frontal gyrus encroaching upon the lateral precentral gyrus.       Echocardiogram Complete   Result Value    LVEF  55-60%    Narrative     [id number]  [id number]  [id number]  [id number]^FRANCIS^LEON^RBODIE     [facility]  Echocardiography Laboratory  [address] [place] [address]     Name: STACIA MARIE  MRN: [id number]  : 1950  Study Date: 2023 08:14 AM  Age: 73 yrs  Gender: Female  Patient Location: [facility]  Reason For Study: CVA  Ordering Physician: LEON BLANCO  Referring Physician: SHASTA LEON  Performed By: Alpa Tiwari     BSA: 1.8 m2  Height: 64 in  Weight: 174 lb  HR: 93  BP: 137/88 mmHg  ______________________________________________________________________________  Procedure  Complete Echo Adult.  ______________________________________________________________________________  Interpretation Summary     1. Normal biventricular size and function. Left ventricular ejection fraction  of 60-65%.  2. No segmental wall motion abnormalities noted.  3. No hemodynamically significant valvular disease.  No prior study for comparison.  ______________________________________________________________________________  Left Ventricle  The left ventricle is normal in size. There is normal left ventricular wall  thickness. Left ventricular systolic function is normal. The visual ejection  fraction is 55-60%. Grade I or early diastolic dysfunction. No regional wall  motion abnormalities noted.     Right Ventricle  The right ventricle is normal in size and function.     Atria  Normal left atrial size. The atrial walls appear thickenned. Right atrial size  is normal.     Mitral Valve  The mitral valve leaflets appear normal. There is no evidence of stenosis,  fluttering, or prolapse. There is trace mitral regurgitation.     Tricuspid Valve  Normal tricuspid valve. There is trace tricuspid regurgitation.     Aortic Valve  There is mild trileaflet aortic sclerosis. There is trace aortic  regurgitation.     Pulmonic Valve  The pulmonic valve is not well visualized.     Vessels  Normal size aorta. Normal size ascending  aorta. IVC diameter <2.1 cm  collapsing >50% with sniff suggests a normal RA pressure of 3 mmHg.     Pericardium  There is no pericardial effusion.     Rhythm  Sinus rhythm was noted.  ______________________________________________________________________________  MMode/2D Measurements & Calculations  IVSd: 0.80 cm     LVIDd: 4.8 cm  LVIDs: 3.3 cm  LVPWd: 0.70 cm  FS: 31.4 %  LV mass(C)d: 116.6 grams  LV mass(C)dI: 63.3 grams/m2  Ao root diam: 2.7 cm  asc Aorta Diam: 3.4 cm  LVOT diam: 1.9 cm  LVOT area: 2.9 cm2  Ao root diam Index (cm/m2): 1.5  asc Aorta Diam Index (cm/m2): 1.9  LA Volume (BP): 19.8 ml  LA Volume Index (BP): 10.8 ml/m2     RWT: 0.29  TAPSE: 2.0 cm     Doppler Measurements & Calculations  MV E max neal: 54.5 cm/sec  MV A max neal: 111.0 cm/sec  MV E/A: 0.49  MV dec time: 0.18 sec  Ao V2 max: 145.0 cm/sec  Ao max P.0 mmHg  Ao V2 mean: 95.3 cm/sec  Ao mean P.0 mmHg  Ao V2 VTI: 26.4 cm  RUSTY(I,D): 2.2 cm2  RUSTY(V,D): 2.2 cm2  LV V1 max P.7 mmHg  LV V1 max: 108.0 cm/sec  LV V1 VTI: 20.0 cm  SV(LVOT): 58.8 ml  SI(LVOT): 31.9 ml/m2  PA V2 max: 101.8 cm/sec  PA max P.1 mmHg  PA acc time: 0.11 sec  AV Neal Ratio (DI): 0.74  RUSTY Index (cm2/m2): 1.2  E/E' av.9  Lateral E/e': 5.3  Medial E/e': 8.5  RV S Neal: 17.7 cm/sec     ______________________________________________________________________________  Report approved by: Miguel Tolliver 2023 10:06 AM               Assessment & Plan      Elvia Helm is a 73 year old female admitted on 2023. She has a past medical history notable for type 2 diabetes, hypertension, dyslipidemia who presents to the emergency department with acute left-sided weakness, expressive aphasia.     Subacute ischemic CVA, cortical ischemia in the left middle frontal gyrus   Acute onset expressive aphasia, left-sided weakness  Evaluated by stroke neurology after tier 1 stroke imaging completed and negative for any acute changes.  Please see imaging  report for details.  Patient was hyperglycemic with blood sugar 162, hypertensive with blood pressure 174/104, afebrile and not hypoxic on room air.  Not a candidate for thrombectomy due to absence of large vessel occlusion, risks of TNK discussed with the patient and thoughts that risks of bleeding would outweigh the benefit given her absence of symptoms.     1900  *Discussed w/ stroke neuro, pt is severely aphasic, emotionally distressed, hypertensive, LLE weakness improved. Plan to rule out stroke with MRI brain. Per stroke neuro note - if MRI brain is normal or without stroke, or structural lesion this event would not be considered a TIA, but rather a possible stress reaction     2100 Addendum  -Brain MRI WWO contrast showing subacute cortical ischemia, please see full report for details  *BP initially elevated with systolic greater than 200, but has trended down without intervention, allow for permissive HTN  -Continue cardiac monitoring  *9/11 on exam- pt has significant aphasia, slowly able to get one word out at a time, intermittently   - plavix/aspirin loaded  - family and patient updated at bedside   -Echo done this morning shows EF of 60 to 65% with no wall motion abnormalities.  -Neurology team evaluated patient this morning and felt that this was an embolic stroke of unknown origin.  -Recommend daily aspirin 325 mg daily, Plavix 75 mg daily for 21 days and then stop  -Continue atorvastatin 10 mg daily.  -PT/OT/speech therapy consulted.  -30-day cardiac monitor on discharge.  -We will plan on discharge tomorrow after monitoring overnight on cardiac monitor.     Elevated anion gap  Anion gap 16; suspect patient is mildly hypovolemic.  Patient admits to 1-2 drinks of alcohol daily, alcohol ethyl level negative on admission.  -We will rehydrate with 1 L LR and recheck in a.m.     Elevated calcium  Total calcium 10.6, calcium has never been historically elevated, suspect this is related to  "hypovolemia.  -Rehydrate as above,  -Recheck this morning showed corrected anion gap of 12     Diabetes mellitus, type II, well controlled  PTA metformin, most recent hemoglobin A1c 6.5% on February 2023.  -Holding metformin.  Patient on sliding scale insulin aspart.  HbA1c 6.3.     Hypertension  Typically well controlled on lisinopril hydrochlorothiazide .  -Allow for permissive hypertension for 24 to 48 hours and restart upon discharge     Dyslipidemia  -Continue PTA statin           Diet: Regular  DVT Prophylaxis: Pneumatic Compression Devices  Navarrete Catheter: Not present  Lines: None     Cardiac Monitoring: None  Code Status:  Full Code        Clinically Significant Risk Factors Present on Admission           # Hypercalcemia: Highest Ca = 10.6 mg/dL in last 2 days, will monitor as appropriate      # Drug Induced Platelet Defect: home medication list includes an antiplatelet medication   # Hypertension: Noted on problem list      # Overweight: Estimated body mass index is 29.87 kg/m  as calculated from the following:    Height as of this encounter: 1.626 m (5' 4\").    Weight as of this encounter: 78.9 kg (174 lb).                   Amarilis Pepe MD, MD  [phone](p)   "

## 2023-09-12 NOTE — PROGRESS NOTES
09/12/23 1000   Appointment Info   Signing Clinician's Name / Credentials (PT) Caroline Thomas DPT   Rehab Comments (PT) Needs walker for d/c   Quick Adds   Quick Adds Vestibular Eval;Certification   Living Environment   People in Home spouse;child(heber), adult   Home Accessibility stairs to enter home;stairs within home   Transportation Anticipated car, drives self;family or friend will provide   Living Environment Comments Lives in house with spouse, daughter's family. 2 steps, no railings, 3 level home, could stay on one level but would be able to stay down, and shower upstairs. 14 steps up to bedroom railings on L side. Can have 80% assistance, could be up to 100 has good family supporet   Self-Care   Regular Exercise No   Equipment Currently Used at Home cane, quad   Fall history within last six months no   Activity/Exercise/Self-Care Comment Ind with all ADLs, retired, no AD   General Information   Onset of Illness/Injury or Date of Surgery 09/11/23   Referring Physician Janis Grullon APRN CNP   Patient/Family Therapy Goals Statement (PT) GO home   Pertinent History of Current Problem (include personal factors and/or comorbidities that impact the POC) Elvia Helm is a 73 year old female who has a past medical history notable for type 2 diabetes, hypertension, dyslipidemia who presents to the emergency department with acute left-sided weakness, expressive aphasia. Brain MRI showing subacute cortical ischemia.   Existing Precautions/Restrictions fall   Cognition   Affect/Mental Status (Cognition) WFL   Orientation Status (Cognition) oriented x 4   Follows Commands (Cognition) WFL   Cognitive Status Comments Word finding difficulty, whispers   Pain Assessment   Patient Currently in Pain No   Integumentary/Edema   Integumentary/Edema no deficits were identifed   Posture    Posture Forward head position   Range of Motion (ROM)   Range of Motion ROM is WFL   Strength (Manual Muscle Testing)   Strength  Comments Mild asymmetry R stronger than L side though all limbs at least 3+/5 and functional.   Bed Mobility   Comment, (Bed Mobility) Supervision w/ HOB raiseed   Transfers   Comment, (Transfers) sit<>stand SBA w/ FWW   Gait/Stairs (Locomotion)   Comment, (Gait/Stairs) AMb 15ft w/ FWW CGA   Balance   Balance Comments ind seated, standing w/ FWW supervision   Sensory Examination   Sensory Perception patient reports no sensory changes   Oculomotor Exam   Smooth Pursuit Comment saccade at end range R horizontal, overshooting vertical   Saccades Comments Slow   VOR Comments Motion sick feeling   VOR Cancellation Comments Motion sick feeling   Convergence Testing Comments disconjugate gaze 12 in   Clinical Impression   Criteria for Skilled Therapeutic Intervention Yes, treatment indicated   PT Diagnosis (PT) Impaired gait   Influenced by the following impairments Decreased strength, decreased activity tolerance,   Functional limitations due to impairments Impaired functional mobility   Clinical Presentation (PT Evaluation Complexity) Stable/Uncomplicated   Clinical Presentation Rationale clinical judgement   Clinical Decision Making (Complexity) low complexity   Planned Therapy Interventions (PT) balance training;bed mobility training;gait training;home exercise program;neuromuscular re-education;patient/family education;ROM (range of motion);stair training;strengthening;transfer training   Anticipated Equipment Needs at Discharge (PT) walker, rolling   Risk & Benefits of therapy have been explained evaluation/treatment results reviewed;care plan/treatment goals reviewed;current/potential barriers reviewed;participants voiced agreement with care plan;participants included;risks/benefits reviewed;patient;spouse/significant other;daughter;son   PT Total Evaluation Time   PT Eval, Low Complexity Minutes (67719) 16   Therapy Certification   Start of care date 09/11/23   Certification date from 09/11/23   Certification date  to 09/19/23   Medical Diagnosis CVA   Physical Therapy Goals   PT Frequency 5x/week   PT Predicted Duration/Target Date for Goal Attainment 09/19/23   PT Goals Bed Mobility;Transfers;Stairs;Gait   PT: Bed Mobility Supervision/stand-by assist;Supine to/from sit;Bridging;Rolling   PT: Transfers Supervision/stand-by assist;Sit to/from stand;Bed to/from chair;Assistive device   PT: Gait Supervision/stand-by assist;100 feet;Assistive device   PT: Stairs Minimal assist;Greater than 10 stairs   Interventions   Interventions Quick Adds Gait Training;Therapeutic Activity   Therapeutic Activity   Therapeutic Activities: dynamic activities to improve functional performance Minutes (88714) 10   Symptoms Noted During/After Treatment None   Treatment Detail/Skilled Intervention Supine<>sit supervision EOB, some dizziness at EOB. /71. Family present at bedside with many questions on recovery timelines, therapist provided stroke education. After amb, pt returned to recliner. Demo and teach back for family using gait belt. Okay to be up in room and walk halls with family and GB, walker.   Gait Training   Gait Training Minutes (45546) 9   Symptoms Noted During/After Treatment (Gait Training) fatigue   Treatment Detail/Skilled Intervention Amb in hallways 250ft w/ FWW CGA. Trialed amb w/o using [place] rail [place], significant slowing in gait. Trialed w/o AD, pt fearful and requirs [place]. Trialed stairs 2x4 reps with BUE support CGA, reciprocal stepping.   PT Discharge Planning   PT Plan DGI or higher level balance test, gait speed, try gait w/ SEC or [place] rail   PT Discharge Recommendation (DC Rec) home with assist;home with outpatient physical therapy   PT Rationale for DC Rec Pt has good family support and is safe to DC home with assist and AD, pt would benefit from OP services for higher level balanec and gait work to improve functional mobility.   PT Brief overview of current status CGA w/ FWW, [place] w/o AD   Total Session Time    Timed Code Treatment Minutes 19   Total Session Time (sum of timed and untimed services) 35     [facility]  OUTPATIENT PHYSICAL THERAPY EVALUATION  PLAN OF TREATMENT FOR OUTPATIENT REHABILITATION  (COMPLETE FOR INITIAL CLAIMS ONLY)  Patient's Last Name, First Name, M.I.  YOB: 1950  Elvia Helm                        Provider's Name  [facility] Medical Record No.  [id number]                             Onset Date:  09/11/23   Start of Care Date:  09/11/23   Type:     _X_PT   ___OT   ___SLP Medical Diagnosis:  CVA              PT Diagnosis:  Impaired gait Visits from SOC:  1     See note for plan of treatment, functional goals and certification details    I CERTIFY THE NEED FOR THESE SERVICES FURNISHED UNDER        THIS PLAN OF TREATMENT AND WHILE UNDER MY CARE     (Physician co-signature of this document indicates review and certification of the therapy plan).

## 2023-09-12 NOTE — PROGRESS NOTES
09/12/23 1300   Appointment Info   Signing Clinician's Name / Credentials (OT) TAMERA Brand/L   Quick Adds   Quick Adds Certification   Living Environment   People in Home spouse;child(heber), adult   Current Living Arrangements house  (two story home)   Transportation Anticipated family or friend will provide  (Pt typically drives)   Living Environment Comments Lives in a home with spouse and daughter (and her family). Pt can have 24 hour supervision upon discharge. Right hand dominant.   Self-Care   Usual Activity Tolerance good   Current Activity Tolerance moderate   Equipment Currently Used at Home cane, quad   Activity/Exercise/Self-Care Comment Independent in all ADLs and mobility at baseline.   Instrumental Activities of Daily Living (IADL)   Previous Responsibilities meal prep;housekeeping;laundry;shopping;yardwork;medication management;finances;driving   General Information   Onset of Illness/Injury or Date of Surgery 09/11/23   Referring Physician Janis Grullon APRN CNP   Patient/Family Therapy Goal Statement (OT) Home   Additional Occupational Profile Info/Pertinent History of Current Problem Per chart: Elvia Helm is a 73 year old female who has a past medical history notable for type 2 diabetes, hypertension, dyslipidemia who presents to the emergency department with acute left-sided weakness, expressive aphasia.  MRI revealed acute L frontal infarct, exam today with difficulty with repetition and expressive aphasia. See chart for details.   Cognitive Status Examination   Orientation Status orientation to person, place and time   Safety Deficit at risk behavior observed;awareness of need for assistance;insight into deficits/self-awareness;judgment;problem-solving   Cognitive Screens/Assessments   Cognitive Assessments Completed [facility] Mental Status Exam (UMS):  Total Score out of /30 25   UMS Domains assessed: orientation, memory, attention, executive  functions   SLUMS Interpretation Completed the SLUMS Cognitive Screen to assess cognition and the implications on I/ADls. Pt scored 25/30 indicating suspected cognitive impairment. Pt demonstrated difficulty with delayed recall, sequencing, and executive functioning. A score at this level may indicate difficulty with I/ADLs including medication management tasks. score may be skewed by apahsia. Further cognitive testing warranted.   Visual Perception   Impact of Vision Impairment on Function (Vision) Wears glasses for near and distances. Intact saacades, pursuits, near reading (with each eye occluded).   Range of Motion Comprehensive   Comment, General Range of Motion LUE shoulder flexion and abduction ~160 degrees. RUE with rotator cuff injury (June 2023). RUE shoulder flexion and abduction ~ 100 degrees.   Strength Comprehensive (MMT)   Comment, General Manual Muscle Testing (MMT) Assessment LUE shoulder flexion and abduction 3+/5 on MMT. Unable to complete MMT to RUE as with recent rotator cuff injury.   Coordination   Upper Extremity Coordination Left UE impaired   Transfers   Transfers bed-chair transfer;sit-stand transfer;toilet transfer   Transfer Skill: Bed to Chair/Chair to Bed   Bed-Chair [place] (Transfers) set up;verbal cues;contact guard   Sit-Stand Transfer   Sit-Stand [place] (Transfers) set up;verbal cues;contact guard   Toilet Transfer   Type (Toilet Transfer) sit-stand;stand-sit   [place] Level (Toilet Transfer) set up;verbal cues;contact guard   Activities of Daily Living   BADL Assessment/Intervention lower body dressing;grooming   Lower Body Dressing Assessment/Training   [place] Level (Lower Body Dressing) set up;verbal cues;contact guard assist   Grooming Assessment/Training   [place] Level (Grooming) set up;verbal cues;contact guard assist   Clinical Impression   Criteria for Skilled Therapeutic Interventions Met (OT) Yes, treatment indicated   OT Diagnosis Decreased  independence in I/ADLs   Influenced by the following impairments Decreased independence in I/ADLs   OT Problem List-Impairments impacting ADL problems related to;activity tolerance impaired;cognition;range of motion (ROM);strength   Assessment of Occupational Performance 1-3 Performance Deficits   Identified Performance Deficits Decreased independence in I/ADLs (Dressing, bathing, toileting)   Planned Therapy Interventions (OT) ADL retraining;IADL retraining;cognition;transfer training;ROM;strengthening;home program guidelines;neuromuscular re-education   Clinical Decision Making Complexity (OT) low complexity   Risk & Benefits of therapy have been explained evaluation/treatment results reviewed;care plan/treatment goals reviewed;risks/benefits reviewed;patient   OT Total Evaluation Time   OT Eval, Low Complexity Minutes (92996) 10   Therapy Certification   Start of Care Date 09/12/23   OT Goals   Therapy Frequency (OT) Daily   OT Predicted Duration/Target Date for Goal Attainment 09/15/23   OT Goals Hygiene/Grooming;Upper Body Dressing;Lower Body Dressing;Toilet Transfer/Toileting;Cognition   OT: Hygiene/Grooming modified independent;while standing   OT: Upper Body Dressing Modified independent;including set-up/clothing retrieval   OT: Lower Body Dressing Modified independent;including set-up/clothing retrieval   OT: Toilet Transfer/Toileting Modified independent;toilet transfer;cleaning and garment management   OT: Cognitive Patient/caregiver will verbalize understanding of cognitive assessment results/recommendations as needed for safe discharge planning   Interventions   Interventions Quick Adds Self-Care/Home Management   Self-Care/Home Management   Self-Care/Home Mgmt/ADL, Compensatory, Meal Prep Minutes (86114) 30   Symptoms Noted During/After Treatment (Meal Preparation/Planning Training) fatigue   Treatment Detail/Skilled Intervention Pt ambulated to the bathroom with CGA and walker for toilet transfer.  Transferred to/from the toilet with CGA after 1 verbal cue on hand placement.  Educated on lower body dressing with compensatory techniques. While seated/standing pt completed LE dressing (don/doff underwear) with CGA and set up.  Pt completed 2 grooming and hygiene tasks at the sink with CGA and set up.  During session, pt demonstrated difficulty with functional transfers, requiring education. Alarm on.   OT Discharge Planning   OT Plan TB dress with retreival; Sequenced G/H sinkside   OT Discharge Recommendation (DC Rec) home with assist;home with home care occupational therapy   OT Rationale for DC Rec Pt limited by decreased activity tolerance and IND in I/ADLs. Home with assist in all I/ADLs and home OT to address safety and IND in I/ADLs. If this level of assist is not available, then TCU should be considered.   Total Session Time   Timed Code Treatment Minutes 30   Total Session Time (sum of timed and untimed services) 40      [facility]  OUTPATIENT OCCUPATIONAL THERAPY  EVALUATION  PLAN OF TREATMENT FOR OUTPATIENT REHABILITATION  (COMPLETE FOR INITIAL CLAIMS ONLY)  Patient's Last Name, First Name, M.I.  YOB: 1950  Elvia Helm                          Provider's Name  [facility] Medical Record No.  [id number]                             Onset Date:  09/11/23   Start of Care Date:  09/12/23   Type:     ___PT   _X_OT   ___SLP Medical Diagnosis:                       OT Diagnosis:  Decreased independence in I/ADLs Visits from SOC:  1     See note for plan of treatment, functional goals and certification details    I CERTIFY THE NEED FOR THESE SERVICES FURNISHED UNDER        THIS PLAN OF TREATMENT AND WHILE UNDER MY CARE     (Physician co-signature of this document indicates review and certification of the therapy plan).

## 2023-09-13 ENCOUNTER — APPOINTMENT (OUTPATIENT)
Dept: CARDIOLOGY | Facility: CLINIC | Age: 73
End: 2023-09-13
Attending: HOSPITALIST
Payer: COMMERCIAL

## 2023-09-13 ENCOUNTER — APPOINTMENT (OUTPATIENT)
Dept: PHYSICAL THERAPY | Facility: CLINIC | Age: 73
End: 2023-09-13
Payer: COMMERCIAL

## 2023-09-13 VITALS
HEART RATE: 87 BPM | DIASTOLIC BLOOD PRESSURE: 84 MMHG | HEIGHT: 64 IN | OXYGEN SATURATION: 96 % | WEIGHT: 174 LBS | RESPIRATION RATE: 18 BRPM | BODY MASS INDEX: 29.71 KG/M2 | TEMPERATURE: 98.1 F | SYSTOLIC BLOOD PRESSURE: 142 MMHG

## 2023-09-13 LAB
GLUCOSE BLDC GLUCOMTR-MCNC: 112 MG/DL (ref 70–99)
GLUCOSE BLDC GLUCOMTR-MCNC: 128 MG/DL (ref 70–99)
GLUCOSE BLDC GLUCOMTR-MCNC: 142 MG/DL (ref 70–99)

## 2023-09-13 PROCEDURE — 999N000226 HC STATISTIC SLP IP EVAL DEFER

## 2023-09-13 PROCEDURE — 97116 GAIT TRAINING THERAPY: CPT | Mod: GP

## 2023-09-13 PROCEDURE — 93272 ECG/REVIEW INTERPRET ONLY: CPT | Performed by: INTERNAL MEDICINE

## 2023-09-13 PROCEDURE — G0378 HOSPITAL OBSERVATION PER HR: HCPCS

## 2023-09-13 PROCEDURE — 82962 GLUCOSE BLOOD TEST: CPT

## 2023-09-13 PROCEDURE — 99239 HOSP IP/OBS DSCHRG MGMT >30: CPT | Performed by: HOSPITALIST

## 2023-09-13 PROCEDURE — 97530 THERAPEUTIC ACTIVITIES: CPT | Mod: GP

## 2023-09-13 PROCEDURE — 93270 REMOTE 30 DAY ECG REV/REPORT: CPT

## 2023-09-13 RX ORDER — CLOPIDOGREL BISULFATE 75 MG/1
75 TABLET ORAL AT BEDTIME
Qty: 21 TABLET | Refills: 0 | Status: SHIPPED | OUTPATIENT
Start: 2023-09-13 | End: 2023-10-10

## 2023-09-13 RX ADMIN — INSULIN ASPART 1 UNITS: 100 INJECTION, SOLUTION INTRAVENOUS; SUBCUTANEOUS at 08:35

## 2023-09-13 ASSESSMENT — ACTIVITIES OF DAILY LIVING (ADL)
ADLS_ACUITY_SCORE: 31
ADLS_ACUITY_SCORE: 30
ADLS_ACUITY_SCORE: 31
DEPENDENT_IADLS:: INDEPENDENT
ADLS_ACUITY_SCORE: 31

## 2023-09-13 NOTE — PROGRESS NOTES
Care Management Discharge Note    Discharge Date: 09/13/2023       Discharge Disposition: Home, Home Care    Discharge Services:      Discharge DME:      Discharge Transportation: family or friend will provide (Pt typically drives)    Private pay costs discussed: Not applicable    Does the patient's insurance plan have a 3 day qualifying hospital stay waiver?  No    PAS Confirmation Code:    Patient/family educated on Medicare website which has current facility and service quality ratings:      Education Provided on the Discharge Plan:    Persons Notified of Discharge Plans: RN  Patient/Family in Agreement with the Plan: yes    Handoff Referral Completed: Yes    Additional Information:  No further CM needs identified.    Diya Au RN

## 2023-09-13 NOTE — PROGRESS NOTES
Neuros  and VSS stable. Pt discharging home with HHC and therapies. Family to transport. Heart monitor on at discharge. Follow ups and discharge info discussed.

## 2023-09-13 NOTE — DISCHARGE INSTRUCTIONS
HOMECARE NOTE:   Your doctor has ordered home care to help you after your hospital stay.  The staff will contact you to schedule your first visit.  This service will be provided by [facility].  If you have any question, or have not received a call within 48 hours of discharge, please call them at [phone].    *please see homecare quality ratings for all homecares in your area at www.medicare.gov       Your risk factors for stroke or TIA (transient ischemic attack):    Your Risk Factors Your Results Normal Ranges   High blood pressure BP Readings from Last 1 Encounters:   09/13/23 (!) 142/84    Less than 120/80   Cholesterol              Total Lab Results   Component Value Date    CHOL 155 09/12/2023    CHOL 151 06/14/2021      Less than 150    Triglycerides   Lab Results   Component Value Date    TRIG 212 09/12/2023    TRIG 128 06/14/2021    Less than 150   LDL Lab Results   Component Value Date    LDL 66 09/12/2023    LDL 65 06/14/2021       Less than 70   HDL Lab Results   Component Value Date    HDL 47 09/12/2023    HDL 60 06/14/2021            Greater than 40 (men)  Greater than 50 (women)   Diabetes Recent Labs   Lab 09/13/23  0750   *    Fasting blood glucose    Smoking/tobacco use  Quit smoking and tobacco   Overweight  Lose 1-2 pounds a week   Lack of exercise  30 minutes moderate activity each day   Other risk factors include carotid (neck) artery disease, atrial fibrillation and stress. You may be on new medicine to treat high blood pressure, cholesterol, diabetes or atrial fibrillation.    Understanding Stroke Booklet given to patient. Please refer to booklet for further information.    Stroke warning signs and symptoms - CALL 911 right away for:  - Sudden numbness or weakness in the face, arm or leg (often on one side of the body).  - Sudden confusion or trouble understanding what is going on.  - Sudden blurred or decreased vision in one or both eyes.  - Sudden trouble  speaking, loss of balance, dizziness or problems with coordination.  - Sudden, severe headache for no reason.  - Fainting or seizures.  - Symptoms may go away then come back suddenly.

## 2023-09-13 NOTE — PLAN OF CARE
Physical Therapy Discharge Summary    Reason for therapy discharge:    Discharged to home with home therapy.    Progress towards therapy goal(s). See goals on Care Plan in [facility] electronic health record for goal details.  Goals partially met.  Barriers to achieving goals:   discharge from facility.    Therapy recommendation(s):    Continued therapy is recommended.  Rationale/Recommendations:  recommending continued skilled therapies with HH PT/OT for improvement of strength, activity tolerance, balance, and independence with functional mobility.

## 2023-09-13 NOTE — DISCHARGE SUMMARY
[facility]    Discharge Summary  Hospitalist    Date of Admission:  9/11/2023  Date of Discharge:  9/13/2023    Discharge Diagnoses      Left-sided weakness  Acute ischemic stroke (H)    History of Present Illness   Elvia Helm is an 73 year old female who presented with severe aphasia with some left-sided weakness    Hospital Course   Elvia Helm was admitted on 9/11/2023.  The following problems were addressed during her hospitalization:    Elvia Helm is a 73 year old female admitted on 9/11/2023. She has a past medical history notable for type 2 diabetes, hypertension, dyslipidemia who presents to the emergency department with acute left-sided weakness, expressive aphasia.     Subacute ischemic CVA, cortical ischemia in the left middle frontal gyrus   Acute onset expressive aphasia, left-sided weakness  Evaluated by stroke neurology after tier 1 stroke imaging completed and negative for any acute changes.  Please see imaging report for details.  Patient was hyperglycemic with blood sugar 162, hypertensive with blood pressure 174/104, afebrile and not hypoxic on room air.  Not a candidate for thrombectomy due to absence of large vessel occlusion, risks of TNK discussed with the patient and thoughts that risks of bleeding would outweigh the benefit given her absence of symptoms.     1900  *Discussed w/ stroke neuro, pt is severely aphasic, emotionally distressed, hypertensive, LLE weakness improved. Plan to rule out stroke with MRI brain. Per stroke neuro note - if MRI brain is normal or without stroke, or structural lesion this event would not be considered a TIA, but rather a possible stress reaction     2100 Addendum  -Brain MRI WWO contrast showing subacute cortical ischemia, please see full report for details  *BP initially elevated with systolic greater than 200, but has trended down without intervention, allow for permissive HTN  -Continue cardiac  monitoring  *9/11 on exam- pt has significant aphasia, slowly able to get one word out at a time, intermittently   - plavix/aspirin loaded  - family and patient updated at bedside       -Echo done the next morning shows EF of 60 to 65% with no wall motion abnormalities.  -Neurology team evaluated patient this morning and felt that this was an embolic stroke of unknown origin.  -Recommend daily aspirin 325 mg daily, Plavix 75 mg daily for 21 days and then stop Plavix 75 daily and continue aspirin 325 mg daily indefinitely.  -Continue atorvastatin 10 mg daily.  -PT/OT/speech therapy consulted.  -30-day cardiac monitor on discharge.  -Discharged with follow-up with PCP in a week with CBC and BMP.  Follow-up with neurology in 6 weeks.     Elevated anion gap  Anion gap 16; suspect patient is mildly hypovolemic.  Patient admits to 1-2 drinks of alcohol daily, alcohol ethyl level negative on admission.  -We will rehydrate with 1 L LR and recheck in a.m.  -Resolved.     Elevated calcium  Total calcium 10.6, calcium has never been historically elevated, suspect this is related to hypovolemia.  -Rehydrate as above,  -Recheck this morning showed corrected anion gap of 12 and corrected hypercalcemia.     Diabetes mellitus, type II, well controlled  PTA metformin, most recent hemoglobin A1c 6.5% on February 2023.  -Holding metformin.  Patient on sliding scale insulin aspart.  HbA1c 6.3.  -Restarted home metformin upon discharge.     Hypertension  Typically well controlled on lisinopril hydrochlorothiazide .  -Allow for permissive hypertension for 24 to 48 hours and restart upon discharge     Dyslipidemia  -Continue PTA statin           Diet: Regular  DVT Prophylaxis: Pneumatic Compression Devices  Navarrete Catheter: Not present  Lines: None     Cardiac Monitoring: None  Code Status:  Full Code           Clinically Significant Risk Factors Present on Admission           # Hypercalcemia: Highest Ca = 10.6 mg/dL in last 2 days, will  "monitor as appropriate      # Drug Induced Platelet Defect: home medication list includes an antiplatelet medication   # Hypertension: Noted on problem list      # Overweight: Estimated body mass index is 29.87 kg/m  as calculated from the following:    Height as of this encounter: 1.626 m (5' 4\").    Weight as of this encounter: 78.9 kg (174 lb).               Amarilis Pepe MD, MD      Code Status   Full Code       Primary Care Physician   Masood Osborne    Physical Exam   Temp: 98.1  F (36.7  C) Temp src: Oral BP: (!) 142/84 Pulse: 87   Resp: 18 SpO2: 96 % O2 Device: None (Room air)    Vitals:    09/11/23 1650   Weight: 78.9 kg (174 lb)     Vital Signs with Ranges  Temp:  [98.1  F (36.7  C)-98.8  F (37.1  C)] 98.1  F (36.7  C)  Pulse:  [77-96] 87  Resp:  [18] 18  BP: (120-145)/(76-87) 142/84  SpO2:  [94 %-98 %] 96 %  No intake/output data recorded.    Physical Exam  Constitutional:       Appearance: Normal appearance.   HENT:      Head: Normocephalic.   Eyes:      Pupils: Pupils are equal, round, and reactive to light.   Cardiovascular:      Rate and Rhythm: Normal rate and regular rhythm.      Pulses: Normal pulses.      Heart sounds: Normal heart sounds.   Pulmonary:      Effort: Pulmonary effort is normal. No respiratory distress.      Breath sounds: Normal breath sounds.   Abdominal:      General: Abdomen is flat. Bowel sounds are normal. There is no distension.      Tenderness: There is no abdominal tenderness. There is no guarding.   Musculoskeletal:         General: Normal range of motion.      Cervical back: Normal range of motion.   Skin:     General: Skin is warm and dry.   Neurological:      General: No focal deficit present.      Comments: Continues to have some expressive aphasia with word finding difficulties and slowed speech.   Psychiatric:         Mood and Affect: Mood normal.           Discharge Disposition   Discharged to home  Condition at discharge: Stable    Consultations This Hospital Stay "   NEUROLOGY IP STROKE CONSULT  PHYSICAL THERAPY ADULT IP CONSULT  OCCUPATIONAL THERAPY ADULT IP CONSULT  SPEECH LANGUAGE PATH ADULT IP CONSULT    Time Spent on this Encounter   I, Amarilis Pepe MD, personally saw the patient today and spent greater than 30 minutes discharging this patient.    Discharge Orders      Adult Neurology  Referral      Home Care Referral      Reason for your hospital stay    stroke     Follow-up and recommended labs and tests     Follow up with primary care provider, Masood Osborne, within 7 days for hospital follow- up.  The following labs/tests are recommended: bmp cbc in 1 week .     Activity    Your activity upon discharge: activity as tolerated     Adult Cardiac Event Monitor     Diet    Follow this diet upon discharge: Orders Placed This Encounter      Regular Diet Adult     Discharge Medications   Current Discharge Medication List        START taking these medications    Details   clopidogrel (PLAVIX) 75 MG tablet Take 1 tablet (75 mg) by mouth At Bedtime for 21 days  Qty: 21 tablet, Refills: 0    Associated Diagnoses: Left-sided weakness; Acute ischemic stroke (H)           CONTINUE these medications which have NOT CHANGED    Details   aspirin (ASA) 325 MG EC tablet Take 325 mg by mouth At Bedtime      atorvastatin (LIPITOR) 10 MG tablet Take 1 tablet (10 mg) by mouth daily  Qty: 90 tablet, Refills: 0    Associated Diagnoses: Hyperlipidemia LDL goal <100      azelastine (ASTELIN) 0.1 % nasal spray Spray 1 spray into both nostrils 2 times daily  Qty: 30 mL, Refills: 3    Associated Diagnoses: Seasonal allergic rhinitis due to pollen      Cholecalciferol (VITAMIN D3 PO) Take 2,000 Units by mouth daily       EPINEPHrine (ANY BX GENERIC EQUIV) 0.3 MG/0.3ML injection 2-pack INJECT 0.3 MLS INTO THE MUSCLE ONCE AS NEEDED FOR ANAPHYLAXIS  Qty: 2 each, Refills: 0    Associated Diagnoses: Bee sting reaction, accidental or unintentional, sequela      fluticasone (FLONASE) 50 MCG/ACT  spray Spray 1-2 sprays into both nostrils daily  Qty: 16 g, Refills: 0    Associated Diagnoses: Seasonal allergic rhinitis due to other allergic trigger      lisinopril-hydrochlorothiazide (ZESTORETIC) 20-25 MG tablet Take 0.5 tablets by mouth daily  Qty: 45 tablet, Refills: 4    Associated Diagnoses: Essential hypertension, benign      metFORMIN (GLUCOPHAGE) 850 MG tablet Take 1 tablet (850 mg) by mouth 2 times daily (with meals)  Qty: 180 tablet, Refills: 3    Associated Diagnoses: Type 2 diabetes mellitus without complication, without long-term current use of insulin (H)      Omega-3 Fatty Acids (OMEGA-3 FISH OIL PO) Take 1 g by mouth daily       sodium chloride (OCEAN) 0.65 % nasal spray Spray 1 spray into both nostrils daily as needed for congestion      vitamin B complex with vitamin C (STRESS TAB) tablet Take 1 tablet by mouth daily      blood glucose (NO BRAND SPECIFIED) lancets standard Use to test blood sugar one* times daily or as directed.  Qty: 100 each, Refills: 3    Associated Diagnoses: Type 2 diabetes mellitus without complication, without long-term current use of insulin (H)      blood glucose (ONETOUCH ULTRA) test strip Use to test blood sugar daily or as directed.  Qty: 100 strip, Refills: 3    Associated Diagnoses: Type 2 diabetes mellitus without complication, without long-term current use of insulin (H)      blood glucose calibration (ONETOUCH ULTRA CONTROL) solution Use to calibrate blood glucose monitor as directed.  Qty: 1 Bottle, Refills: 1    Associated Diagnoses: Type 2 diabetes mellitus without complication, without long-term current use of insulin (H)      blood glucose monitoring (ONE TOUCH ULTRA 2) meter device kit Use to test blood sugar 1 times daily.  Qty: 1 kit, Refills: 0    Associated Diagnoses: Type 2 diabetes mellitus without complication, without long-term current use of insulin (H)           STOP taking these medications       fexofenadine (ALLEGRA) 180 MG tablet Comments:    Reason for Stopping:         sertraline (ZOLOFT) 50 MG tablet Comments:   Reason for Stopping:             Allergies   Allergies   Allergen Reactions    Atovaquone-Proguanil Hcl Hives    Amoxicillin Hives    Bee Hives and Swelling     carries Epi Pen     Ceftin Hives     hives    Clindamycin Hives    Erythromycin GI Disturbance    Seasonal Allergies     Shellfish-Derived Products Unknown    Shrimp Hives     Happened twice    Tetracycline Other (See Comments)     ?severe headaches  & nausea      Data   Recent Labs   Lab Test 09/11/23  1652 06/14/21  1010 07/21/16  1034   WBC 9.0 7.5 7.0   HGB 14.7 13.5 13.5   MCV 94 93 92    278 265   INR 0.91  --   --       Recent Labs   Lab Test 09/13/23  0750 09/13/23  0214 09/12/23  2124 09/12/23  1652 09/12/23  1106 09/11/23  2246 09/11/23  1652 06/13/23  1032   NA  --   --   --   --  139  --  139 140   POTASSIUM  --   --   --   --  4.0  --  4.1 4.0   CHLORIDE  --   --   --   --  103  --  98 102   CO2  --   --   --   --  24  --  25 24   BUN  --   --   --   --  12.6  --  13.6 13.3   CR  --   --   --   --  0.73  --  0.74 0.79   ANIONGAP  --   --   --   --  12  --  16* 14   KIM  --   --   --   --  9.5  --  10.6* 9.9   * 128* 198*   < > 194*   < > 129* 130*    < > = values in this interval not displayed.         Results for orders placed or performed during the hospital encounter of 09/11/23   CT Head w/o Contrast    Narrative    EXAM: CT HEAD W/O CONTRAST  LOCATION: [facility]  DATE: 9/11/2023    INDICATION: Code Stroke to evaluate for potential thrombolysis and thrombectomy. Aphasia, left-sided weakness.    COMPARISON: None.    TECHNIQUE: Routine CT head without IV contrast. Multiplanar reformats. Dose reduction techniques were used.      Impression    IMPRESSION: No evidence of hemorrhage. No convincing acute infarct. Parenchyma within normal limits for age. No acute osseous abnormality.    Findings were discussed by phone between   Fortunato and Dr. Cecilia Alexander as at approximately 4:50 p.m. on 9/11/2023.     CTA Head Neck with Contrast    Narrative    EXAM: CTA HEAD NECK W CONTRAST  LOCATION: [facility]  DATE: 9/11/2023    INDICATION: Code Stroke to evaluate for potential thrombolysis and thrombectomy. PLEASE READ IMMEDIATELY. Left-sided weakness, aphasia.  COMPARISON: None.  CONTRAST: 75 mL Isovue 370  TECHNIQUE: Head and neck CT angiogram with IV contrast. Noncontrast head CT followed by axial helical CT images of the head and neck vessels obtained during the arterial phase of intravenous contrast administration. Axial 2D reconstructed images and   multiplanar 3D MIP reconstructed images of the head and neck vessels were performed by the technologist. Dose reduction techniques were used. All stenosis measurements made according to NASCET criteria unless otherwise specified.    FINDINGS:     HEAD CTA:  No evidence of large vessel occlusion, high-grade stenosis, aneurysm, or vascular malformation.    NECK CTA:  No evidence of large vessel occlusion, high-grade stenosis, or dissection.    NONVASCULAR STRUCTURES: Cervical spine degenerative changes. Presumed atelectasis at the lung apices.      Impression    IMPRESSION:     HEAD CTA:   1.  No evidence of large vessel occlusion or high-grade stenosis.    NECK CTA:  1.  No evidence of large vessel occlusion or high-grade stenosis.   MR Brain w/o & w Contrast    Narrative    EXAM: MR BRAIN W/O and W CONTRAST  LOCATION: [facility]  DATE/TIME: 9/11/2023 8:16 PM CDT    INDICATION: left sided weakness, resolved  COMPARISON: Same day CTA head and neck.  CONTRAST: 8 mL Gadavist  TECHNIQUE: Routine multiplanar multisequence head MRI without and with intravenous contrast.    FINDINGS:    INTRACRANIAL CONTENTS: Cortical reduced diffusivity and FLAIR hyperintensity in the left middle frontal gyrus and encroaching upon the lateral precentral gyrus.  Subtle corresponding enhancement is compatible with subacute ischemia related to blood brain   barrier breakdown. No mass effect, acute hemorrhage, or extra-axial collection. Background parenchymal signal is otherwise normal for age. Normal ventricles and sulci. Normal position of the cerebellar tonsils.    SELLA: No abnormality accounting for technique.    OSSEOUS STRUCTURES/SOFT TISSUES: Normal marrow signal. The major intracranial vascular flow voids are maintained.    ORBITS: No visible intraorbital abnormality.     SINUSES/MASTOIDS: Mild mucosal thickening scattered about the paranasal sinuses. No middle ear or mastoid effusion.      Impression    IMPRESSION:  1.  Findings most compatible with subacute cortical ischemia in the left middle frontal gyrus encroaching upon the lateral precentral gyrus.       Echocardiogram Complete     Value    LVEF  55-60%    Narrative    [id number]  [id number]  [id number]  [id number]^FRANCIS^LEON^BRODIE     [facility]  Echocardiography Laboratory  [address] [place] [address]     Name: STACIA MARIE  MRN: [id number]  : 1950  Study Date: 2023 08:14 AM  Age: 73 yrs  Gender: Female  Patient Location: [facility]  Reason For Study: CVA  Ordering Physician: LEON BLANCO  Referring Physician: SHASTA LEON  Performed By: Alpa Tiwari     BSA: 1.8 m2  Height: 64 in  Weight: 174 lb  HR: 93  BP: 137/88 mmHg  ______________________________________________________________________________  Procedure  Complete Echo Adult.  ______________________________________________________________________________  Interpretation Summary     1. Normal biventricular size and function. Left ventricular ejection fraction  of 60-65%.  2. No segmental wall motion abnormalities noted.  3. No hemodynamically significant valvular disease.  No prior study for comparison.  ______________________________________________________________________________  Left Ventricle  The  left ventricle is normal in size. There is normal left ventricular wall  thickness. Left ventricular systolic function is normal. The visual ejection  fraction is 55-60%. Grade I or early diastolic dysfunction. No regional wall  motion abnormalities noted.     Right Ventricle  The right ventricle is normal in size and function.     Atria  Normal left atrial size. The atrial walls appear thickenned. Right atrial size  is normal.     Mitral Valve  The mitral valve leaflets appear normal. There is no evidence of stenosis,  fluttering, or prolapse. There is trace mitral regurgitation.     Tricuspid Valve  Normal tricuspid valve. There is trace tricuspid regurgitation.     Aortic Valve  There is mild trileaflet aortic sclerosis. There is trace aortic  regurgitation.     Pulmonic Valve  The pulmonic valve is not well visualized.     Vessels  Normal size aorta. Normal size ascending aorta. IVC diameter <2.1 cm  collapsing >50% with sniff suggests a normal RA pressure of 3 mmHg.     Pericardium  There is no pericardial effusion.     Rhythm  Sinus rhythm was noted.  ______________________________________________________________________________  MMode/2D Measurements & Calculations  IVSd: 0.80 cm     LVIDd: 4.8 cm  LVIDs: 3.3 cm  LVPWd: 0.70 cm  FS: 31.4 %  LV mass(C)d: 116.6 grams  LV mass(C)dI: 63.3 grams/m2  Ao root diam: 2.7 cm  asc Aorta Diam: 3.4 cm  LVOT diam: 1.9 cm  LVOT area: 2.9 cm2  Ao root diam Index (cm/m2): 1.5  asc Aorta Diam Index (cm/m2): 1.9  LA Volume (BP): 19.8 ml  LA Volume Index (BP): 10.8 ml/m2     RWT: 0.29  TAPSE: 2.0 cm     Doppler Measurements & Calculations  MV E max gama: 54.5 cm/sec  MV A max gama: 111.0 cm/sec  MV E/A: 0.49  MV dec time: 0.18 sec  Ao V2 max: 145.0 cm/sec  Ao max P.0 mmHg  Ao V2 mean: 95.3 cm/sec  Ao mean P.0 mmHg  Ao V2 VTI: 26.4 cm  RUSTY(I,D): 2.2 cm2  RUSTY(V,D): 2.2 cm2  LV V1 max P.7 mmHg  LV V1 max: 108.0 cm/sec  LV V1 VTI: 20.0 cm  SV(LVOT): 58.8 ml  SI(LVOT): 31.9  ml/m2  PA V2 max: 101.8 cm/sec  PA max P.1 mmHg  PA acc time: 0.11 sec  AV Neal Ratio (DI): 0.74  RUSTY Index (cm2/m2): 1.2  E/E' av.9  Lateral E/e': 5.3  Medial E/e': 8.5  RV S Neal: 17.7 cm/sec     ______________________________________________________________________________  Report approved by: Miguel Tolliver 2023 10:06 AM

## 2023-09-13 NOTE — PLAN OF CARE
SLP: Orders received for aphasia evaluation. Per discussion with RN and MD: plan to discharge home with home therapies today. Hospitalist did add speech to home care orders - defer full assessment and follow up intervention to  SLP.

## 2023-09-13 NOTE — PLAN OF CARE
Occupational Therapy Discharge Summary    Reason for therapy discharge:    Discharged to home with home therapy.    Progress towards therapy goal(s). See goals on Care Plan in [facility] electronic health record for goal details.  Goals partially met.  Barriers to achieving goals:   discharge from facility.    Therapy recommendation(s):    Continued therapy is recommended.  Rationale/Recommendations:  Per chart review, pt would benefit from continued HHOT to progress to prior level of function.

## 2023-09-13 NOTE — PLAN OF CARE
Pt here with L frontal stroke. A&Ox4. Neuros intact ex WFD, whisper. R thigh numbness and tingling while walking- pt states has been present for months. VSS on RA. Tele NSR. reg diet, thin liquids. Takes pills whole. Up with 1 GB/W. Continent.  2 PIVs, SL. Denies pain. Pt scoring green on the Aggression Stop Light Tool. Plan for home with assist/therapies. Discharge pending.

## 2023-09-13 NOTE — CONSULTS
Care Management Initial Consult    General Information  Assessment completed with: Patient, Spouse or significant other, Children,    Type of CM/SW Visit: Initial Assessment    Primary Care Provider verified and updated as needed: Yes   Readmission within the last 30 days: no previous admission in last 30 days         Advance Care Planning: Advance Care Planning Reviewed: no concerns identified          Communication Assessment  Patient's communication style: spoken language (English or Bilingual)    Hearing Difficulty or Deaf: no   Wear Glasses or Blind: yes    Cognitive  Cognitive/Neuro/Behavioral: .WDL except, speech  Level of Consciousness: alert  Arousal Level: opens eyes spontaneously  Orientation: oriented x 4  Mood/Behavior: calm, cooperative  Best Language: 1 - Mild to moderate  Speech: whispers, other (see comments) (stutters)    Living Environment:   People in home: spouse, child(heber), adult     Current living Arrangements:        Able to return to prior arrangements: yes       Family/Social Support:  Care provided by: self  Provides care for:    Marital Status:   Children,           Description of Support System: Supportive    Support Assessment: Adequate family and caregiver support    Current Resources:   Patient receiving home care services:       Community Resources:    Equipment currently used at home: cane, quad  Supplies currently used at home:      Employment/Financial:  Employment Status: retired        Financial Concerns: No concerns identified           Does the patient's insurance plan have a 3 day qualifying hospital stay waiver?  No    Lifestyle & Psychosocial Needs:  Social Determinants of Health     Tobacco Use: Medium Risk (6/6/2023)    Patient History     Smoking Tobacco Use: Former     Smokeless Tobacco Use: Never     Passive Exposure: Not on file   Alcohol Use: Not on file   Financial Resource Strain: Not on file   Food Insecurity: Not on file   Transportation Needs: Not  on file   Physical Activity: Not on file   Stress: Not on file   Social Connections: Not on file   Intimate Partner Violence: Not on file   Depression: Not at risk (6/6/2023)    PHQ-2     PHQ-2 Score: 1   Housing Stability: Not on file       Functional Status:  Prior to admission patient needed assistance:   Dependent ADLs:: Independent  Dependent IADLs:: Independent  Assesssment of Functional Status: Not at  functional baseline    Mental Health Status:          Chemical Dependency Status:                Values/Beliefs:  Spiritual, Cultural Beliefs, Scientologist Practices, Values that affect care:                 Additional Information:  CM consult for discharge planning.  Met with patient, her  and daughter.  Patient lives with her  and their adult daughter and family.  Patient's spouse does work, but his office is in the home.  Reviewed therapy recommendations for C and patient and family in agreement.  She does have a PCP appointment scheduled.  Referral sent for C PT, OT and SLP.    Patient accepted by [facility].  They are able to obtain orders from [facility].    Diya Au RN, BSN, PHN  Inpatient Care Coordination  [facility]  Phone: [phone]

## 2023-09-17 ENCOUNTER — HEALTH MAINTENANCE LETTER (OUTPATIENT)
Age: 73
End: 2023-09-17

## 2023-09-18 ENCOUNTER — TELEPHONE (OUTPATIENT)
Dept: FAMILY MEDICINE | Facility: CLINIC | Age: 73
End: 2023-09-18
Payer: COMMERCIAL

## 2023-09-18 NOTE — TELEPHONE ENCOUNTER
Home Care is calling regarding an established patient with [facility].  {     Requesting orders from: Masood Osborne  Provider is following patient: Yes  Is this a 60-day recertification request?  No    Orders Requested    Physical Therapy  Need additional PT visits after initial evaluation last week.  PT 2 x a wk for 3 wks  1 x a wk for 2 wks   Orders needed for strength and balance.     Confirmed ok to leave a detailed message with call back.  Contact information confirmed and updated as needed.    Abelino Marshall RN

## 2023-09-20 ENCOUNTER — OFFICE VISIT (OUTPATIENT)
Dept: FAMILY MEDICINE | Facility: CLINIC | Age: 73
End: 2023-09-20
Payer: COMMERCIAL

## 2023-09-20 ENCOUNTER — TELEPHONE (OUTPATIENT)
Dept: NEUROLOGY | Facility: CLINIC | Age: 73
End: 2023-09-20

## 2023-09-20 VITALS
DIASTOLIC BLOOD PRESSURE: 70 MMHG | TEMPERATURE: 98.7 F | RESPIRATION RATE: 18 BRPM | WEIGHT: 174 LBS | BODY MASS INDEX: 29.87 KG/M2 | HEART RATE: 89 BPM | OXYGEN SATURATION: 95 % | SYSTOLIC BLOOD PRESSURE: 124 MMHG

## 2023-09-20 DIAGNOSIS — Z09 HOSPITAL DISCHARGE FOLLOW-UP: Primary | ICD-10-CM

## 2023-09-20 DIAGNOSIS — I63.9 ACUTE ISCHEMIC STROKE (H): ICD-10-CM

## 2023-09-20 DIAGNOSIS — R47.01 APHASIA: ICD-10-CM

## 2023-09-20 LAB
ALBUMIN SERPL BCG-MCNC: 4.7 G/DL (ref 3.5–5.2)
ALP SERPL-CCNC: 56 U/L (ref 35–104)
ALT SERPL W P-5'-P-CCNC: 36 U/L (ref 0–50)
ANION GAP SERPL CALCULATED.3IONS-SCNC: 14 MMOL/L (ref 7–15)
AST SERPL W P-5'-P-CCNC: 30 U/L (ref 0–45)
BILIRUB SERPL-MCNC: 0.4 MG/DL
BUN SERPL-MCNC: 14.7 MG/DL (ref 8–23)
CALCIUM SERPL-MCNC: 10.1 MG/DL (ref 8.8–10.2)
CHLORIDE SERPL-SCNC: 97 MMOL/L (ref 98–107)
CREAT SERPL-MCNC: 0.74 MG/DL (ref 0.51–0.95)
DEPRECATED HCO3 PLAS-SCNC: 25 MMOL/L (ref 22–29)
EGFRCR SERPLBLD CKD-EPI 2021: 84 ML/MIN/1.73M2
ERYTHROCYTE [DISTWIDTH] IN BLOOD BY AUTOMATED COUNT: 12.6 % (ref 10–15)
GLUCOSE SERPL-MCNC: 132 MG/DL (ref 70–99)
HBA1C MFR BLD: 6.1 % (ref 0–5.6)
HCT VFR BLD AUTO: 43.4 % (ref 35–47)
HGB BLD-MCNC: 14 G/DL (ref 11.7–15.7)
MCH RBC QN AUTO: 29.5 PG (ref 26.5–33)
MCHC RBC AUTO-ENTMCNC: 32.3 G/DL (ref 31.5–36.5)
MCV RBC AUTO: 91 FL (ref 78–100)
PLATELET # BLD AUTO: 347 10E3/UL (ref 150–450)
POTASSIUM SERPL-SCNC: 4.4 MMOL/L (ref 3.4–5.3)
PROT SERPL-MCNC: 7.4 G/DL (ref 6.4–8.3)
RBC # BLD AUTO: 4.75 10E6/UL (ref 3.8–5.2)
SODIUM SERPL-SCNC: 136 MMOL/L (ref 136–145)
WBC # BLD AUTO: 9.7 10E3/UL (ref 4–11)

## 2023-09-20 PROCEDURE — 85027 COMPLETE CBC AUTOMATED: CPT | Performed by: FAMILY MEDICINE

## 2023-09-20 PROCEDURE — 99214 OFFICE O/P EST MOD 30 MIN: CPT | Performed by: FAMILY MEDICINE

## 2023-09-20 PROCEDURE — 80053 COMPREHEN METABOLIC PANEL: CPT | Performed by: FAMILY MEDICINE

## 2023-09-20 PROCEDURE — 83036 HEMOGLOBIN GLYCOSYLATED A1C: CPT | Performed by: FAMILY MEDICINE

## 2023-09-20 PROCEDURE — 36415 COLL VENOUS BLD VENIPUNCTURE: CPT | Performed by: FAMILY MEDICINE

## 2023-09-20 ASSESSMENT — PAIN SCALES - GENERAL: PAINLEVEL: NO PAIN (0)

## 2023-09-20 NOTE — PROGRESS NOTES
"  Assessment & Plan     Hospital discharge follow-up    - CBC with platelets; Future  - Comprehensive metabolic panel; Future  - CBC with platelets  - Comprehensive metabolic panel    Acute ischemic stroke (H)  Resume the use of Plavix and discontinue after 21 days of use, along with 325 mg of aspirin.  Resume aspirin after that indefinitely.  Patient has home health and physical therapy working with her right now    Aphasia  .  So far she has not been scheduled to see speech therapy or neurology.  Referral placed.    Patient is able to swallow regular food for her and her daughter  - Speech Therapy Referral; Future  - Adult Neurology  Referral; Future           MED REC REQUIRED  Post Medication Reconciliation Status: discharge medications reconciled, continue medications without change  BMI:   Estimated body mass index is 29.87 kg/m  as calculated from the following:    Height as of 9/11/23: 1.626 m (5' 4\").    Weight as of this encounter: 78.9 kg (174 lb).           Renato Plata MD  [facility] JESSICA Marquez is a 73 year old, presenting for the following health issues:  Hospital F/U        9/20/2023     8:18 AM   Additional Questions   Roomed by Jacques GUAJARDO   Accompanied by Daughter Annie       [facility]         Hospital Follow-up Visit:    Hospital/Nursing Home/IP Rehab Facility: [facility]  Date of Admission: 9/11  Date of Discharge: 9/13  Reason(s) for Admission: Left sided weakness, aphasia    Was your hospitalization related to COVID-19? No   Problems taking medications regularly:  None  Medication changes since discharge: None  Problems adhering to non-medication therapy:  Pt needs PT orders placed, is scheduled to start this afternoon.    Summary of hospitalization:  [facility] discharge summary reviewed  Diagnostic Tests/Treatments reviewed.  Follow up needed: none  Other Healthcare Providers Involved in Patient s Care:         " Homecare and neurology and speech therapy  Update since discharge: stable.         Plan of care communicated with patient                   Review of Systems   CONSTITUTIONAL: NEGATIVE for fever, chills, change in weight  ENT/MOUTH: NEGATIVE for ear, mouth and throat problems  RESP: NEGATIVE for significant cough or SOB  CV: NEGATIVE for chest pain, palpitations or peripheral edema      Objective    /70   Pulse 89   Temp 98.7  F (37.1  C) (Tympanic)   Resp 18   Wt 78.9 kg (174 lb)   LMP 03/17/2001   SpO2 95%   BMI 29.87 kg/m    Body mass index is 29.87 kg/m .  Physical Exam   GENERAL: healthy, alert and no distress  NECK: no adenopathy, no asymmetry, masses, or scars and thyroid normal to palpation  RESP: lungs clear to auscultation - no rales, rhonchi or wheezes  CV: regular rate and rhythm, normal S1 S2, no S3 or S4, no murmur, click or rub, no peripheral edema and peripheral pulses strong  ABDOMEN: soft, nontender, no hepatosplenomegaly, no masses and bowel sounds normal  NEURO: sensory exam grossly normal, mentation intact, patient has expressive aphasia.  Upper and lower extremity with strength of 4.5 out of 5 in the left and 5 out of 5 on the right.  Using a walker.  Cranial nerves II to XII intact

## 2023-09-20 NOTE — RESULT ENCOUNTER NOTE
Alma,    Your hemoglobin A1c is stable/improved at 6.1%.    Senait Cohen PA-C  Covering for Dr. Osborne

## 2023-09-20 NOTE — TELEPHONE ENCOUNTER
[facility] Call Center    Phone Message    May a detailed message be left on voicemail: yes     Reason for Call: Appointment Intake    Referring Provider Name: Renato Plata MD in [facility] FP/IM/PEDS    Diagnosis and/or Symptoms:     recent hospital discharge/ Aphasia / recent stroke       Patients daughter Janis called to schedule Stroke, referral does not states if patient n eeds to see LISA or General. Please review and assist with referral     Action Taken: Other: cs neurology    Travel Screening: Not Applicable

## 2023-09-21 NOTE — TELEPHONE ENCOUNTER
Per chart review referral is for general neurology 6-8 weeks.      Will route to scheduling to assist with scheduling pt.     If nothing available advise SNADEEP or Yazmin.     Kathleen MCDONALD RN, BSN

## 2023-09-22 ENCOUNTER — TELEPHONE (OUTPATIENT)
Dept: FAMILY MEDICINE | Facility: CLINIC | Age: 73
End: 2023-09-22
Payer: COMMERCIAL

## 2023-09-22 NOTE — TELEPHONE ENCOUNTER
Home Care is calling regarding an established patient with [facility].       Requesting orders from: Masood Osborne  Provider is following patient: Yes  Is this a 60-day recertification request?  No    Orders Requested    Speech Therapy  Request for initial certification (first set of orders)   Frequency:  Speech Therapy starting Saturday 1x a week for 1 week, 2x a week for 2 weeks, and 1x a week for 1 week.  Addressing expressive language and voice.   Routing to provider to review/advise.    Confirmed ok to leave a detailed message with call back.  Contact information confirmed and updated as needed.    Sharee Gómez RN

## 2023-09-22 NOTE — TELEPHONE ENCOUNTER
Writer called and left detailed message on confidential VM of Angella speech therapist and notified of provider's approval for requested home care orders.    Advised that Angella callback to triage if any further questions or concerns.    Signing encounter.    Ann Lemus RN  [facility]

## 2023-09-26 ENCOUNTER — NURSE TRIAGE (OUTPATIENT)
Dept: FAMILY MEDICINE | Facility: CLINIC | Age: 73
End: 2023-09-26
Payer: COMMERCIAL

## 2023-09-26 DIAGNOSIS — I63.9 ACUTE ISCHEMIC STROKE (H): Primary | ICD-10-CM

## 2023-09-26 DIAGNOSIS — R00.0 SINUS TACHYCARDIA: ICD-10-CM

## 2023-09-26 DIAGNOSIS — I10 ESSENTIAL HYPERTENSION, BENIGN: ICD-10-CM

## 2023-09-26 RX ORDER — CARVEDILOL 3.12 MG/1
3.12 TABLET ORAL 2 TIMES DAILY WITH MEALS
Qty: 60 TABLET | Refills: 3 | Status: SHIPPED | OUTPATIENT
Start: 2023-09-26 | End: 2023-12-12

## 2023-09-26 NOTE — TELEPHONE ENCOUNTER
Spoke with patients daughter. Aware of the carvedilol and agreeable to the plan. However, you only have virtual appointments available. Patient booked for a virtual follow up on 10/27/23. Will this be sufficient or can it be changed to an in-person appointment?      NAZARIO Berry  [facility]

## 2023-09-26 NOTE — TELEPHONE ENCOUNTER
TO PCP:     Patient's home care nurse called to report tachycardia:     Resting -107 today   No symptoms   Manual pulse felt irregular, was unable to count manually. Uncertain but thinks maybe it sped up and slowed down   Not feeling any palpitations, no chest pain or SOB   A little more fatigued today   Cardiac history: HTN, recently had a hemorrhagic stroke and was recently hospitalized and followed up with Dr Plata for ER follow up last week     Patient currently wearing a cardiac event monitor (30 day monitor) - per protocol, sending message to PCP for recommendation/second level triage     Per home care nurse, prefers that we call patient rather than her back     Sherrill Zhu RN    Reason for Disposition   Wearing a 'Holter monitor' or 'cardiac event monitor'    Additional Information   Negative: Passed out (i.e., lost consciousness, collapsed and was not responding)   Negative: Shock suspected (e.g., cold/pale/clammy skin, too weak to stand, low BP, rapid pulse)   Negative: Difficult to awaken or acting confused (e.g., disoriented, slurred speech)   Negative: Visible sweat on face or sweat dripping down face   Negative: Unable to walk, or can only walk with assistance (e.g., requires support)   Negative: Received SHOCK from implantable cardiac defibrillator and has persisting symptoms (i.e., palpitations, lightheadedness)   Negative: Dizziness, lightheadedness, or weakness and heart beating very rapidly (e.g., > 140 / minute)   Negative: Dizziness, lightheadedness, or weakness and heart beating very slowly (e.g., < 50 / minute)   Negative: Sounds like a life-threatening emergency to the triager   Negative: Chest pain   Negative: Difficulty breathing   Negative: Dizziness, lightheadedness, or weakness   Negative: Heart beating very rapidly (e.g., > 140 / minute) and present now  (Exception: During exercise.)   Negative: Heart beating very slowly (e.g., < 50 / minute)  (Exception: Athlete and heart rate  normal for caller.)   Negative: New or worsened shortness of breath with activity (dyspnea on exertion)   Negative: Patient sounds very sick or weak to the triager    Protocols used: Heart Rate and Heartbeat [id number]

## 2023-09-29 DIAGNOSIS — Z53.9 DIAGNOSIS NOT YET DEFINED: Primary | ICD-10-CM

## 2023-09-29 PROCEDURE — G0180 MD CERTIFICATION HHA PATIENT: HCPCS | Performed by: INTERNAL MEDICINE

## 2023-10-03 ENCOUNTER — TELEPHONE (OUTPATIENT)
Dept: FAMILY MEDICINE | Facility: CLINIC | Age: 73
End: 2023-10-03
Payer: COMMERCIAL

## 2023-10-03 NOTE — TELEPHONE ENCOUNTER
Meli VALIENTE with [facility] calling to request verbal orders, see order details below.     Routing to PCP for approval.       Home Care is calling regarding an established patient with [facility].       Requesting orders from: Masood Osborne  Provider is following patient: Yes  Is this a 60-day recertification request?  No    Orders Requested    Occupational Therapy  Request for initial certification (first set of orders)   Frequency: 1x per week for 2 weeks.     Information was gathered and will be sent to provider for review.  RN will contact Home Care with information after provider review.  Confirmed ok to leave a detailed message with call back.  Contact information confirmed and updated as needed.    Can we leave a detailed message on this number? YES  Phone number patient can be reached at: Other phone number: Meli STEFFANIE with [facility] Confidential Line: [phone].    Justice L. Phoenix, RN  [facility] Triage

## 2023-10-06 ENCOUNTER — TELEPHONE (OUTPATIENT)
Dept: FAMILY MEDICINE | Facility: CLINIC | Age: 73
End: 2023-10-06
Payer: COMMERCIAL

## 2023-10-06 NOTE — TELEPHONE ENCOUNTER
Home Care is calling regarding an established patient with [facility].       Requesting orders from: Masood Osborne  Provider is following patient: Yes  Is this a 60-day recertification request?  No    Orders Requested    Physical Therapy  Request for continuation of care with no increase or decrease in frequency  Frequency:  1x/wk for 3 wks        Verbal orders given.  Home Care will send orders for provider to sign.  Confirmed ok to leave a detailed message with call back.  Contact information confirmed and updated as needed.    Lisette Israel RN

## 2023-10-07 ENCOUNTER — HOSPITAL ENCOUNTER (EMERGENCY)
Facility: CLINIC | Age: 73
Discharge: HOME | End: 2023-10-07
Attending: EMERGENCY MEDICINE | Admitting: EMERGENCY MEDICINE
Payer: COMMERCIAL

## 2023-10-07 VITALS
OXYGEN SATURATION: 95 % | WEIGHT: 174 LBS | DIASTOLIC BLOOD PRESSURE: 77 MMHG | RESPIRATION RATE: 16 BRPM | HEIGHT: 64 IN | TEMPERATURE: 97.8 F | SYSTOLIC BLOOD PRESSURE: 115 MMHG | BODY MASS INDEX: 29.71 KG/M2 | HEART RATE: 76 BPM

## 2023-10-07 DIAGNOSIS — R04.0 EPISTAXIS: ICD-10-CM

## 2023-10-07 LAB
BASO+EOS+MONOS # BLD AUTO: NORMAL 10*3/UL
BASO+EOS+MONOS NFR BLD AUTO: NORMAL %
BASOPHILS # BLD AUTO: 0.1 10E3/UL (ref 0–0.2)
BASOPHILS NFR BLD AUTO: 1 %
EOSINOPHIL # BLD AUTO: 0.4 10E3/UL (ref 0–0.7)
EOSINOPHIL NFR BLD AUTO: 5 %
ERYTHROCYTE [DISTWIDTH] IN BLOOD BY AUTOMATED COUNT: 12.3 % (ref 10–15)
HCT VFR BLD AUTO: 38.9 % (ref 35–47)
HGB BLD-MCNC: 12.5 G/DL (ref 11.7–15.7)
IMM GRANULOCYTES # BLD: 0 10E3/UL
IMM GRANULOCYTES NFR BLD: 1 %
LYMPHOCYTES # BLD AUTO: 1.6 10E3/UL (ref 0.8–5.3)
LYMPHOCYTES NFR BLD AUTO: 20 %
MCH RBC QN AUTO: 29.6 PG (ref 26.5–33)
MCHC RBC AUTO-ENTMCNC: 32.1 G/DL (ref 31.5–36.5)
MCV RBC AUTO: 92 FL (ref 78–100)
MONOCYTES # BLD AUTO: 0.6 10E3/UL (ref 0–1.3)
MONOCYTES NFR BLD AUTO: 7 %
NEUTROPHILS # BLD AUTO: 5.4 10E3/UL (ref 1.6–8.3)
NEUTROPHILS NFR BLD AUTO: 66 %
NRBC # BLD AUTO: 0 10E3/UL
NRBC BLD AUTO-RTO: 0 /100
PLATELET # BLD AUTO: 369 10E3/UL (ref 150–450)
RBC # BLD AUTO: 4.23 10E6/UL (ref 3.8–5.2)
WBC # BLD AUTO: 8.1 10E3/UL (ref 4–11)

## 2023-10-07 PROCEDURE — 85025 COMPLETE CBC W/AUTO DIFF WBC: CPT | Performed by: EMERGENCY MEDICINE

## 2023-10-07 PROCEDURE — 30901 CONTROL OF NOSEBLEED: CPT | Mod: RT

## 2023-10-07 PROCEDURE — 36415 COLL VENOUS BLD VENIPUNCTURE: CPT | Performed by: EMERGENCY MEDICINE

## 2023-10-07 PROCEDURE — 99284 EMERGENCY DEPT VISIT MOD MDM: CPT | Mod: 25

## 2023-10-07 ASSESSMENT — ACTIVITIES OF DAILY LIVING (ADL): ADLS_ACUITY_SCORE: 35

## 2023-10-07 NOTE — ED NOTES
Bed: ED14  Expected date:   Expected time:   Means of arrival:   [place]  [facility]  73 [place] epistaxis  Here now

## 2023-10-07 NOTE — DISCHARGE INSTRUCTIONS
"Please use nasal saline throughout the day to help lubricate the nose.  Apply Aquaphor or Vaseline inside the nare for moisturizing as well.  Use a humidifier at night.  Return to the ER if having intractable bleeding, fevers, chills or other concerns.  If your nose starts to bleed again you may apply a couple squirts of Afrin and pressure.  If it does not resolve after 20 minutes of pressure or you have any other more concerning symptoms such as lightheadedness, dizziness or severe bleeding then return to the ER.    Discharge Instructions  Epistaxis    Today you were seen for a nosebleed.   Nosebleeds (the medical term is \"epistaxis\") are very common. Almost every person has had at least one in their lifetime.  Although the amount of blood loss can appear dramatic, nosebleeds rarely cause serious problems.  The most common causes are dry air or nose picking, but they also are common in people who have allergies, high blood pressure, or are on blood thinners (such as Coumadin, Aspirin or Plavix). If you or your child gets a nosebleed, the important thing is to know how to take care of it. With the right self-care, most nosebleeds will stop on their own.    Generally, every Emergency Department visit should have a follow-up clinic visit with either a primary or a specialty clinic/provider. Please follow-up as instructed by your emergency provider today.    Return to the Emergency Department if:  Your nose is bleeding a very large amount of blood and you are unable to stop it.  You get very pale, faint, or tired.  You cannot get the bleeding to stop after following these instructions.    Treatment:  Your provider may tell you to use a decongestant nose spray, like Afrin  (oxymetazoline), in both nostrils in the morning and at night for the next three days. Do not use this medicine for more than three days at a time.  If you do, it will cause nasal congestion.   Use a moisturizer. A small amount of Vaseline  to the " inside of your nostrils for moisture before bed is one option. There are nasal sprays available over-the-counter for this purpose as well.  Using a humidifier in your bedroom or home will help as well when the air is dry.  For the next three days, do not blow your nose or put anything in your nose. You may sniffle, or dab the outside of your nose.  Do not bend with your head below your waist for the next three days. Do not lift anything so heavy that you have to strain.   If you received nasal packing, please do not remove the packing until seen by an Ear, Nose, and Throat (ENT) specialist.  If antibiotics have been given with the packing, please take as directed.    If your nose starts to bleed again:  Blow your nose to get rid of some of the clots that have formed inside your nostrils. This may increase the bleeding temporarily, but that is okay.  Spray decongestant nose spray (like Afrin ) into both nostrils to constrict the blood vessels.  Sit or stand while bending forward slightly at the waist. Do not lie down or tilt your head back. This may cause you to swallow blood and can lead to vomiting.   the soft part of BOTH nostrils at the bottom of your nose and squeeze your nose closed for at least 5 minutes (for children) or 10 to 15 minutes (for adults). Use a clock to time yourself. Do not release the pressure every so often to check whether the bleeding has stopped. Many people hurt their chances of stopping the bleeding by releasing the pressure too soon or too often.    If you follow the steps outlined above, and your nose continues to bleed, repeat all the steps once more. Apply pressure for a total of at least 30 minutes. If you continue to bleed even then, seek medical attention.  If you were given a prescription for medicine here today, be sure to read all of the information (including the package insert) that comes with your prescription.  This will include important information about the medicine,  its side effects, and any warnings that you need to know about.  The pharmacist who fills the prescription can provide more information and answer questions you may have about the medicine.  If you have questions or concerns that the pharmacist cannot address, please call or return to the Emergency Department.   Remember that you can always come back to the Emergency Department if you are not able to see your regular provider in the amount of time listed above, if you get any new symptoms, or if there is anything that worries you.

## 2023-10-07 NOTE — ED PROVIDER NOTES
History     Chief Complaint:  Epistaxis       HPI   Elvia Helm is a 73 year old female who presents with epistasis.  Patient presents to the ER after a nosebleed that started around 730 this morning.  She had episode of the bleeding then resolved.  The bleeding then recurred and they called 911.  She was concerned by the amount of bleeding.  She is on aspirin recently stopped Plavix for recent stroke.  No new stroke symptoms.  No falls or trauma.  She reports that she has had a dry cough recently and feels like her nose is dry but otherwise no acute changes      Independent Historian:    none    Review of External Notes:  Reviewed admission and discharge summary from 9/11/2023 visit for stroke.  Was on aspirin and Plavix at that time.    Medications:    aspirin (ASA) 325 MG EC tablet  atorvastatin (LIPITOR) 10 MG tablet  azelastine (ASTELIN) 0.1 % nasal spray  blood glucose (NO BRAND SPECIFIED) lancets standard  blood glucose (ONETOUCH ULTRA) test strip  blood glucose calibration (ONETOUCH ULTRA CONTROL) solution  blood glucose monitoring (ONE TOUCH ULTRA 2) meter device kit  carvedilol (COREG) 3.125 MG tablet  Cholecalciferol (VITAMIN D3 PO)  clopidogrel (PLAVIX) 75 MG tablet  EPINEPHrine (ANY BX GENERIC EQUIV) 0.3 MG/0.3ML injection 2-pack  fluticasone (FLONASE) 50 MCG/ACT spray  lisinopril-hydrochlorothiazide (ZESTORETIC) 20-25 MG tablet  metFORMIN (GLUCOPHAGE) 850 MG tablet  Omega-3 Fatty Acids (OMEGA-3 FISH OIL PO)  sodium chloride (OCEAN) 0.65 % nasal spray  vitamin B complex with vitamin C (STRESS TAB) tablet        Past Medical History:    Past Medical History:   Diagnosis Date    Allergic rhinitis, cause unspecified 2003a    Allergic rhinitis, cause unspecified     Arthritis of knee 6/6/2014    Bee sting reaction 7-09    DIABETES TYPE II 2003    Essential hypertension, benign     Flat foot(734) 6/6/2014    Hyperlipidaemia LDL goal < 100     Localized osteoarthrosis not specified whether primary  "or secondary, ankle and foot     Tinnitus 2014       Past Surgical History:    Past Surgical History:   Procedure Laterality Date    ABDOMEN SURGERY  1988    ceasaran birth    COLONOSCOPY      COLONOSCOPY N/A 2022    Procedure: COLONOSCOPY, WITH POLYPECTOMY AND BIOPSY;  Surgeon: Nicole Grubbs MD;  Location:  GI    ENT SURGERY      frequent sinus infections    HC ENDOMETRIAL BIOPSY W/O CERVICAL DILATION      ORTHOPEDIC SURGERY      broken right ankle/plates and screws    SOFT TISSUE SURGERY      tendonitis in right upper leg and below right elbow    ZZC  DELIVERY ONLY      ZZC LIGATE FALLOPIAN TUBE,POSTPARTUM      ZZC NONSPECIFIC PROCEDURE      right ankle roe and pins          Physical Exam   Patient Vitals for the past 24 hrs:   BP Temp Temp src Pulse Resp SpO2 Height Weight   10/07/23 1146 115/77 -- -- 76 16 95 % -- --   10/07/23 1000 -- -- -- 71 -- 97 % -- --   10/07/23 0927 (!) 151/87 97.8  F (36.6  C) Oral 81 16 97 % 1.626 m (5' 4\") 78.9 kg (174 lb)        Physical Exam  General: Resting on the bed.  Head: No obvious trauma to head.  Ears, Nose, Throat:  External ears normal.  Nose normal externally, right nare with gauze in place once removed no active bleeding, irritation noted to the anterior septum of the right nare, left nare clear.  No pharyngeal erythema, swelling or exudate, no blood noted.  Midline uvula.    Eyes:  Conjunctivae clear.  Pupils are equal, round, and reactive.   Neck: Normal range of motion.  Neck supple.   CV: Regular rate and rhythm.  No murmurs.      Respiratory: Effort normal and breath sounds normal.  No wheezing or crackles.   Gastrointestinal: Soft.  No distension. There is no tenderness.  There is no rigidity, no rebound and no guarding.   Neuro: Alert. Moving all extremities appropriately.  Normal speech.  CN II-XII grossly intact.  Gross muscle strength intact of the proximal and distal bilateral upper and lower extremities.  " Sensation intact to light touch in all 4 extremities.    Skin: Skin is warm and dry.  No rash noted.       Emergency Department Course     Laboratory:  Labs Ordered and Resulted from Time of ED Arrival to Time of ED Departure   CBC WITH PLATELETS AND DIFFERENTIAL       Result Value    WBC Count 8.1      RBC Count 4.23      Hemoglobin 12.5      Hematocrit 38.9      MCV 92      MCH 29.6      MCHC 32.1      RDW 12.3      Platelet Count 369      % Neutrophils 66      % Lymphocytes 20      % Monocytes 7      Mids % (Monos, Eos, Basos)        % Eosinophils 5      % Basophils 1      % Immature Granulocytes 1      NRBCs per 100 WBC 0      Absolute Neutrophils 5.4      Absolute Lymphocytes 1.6      Absolute Monocytes 0.6      Mids Abs (Monos, Eos, Basos)        Absolute Eosinophils 0.4      Absolute Basophils 0.1      Absolute Immature Granulocytes 0.0      Absolute NRBCs 0.0          Procedures       Emergency Department Course & Assessments:             Interventions:  Medications - No data to display     Assessments:  1130 I met with patient, obtained history and performed examination.    Independent Interpretation (X-rays, CTs, rhythm strip):  None    Consultations/Discussion of Management or Tests:  ED Course as of 10/07/23 1806   Sat Oct 07, 2023   1228 I rechecked the patient.  No bleeding.        Social Determinants of Health affecting care:  none     Disposition:  The patient was discharged to home.     Impression & Plan      Medical Decision Makin-year-old female presents to the ER with nosebleed.  Vitals are reassuring.  Broad differentials pursued include not limited to anticoagulation, thrombocytopenia, DIC, trauma, etc.  Overall patient is well-appearing nontoxic.  Patient has some mild erythema noted to the anterior aspect of the right nare.  I suspect likely patient Plavix and aspirin as well as dry conditions have provoked a nosebleed at this time.  CBC shows normal hemoglobin, no anemia, no  thrombocytopenia or leukocytosis.  Afebrile.  Otherwise well-appearing nontoxic.  No other signs or symptoms of DIC.  No trauma.  Bleeding was stopped by topical TXA from medics.  Patient was observed in the ER without recurrence of bleeding.  We discussed supportive care.  Discussed close follow-up with PCP and/or ENT.  Return precautions were given and patient was discharged.    Diagnosis:    ICD-10-CM    1. Epistaxis  R04.0            Discharge Medications:  Discharge Medication List as of 10/7/2023 12:31 PM             10/7/2023   Chantal Roland MD Bennett, Jennifer L, MD  10/07/23 1802

## 2023-10-07 NOTE — ED TRIAGE NOTES
Pt BIBA from home with c/o a nosebleed starting this morning. Initially was able to get it to stop but then it restarted and was unable to stop it. States she felt clots going down the back of her throat. EMS placed gauze with topical TXA on it into R nare with relief. VSS, . Recent stroke and was admitted on 9/11, was on plavix for 21 days and stopped it 2 days ago.

## 2023-10-09 ENCOUNTER — PATIENT OUTREACH (OUTPATIENT)
Dept: FAMILY MEDICINE | Facility: CLINIC | Age: 73
End: 2023-10-09
Payer: COMMERCIAL

## 2023-10-09 NOTE — TELEPHONE ENCOUNTER
What type of discharge? Emergency Department  Risk of Hospital admission or ED visit: 71%  Is a TCM episode required? No  When should the patient follow up with PCP? n/a    Ann Lemus RN  [facility]

## 2023-10-10 ENCOUNTER — OFFICE VISIT (OUTPATIENT)
Dept: FAMILY MEDICINE | Facility: CLINIC | Age: 73
End: 2023-10-10
Payer: COMMERCIAL

## 2023-10-10 VITALS
DIASTOLIC BLOOD PRESSURE: 83 MMHG | RESPIRATION RATE: 20 BRPM | WEIGHT: 174 LBS | SYSTOLIC BLOOD PRESSURE: 130 MMHG | HEART RATE: 77 BPM | BODY MASS INDEX: 29.71 KG/M2 | TEMPERATURE: 96.9 F | OXYGEN SATURATION: 97 % | HEIGHT: 64 IN

## 2023-10-10 DIAGNOSIS — I63.511 ACUTE ISCHEMIC RIGHT MCA STROKE (H): Primary | ICD-10-CM

## 2023-10-10 DIAGNOSIS — I10 ESSENTIAL HYPERTENSION, BENIGN: ICD-10-CM

## 2023-10-10 DIAGNOSIS — E78.5 HYPERLIPIDEMIA LDL GOAL <100: ICD-10-CM

## 2023-10-10 DIAGNOSIS — E11.9 TYPE 2 DIABETES MELLITUS WITHOUT COMPLICATION, WITHOUT LONG-TERM CURRENT USE OF INSULIN (H): ICD-10-CM

## 2023-10-10 PROCEDURE — 99214 OFFICE O/P EST MOD 30 MIN: CPT | Performed by: INTERNAL MEDICINE

## 2023-10-10 ASSESSMENT — PAIN SCALES - GENERAL: PAINLEVEL: NO PAIN (0)

## 2023-10-10 NOTE — TELEPHONE ENCOUNTER
CC: Patient's daughter calling stating she is returning a call to triage.    Per chart review, patient already had ED follow up appointment with PCP today 10/10/23.    Hospital follow up protocol not needed at this time.    Daughter states they do not have any questions or concerns at this time. Signing encounter.    Ann Lemus RN  [facility]

## 2023-10-10 NOTE — PROGRESS NOTES
"  Assessment & Plan     Acute ischemic right MCA stroke ([facility]  Patient has fully recovered except for speech and some depression and anxiety from it  She does not want selective serotonin reuptake inhibitor  On speech therapy  On occupational therapy and physical therapy   Epistaxis stopped  Also on aspirin   Plavix done already  Has neuro appointment   Type 2 diabetes mellitus without complication, without long-term current use of insulin (H)  No vascular issues  - Hemoglobin A1c  On metformin  Well controlled    Essential hypertension, benign  On lisinopril hydrochlorothiazide  Well controlled    Hyperlipidemia LDL goal <100  Continue statins               MED REC REQUIRED  Post Medication Reconciliation Status: discharge medications reconciled, continue medications without change  BMI:   Estimated body mass index is 29.87 kg/m  as calculated from the following:    Height as of this encounter: 1.626 m (5' 4\").    Weight as of this encounter: 78.9 kg (174 lb).       Admission on 10/07/2023, Discharged on 10/07/2023   Component Date Value Ref Range Status    WBC Count 10/07/2023 8.1  4.0 - 11.0 10e3/uL Final    RBC Count 10/07/2023 4.23  3.80 - 5.20 10e6/uL Final    Hemoglobin 10/07/2023 12.5  11.7 - 15.7 g/dL Final    Hematocrit 10/07/2023 38.9  35.0 - 47.0 % Final    MCV 10/07/2023 92  78 - 100 fL Final    MCH 10/07/2023 29.6  26.5 - 33.0 pg Final    MCHC 10/07/2023 32.1  31.5 - 36.5 g/dL Final    RDW 10/07/2023 12.3  10.0 - 15.0 % Final    Platelet Count 10/07/2023 369  150 - 450 10e3/uL Final    % Neutrophils 10/07/2023 66  % Final    % Lymphocytes 10/07/2023 20  % Final    % Monocytes 10/07/2023 7  % Final    % Eosinophils 10/07/2023 5  % Final    % Basophils 10/07/2023 1  % Final    % Immature Granulocytes 10/07/2023 1  % Final    NRBCs per 100 WBC 10/07/2023 0  <1 /100 Final    Absolute Neutrophils 10/07/2023 5.4  1.6 - 8.3 10e3/uL Final    Absolute Lymphocytes 10/07/2023 1.6  0.8 - 5.3 10e3/uL Final    " Absolute Monocytes 10/07/2023 0.6  0.0 - 1.3 10e3/uL Final    Absolute Eosinophils 10/07/2023 0.4  0.0 - 0.7 10e3/uL Final    Absolute Basophils 10/07/2023 0.1  0.0 - 0.2 10e3/uL Final    Absolute Immature Granulocytes 10/07/2023 0.0  <=0.4 10e3/uL Final    Absolute NRBCs 10/07/2023 0.0  10e3/uL Final     Lab Results   Component Value Date    A1C 6.1 09/20/2023    A1C 6.3 09/12/2023    A1C 6.5 02/02/2023    A1C 6.2 09/29/2022    A1C 5.7 01/21/2022    A1C 6.1 06/14/2021    A1C 5.9 03/26/2021    A1C 6.4 06/23/2020    A1C 6.1 11/18/2019    A1C 6.1 05/17/2019     LDL Cholesterol Calculated   Date Value Ref Range Status   09/12/2023 66 <=100 mg/dL Final   06/14/2021 65 <100 mg/dL Final     Comment:     Desirable:       <100 mg/dl     Creatinine   Date Value Ref Range Status   09/20/2023 0.74 0.51 - 0.95 mg/dL Final   06/14/2021 0.81 0.52 - 1.04 mg/dL Final         Masood Osborne MD  [facility] Marquez is a 73 year old, presenting for the following health issues:  ER F/U      HPI   In mid September, patient had a trip and noted after coming home her speech was defective, noted by daughter  She had driven 4 hours   Regular day  911 was called found to have subacute left frontal stroke  No angiographic issue  No afib  Given monitor 30 days  9/18, reported palpitations   Strip is nsr   Feels good now  Speech better  Weakness gone  Last week in ED for epistaxis  Self limited    Blood glucose and blood pressure at home is excellent  Some depression noted  Never took previously prescribed zoloft  Daughter and  very attentive to her  Second daughter in PA school in [place]  ED/UC Followup:    Facility:  [facility]  Date of visit: 10/7/2023 (3 hours) also follow up from hospital stay in September    Reason for visit: Epistaxis  Current Status: improved        Review of Systems   10 point ROS of systems including Constitutional, Eyes, Respiratory,  "Cardiovascular, Gastroenterology, Genitourinary, Integumentary, Muscularskeletal, Psychiatric were all negative except for pertinent positives noted in my HPI.'      Objective    /83 (BP Location: Right arm, Patient Position: Chair, Cuff Size: Adult Regular)   Pulse 77   Temp 96.9  F (36.1  C) (Temporal)   Resp 20   Ht 1.626 m (5' 4\")   Wt 78.9 kg (174 lb)   LMP 03/17/2001   SpO2 97%   BMI 29.87 kg/m    Body mass index is 29.87 kg/m .  Physical Exam   GENERAL: healthy, alert and no distress  NECK: no adenopathy, no asymmetry, masses, or scars and thyroid normal to palpation  RESP: lungs clear to auscultation - no rales, rhonchi or wheezes  CV: regular rate and rhythm, normal S1 S2, no S3 or S4, no murmur, click or rub, no peripheral edema and peripheral pulses strong  ABDOMEN: soft, nontender, no hepatosplenomegaly, no masses and bowel sounds normal  MS: no gross musculoskeletal defects noted, no edema  NEURO: Normal strength and tone, mentation intact and speech some dysarthria, normal    Patient Active Problem List   Diagnosis    Allergic rhinitis    Essential hypertension, benign    Postmenopausal atrophic vaginitis    Bee sting reaction    Torticollis    Type 2 diabetes mellitus without complications (H)    HYPERLIPIDEMIA LDL GOAL <100    Post-nasal drip    Advanced directives, counseling/discussion    Osteopenia    Urticaria    Arthritis of knee    Pes planus    Tinnitus    Left-sided weakness    Acute ischemic right MCA stroke (H)     Current Outpatient Medications   Medication    aspirin (ASA) 325 MG EC tablet    atorvastatin (LIPITOR) 10 MG tablet    azelastine (ASTELIN) 0.1 % nasal spray    blood glucose (NO BRAND SPECIFIED) lancets standard    blood glucose (ONETOUCH ULTRA) test strip    blood glucose calibration (ONETOUCH ULTRA CONTROL) solution    blood glucose monitoring (ONE TOUCH ULTRA 2) meter device kit    carvedilol (COREG) 3.125 MG tablet    Cholecalciferol (VITAMIN D3 PO)    " EPINEPHrine (ANY BX GENERIC EQUIV) 0.3 MG/0.3ML injection 2-pack    fluticasone (FLONASE) 50 MCG/ACT spray    lisinopril-hydrochlorothiazide (ZESTORETIC) 20-25 MG tablet    metFORMIN (GLUCOPHAGE) 850 MG tablet    Omega-3 Fatty Acids (OMEGA-3 FISH OIL PO)    sodium chloride (OCEAN) 0.65 % nasal spray    vitamin B complex with vitamin C (STRESS TAB) tablet     No current facility-administered medications for this visit.

## 2023-10-10 NOTE — TELEPHONE ENCOUNTER
Triage outreach    Attempt number 1    Left message to call back at [phone].    Kristi RN  [facility]

## 2023-10-16 ENCOUNTER — TELEPHONE (OUTPATIENT)
Dept: FAMILY MEDICINE | Facility: CLINIC | Age: 73
End: 2023-10-16
Payer: COMMERCIAL

## 2023-10-16 NOTE — TELEPHONE ENCOUNTER
FYI-  Home care speech therapist called to inform PCP pt will be discharged from speech therapy since she has met goals. No further action is needed.  Pt will continue getting PT services.

## 2023-10-26 NOTE — TELEPHONE ENCOUNTER
Reason for Call:  Medication or medication refill: diabetic supplies    Do you use a [facility]?  Name of the pharmacy and phone number for the current request:  [id number] - [place], [place] - [address]    Name of the medication requested: [facility] calling, they got rx for accu check test strips, but they are not covered by patient's insurance.   Pharmacy is requesting new rx for ONE TOUCH ULTRA, for meter,lancets and strips.  One touch ultra is covered by insurance.     Other request:     Can we leave a detailed message on this number? Not Applicable    Phone number patient can be reached at: Home number on file [phone] (home)    Best Time:     Call taken on 8/20/2020 at 11:38 AM by Olga Ortiz      
Detail Level: Detailed
Dr Osborne - pharmacy requesting new diabetes supplies for One Touch Ultra system - see pended Rxs    Sherrill MYLES RN    
Add 62248 Cpt? (Important Note: In 2017 The Use Of 87888 Is Being Tracked By Cms To Determine Future Global Period Reimbursement For Global Periods): yes

## 2023-11-01 ENCOUNTER — OFFICE VISIT (OUTPATIENT)
Dept: NEUROLOGY | Facility: CLINIC | Age: 73
End: 2023-11-01
Attending: FAMILY MEDICINE
Payer: COMMERCIAL

## 2023-11-01 VITALS
HEIGHT: 64 IN | WEIGHT: 174 LBS | OXYGEN SATURATION: 97 % | SYSTOLIC BLOOD PRESSURE: 135 MMHG | HEART RATE: 85 BPM | DIASTOLIC BLOOD PRESSURE: 83 MMHG | BODY MASS INDEX: 29.71 KG/M2

## 2023-11-01 DIAGNOSIS — I63.511 ACUTE ISCHEMIC RIGHT MCA STROKE (H): Primary | ICD-10-CM

## 2023-11-01 DIAGNOSIS — R47.01 APHASIA: ICD-10-CM

## 2023-11-01 PROCEDURE — 99205 OFFICE O/P NEW HI 60 MIN: CPT | Performed by: PHYSICIAN ASSISTANT

## 2023-11-01 NOTE — Clinical Note
Bartolo Osborne. Alma's 30 day cardionet monitor came back benign. It looks like you started her on carvedilol for some tachycardia repeorted by home health RN. She notes tiredness and hypotension 100/60 and wondering if she can stop it. If your office could contact her that would be great. Thanks Ching Burgess PA-C  Stroke neuro

## 2023-11-01 NOTE — PATIENT INSTRUCTIONS
Cardiology referral  CT scan  I will let you know what Dr. Osborne says about carvedilol  Let me know what GI doctor says about aspirin for colonoscopy  You will be called for DISCOVERY trial if eligible    - Don't stop your aspirin (such as stopping it for dental work, surgery, etc) without checking with our office first    - Call 911 with any new stroke symptoms

## 2023-11-01 NOTE — NURSING NOTE
Symptoms or concerns today: BP being low, Driving and Colonoscopy, Halter results, being tired from medication and speech    Med comments:     Who the patient is here with today: Daughter Janis Duong

## 2023-11-01 NOTE — PROGRESS NOTES
__________________________________________________________      [facility]   [phone]  __________________________________________________________    Chief Complaint: Patient presents with:  Stroke: Acute Ischemic Stroke      History of Present Illness: Elvia Helm is a 73 year old female presenting for stroke. This is a follow-up to a hospitalization that recently happened at [facility] on 9/11/23    Prior to the hospital stay, she had a past medical history of HTN, HLD, DM2, past smoking, colon adenoma.    She presented to the hospital with speech difficulty and left leg weakness. On initial presentation the deficits were quite mild and fluctuating but then became a little more pronounced.  Ultimately her speech deficits are still present.    She was started on aspirin and Plavix for 21 days for recurrent stroke prevention. Since being discharged, she went to the ER on 10/7 for a nosebleed. That was 2 days after the Plavix was done.  No recurrence.      In terms of her speech and L leg function now, speech is definitely worse when tired.  Sometimes gives up what she wants to talk about. Stopped home speech appointments last week, had last PT yesterday.  Deciding whether to do more speech therapy. Leg got a lot better after 2-3 weeks.  Uses a cane sometimes.     Home health nurse noted HR was a little high on 9/26 and so PCP called in carvedilol.  Tired-- related to carvedilol? BP also been pretty low like 100/60    Stroke Evaluation Summarized:    MRI/Head CT MRI showed subuacte stroke L middle frontal gyrus   Intracranial Vasculature CTA head normal   Cervical Vasculature CTA neck normal   I personally reviewed the following neuroimaging studies today and the comments above reflect my own personal interpretation of the images: images: CTA head/neck, MRI brain and I also showed brain MRI to the patient myself today.    Echocardiogram EF 60-65%    EKG/Telemetry SR on hospital EKG. Since discharge, 30 day event monitor showed no atrial fibrillation or flutter   Other Testing Not Applicable      Labs Lab Results   Component Value Date    LDL 66 09/12/2023    A1C 6.1 (H) 09/20/2023    CTROPT 9 09/12/2023    INR 0.91 09/11/2023        Impression/Plan:     #L MCA stroke  Diagnostic testing  - Check CT c/a/p to evaluate for any occult malignancy causing hypercoagulability   - Consider loop recorder, will refer to cardiology  - Discussed DENTON testing, STOP-BANG is lower so will not refer now    Secondary stroke prevention  - Continue aspirin 325mg daily  - Statin for goal LDL 40-70, continue atorvastatin 10mg and titrate as needed with PCP  - Start checking BP at home.   - Continue good control of blood sugar, goal A1C <7.0  - Mediterranean diet and daily exercise  - Clinical trial screening: DISCOVERY (interested)    #Aphasia  - do more outpatient speech therapy    #Fatigue/hypotension  - message PCP: stop carvedilol? cardionet monitor showed no sustained tachyarrhythmia  - Could also just be stroke related    #history of colonic adenoma  - pt to ask proceduralist if could stay on aspirin for procedure. Depending on answer, risk stratify for timing of minor anesthesia for procedure    Stroke Education provided.  She will call us with any questions.  For any acute neurologic deficits she was advised to  go directly to the hospital rather than call the clinic.    Marietta Burgess PA-C  Neurology  11/01/2023    ___________________________________________________________________    Current Medications  Current Outpatient Medications   Medication Sig     aspirin (ASA) 325 MG EC tablet Take 325 mg by mouth At Bedtime     atorvastatin (LIPITOR) 10 MG tablet Take 1 tablet (10 mg) by mouth daily     azelastine (ASTELIN) 0.1 % nasal spray Spray 1 spray into both nostrils 2 times daily     blood glucose (NO BRAND SPECIFIED) lancets standard Use to test blood sugar one* times  "daily or as directed.     blood glucose (ONETOUCH ULTRA) test strip Use to test blood sugar daily or as directed.     blood glucose calibration (ONETOUCH ULTRA CONTROL) solution Use to calibrate blood glucose monitor as directed.     blood glucose monitoring (ONE TOUCH ULTRA 2) meter device kit Use to test blood sugar 1 times daily.     carvedilol (COREG) 3.125 MG tablet Take 1 tablet (3.125 mg) by mouth 2 times daily (with meals)     Cholecalciferol (VITAMIN D3 PO) Take 2,000 Units by mouth daily      EPINEPHrine (ANY BX GENERIC EQUIV) 0.3 MG/0.3ML injection 2-pack INJECT 0.3 MLS INTO THE MUSCLE ONCE AS NEEDED FOR ANAPHYLAXIS     fluticasone (FLONASE) 50 MCG/ACT spray Spray 1-2 sprays into both nostrils daily     lisinopril-hydrochlorothiazide (ZESTORETIC) 20-25 MG tablet Take 0.5 tablets by mouth daily     metFORMIN (GLUCOPHAGE) 850 MG tablet Take 1 tablet (850 mg) by mouth 2 times daily (with meals)     Omega-3 Fatty Acids (OMEGA-3 FISH OIL PO) Take 1 g by mouth daily      sodium chloride (OCEAN) 0.65 % nasal spray Spray 1 spray into both nostrils daily as needed for congestion     vitamin B complex with vitamin C (STRESS TAB) tablet Take 1 tablet by mouth daily     No current facility-administered medications for this visit.       Physical Exam    Estimated body mass index is 29.87 kg/m  as calculated from the following:    Height as of this encounter: 1.626 m (5' 4\").    Weight as of this encounter: 78.9 kg (174 lb).    /83   Pulse 85   Ht 1.626 m (5' 4\")   Wt 78.9 kg (174 lb)   LMP 03/17/2001   SpO2 97%   BMI 29.87 kg/m         General Exam  General: Sitting up in chair in no acute distress  Cardio:  RRR  Pulmonary:  no respiratory distress    Neurologic:  Mental Status:  alert, oriented x 3, follows commands, speech clear and fluent, naming and repetition normal  Cranial Nerves:  visual fields intact, PERRL, EOMI with normal smooth pursuit, facial sensation intact and symmetric, facial movements " symmetric, hearing not formally tested but intact to conversation, palate elevation symmetric and uvula midline, no dysarthria, shoulder shrug strong bilaterally, tongue protrusion midline  Motor:  normal muscle tone and bulk, no abnormal movements, able to move all limbs spontaneously, strength 5/5 throughout upper and lower extremities, no pronator drift  Reflexes:  toes down-going  Sensory:  light touch sensation intact and symmetric throughout upper and lower extremities, no extinction on double simultaneous stimulation   Coordination:  normal finger-to-nose and heel-to-shin bilaterally without dysmetria, rapid alternating movements symmetric  Station/Gait:  deferred    Modified Jordan Scale  Score: 2-Slight disability; unable to carry out all previous activities, but able to look after own affairs    Questionnaires:        10/28/2023     1:01 PM   Stroke Questionnaire   Residual effects: Any residual effects from stroke? I have some symptoms, but I'm not sure if they're residual effects of the stroke   Residual effects: Describe speech   Level of independence: Could you live alone? Yes   Quality of Life: Rating (0-100%) 85   Quality of Life: What work now or before stroke Retired   Quality of Life: Current work status Retired   Quality of Life: How do you get around Family member drives me   Quality of Life: Living situation before stroke With family or significant other   Quality of Life: Living situation now With family or significant other   Medication: How do you take your meds Myself, from a pillbox that I set up   Medication: Do you ever miss or forget meds Rarely   Risk Factor: Checking blood pressure at home No, I have a blood pressure cuff but do not use it   Risk Factor: Checking blood sugar at home Yes   Risk Factor: Usual blood sugar numbers 100 to 150   Risk Factor: Second-hand smoke at home No   Risk Factor: How much caffeine per day hot tea 2-3 cups, tea drink 1, diet soda 1. average   Who  completed this questionnaire? The patient independently     STOP-BANG SLEEP APNEA SCREEN        11/1/2023     9:51 AM   STOP-Bang Total Score   Total Score 3         Billing:    I spent a total of 58 minutes on the day of the visit.   Time spent by me doing chart review, history and exam, documentation and further activities per the note

## 2023-11-01 NOTE — LETTER
11/1/2023         RE: Elvia Helm  [address] [facility] [id number]        Dear Colleague,    Thank you for referring your patient, Elvia Helm, to the [facility]UROLOG[facility]. Please see a copy of my visit note below.    __________________________________________________________      [facility] Neurology [facility]   [phone]  __________________________________________________________    Chief Complaint: Patient presents with:  Stroke: Acute Ischemic Stroke      History of Present Illness: Elvia Helm is a 73 year old female presenting for stroke. This is a follow-up to a hospitalization that recently happened at [facility] on 9/11/23    Prior to the hospital stay, she had a past medical history of HTN, HLD, DM2, past smoking, colon adenoma.    She presented to the hospital with speech difficulty and left leg weakness. On initial presentation the deficits were quite mild and fluctuating but then became a little more pronounced.  Ultimately her speech deficits are still present.    She was started on aspirin and Plavix for 21 days for recurrent stroke prevention. Since being discharged, she went to the ER on 10/7 for a nosebleed. That was 2 days after the Plavix was done.  No recurrence.      In terms of her speech and L leg function now, speech is definitely worse when tired.  Sometimes gives up what she wants to talk about. Stopped home speech appointments last week, had last PT yesterday.  Deciding whether to do more speech therapy. Leg got a lot better after 2-3 weeks.  Uses a cane sometimes.     Home health nurse noted HR was a little high on 9/26 and so PCP called in carvedilol.  Tired-- related to carvedilol? BP also been pretty low like 100/60    Stroke Evaluation Summarized:    MRI/Head CT MRI showed subuacte stroke L middle frontal gyrus   Intracranial Vasculature CTA head normal   Cervical Vasculature CTA neck  normal   I personally reviewed the following neuroimaging studies today and the comments above reflect my own personal interpretation of the images: images: CTA head/neck, MRI brain and I also showed brain MRI to the patient myself today.    Echocardiogram EF 60-65%   EKG/Telemetry SR on hospital EKG. Since discharge, 30 day event monitor showed no atrial fibrillation or flutter   Other Testing Not Applicable      Labs Lab Results   Component Value Date    LDL 66 09/12/2023    A1C 6.1 (H) 09/20/2023    CTROPT 9 09/12/2023    INR 0.91 09/11/2023        Impression/Plan:     #L MCA stroke  Diagnostic testing  - Check CT c/a/p to evaluate for any occult malignancy causing hypercoagulability   - Consider loop recorder, will refer to cardiology  - Discussed DENTON testing, STOP-BANG is lower so will not refer now    Secondary stroke prevention  - Continue aspirin 325mg daily  - Statin for goal LDL 40-70, continue atorvastatin 10mg and titrate as needed with PCP  - Start checking BP at home.   - Continue good control of blood sugar, goal A1C <7.0  - Mediterranean diet and daily exercise  - Clinical trial screening: DISCOVERY (interested)    #Aphasia  - do more outpatient speech therapy    #Fatigue/hypotension  - message PCP: stop carvedilol? cardionet monitor showed no sustained tachyarrhythmia  - Could also just be stroke related    #history of colonic adenoma  - pt to ask proceduralist if could stay on aspirin for procedure. Depending on answer, risk stratify for timing of minor anesthesia for procedure    Stroke Education provided.  She will call us with any questions.  For any acute neurologic deficits she was advised to  go directly to the hospital rather than call the clinic.    Marietta Burgess PA-C  Neurology  11/01/2023    ___________________________________________________________________    Current Medications  Current Outpatient Medications   Medication Sig     aspirin (ASA) 325 MG EC tablet Take 325 mg by mouth  "At Bedtime     atorvastatin (LIPITOR) 10 MG tablet Take 1 tablet (10 mg) by mouth daily     azelastine (ASTELIN) 0.1 % nasal spray Spray 1 spray into both nostrils 2 times daily     blood glucose (NO BRAND SPECIFIED) lancets standard Use to test blood sugar one* times daily or as directed.     blood glucose (ONETOUCH ULTRA) test strip Use to test blood sugar daily or as directed.     blood glucose calibration (ONETOUCH ULTRA CONTROL) solution Use to calibrate blood glucose monitor as directed.     blood glucose monitoring (ONE TOUCH ULTRA 2) meter device kit Use to test blood sugar 1 times daily.     carvedilol (COREG) 3.125 MG tablet Take 1 tablet (3.125 mg) by mouth 2 times daily (with meals)     Cholecalciferol (VITAMIN D3 PO) Take 2,000 Units by mouth daily      EPINEPHrine (ANY BX GENERIC EQUIV) 0.3 MG/0.3ML injection 2-pack INJECT 0.3 MLS INTO THE MUSCLE ONCE AS NEEDED FOR ANAPHYLAXIS     fluticasone (FLONASE) 50 MCG/ACT spray Spray 1-2 sprays into both nostrils daily     lisinopril-hydrochlorothiazide (ZESTORETIC) 20-25 MG tablet Take 0.5 tablets by mouth daily     metFORMIN (GLUCOPHAGE) 850 MG tablet Take 1 tablet (850 mg) by mouth 2 times daily (with meals)     Omega-3 Fatty Acids (OMEGA-3 FISH OIL PO) Take 1 g by mouth daily      sodium chloride (OCEAN) 0.65 % nasal spray Spray 1 spray into both nostrils daily as needed for congestion     vitamin B complex with vitamin C (STRESS TAB) tablet Take 1 tablet by mouth daily     No current facility-administered medications for this visit.       Physical Exam    Estimated body mass index is 29.87 kg/m  as calculated from the following:    Height as of this encounter: 1.626 m (5' 4\").    Weight as of this encounter: 78.9 kg (174 lb).    /83   Pulse 85   Ht 1.626 m (5' 4\")   Wt 78.9 kg (174 lb)   LMP 03/17/2001   SpO2 97%   BMI 29.87 kg/m         General Exam  General: Sitting up in chair in no acute distress  Cardio:  RRR  Pulmonary:  no respiratory " distress    Neurologic:  Mental Status:  alert, oriented x 3, follows commands, speech clear and fluent, naming and repetition normal  Cranial Nerves:  visual fields intact, PERRL, EOMI with normal smooth pursuit, facial sensation intact and symmetric, facial movements symmetric, hearing not formally tested but intact to conversation, palate elevation symmetric and uvula midline, no dysarthria, shoulder shrug strong bilaterally, tongue protrusion midline  Motor:  normal muscle tone and bulk, no abnormal movements, able to move all limbs spontaneously, strength 5/5 throughout upper and lower extremities, no pronator drift  Reflexes:  toes down-going  Sensory:  light touch sensation intact and symmetric throughout upper and lower extremities, no extinction on double simultaneous stimulation   Coordination:  normal finger-to-nose and heel-to-shin bilaterally without dysmetria, rapid alternating movements symmetric  Station/Gait:  deferred    Modified [place] Scale  Score: 2-Slight disability; unable to carry out all previous activities, but able to look after own affairs    Questionnaires:        10/28/2023     1:01 PM   Stroke Questionnaire   Residual effects: Any residual effects from stroke? I have some symptoms, but I'm not sure if they're residual effects of the stroke   Residual effects: Describe speech   Level of independence: Could you live alone? Yes   Quality of Life: Rating (0-100%) 85   Quality of Life: What work now or before stroke Retired   Quality of Life: Current work status Retired   Quality of Life: How do you get around Family member drives me   Quality of Life: Living situation before stroke With family or significant other   Quality of Life: Living situation now With family or significant other   Medication: How do you take your meds Myself, from a pillbox that I set up   Medication: Do you ever miss or forget meds Rarely   Risk Factor: Checking blood pressure at home No, I have a blood pressure  cuff but do not use it   Risk Factor: Checking blood sugar at home Yes   Risk Factor: Usual blood sugar numbers 100 to 150   Risk Factor: Second-hand smoke at home No   Risk Factor: How much caffeine per day hot tea 2-3 cups, tea drink 1, diet soda 1. average   Who completed this questionnaire? The patient independently     STOP-BANG SLEEP APNEA SCREEN        11/1/2023     9:51 AM   STOP-Bang Total Score   Total Score 3         Billing:    I spent a total of 58 minutes on the day of the visit.   Time spent by me doing chart review, history and exam, documentation and further activities per the note                Again, thank you for allowing me to participate in the care of your patient.        Sincerely,        Marietta Burgess PA-C

## 2023-11-05 DIAGNOSIS — I10 ESSENTIAL HYPERTENSION, BENIGN: ICD-10-CM

## 2023-11-06 RX ORDER — LISINOPRIL AND HYDROCHLOROTHIAZIDE 20; 25 MG/1; MG/1
0.5 TABLET ORAL DAILY
Qty: 45 TABLET | Refills: 1 | Status: SHIPPED | OUTPATIENT
Start: 2023-11-06 | End: 2024-05-08

## 2023-11-16 ENCOUNTER — ANCILLARY PROCEDURE (OUTPATIENT)
Dept: CT IMAGING | Facility: CLINIC | Age: 73
End: 2023-11-16
Attending: PHYSICIAN ASSISTANT
Payer: COMMERCIAL

## 2023-11-16 DIAGNOSIS — I63.511 ACUTE ISCHEMIC RIGHT MCA STROKE (H): ICD-10-CM

## 2023-11-16 LAB
CREAT BLD-MCNC: 0.8 MG/DL (ref 0.5–1)
EGFRCR SERPLBLD CKD-EPI 2021: >60 ML/MIN/1.73M2

## 2023-11-16 PROCEDURE — 250N000009 HC RX 250: Performed by: PHYSICIAN ASSISTANT

## 2023-11-16 PROCEDURE — 74177 CT ABD & PELVIS W/CONTRAST: CPT

## 2023-11-16 PROCEDURE — 250N000011 HC RX IP 250 OP 636: Performed by: PHYSICIAN ASSISTANT

## 2023-11-16 PROCEDURE — 82565 ASSAY OF CREATININE: CPT

## 2023-11-16 RX ORDER — IOPAMIDOL 755 MG/ML
90 INJECTION, SOLUTION INTRAVASCULAR ONCE
Status: COMPLETED | OUTPATIENT
Start: 2023-11-16 | End: 2023-11-16

## 2023-11-16 RX ADMIN — SODIUM CHLORIDE 40 ML: 9 INJECTION, SOLUTION INTRAVENOUS at 09:21

## 2023-11-16 RX ADMIN — IOPAMIDOL 90 ML: 755 INJECTION, SOLUTION INTRAVENOUS at 09:20

## 2023-11-27 NOTE — RESULT ENCOUNTER NOTE
Hi Dr. Osborne, I ordered this CT as part of the workup for cryptogenic stroke. There are two abnormal findings here. I called the patient this morning and told her. She says she has an appointment with you next week. I would defer to your expertise in terms of further follow up of these findings

## 2023-11-28 ENCOUNTER — PATIENT OUTREACH (OUTPATIENT)
Dept: ONCOLOGY | Facility: CLINIC | Age: 73
End: 2023-11-28

## 2023-11-28 ENCOUNTER — OFFICE VISIT (OUTPATIENT)
Dept: FAMILY MEDICINE | Facility: CLINIC | Age: 73
End: 2023-11-28
Payer: COMMERCIAL

## 2023-11-28 VITALS
OXYGEN SATURATION: 98 % | WEIGHT: 172.1 LBS | HEART RATE: 89 BPM | RESPIRATION RATE: 18 BRPM | HEIGHT: 64 IN | DIASTOLIC BLOOD PRESSURE: 85 MMHG | TEMPERATURE: 98.7 F | SYSTOLIC BLOOD PRESSURE: 137 MMHG | BODY MASS INDEX: 29.38 KG/M2

## 2023-11-28 DIAGNOSIS — R91.8 MASS OF UPPER LOBE OF RIGHT LUNG: Primary | ICD-10-CM

## 2023-11-28 DIAGNOSIS — E11.59 TYPE 2 DIABETES MELLITUS WITH VASCULAR DISEASE (H): ICD-10-CM

## 2023-11-28 DIAGNOSIS — N83.8 OVARIAN MASS: ICD-10-CM

## 2023-11-28 LAB — HBA1C MFR BLD: 6 % (ref 0–5.6)

## 2023-11-28 PROCEDURE — 99214 OFFICE O/P EST MOD 30 MIN: CPT | Performed by: INTERNAL MEDICINE

## 2023-11-28 PROCEDURE — 36415 COLL VENOUS BLD VENIPUNCTURE: CPT | Performed by: INTERNAL MEDICINE

## 2023-11-28 PROCEDURE — 83036 HEMOGLOBIN GLYCOSYLATED A1C: CPT | Performed by: INTERNAL MEDICINE

## 2023-11-28 ASSESSMENT — PAIN SCALES - GENERAL: PAINLEVEL: NO PAIN (0)

## 2023-11-28 NOTE — PROGRESS NOTES
This is a very nice patient was a type II diabetic with recent stroke and is recovering very well  The workup for hypercoagulability entailed a CT of the chest rule out pulmonary embolism  And that shows a mass in the lung which is right upper lobe 6 to 8 mm and a right ovarian mass as well  I have spoken to thoracic surgery and patient will be seen there tomorrow  Patient may need a PET scan or surgical consult for the lung issue  CT films are shared with patient and her daughter  We will check an A1c today  She voices understanding      ICD-10-CM    1. Mass of upper lobe of right lung  R91.8 REVIEW OF HEALTH MAINTENANCE PROTOCOL ORDERS     Thoracic Surgery  Referral      2. Ovarian mass  N83.8 US Pelvic Complete with Transvaginal      3. Type 2 diabetes mellitus with vascular disease (H)  E11.59 Hemoglobin A1c            Subjective   Alma is a 73 year old, presenting for the following health issues:  Follow Up      HPI         Patient is recovering from cryptogenic stroke for which she has event monitor placed  She does not has a PFO or thrombus in the left atrium  She is a well-controlled diabetic who has hypertension and hyperlipidemia well-controlled  She saw stroke services for follow-up and CT of the chest was done to rule out any PE  Final report is as below  That showed a right upper lobe mass and adnexal mass  .  There is an 8 x 6 mm right upper lobe groundglass nodule, which may represent neoplasm on the spectrum of adenocarcinoma in situ. 2.  There is a 2.3 cm possible right ovarian cyst. This can be further evaluated by ultrasound  Review of Systems   10 point ROS of systems including Constitutional, Eyes, Respiratory, Cardiovascular, Gastroenterology, Genitourinary, Integumentary, Muscularskeletal, Psychiatric were all negative except for pertinent positives noted in my HPI.                                                        Objective    /85   Pulse 89   Temp 98.7  F (37.1  C)  "(Tympanic)   Resp 18   Ht 1.626 m (5' 4\")   Wt 78.1 kg (172 lb 1.6 oz)   LMP 03/17/2001   SpO2 98%   BMI 29.54 kg/m    Body mass index is 29.54 kg/m .  Physical Exam   GENERAL: healthy, alert and no distress  On neuroexam her speech is improved and right-sided leg weakness has improved  But she still has right lower limb weakness  Patient Active Problem List   Diagnosis    Allergic rhinitis    Essential hypertension, benign    Postmenopausal atrophic vaginitis    Bee sting reaction    Torticollis    Type 2 diabetes mellitus without complications (H)    HYPERLIPIDEMIA LDL GOAL <100    Post-nasal drip    Advanced directives, counseling/discussion    Osteopenia    Urticaria    Arthritis of knee    Pes planus    Tinnitus    Left-sided weakness    Acute ischemic right MCA stroke (H)     Current Outpatient Medications   Medication    aspirin (ASA) 325 MG EC tablet    atorvastatin (LIPITOR) 10 MG tablet    azelastine (ASTELIN) 0.1 % nasal spray    blood glucose (NO BRAND SPECIFIED) lancets standard    blood glucose (ONETOUCH ULTRA) test strip    blood glucose calibration (ONETOUCH ULTRA CONTROL) solution    blood glucose monitoring (ONE TOUCH ULTRA 2) meter device kit    carvedilol (COREG) 3.125 MG tablet    Cholecalciferol (VITAMIN D3 PO)    EPINEPHrine (ANY BX GENERIC EQUIV) 0.3 MG/0.3ML injection 2-pack    fluticasone (FLONASE) 50 MCG/ACT spray    lisinopril-hydrochlorothiazide (ZESTORETIC) 20-25 MG tablet    metFORMIN (GLUCOPHAGE) 850 MG tablet    Omega-3 Fatty Acids (OMEGA-3 FISH OIL PO)    sodium chloride (OCEAN) 0.65 % nasal spray    vitamin B complex with vitamin C (STRESS TAB) tablet     No current facility-administered medications for this visit.                       "

## 2023-11-28 NOTE — PROGRESS NOTES
New Patient Oncology Nurse Navigator Note     Referring provider: Dr. Masood Osborne    Referring Clinic/Organization: [facility]  Referred to: Thoracic Surgery  Requested provider (if applicable): Dr. Jose David Montalvo  Referral Received: 11/28/23       Evaluation for :   Diagnosis   R91.8 (ICD-10-CM) - Mass of upper lobe of right lung     External referral received for Dr. Montalvo. Will have NPS send referral to [facility].    KELLEN MooreN, RN, OCN  [facility] Oncology Nurse Navigator  [phone] / [phone]

## 2023-11-29 ENCOUNTER — OFFICE VISIT (OUTPATIENT)
Dept: CARDIOLOGY | Facility: CLINIC | Age: 73
End: 2023-11-29
Payer: COMMERCIAL

## 2023-11-29 ENCOUNTER — TELEPHONE (OUTPATIENT)
Dept: FAMILY MEDICINE | Facility: CLINIC | Age: 73
End: 2023-11-29

## 2023-11-29 ENCOUNTER — TRANSFERRED RECORDS (OUTPATIENT)
Dept: HEALTH INFORMATION MANAGEMENT | Facility: CLINIC | Age: 73
End: 2023-11-29

## 2023-11-29 VITALS
HEART RATE: 102 BPM | DIASTOLIC BLOOD PRESSURE: 84 MMHG | OXYGEN SATURATION: 97 % | HEIGHT: 63 IN | SYSTOLIC BLOOD PRESSURE: 123 MMHG | BODY MASS INDEX: 30.48 KG/M2 | WEIGHT: 172 LBS

## 2023-11-29 DIAGNOSIS — I63.511 ACUTE ISCHEMIC RIGHT MCA STROKE (H): ICD-10-CM

## 2023-11-29 PROCEDURE — 99205 OFFICE O/P NEW HI 60 MIN: CPT | Performed by: INTERNAL MEDICINE

## 2023-11-29 NOTE — PROGRESS NOTES
HPI and Plan:   I met Ms. Helm today at the heart clinic she is accompanied by her daughter.  She was referred in because of a stroke in September it was the left middle frontal gyrus perhaps a left MCA pattern.  Her work-up included MRI showing the stroke CT angiogram did not find any significant blockages a standard echo without bubble study was normal no atrial fibrillation was seen in the hospital nor on a 30-day event recorder patient is not aware of any atrial fibs she does note occasional flip-flops and indeed the heart monitor showed isolated PVCs.  I do not see that there was a hypercoagulable work-up done.    Because we did not see any clot anywhere she did not get lytics or cerebral angiogram with thrombectomy.  They did scan from top to bottom looking for anything that might have made her procoagulant and a question is raised if she has a groundglass appearance nodule in the right upper lobe of her lung perhaps adenocarcinoma so she is going to be seen Dr. Jarrod Montalvo later today.    At this time I recommend the following I will do an echocardiogram with bubble study just to exclude PFO.  I would like to eventually place an implantable loop recorder but because I do not know if there is additional tests coming or surgery I will wait until she gets the all clear from Dr. Montalvo.  We will have her come back in about 4 weeks we will see if anything was found with after her surgical consult we will review the hypercoagulable work-up and the echocardiogram and then decide at what point we will schedule implantable loop recorder.  This was all discussed with the patient and her daughter today and in fact I showed them a model of the loop recorder  Today I reviewed the blood test in the hospital including probably has metabolic syndrome fact that she with low HDL high triglyceride electrolytes lipids CBC CT scan and MRI scans echocardiogram EKG were all reviewed today    Today's visit was 45 minutes high  complexity  Orders Placed This Encounter   Procedures    Activated protein C resistance    Antithrombin III    Protein C chromogenic    Lupus Anticoagulant Panel    Follow-Up with Cardiology LISA    Echocardiogram Limited     No orders of the defined types were placed in this encounter.    There are no discontinued medications.      Encounter Diagnosis   Name Primary?    Acute ischemic right MCA stroke (H)        CURRENT MEDICATIONS:  Current Outpatient Medications   Medication Sig Dispense Refill    aspirin (ASA) 325 MG EC tablet Take 325 mg by mouth At Bedtime      atorvastatin (LIPITOR) 10 MG tablet Take 1 tablet (10 mg) by mouth daily 90 tablet 0    azelastine (ASTELIN) 0.1 % nasal spray Spray 1 spray into both nostrils 2 times daily 30 mL 3    carvedilol (COREG) 3.125 MG tablet Take 1 tablet (3.125 mg) by mouth 2 times daily (with meals) 60 tablet 3    Cholecalciferol (VITAMIN D3 PO) Take 2,000 Units by mouth daily       fluticasone (FLONASE) 50 MCG/ACT spray Spray 1-2 sprays into both nostrils daily 16 g 0    lisinopril-hydrochlorothiazide (ZESTORETIC) 20-25 MG tablet Take 0.5 tablets by mouth daily 45 tablet 1    metFORMIN (GLUCOPHAGE) 850 MG tablet Take 1 tablet (850 mg) by mouth 2 times daily (with meals) 180 tablet 3    Omega-3 Fatty Acids (OMEGA-3 FISH OIL PO) Take 1 g by mouth daily       sodium chloride (OCEAN) 0.65 % nasal spray Spray 1 spray into both nostrils daily as needed for congestion      vitamin B complex with vitamin C (STRESS TAB) tablet Take 1 tablet by mouth daily      blood glucose (NO BRAND SPECIFIED) lancets standard Use to test blood sugar one* times daily or as directed. (Patient not taking: Reported on 11/29/2023) 100 each 3    blood glucose (ONETOUCH ULTRA) test strip Use to test blood sugar daily or as directed. (Patient not taking: Reported on 11/29/2023) 100 strip 3    blood glucose calibration (ONETOUCH ULTRA CONTROL) solution Use to calibrate blood glucose monitor as directed.  (Patient not taking: Reported on 2023) 1 Bottle 1    blood glucose monitoring (ONE TOUCH ULTRA 2) meter device kit Use to test blood sugar 1 times daily. (Patient not taking: Reported on 2023) 1 kit 0    EPINEPHrine (ANY BX GENERIC EQUIV) 0.3 MG/0.3ML injection 2-pack INJECT 0.3 MLS INTO THE MUSCLE ONCE AS NEEDED FOR ANAPHYLAXIS (Patient not taking: Reported on 2023) 2 each 0       ALLERGIES     Allergies   Allergen Reactions    Atovaquone-Proguanil Hcl Hives    Amoxicillin Hives    Bee Hives and Swelling     carries Epi Pen     Ceftin Hives     hives    Clindamycin Hives    Erythromycin GI Disturbance    Seasonal Allergies     Shellfish-Derived Products Unknown    Shrimp Hives     Happened twice    Tetracycline Other (See Comments)     ?severe headaches  & nausea        PAST MEDICAL HISTORY:  Past Medical History:   Diagnosis Date    Acute ischemic right MCA stroke (H) 10/10/2023    Allergic rhinitis, cause unspecified a    Arthritis of knee 2014    Bee sting reaction 2009    DIABETES TYPE II     Essential hypertension, benign     Flat foot(734) 2014    Hyperlipidaemia LDL goal < 100     Localized osteoarthrosis not specified whether primary or secondary, ankle and foot     After surgery    Metabolic syndrome X     PVC's (premature ventricular contractions)     Tinnitus 2014       PAST SURGICAL HISTORY:  Past Surgical History:   Procedure Laterality Date    ABDOMEN SURGERY  1988    ceasaran birth    COLONOSCOPY      COLONOSCOPY N/A 2022    Procedure: COLONOSCOPY, WITH POLYPECTOMY AND BIOPSY;  Surgeon: Nicole Grubbs MD;  Location:  GI    ENT SURGERY      frequent sinus infections    HC ENDOMETRIAL BIOPSY W/O CERVICAL DILATION      ORTHOPEDIC SURGERY      broken right ankle/plates and screws    SOFT TISSUE SURGERY      tendonitis in right upper leg and below right elbow    ZZC  DELIVERY ONLY      Z LIGATE FALLOPIAN TUBE,POSTPARTUM       ZZC NONSPECIFIC PROCEDURE      right ankle roe and pins       FAMILY HISTORY:  Family History   Problem Relation Age of Onset    Musculoskeletal Disorder Mother         b:1938   Hx Fibromyalgia    Hypertension Mother     Cerebrovascular Disease Mother     Anesthesia Reaction Mother     Cerebrovascular Disease Father         b:193,  age 68  massive stroke    Diabetes Father         dx age 50s and his family    Gastrointestinal Disease Sister         b:1950   Hx of reflux    Cerebrovascular Disease Sister     Cerebrovascular Disease Sister 62        ? TIA    Bronchitis Sister     Anxiety Disorder Sister     Anesthesia Reaction Sister     Family History Negative Brother         b:    Cancer Maternal Grandfather         throat    Diabetes Paternal Grandmother     Hypertension Daughter     Family History Negative Daughter         b:    Diabetes Daughter         b:    Diabetes dx age 22**insulin    CABG Daughter 43    Hypertension Daughter     Lipids Son         b: high cholosterol and triglycerides       SOCIAL HISTORY:  Social History     Socioeconomic History    Marital status:      Spouse name: None    Number of children: 3    Years of education: None    Highest education level: None   Tobacco Use    Smoking status: Former     Packs/day: 1.00     Years: 10.00     Additional pack years: 0.00     Total pack years: 10.00     Types: Cigarettes     Quit date: 1982     Years since quittin.3    Smokeless tobacco: Never   Vaping Use    Vaping Use: Never used   Substance and Sexual Activity    Alcohol use: Yes     Comment: glass of wine on the weekends    Drug use: No     Comment: caffeine tea 3-4/d    Sexual activity: Not Currently     Partners: Male     Birth control/protection: Female Surgical, None     Comment: Post menopausal   Other Topics Concern    Weight Concern Yes     Comment: trying to lose    Special Diet No     Comment: Diabetic diet    Exercise Yes     Comment: walks  "20 mt    Parent/sibling w/ CABG, MI or angioplasty before 65F 55M? 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            Review of Systems:  Skin:        Eyes:       ENT:       Respiratory:  Negative    Cardiovascular:  Negative for;chest pain;palpitations;lightheadedness;dizziness;syncope or near-syncope Positive for;edema;fatigue  Gastroenterology:      Genitourinary:       Musculoskeletal:       Neurologic:       Psychiatric:       Heme/Lymph/Imm:       Endocrine:  Positive for night sweats    Physical Exam:  Vitals: /84   Pulse 102   Ht 1.6 m (5' 3\")   Wt 78 kg (172 lb)   LMP 03/17/2001   SpO2 97%   BMI 30.47 kg/m        Constitutional:  cooperative, alert and oriented, well developed, well nourished, in no acute distress        Skin:  warm and dry to the touch, no apparent skin lesions or masses noted          Head:  normocephalic, no masses or lesions        Eyes:  pupils equal and round, conjunctivae and lids unremarkable, sclera white, no xanthalasma, EOMS intact, no nystagmus        Lymph:No Cervical lymphadenopathy present;No thyromegaly     ENT:           Neck:  carotid pulses are full and equal bilaterally, JVP normal, no carotid bruit        Respiratory:  normal breath sounds, clear to auscultation, normal A-P diameter, normal symmetry, normal respiratory excursion, no use of accessory muscles         Cardiac: regular rhythm, normal S1/S2, no S3 or S4, apical impulse not displaced, no murmurs, gallops or rubs                pulses full and equal, no bruits auscultated                                        GI:  abdomen soft, non-tender, BS normoactive, no mass, no HSM, no bruits obese      Extremities and Muscular Skeletal:  no deformities, clubbing, cyanosis, erythema observed;no edema              Neurological:  no gross motor deficits        Psych:  Alert and Oriented x 3      Recent Lab Results:  LIPID RESULTS:  Lab Results   Component Value Date    CHOL " 155 09/12/2023    CHOL 151 06/14/2021    HDL 47 (L) 09/12/2023    HDL 60 06/14/2021    LDL 66 09/12/2023    LDL 65 06/14/2021    TRIG 212 (H) 09/12/2023    TRIG 128 06/14/2021    CHOLHDLRATIO 2.9 06/30/2015       LIVER ENZYME RESULTS:  Lab Results   Component Value Date    AST 30 09/20/2023    AST 23 06/14/2021    ALT 36 09/20/2023    ALT 32 06/14/2021       CBC RESULTS:  Lab Results   Component Value Date    WBC 8.1 10/07/2023    WBC 7.5 06/14/2021    RBC 4.23 10/07/2023    RBC 4.36 06/14/2021    HGB 12.5 10/07/2023    HGB 13.5 06/14/2021    HCT 38.9 10/07/2023    HCT 40.5 06/14/2021    MCV 92 10/07/2023    MCV 93 06/14/2021    MCH 29.6 10/07/2023    MCH 31.0 06/14/2021    MCHC 32.1 10/07/2023    MCHC 33.3 06/14/2021    RDW 12.3 10/07/2023    RDW 14.0 06/14/2021     10/07/2023     06/14/2021       BMP RESULTS:  Lab Results   Component Value Date     09/20/2023     06/14/2021    POTASSIUM 4.4 09/20/2023    POTASSIUM 4.3 09/29/2022    POTASSIUM 4.0 06/14/2021    CHLORIDE 97 (L) 09/20/2023    CHLORIDE 104 09/29/2022    CHLORIDE 104 06/14/2021    CO2 25 09/20/2023    CO2 30 09/29/2022    CO2 29 06/14/2021    ANIONGAP 14 09/20/2023    ANIONGAP 3 09/29/2022    ANIONGAP 4 06/14/2021     (H) 09/20/2023     (H) 09/13/2023     (H) 09/29/2022     (H) 06/14/2021    BUN 14.7 09/20/2023    BUN 16 09/29/2022    BUN 15 06/14/2021    CR 0.8 11/16/2023    CR 0.74 09/20/2023    CR 0.81 06/14/2021    GFRESTIMATED >60 11/16/2023    GFRESTIMATED 72 06/14/2021    GFRESTBLACK 84 06/14/2021    KIM 10.1 09/20/2023    KIM 9.4 06/14/2021        A1C RESULTS:  Lab Results   Component Value Date    A1C 6.0 (H) 11/28/2023    A1C 6.1 (H) 06/14/2021       INR RESULTS:  Lab Results   Component Value Date    INR 0.91 09/11/2023           CC  [facility]  [address] [place] [address]

## 2023-11-29 NOTE — TELEPHONE ENCOUNTER
Reason for Call:  Appointment Request    Patient requesting this type of appt:  Pt have an Apt for 12-07-23, but it was cancelled. Pt Said the lab apt was supposed to be cancelled not the office Apt.     Requested provider: Masood Osborne    Reason patient unable to be scheduled: Not with their preferred provider    When does patient want to be seen/preferred time: Same day    Comments:     Could we send this information to you in [facility] or would you prefer to receive a phone call?:   Patient would prefer a phone call   Okay to leave a detailed message?: Yes at Home number on file [phone] (home)    Call taken on 11/29/2023 at 4:16 PM by Clau Bradley

## 2023-11-29 NOTE — LETTER
11/29/2023    Masood Osborne MD  [address]  [place] [address]    RE: Elvia Helm       Dear Colleague,     I had the pleasure of seeing Elvia Helm in the [facility].  HPI and Plan:   I met Ms. Helm today at the heart clinic she is accompanied by her daughter.  She was referred in because of a stroke in September it was the left middle frontal gyrus perhaps a left MCA pattern.  Her work-up included MRI showing the stroke CT angiogram did not find any significant blockages a standard echo without bubble study was normal no atrial fibrillation was seen in the hospital nor on a 30-day event recorder patient is not aware of any atrial fibs she does note occasional flip-flops and indeed the heart monitor showed isolated PVCs.  I do not see that there was a hypercoagulable work-up done.    Because we did not see any clot anywhere she did not get lytics or cerebral angiogram with thrombectomy.  They did scan from top to bottom looking for anything that might have made her procoagulant and a question is raised if she has a groundglass appearance nodule in the right upper lobe of her lung perhaps adenocarcinoma so she is going to be seen Dr. Jarrod Montalvo later today.    At this time I recommend the following I will do an echocardiogram with bubble study just to exclude PFO.  I would like to eventually place an implantable loop recorder but because I do not know if there is additional tests coming or surgery I will wait until she gets the all clear from Dr. Montalvo.  We will have her come back in about 4 weeks we will see if anything was found with after her surgical consult we will review the hypercoagulable work-up and the echocardiogram and then decide at what point we will schedule implantable loop recorder.  This was all discussed with the patient and her daughter today and in fact I showed them a model of the loop recorder  Today I reviewed the blood test in the  hospital including probably has metabolic syndrome fact that she with low HDL high triglyceride electrolytes lipids CBC CT scan and MRI scans echocardiogram EKG were all reviewed today    Today's visit was 45 minutes high complexity  Orders Placed This Encounter   Procedures    Activated protein C resistance    Antithrombin III    Protein C chromogenic    Lupus Anticoagulant Panel    Follow-Up with Cardiology LISA    Echocardiogram Limited     No orders of the defined types were placed in this encounter.    There are no discontinued medications.      Encounter Diagnosis   Name Primary?    Acute ischemic right MCA stroke (H)        CURRENT MEDICATIONS:  Current Outpatient Medications   Medication Sig Dispense Refill    aspirin (ASA) 325 MG EC tablet Take 325 mg by mouth At Bedtime      atorvastatin (LIPITOR) 10 MG tablet Take 1 tablet (10 mg) by mouth daily 90 tablet 0    azelastine (ASTELIN) 0.1 % nasal spray Spray 1 spray into both nostrils 2 times daily 30 mL 3    carvedilol (COREG) 3.125 MG tablet Take 1 tablet (3.125 mg) by mouth 2 times daily (with meals) 60 tablet 3    Cholecalciferol (VITAMIN D3 PO) Take 2,000 Units by mouth daily       fluticasone (FLONASE) 50 MCG/ACT spray Spray 1-2 sprays into both nostrils daily 16 g 0    lisinopril-hydrochlorothiazide (ZESTORETIC) 20-25 MG tablet Take 0.5 tablets by mouth daily 45 tablet 1    metFORMIN (GLUCOPHAGE) 850 MG tablet Take 1 tablet (850 mg) by mouth 2 times daily (with meals) 180 tablet 3    Omega-3 Fatty Acids (OMEGA-3 FISH OIL PO) Take 1 g by mouth daily       sodium chloride (OCEAN) 0.65 % nasal spray Spray 1 spray into both nostrils daily as needed for congestion      vitamin B complex with vitamin C (STRESS TAB) tablet Take 1 tablet by mouth daily      blood glucose (NO BRAND SPECIFIED) lancets standard Use to test blood sugar one* times daily or as directed. (Patient not taking: Reported on 11/29/2023) 100 each 3    blood glucose (ONETOUCH ULTRA) test  strip Use to test blood sugar daily or as directed. (Patient not taking: Reported on 11/29/2023) 100 strip 3    blood glucose calibration (ONETOUCH ULTRA CONTROL) solution Use to calibrate blood glucose monitor as directed. (Patient not taking: Reported on 11/29/2023) 1 Bottle 1    blood glucose monitoring (ONE TOUCH ULTRA 2) meter device kit Use to test blood sugar 1 times daily. (Patient not taking: Reported on 11/29/2023) 1 kit 0    EPINEPHrine (ANY BX GENERIC EQUIV) 0.3 MG/0.3ML injection 2-pack INJECT 0.3 MLS INTO THE MUSCLE ONCE AS NEEDED FOR ANAPHYLAXIS (Patient not taking: Reported on 11/29/2023) 2 each 0       ALLERGIES     Allergies   Allergen Reactions    Atovaquone-Proguanil Hcl Hives    Amoxicillin Hives    Bee Hives and Swelling     carries Epi Pen     Ceftin Hives     hives    Clindamycin Hives    Erythromycin GI Disturbance    Seasonal Allergies     Shellfish-Derived Products Unknown    Shrimp Hives     Happened twice    Tetracycline Other (See Comments)     ?severe headaches  & nausea        PAST MEDICAL HISTORY:  Past Medical History:   Diagnosis Date    Acute ischemic right MCA stroke (H) 10/10/2023    Allergic rhinitis, cause unspecified 2003a    Arthritis of knee 06/06/2014    Bee sting reaction 07/2009    DIABETES TYPE II 2003    Essential hypertension, benign     Flat foot(734) 06/06/2014    Hyperlipidaemia LDL goal < 100     Localized osteoarthrosis not specified whether primary or secondary, ankle and foot     After surgery    Metabolic syndrome X     PVC's (premature ventricular contractions)     Tinnitus 06/06/2014       PAST SURGICAL HISTORY:  Past Surgical History:   Procedure Laterality Date    ABDOMEN SURGERY  7/1988    ceasaran birth    COLONOSCOPY  12/07    COLONOSCOPY N/A 1/31/2022    Procedure: COLONOSCOPY, WITH POLYPECTOMY AND BIOPSY;  Surgeon: Nicole Grubbs MD;  Location:  GI    ENT SURGERY      frequent sinus infections    HC ENDOMETRIAL BIOPSY W/O CERVICAL DILATION       ORTHOPEDIC SURGERY      broken right ankle/plates and screws    SOFT TISSUE SURGERY      tendonitis in right upper leg and below right elbow    ZZC  DELIVERY ONLY      ZZC LIGATE FALLOPIAN TUBE,POSTPARTUM      ZZC NONSPECIFIC PROCEDURE      right ankle roe and pins       FAMILY HISTORY:  Family History   Problem Relation Age of Onset    Musculoskeletal Disorder Mother         b:1938   Hx Fibromyalgia    Hypertension Mother     Cerebrovascular Disease Mother     Anesthesia Reaction Mother     Cerebrovascular Disease Father         b:193,  age 68  massive stroke    Diabetes Father         dx age 50s and his family    Gastrointestinal Disease Sister         b:1950   Hx of reflux    Cerebrovascular Disease Sister     Cerebrovascular Disease Sister 62        ? TIA    Bronchitis Sister     Anxiety Disorder Sister     Anesthesia Reaction Sister     Family History Negative Brother         b:    Cancer Maternal Grandfather         throat    Diabetes Paternal Grandmother     Hypertension Daughter     Family History Negative Daughter         b:    Diabetes Daughter         b:    Diabetes dx age 22**insulin    CABG Daughter 43    Hypertension Daughter     Lipids Son         b: high cholosterol and triglycerides       SOCIAL HISTORY:  Social History     Socioeconomic History    Marital status:      Spouse name: None    Number of children: 3    Years of education: None    Highest education level: None   Tobacco Use    Smoking status: Former     Packs/day: 1.00     Years: 10.00     Additional pack years: 0.00     Total pack years: 10.00     Types: Cigarettes     Quit date: 1982     Years since quittin.3    Smokeless tobacco: Never   Vaping Use    Vaping Use: Never used   Substance and Sexual Activity    Alcohol use: Yes     Comment: glass of wine on the weekends    Drug use: No     Comment: caffeine tea 3-4/d    Sexual activity: Not Currently     Partners: Male      "Birth control/protection: Female Surgical, None     Comment: Post menopausal   Other Topics Concern    Weight Concern Yes     Comment: trying to lose    Special Diet No     Comment: Diabetic diet    Exercise Yes     Comment: walks 20 mt    Parent/sibling w/ CABG, MI or angioplasty before 65F 55M? 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            Review of Systems:  Skin:        Eyes:       ENT:       Respiratory:  Negative    Cardiovascular:  Negative for;chest pain;palpitations;lightheadedness;dizziness;syncope or near-syncope Positive for;edema;fatigue  Gastroenterology:      Genitourinary:       Musculoskeletal:       Neurologic:       Psychiatric:       Heme/Lymph/Imm:       Endocrine:  Positive for night sweats    Physical Exam:  Vitals: /84   Pulse 102   Ht 1.6 m (5' 3\")   Wt 78 kg (172 lb)   LMP 03/17/2001   SpO2 97%   BMI 30.47 kg/m        Constitutional:  cooperative, alert and oriented, well developed, well nourished, in no acute distress        Skin:  warm and dry to the touch, no apparent skin lesions or masses noted          Head:  normocephalic, no masses or lesions        Eyes:  pupils equal and round, conjunctivae and lids unremarkable, sclera white, no xanthalasma, EOMS intact, no nystagmus        Lymph:No Cervical lymphadenopathy present;No thyromegaly     ENT:           Neck:  carotid pulses are full and equal bilaterally, JVP normal, no carotid bruit        Respiratory:  normal breath sounds, clear to auscultation, normal A-P diameter, normal symmetry, normal respiratory excursion, no use of accessory muscles         Cardiac: regular rhythm, normal S1/S2, no S3 or S4, apical impulse not displaced, no murmurs, gallops or rubs                pulses full and equal, no bruits auscultated                                        GI:  abdomen soft, non-tender, BS normoactive, no mass, no HSM, no bruits obese      Extremities and Muscular Skeletal:  no " deformities, clubbing, cyanosis, erythema observed;no edema              Neurological:  no gross motor deficits        Psych:  Alert and Oriented x 3      Recent Lab Results:  LIPID RESULTS:  Lab Results   Component Value Date    CHOL 155 09/12/2023    CHOL 151 06/14/2021    HDL 47 (L) 09/12/2023    HDL 60 06/14/2021    LDL 66 09/12/2023    LDL 65 06/14/2021    TRIG 212 (H) 09/12/2023    TRIG 128 06/14/2021    CHOLHDLRATIO 2.9 06/30/2015       LIVER ENZYME RESULTS:  Lab Results   Component Value Date    AST 30 09/20/2023    AST 23 06/14/2021    ALT 36 09/20/2023    ALT 32 06/14/2021       CBC RESULTS:  Lab Results   Component Value Date    WBC 8.1 10/07/2023    WBC 7.5 06/14/2021    RBC 4.23 10/07/2023    RBC 4.36 06/14/2021    HGB 12.5 10/07/2023    HGB 13.5 06/14/2021    HCT 38.9 10/07/2023    HCT 40.5 06/14/2021    MCV 92 10/07/2023    MCV 93 06/14/2021    MCH 29.6 10/07/2023    MCH 31.0 06/14/2021    MCHC 32.1 10/07/2023    MCHC 33.3 06/14/2021    RDW 12.3 10/07/2023    RDW 14.0 06/14/2021     10/07/2023     06/14/2021       BMP RESULTS:  Lab Results   Component Value Date     09/20/2023     06/14/2021    POTASSIUM 4.4 09/20/2023    POTASSIUM 4.3 09/29/2022    POTASSIUM 4.0 06/14/2021    CHLORIDE 97 (L) 09/20/2023    CHLORIDE 104 09/29/2022    CHLORIDE 104 06/14/2021    CO2 25 09/20/2023    CO2 30 09/29/2022    CO2 29 06/14/2021    ANIONGAP 14 09/20/2023    ANIONGAP 3 09/29/2022    ANIONGAP 4 06/14/2021     (H) 09/20/2023     (H) 09/13/2023     (H) 09/29/2022     (H) 06/14/2021    BUN 14.7 09/20/2023    BUN 16 09/29/2022    BUN 15 06/14/2021    CR 0.8 11/16/2023    CR 0.74 09/20/2023    CR 0.81 06/14/2021    GFRESTIMATED >60 11/16/2023    GFRESTIMATED 72 06/14/2021    GFRESTBLACK 84 06/14/2021    KIM 10.1 09/20/2023    KIM 9.4 06/14/2021        A1C RESULTS:  Lab Results   Component Value Date    A1C 6.0 (H) 11/28/2023    A1C 6.1 (H) 06/14/2021       INR  RESULTS:  Lab Results   Component Value Date    INR 0.91 09/11/2023           CC  Marietta Burgess PA-C  [address] [place] [address]      Thank you for allowing me to participate in the care of your patient.      Sincerely,     Varun Murray MD     [facility]

## 2023-12-01 ENCOUNTER — HOSPITAL ENCOUNTER (EMERGENCY)
Facility: CLINIC | Age: 73
Discharge: HOME | End: 2023-12-01
Attending: STUDENT IN AN ORGANIZED HEALTH CARE EDUCATION/TRAINING PROGRAM | Admitting: STUDENT IN AN ORGANIZED HEALTH CARE EDUCATION/TRAINING PROGRAM
Payer: COMMERCIAL

## 2023-12-01 ENCOUNTER — NURSE TRIAGE (OUTPATIENT)
Dept: FAMILY MEDICINE | Facility: CLINIC | Age: 73
End: 2023-12-01

## 2023-12-01 VITALS
SYSTOLIC BLOOD PRESSURE: 131 MMHG | TEMPERATURE: 98.1 F | BODY MASS INDEX: 30.12 KG/M2 | RESPIRATION RATE: 19 BRPM | OXYGEN SATURATION: 93 % | WEIGHT: 170 LBS | HEIGHT: 63 IN | HEART RATE: 87 BPM | DIASTOLIC BLOOD PRESSURE: 82 MMHG

## 2023-12-01 DIAGNOSIS — J01.00 ACUTE NON-RECURRENT MAXILLARY SINUSITIS: ICD-10-CM

## 2023-12-01 DIAGNOSIS — R42 DIZZINESS: ICD-10-CM

## 2023-12-01 DIAGNOSIS — R42 VERTIGO: Primary | ICD-10-CM

## 2023-12-01 LAB
ALBUMIN SERPL BCG-MCNC: 4.4 G/DL (ref 3.5–5.2)
ALP SERPL-CCNC: 53 U/L (ref 40–150)
ALT SERPL W P-5'-P-CCNC: 38 U/L (ref 0–50)
ANION GAP SERPL CALCULATED.3IONS-SCNC: 12 MMOL/L (ref 7–15)
AST SERPL W P-5'-P-CCNC: 35 U/L (ref 0–45)
ATRIAL RATE - MUSE: 84 BPM
BILIRUB SERPL-MCNC: 0.3 MG/DL
BUN SERPL-MCNC: 10.7 MG/DL (ref 8–23)
CALCIUM SERPL-MCNC: 9.9 MG/DL (ref 8.8–10.2)
CHLORIDE SERPL-SCNC: 102 MMOL/L (ref 98–107)
CREAT SERPL-MCNC: 0.69 MG/DL (ref 0.51–0.95)
DEPRECATED HCO3 PLAS-SCNC: 26 MMOL/L (ref 22–29)
DIASTOLIC BLOOD PRESSURE - MUSE: NORMAL MMHG
EGFRCR SERPLBLD CKD-EPI 2021: >90 ML/MIN/1.73M2
ERYTHROCYTE [DISTWIDTH] IN BLOOD BY AUTOMATED COUNT: 12.8 % (ref 10–15)
FLUAV RNA SPEC QL NAA+PROBE: NEGATIVE
FLUBV RNA RESP QL NAA+PROBE: NEGATIVE
GLUCOSE SERPL-MCNC: 131 MG/DL (ref 70–99)
HCT VFR BLD AUTO: 40.9 % (ref 35–47)
HGB BLD-MCNC: 13.2 G/DL (ref 11.7–15.7)
HOLD SPECIMEN: NORMAL
INTERPRETATION ECG - MUSE: NORMAL
MCH RBC QN AUTO: 29.6 PG (ref 26.5–33)
MCHC RBC AUTO-ENTMCNC: 32.3 G/DL (ref 31.5–36.5)
MCV RBC AUTO: 92 FL (ref 78–100)
P AXIS - MUSE: 5 DEGREES
PLATELET # BLD AUTO: 331 10E3/UL (ref 150–450)
POTASSIUM SERPL-SCNC: 4.1 MMOL/L (ref 3.4–5.3)
PR INTERVAL - MUSE: 132 MS
PROT SERPL-MCNC: 7.2 G/DL (ref 6.4–8.3)
QRS DURATION - MUSE: 70 MS
QT - MUSE: 356 MS
QTC - MUSE: 420 MS
R AXIS - MUSE: -20 DEGREES
RBC # BLD AUTO: 4.46 10E6/UL (ref 3.8–5.2)
RSV RNA SPEC NAA+PROBE: NEGATIVE
SARS-COV-2 RNA RESP QL NAA+PROBE: NEGATIVE
SODIUM SERPL-SCNC: 140 MMOL/L (ref 135–145)
SYSTOLIC BLOOD PRESSURE - MUSE: NORMAL MMHG
T AXIS - MUSE: 0 DEGREES
TROPONIN T SERPL HS-MCNC: <6 NG/L
VENTRICULAR RATE- MUSE: 84 BPM
WBC # BLD AUTO: 7.6 10E3/UL (ref 4–11)

## 2023-12-01 PROCEDURE — 85014 HEMATOCRIT: CPT | Performed by: EMERGENCY MEDICINE

## 2023-12-01 PROCEDURE — 96374 THER/PROPH/DIAG INJ IV PUSH: CPT

## 2023-12-01 PROCEDURE — 93005 ELECTROCARDIOGRAM TRACING: CPT

## 2023-12-01 PROCEDURE — 80053 COMPREHEN METABOLIC PANEL: CPT | Performed by: STUDENT IN AN ORGANIZED HEALTH CARE EDUCATION/TRAINING PROGRAM

## 2023-12-01 PROCEDURE — 87637 SARSCOV2&INF A&B&RSV AMP PRB: CPT | Performed by: STUDENT IN AN ORGANIZED HEALTH CARE EDUCATION/TRAINING PROGRAM

## 2023-12-01 PROCEDURE — 36415 COLL VENOUS BLD VENIPUNCTURE: CPT | Performed by: EMERGENCY MEDICINE

## 2023-12-01 PROCEDURE — 85027 COMPLETE CBC AUTOMATED: CPT | Performed by: STUDENT IN AN ORGANIZED HEALTH CARE EDUCATION/TRAINING PROGRAM

## 2023-12-01 PROCEDURE — 84484 ASSAY OF TROPONIN QUANT: CPT | Performed by: STUDENT IN AN ORGANIZED HEALTH CARE EDUCATION/TRAINING PROGRAM

## 2023-12-01 PROCEDURE — 250N000011 HC RX IP 250 OP 636: Performed by: STUDENT IN AN ORGANIZED HEALTH CARE EDUCATION/TRAINING PROGRAM

## 2023-12-01 PROCEDURE — 250N000013 HC RX MED GY IP 250 OP 250 PS 637: Performed by: STUDENT IN AN ORGANIZED HEALTH CARE EDUCATION/TRAINING PROGRAM

## 2023-12-01 PROCEDURE — 99284 EMERGENCY DEPT VISIT MOD MDM: CPT | Mod: 25

## 2023-12-01 RX ORDER — MECLIZINE HYDROCHLORIDE 25 MG/1
25 TABLET ORAL 3 TIMES DAILY PRN
Qty: 30 TABLET | Refills: 0 | Status: SHIPPED | OUTPATIENT
Start: 2023-12-01 | End: 2023-12-31

## 2023-12-01 RX ORDER — AZITHROMYCIN 250 MG/1
TABLET, FILM COATED ORAL
Qty: 6 TABLET | Refills: 0 | Status: SHIPPED | OUTPATIENT
Start: 2023-12-01 | End: 2023-12-06

## 2023-12-01 RX ORDER — MECLIZINE HYDROCHLORIDE 25 MG/1
25 TABLET ORAL ONCE
Status: DISCONTINUED | OUTPATIENT
Start: 2023-12-01 | End: 2023-12-01

## 2023-12-01 RX ORDER — DIAZEPAM 2 MG
2 TABLET ORAL ONCE
Status: DISCONTINUED | OUTPATIENT
Start: 2023-12-01 | End: 2023-12-01

## 2023-12-01 RX ORDER — DIAZEPAM 10 MG/2ML
2.5 INJECTION, SOLUTION INTRAMUSCULAR; INTRAVENOUS ONCE
Status: COMPLETED | OUTPATIENT
Start: 2023-12-01 | End: 2023-12-01

## 2023-12-01 RX ORDER — MECLIZINE HYDROCHLORIDE 25 MG/1
25 TABLET ORAL ONCE
Status: COMPLETED | OUTPATIENT
Start: 2023-12-01 | End: 2023-12-01

## 2023-12-01 RX ADMIN — MECLIZINE HYDROCHLORIDE 25 MG: 25 TABLET ORAL at 13:57

## 2023-12-01 RX ADMIN — DIAZEPAM 2.5 MG: 5 INJECTION, SOLUTION INTRAMUSCULAR; INTRAVENOUS at 12:06

## 2023-12-01 ASSESSMENT — ACTIVITIES OF DAILY LIVING (ADL)
ADLS_ACUITY_SCORE: 35
ADLS_ACUITY_SCORE: 33

## 2023-12-01 NOTE — DISCHARGE INSTRUCTIONS
Thank you for allowing us to evaluate you today.  Follow up with primary care clinician  in 1 week for reevaluation. Also follow-up with physical therapy. Take the Z-garrett for sinusitis  Take the meclizine as prescribed for vertigo.  Please read the guidance provided with your discharge instructions.  Immediately return to the emergency department with any concerns.

## 2023-12-01 NOTE — TELEPHONE ENCOUNTER
"    Writer called patient to triage above symptoms.    Dizziness:     DESCRIPTION: \"I got really dizzy when I tried to walk to the bathroom this morning\" - spinning sensation   LIGHTHEADED: denies   VERTIGO: yes, spinning   SEVERITY: moderate - with positions   ONSET: this morning   AGGRAVATING FACTORS: getting out of bed or moving head back   HEART RATE: denies any palpitations   CAUSE: unsure - recent sinus symptoms   RECURRENT SYMPTOM: denies - denies ever having vertigo in the past   OTHER SYMPTOMS: denies fever, chest pain, vomiting, diarrhea, bleeding     Patient is currently reclined in bed and does note have any dizziness/spinning sensation while resting     Also reports sinus symptoms for the last week. Congestion, green discharge, pressure in sinuses, dry cough, mild headache. Patient took a covid test which was negative. Reports hx of sinus infections.     Triaged per [facility] protocol, patient to be seen in office today. No appts available within clinic today, discussed option of urgent care with patient. Patient states \"I don't know if I could make it into the car, I haven't tried walking\"      Patient has not been out of bed since having dizziness this morning, patient is unsure if she can safely walk. Writer advised if patient is unable to walk safely/having severe dizziness to be evaluated in the ER/call 911 for ambulance transfer to avoid any potential for injuries.    Patient states her  is with her and will see if she is able to walk, if feeling stable to walk will come to urgent care, if severely dizzy will call 911/go to ED. Advised that patient callback to triage if needed, patient is agreeable to do so.     Routing to PCP as FYI - please route back to triage if further recommendations - thank you!     Callback to patient [phone]     Ann Lemus RN  [facility]    Reason for Disposition   MODERATE dizziness (e.g., interferes with normal activities)  (Exception: " Dizziness caused by heat exposure, sudden standing, or poor fluid intake.)    Additional Information   Negative: SEVERE difficulty breathing (e.g., struggling for each breath, speaks in single words)   Negative: Shock suspected (e.g., cold/pale/clammy skin, too weak to stand, low BP, rapid pulse)   Negative: Difficult to awaken or acting confused (e.g., disoriented, slurred speech)   Negative: Fainted, and still feels dizzy afterwards   Negative: Overdose (accidental or intentional) of medications   Negative: New neurologic deficit that is present now: * Weakness of the face, arm, or leg on one side of the body * Numbness of the face, arm, or leg on one side of the body * Loss of speech or garbled speech   Negative: Heart beating < 50 beats per minute OR > 140 beats per minute   Negative: Sounds like a life-threatening emergency to the triager   Negative: Chest pain   Negative: Rectal bleeding, bloody stool, or tarry-black stool   Negative: Vomiting is main symptom   Negative: Diarrhea is main symptom   Negative: Headache is main symptom   Negative: Heat exhaustion suspected (i.e., dehydration from heat exposure)   Negative: Patient states that they are having an anxiety or panic attack   Negative: Dizziness from low blood sugar (i.e., < 60 mg/dl or 3.5 mmol/l)   Negative: SEVERE dizziness (e.g., unable to stand, requires support to walk, feels like passing out now)   Negative: SEVERE headache or neck pain   Negative: Spinning or tilting sensation (vertigo) present now and one or more stroke risk factors (i.e., hypertension, diabetes mellitus, prior stroke/TIA, heart attack, age over 60) (Exception: Prior physician evaluation for this AND no different/worse than usual.)   Negative: Neurologic deficit that was brief (now gone), ANY of the following:* Weakness of the face, arm, or leg on one side of the body* Numbness of the face, arm, or leg on one side of the body* Loss of speech or garbled speech   Negative: Loss  of vision or double vision  (Exception: Similar to previous migraines.)   Negative: Extra heartbeats, irregular heart beating, or heart is beating very fast (i.e., 'palpitations')   Negative: Difficulty breathing   Negative: Drinking very little and dehydration suspected (e.g., no urine > 12 hours, very dry mouth, very lightheaded)   Negative: Follows bleeding (e.g., stomach, rectum, vagina)  (Exception: Became dizzy from sight of small amount blood.)   Negative: Patient sounds very sick or weak to the triager   Negative: Lightheadedness (dizziness) present now, after 2 hours of rest and fluids   Negative: Spinning or tilting sensation (vertigo) present now   Negative: Fever > 103 F (39.4 C)   Negative: Fever > 100.0 F (37.8 C) and has diabetes mellitus or a weak immune system (e.g., HIV positive, cancer chemotherapy, organ transplant, splenectomy, chronic steroids)    Protocols used: Dizziness-A-[place]

## 2023-12-01 NOTE — ED TRIAGE NOTES
"5AM this morning got up to use the bathroom when she had onset of \"room spinning\" and needed assistance to walk to the bathroom. Patient reports dizziness/room spinning more so with position change and movement. No hx of vertigo. Patient reports nausea. Denies vision changes, does not take blood thinners. Hx of stroke in September. Denies recent falls or head injuries.    Headache today in bi lateral temporal region and top of head.    CVA in September- had issues with speech and left sided weakness. Patient has residual speech difficulties.        "

## 2023-12-01 NOTE — ED PROVIDER NOTES
"History   Chief Complaint:  Dizziness     HPI:  Elvia Helm is a pleasant 73 year old female presenting with dizziness. The patient states that this morning at 0500, she got out of bed to go the bathroom when she experienced an episode of dizziness that she describes a vertigo in which she fell back into her bed. She reports that she experienced another episode of this dizziness when her dog jumped on her later in the morning and her head went backwards suddenly. Denies history of vertigo. She notes that she was also nauseous. She states that earlier this week, she experienced bilateral sinus \"pressure,\" ear pain, and ear popping while swallowing in both ears. She adds that last night, she was also experiencing chills and a cough. Denies vomiting or fever. Denies hearing changes. Denies eye pain. She reports that she has a history of stroke.     Independent Historian:  None. Only the patient provided history.    Review of External Notes:  I reviewed the patient's clinic note from 11/29/23.  This provided me understanding of the patient's baseline medical problems and recent history.    I personally reviewed the patient's chart, including available medication list and available past medical history, past surgical history, family history, and social history.    Physical Exam   Patient Vitals for the past 24 hrs:   BP Temp Temp src Pulse Resp SpO2 Height Weight   12/01/23 1431 131/82 -- -- 87 19 93 % -- --   12/01/23 1400 (!) 127/91 -- -- 88 16 94 % -- --   12/01/23 1345 106/77 -- -- 85 18 91 % -- --   12/01/23 1330 122/79 -- -- 90 19 92 % -- --   12/01/23 1316 130/82 -- -- 91 16 96 % -- --   12/01/23 1300 117/73 -- -- 82 14 94 % -- --   12/01/23 1245 129/76 -- -- 82 11 95 % -- --   12/01/23 1230 122/80 -- -- 87 14 95 % -- --   12/01/23 1215 120/83 -- -- 96 11 94 % -- --   12/01/23 1130 112/76 -- -- 86 17 92 % -- --   12/01/23 1115 127/83 -- -- 93 19 96 % -- --   12/01/23 1044 133/79 98.1  F (36.7  C) Oral 94 " "16 96 % 1.6 m (5' 3\") 77.1 kg (170 lb)      Physical Exam  Vitals and nursing note reviewed.   Constitutional:       General: She is not in acute distress.     Appearance: Normal appearance. She is not ill-appearing.   HENT:      Head:      Comments: Tenderness to palpation of the maxillary sinuses     Right Ear: Tympanic membrane, ear canal and external ear normal.      Left Ear: Tympanic membrane, ear canal and external ear normal.      Nose: Nose normal.   Eyes:      Extraocular Movements: Extraocular movements intact.      Conjunctiva/sclera: Conjunctivae normal.      Pupils: Pupils are equal, round, and reactive to light.      Comments: No nystagmus   Cardiovascular:      Rate and Rhythm: Normal rate and regular rhythm.      Pulses: Normal pulses.      Heart sounds: Normal heart sounds.   Pulmonary:      Effort: Pulmonary effort is normal.   Abdominal:      General: Abdomen is flat.   Musculoskeletal:      Cervical back: Neck supple.   Skin:     General: Skin is warm and dry.   Neurological:      Mental Status: She is alert and oriented to person, place, and time.      Cranial Nerves: No cranial nerve deficit.      Sensory: No sensory deficit.      Motor: No weakness.      Coordination: Coordination normal.      Gait: Gait normal.            Emergency Department Course     Imaging & ECG: Laboratory:   ECG results from 12/01/23   EKG 12-lead, tracing only     Value    Systolic Blood Pressure     Diastolic Blood Pressure     Ventricular Rate 84    Atrial Rate 84    IL Interval 132    QRS Duration 70        QTc 420    P Axis 5    R AXIS -20    T Axis 0    Interpretation ECG      Sinus rhythm  Inferior infarct , age undetermined  Possible Anterior infarct (cited on or before 11-SEP-2023)  Abnormal ECG  When compared with ECG of 11-SEP-2023 17:02,  Inferior infarct is now Present  Nonspecific T wave abnormality now evident in Inferior leads      Labs Ordered and Resulted from Time of ED Arrival to Time of ED " Departure   COMPREHENSIVE METABOLIC PANEL - Abnormal       Result Value    Sodium 140      Potassium 4.1      Carbon Dioxide (CO2) 26      Anion Gap 12      Urea Nitrogen 10.7      Creatinine 0.69      GFR Estimate >90      Calcium 9.9      Chloride 102      Glucose 131 (*)     Alkaline Phosphatase 53      AST 35      ALT 38      Protein Total 7.2      Albumin 4.4      Bilirubin Total 0.3     CBC WITH PLATELETS - Normal    WBC Count 7.6      RBC Count 4.46      Hemoglobin 13.2      Hematocrit 40.9      MCV 92      MCH 29.6      MCHC 32.3      RDW 12.8      Platelet Count 331     TROPONIN T, HIGH SENSITIVITY - Normal    Troponin T, High Sensitivity <6     INFLUENZA A/B, RSV, & SARS-COV2 PCR - Normal    Influenza A PCR Negative      Influenza B PCR Negative      RSV PCR Negative      SARS CoV2 PCR Negative           Interventions & Assessments:    Interventions:  Medications   diazepam (VALIUM) injection 2.5 mg (2.5 mg Intravenous $Given 12/1/23 1206)   meclizine (ANTIVERT) tablet 25 mg (25 mg Oral $Given 12/1/23 1357)      Assessments:  1134  I obtained history and performed initial assessment of the patient. I discussed findings and   discharge with the patient. All questions answered.   1314 I reassessed the patient. I discussed findings and discharge with the patient. All questions   answered.     Independent Interpretation (X-rays, CTs, rhythm strip):  None    Consultations/Discussion of Management or Tests:  None    Social Determinants of Health affecting care:   None.      Disposition:  The patient was discharged to home.     Impression & Plan     Medical Decision Making:  Patient presenting with dizziness, room spinning sensation, and nausea.  Vital signs are reassuring upon arrival.  Considered various causes of dizziness, including posterior stroke, BPPV, labyrinthitis, and Ménière's disease, other acute vestibular syndrome.  Also considered nonneurologic causes including cardiac arrhythmia and acute  coronary syndrome.  ECG was reassuring against cardiac arrhythmia.  Troponin was reassuring against acute myocardial injury.  Patient presentation strongly suggests BPPV.  She does not have continuous vertigo.  It is triggered by positional movements only.  She had a positive Terri-Hallpike test.  She does not have nystagmus.  Given her URI type symptoms, Ménière's disease is possible but overall likelihood is low even positional component of her symptoms.  Do not feel there is indication for obtaining MRI at this time given nature of patient's vertigo.  Treated her with diazepam and meclizine.  She did have some relief with meclizine and was able to ambulate to the bathroom without difficulty.  We will plan for patient to follow-up with physical therapy for management of BPPV and use meclizine as needed. For patient's URI type symptoms and sinus pressure, did evaluate for COVID and influenza.  These tests were negative.  Overall impression is likely sinusitis.  Plan to treat with Z-Theresa, since this has worked for the patient in the past. Findings were discussed. , Additional verbal instructions were provided. , I discussed specific warning signs and instructed the patient to return to the emergency department if there are any concerns., Understanding of instructions was voiced, questions were answered and the patient was discharged.       Diagnosis:    ICD-10-CM    1. Vertigo  R42       2. Dizziness  R42 Physical Therapy Referral      3. Acute non-recurrent maxillary sinusitis  J01.00          Discharge Medications:  Discharge Medication List as of 12/1/2023  2:47 PM        START taking these medications    Details   azithromycin (ZITHROMAX Z-THERESA) 250 MG tablet Two tablets on the first day, then one tablet daily for the next 4 days, Disp-6 tablet, R-0, E-Prescribe      meclizine (ANTIVERT) 25 MG tablet Take 1 tablet (25 mg) by mouth 3 times daily as needed for dizziness, Disp-30 tablet, R-0, E-Prescribe               Yogi Hernandez MD  12/1950

## 2023-12-01 NOTE — ED NOTES
"Patient up to bedside commode SBA. Patient c/o ongoing dizziness. States \"a  tiny better\" than upon arrival but states if she moves her head she still has significant dizziness.  "

## 2023-12-04 ENCOUNTER — MYC MEDICAL ADVICE (OUTPATIENT)
Dept: FAMILY MEDICINE | Facility: CLINIC | Age: 73
End: 2023-12-04
Payer: COMMERCIAL

## 2023-12-04 DIAGNOSIS — E78.5 HYPERLIPIDEMIA LDL GOAL <100: ICD-10-CM

## 2023-12-04 RX ORDER — ATORVASTATIN CALCIUM 10 MG/1
10 TABLET, FILM COATED ORAL DAILY
Qty: 90 TABLET | Refills: 1 | Status: SHIPPED | OUTPATIENT
Start: 2023-12-04 | End: 2024-05-30

## 2023-12-04 NOTE — TELEPHONE ENCOUNTER
Prescription approved per [facility] Refill Protocol.  Aleah Pham RN  [facility]

## 2023-12-05 ENCOUNTER — THERAPY VISIT (OUTPATIENT)
Dept: PHYSICAL THERAPY | Facility: CLINIC | Age: 73
End: 2023-12-05
Attending: STUDENT IN AN ORGANIZED HEALTH CARE EDUCATION/TRAINING PROGRAM
Payer: COMMERCIAL

## 2023-12-05 DIAGNOSIS — H81.10 BENIGN PAROXYSMAL POSITIONAL VERTIGO, UNSPECIFIED LATERALITY: ICD-10-CM

## 2023-12-05 DIAGNOSIS — R42 VERTIGO: Primary | ICD-10-CM

## 2023-12-05 DIAGNOSIS — R42 DIZZINESS: ICD-10-CM

## 2023-12-05 PROCEDURE — 97530 THERAPEUTIC ACTIVITIES: CPT | Mod: GP | Performed by: PHYSICAL THERAPIST

## 2023-12-05 PROCEDURE — 97162 PT EVAL MOD COMPLEX 30 MIN: CPT | Mod: GP | Performed by: PHYSICAL THERAPIST

## 2023-12-05 NOTE — PROGRESS NOTES
PHYSICAL THERAPY EVALUATION  Type of Visit: Evaluation    See electronic medical record for Abuse and Falls Screening details.    Subjective       Presenting condition or subjective complaint:   Patient presents to PT to address acute onset vertigo with associated nausea that began on , was seen in ED.  Patient indicates severe symptoms, poor balance, was treated symptomatically and discharged home with Rx Meclizine and Z-garrett addressing possible sinus infection. She does indicate precipitating sinus infection with associated aural pressure.  Currently, symptoms have improved but not resolved. Using SEC for ambulation, mobilizing slowly/cautiously. Feels worse with certain positions such as laying down, turning her head to the right, etc.  Denies hx of similar symptoms, though does have a hx of CVA.    Date of onset: 23    Relevant medical history:     Past Medical History:   Diagnosis Date    Acute ischemic right MCA stroke (H) 10/10/2023    Allergic rhinitis, cause unspecified a    Arthritis of knee 2014    Bee sting reaction 2009    DIABETES TYPE II     Essential hypertension, benign     Flat foot(734) 2014    Hyperlipidaemia LDL goal < 100     Localized osteoarthrosis not specified whether primary or secondary, ankle and foot     After surgery    Metabolic syndrome X     PVC's (premature ventricular contractions)     Tinnitus 2014       Dates & types of surgery:    Past Surgical History:   Procedure Laterality Date    ABDOMEN SURGERY  1988    ceasaran birth    COLONOSCOPY      COLONOSCOPY N/A 2022    Procedure: COLONOSCOPY, WITH POLYPECTOMY AND BIOPSY;  Surgeon: Nicole Grubbs MD;  Location:  GI    ENT SURGERY      frequent sinus infections    HC ENDOMETRIAL BIOPSY W/O CERVICAL DILATION      ORTHOPEDIC SURGERY      broken right ankle/plates and screws    SOFT TISSUE SURGERY      tendonitis in right upper leg and below right elbow    ZZC   DELIVERY ONLY  1988    [facility] LIGATE FALLOPIAN TUBE,POSTPARTUM  1988    [facility] NONSPECIFIC PROCEDURE      right ankle roe and pins         Prior diagnostic imaging/testing results:     none  Prior therapy history for the same diagnosis, illness or injury:    none       VESTIBULAR EVALUATION  ADDITIONAL HISTORY:  Description of symptoms: Nausea or vomiting; I feel like the room is spinning; Light-headedness  Dizzy attacks:   Start:  12/1/23   Last attack:  last evening   Frequency of occurrences:  intermittent   Length of attack:  seconds  Difficulty hearing:    Noise in ears? Yes tinnitus  Alleviates symptoms:    Worsens symptoms:    Activities that bring on symptoms: Other  rolling over in bed    Pertinent visual history: Rx lenses, no acute change reported  Pertinent history of current vestibular problem:   DHI:     Prior Level of Function  Transfers: Independent  Ambulation: Independent - occasional use of SEC post CVA, typically does not use it.  More recently, using SEC with vertiginous symptoms.  ADL: Independent    Living Environment  Social support:   significant other/family  Type of home:   2-story home  Stairs to enter the home:       yes  Ramp:   no  Stairs inside the home:       yes - 12  Help at home:    Equipment owned:   walker    Employment:    retired  Hobbies/Interests:  sewing, gardening    Patient goals for therapy:  resolve dizziness, return to unrestricted activity and driving    Pain assessment: Pain denied - chronic neck issues, tightness     Objective   Cognitive Status Examination  Orientation: Oriented to person, place and time   Level of Consciousness: Alert    RANGE OF MOTION: LE ROM WFL  UE ROM WFL  STRENGTH: LE Strength WFL  UE Strength WFL  Grossly symmetrical    BED MOBILITY: Independent    TRANSFERS: Independent    GAIT:   Gait Description: Slow ambulation, using SEC currently, cautious with movement and turning.    BALANCE:     SPECIAL TESTS  Functional Gait Assessment (FGA)    (<22/30  indicates fall risk)     Dynamic Gait Index (DGI)    (<19/24 indicates fall risk)   Modified CTSIB Conditions (sec) Cond 1: 30  Cond 2: 30 - mild sway  Cond 4:   Cond 5 :          SENSATION: UE Sensation WNL, LE Sensation WNL  COORDINATION: functionally intact     VESTIBULAR  Cervicogenic Screen    Neck ROM Abnormal and reduced AROM neck all planes, chronic neck issues, ~50% AROM noted     Oculomotor Screen    Ocular ROM Normal   Smooth Pursuit Normal   Saccades Normal   VOR Normal   VOR Cancellation Normal   Head Impulse Test Normal   Convergence Testing Abnormal and limited vergence bilaterally, patient endorsing vergence point at ~6 inches        Infrared Goggle Exam Vestibular Suppressant in Last 24 Hours? Yes  Exam Completed With: Infrared goggles   Spontaneous Nystagmus Negative   Gaze Evoked Nystagmus Negative   Head Shake Horizontal Nystagmus Deferred due to neck pain   Supine Head-Hanging Test     Left Right   [place]-Hallpike Negative Negative   HSCC Supine Roll Test Negative Horizontal L, mild nystagmus > 60'', lightheadedness reported   [facility] Forward Roll Test     [facility] and Yaw Testing: [facility] test (-), head extended (+) for L beating nystagmus   BPPV Canal(s):  consider possible horizontal canal involvement - although very mild  BPPV Type: Cupulolithasis    Dynamic Visual Acuity (DVA)    Static Acuity (LogMar)    Horizontal Head Movement at 1 Hz (LogMar)    Horizontal Head Movement at 2 Hz (LogMar)            Assessment & Plan   CLINICAL IMPRESSIONS  Medical Diagnosis: Dizziness (R42), BPPV (updated referral sent to PCP with BPPV dx given subjective hx)    Treatment Diagnosis: Vertigo with impaired gait and balance   Impression/Assessment: Patient is a 73 year old female with acute vertiginous complaints.  The following significant findings have been identified: Pain, Impaired balance, Impaired gait, Instability, Dizziness, and Disequilibrium . These impairments interfere with their ability to perform self care  tasks, recreational activities, household chores, driving , household mobility, and community mobility as compared to previous level of function. Vestibular assessment on this date reveals overall improving vertigo symptoms since original onset 12/1/23.  Occulomotor exam unremarkable, positional testing relatively asymptomatic with mild ageotropic nystagmus noted during R roll testing. Given Rx Meclizine usage, associated sinus infection/aural pressure, and generally improving symptoms, ongoing assessment warranted.  Will repeat positional testing and utilize canalith repositioning maneuvers if any evidence of ongoing BPPV, otherwise will progress balance/gait as able and safe.    Clinical Decision Making (Complexity):  Clinical Presentation: Evolving/Changing  Clinical Presentation Rationale: based on medical and personal factors listed in PT evaluation  Clinical Decision Making (Complexity): Moderate complexity    PLAN OF CARE  Treatment Interventions:  Interventions: Gait Training, Manual Therapy, Neuromuscular Re-education, Therapeutic Activity, Therapeutic Exercise, Canalith Repositioning, Standardized Testing    Long Term Goals     PT Goal 1  Goal Identifier: DHI  Goal Description: Patient to score <10/100 on the DHI indicating significant improvement in dizziness condition and improved QOL.  Rationale: to maximize safety and independence with performance of ADLs and functional tasks;to maximize safety and independence within the home;to maximize safety and independence within the community  Goal Progress: Baseline score 46/100  Target Date: 02/02/24  PT Goal 2  Goal Identifier: DGI  Goal Description: Patient to demonstrate DGI score >19/24 indicating low fall risk and improved safety/tolerance resuming normal level of function.  Rationale: to maximize safety and independence with performance of ADLs and functional tasks;to maximize safety and independence within the home;to maximize safety and independence  within the community  Goal Progress: baseline evidence of slowed/guarded gait, mild instability.  Target Date: 02/02/24  PT Goal 3  Goal Identifier: HEP  Goal Description: Patient will demonstrate understanding of HEP for longterm management of condition.  Rationale: to maximize safety and independence with performance of ADLs and functional tasks;to maximize safety and independence within the home;to maximize safety and independence within the community  Goal Progress: goal in progress  Target Date: 02/02/24      Frequency of Treatment: 1x/wk  Duration of Treatment: up to 60 days    Recommended Referrals to Other Professionals:   Education Assessment:   Learner/Method: Patient;Listening;No Barriers to Learning  Education Comments: Eval findings, HEP, POC    Risks and benefits of evaluation/treatment have been explained.   Patient/Family/caregiver agrees with Plan of Care.     Evaluation Time:     PT Eval, Moderate Complexity Minutes (03039): 35       Signing Clinician: Kushal Garcia PT      [facility]                                                                                   OUTPATIENT PHYSICAL THERAPY      PLAN OF TREATMENT FOR OUTPATIENT REHABILITATION   Patient's Last Name, First Name, BENNETTSantinoKACIESantino  AlicjaElvia bynum YOB: 1950   Provider's Name   [facility]   Medical Record No.  [id number]     Onset Date: 12/01/23  Start of Care Date: 12/05/23     Medical Diagnosis:  Dizziness (R42), BPPV (updated referral sent to PCP with BPPV dx given subjective hx)      PT Treatment Diagnosis:  Vertigo with impaired gait and balance Plan of Treatment  Frequency/Duration: 1x/wk/ up to 60 days    Certification date from 12/05/23 to 02/02/24         See note for plan of treatment details and functional goals     Kushal Garcia PT                         I CERTIFY THE NEED FOR THESE SERVICES FURNISHED UNDER        THIS PLAN OF TREATMENT AND WHILE  UNDER MY CARE     (Physician attestation of this document indicates review and certification of the therapy plan).              Referring Provider:  Reji Harris being sent to PCP - Dr. Masood Osborne    Initial Assessment  See Epic Evaluation- Start of Care Date: 12/05/23

## 2023-12-07 ENCOUNTER — LAB (OUTPATIENT)
Dept: LAB | Facility: CLINIC | Age: 73
End: 2023-12-07
Payer: COMMERCIAL

## 2023-12-07 DIAGNOSIS — I63.511 ACUTE ISCHEMIC RIGHT MCA STROKE (H): ICD-10-CM

## 2023-12-07 LAB
FACTOR 2 INTERPRETATION: ABNORMAL
FACTOR V INTERPRETATION: ABNORMAL
LAB DIRECTOR COMMENTS: ABNORMAL
LAB DIRECTOR DISCLAIMER: ABNORMAL
LAB DIRECTOR INTERPRETATION: ABNORMAL
LAB DIRECTOR METHODOLOGY: ABNORMAL
LAB DIRECTOR RESULTS: ABNORMAL
SPECIMEN DESCRIPTION: ABNORMAL

## 2023-12-07 PROCEDURE — 81240 F2 GENE: CPT | Mod: GZ

## 2023-12-07 PROCEDURE — 85303 CLOT INHIBIT PROT C ACTIVITY: CPT

## 2023-12-07 PROCEDURE — 81241 F5 GENE: CPT | Mod: GZ

## 2023-12-07 PROCEDURE — 85390 FIBRINOLYSINS SCREEN I&R: CPT | Performed by: PATHOLOGY

## 2023-12-07 PROCEDURE — G0452 MOLECULAR PATHOLOGY INTERPR: HCPCS | Performed by: STUDENT IN AN ORGANIZED HEALTH CARE EDUCATION/TRAINING PROGRAM

## 2023-12-07 PROCEDURE — 85613 RUSSELL VIPER VENOM DILUTED: CPT

## 2023-12-07 PROCEDURE — 36415 COLL VENOUS BLD VENIPUNCTURE: CPT

## 2023-12-07 PROCEDURE — 85307 ASSAY ACTIVATED PROTEIN C: CPT

## 2023-12-07 PROCEDURE — 85300 ANTITHROMBIN III ACTIVITY: CPT

## 2023-12-07 PROCEDURE — 85730 THROMBOPLASTIN TIME PARTIAL: CPT

## 2023-12-08 ENCOUNTER — HOSPITAL ENCOUNTER (OUTPATIENT)
Dept: CARDIOLOGY | Facility: CLINIC | Age: 73
Discharge: HOME | End: 2023-12-08
Attending: INTERNAL MEDICINE | Admitting: INTERNAL MEDICINE
Payer: COMMERCIAL

## 2023-12-08 DIAGNOSIS — I63.511 ACUTE ISCHEMIC RIGHT MCA STROKE (H): ICD-10-CM

## 2023-12-08 LAB
APCR PPP: 2.96 {RATIO}
AT III ACT/NOR PPP CHRO: 112 % (ref 85–135)
DRVVT SCREEN RATIO: 0.92
INR PPP: 1.03 (ref 0.85–1.15)
LA PPP-IMP: NEGATIVE
LUPUS INTERPRETATION: NORMAL
LVEF ECHO: NORMAL
PTT RATIO: 1.14
THROMBIN TIME: 16.5 SECONDS (ref 13–19)

## 2023-12-08 PROCEDURE — 93325 DOPPLER ECHO COLOR FLOW MAPG: CPT

## 2023-12-08 PROCEDURE — 93321 DOPPLER ECHO F-UP/LMTD STD: CPT

## 2023-12-08 PROCEDURE — 93325 DOPPLER ECHO COLOR FLOW MAPG: CPT | Mod: 26 | Performed by: INTERNAL MEDICINE

## 2023-12-08 PROCEDURE — 93308 TTE F-UP OR LMTD: CPT | Mod: 26 | Performed by: INTERNAL MEDICINE

## 2023-12-08 PROCEDURE — 93321 DOPPLER ECHO F-UP/LMTD STD: CPT | Mod: 26 | Performed by: INTERNAL MEDICINE

## 2023-12-11 LAB — PROT C ACT/NOR PPP CHRO: 130 % (ref 70–170)

## 2023-12-11 ASSESSMENT — ENCOUNTER SYMPTOMS
COUGH: 0
NERVOUS/ANXIOUS: 1
DYSURIA: 0
DIZZINESS: 1
MYALGIAS: 1
WEAKNESS: 0
HEARTBURN: 0
FREQUENCY: 0
CONSTIPATION: 0
JOINT SWELLING: 0
HEMATOCHEZIA: 0
SORE THROAT: 0
ARTHRALGIAS: 1
ABDOMINAL PAIN: 0
HEADACHES: 0
BREAST MASS: 0
FEVER: 0
HEMATURIA: 0
PALPITATIONS: 0
EYE PAIN: 0
SHORTNESS OF BREATH: 0
NAUSEA: 0
PARESTHESIAS: 0
DIARRHEA: 0
CHILLS: 0

## 2023-12-11 ASSESSMENT — ACTIVITIES OF DAILY LIVING (ADL): CURRENT_FUNCTION: NO ASSISTANCE NEEDED

## 2023-12-12 ENCOUNTER — OFFICE VISIT (OUTPATIENT)
Dept: FAMILY MEDICINE | Facility: CLINIC | Age: 73
End: 2023-12-12
Payer: COMMERCIAL

## 2023-12-12 VITALS
HEART RATE: 78 BPM | DIASTOLIC BLOOD PRESSURE: 78 MMHG | TEMPERATURE: 97.8 F | SYSTOLIC BLOOD PRESSURE: 117 MMHG | HEIGHT: 62 IN | OXYGEN SATURATION: 98 % | WEIGHT: 171 LBS | RESPIRATION RATE: 18 BRPM | BODY MASS INDEX: 31.47 KG/M2

## 2023-12-12 DIAGNOSIS — R00.0 SINUS TACHYCARDIA: ICD-10-CM

## 2023-12-12 DIAGNOSIS — H81.11 BENIGN PAROXYSMAL POSITIONAL VERTIGO OF RIGHT EAR: ICD-10-CM

## 2023-12-12 DIAGNOSIS — F41.8 SITUATIONAL ANXIETY: ICD-10-CM

## 2023-12-12 DIAGNOSIS — I63.9 ACUTE ISCHEMIC STROKE (H): ICD-10-CM

## 2023-12-12 DIAGNOSIS — E11.9 TYPE 2 DIABETES MELLITUS WITHOUT COMPLICATION, WITHOUT LONG-TERM CURRENT USE OF INSULIN (H): ICD-10-CM

## 2023-12-12 DIAGNOSIS — R91.8 MASS OF UPPER LOBE OF RIGHT LUNG: ICD-10-CM

## 2023-12-12 DIAGNOSIS — I10 ESSENTIAL HYPERTENSION, BENIGN: ICD-10-CM

## 2023-12-12 DIAGNOSIS — Z00.00 ENCOUNTER FOR ANNUAL WELLNESS VISIT (AWV) IN MEDICARE PATIENT: Primary | ICD-10-CM

## 2023-12-12 DIAGNOSIS — M79.604 PAIN OF RIGHT LOWER EXTREMITY: ICD-10-CM

## 2023-12-12 DIAGNOSIS — E78.5 HYPERLIPIDEMIA LDL GOAL <100: ICD-10-CM

## 2023-12-12 PROCEDURE — 99214 OFFICE O/P EST MOD 30 MIN: CPT | Mod: 25 | Performed by: INTERNAL MEDICINE

## 2023-12-12 PROCEDURE — G0439 PPPS, SUBSEQ VISIT: HCPCS | Performed by: INTERNAL MEDICINE

## 2023-12-12 RX ORDER — CARVEDILOL 3.12 MG/1
3.12 TABLET ORAL 2 TIMES DAILY WITH MEALS
Qty: 180 TABLET | Refills: 3 | Status: SHIPPED | OUTPATIENT
Start: 2023-12-12

## 2023-12-12 RX ORDER — SERTRALINE HYDROCHLORIDE 25 MG/1
25 TABLET, FILM COATED ORAL DAILY
Qty: 90 TABLET | Refills: 1 | Status: SHIPPED | OUTPATIENT
Start: 2023-12-12 | End: 2024-02-27 | Stop reason: DRUGHIGH

## 2023-12-12 ASSESSMENT — ACTIVITIES OF DAILY LIVING (ADL): CURRENT_FUNCTION: NO ASSISTANCE NEEDED

## 2023-12-12 ASSESSMENT — ENCOUNTER SYMPTOMS
HEARTBURN: 0
ABDOMINAL PAIN: 0
DIARRHEA: 0
MYALGIAS: 1
FEVER: 0
NAUSEA: 0
SORE THROAT: 0
HEMATURIA: 0
NERVOUS/ANXIOUS: 1
FREQUENCY: 0
CHILLS: 0
HEMATOCHEZIA: 0
DIZZINESS: 1
WEAKNESS: 0
EYE PAIN: 0
BREAST MASS: 0
JOINT SWELLING: 0
SHORTNESS OF BREATH: 0
COUGH: 0
PARESTHESIAS: 0
PALPITATIONS: 0
HEADACHES: 0
DYSURIA: 0
ARTHRALGIAS: 1
CONSTIPATION: 0

## 2023-12-12 ASSESSMENT — PAIN SCALES - GENERAL: PAINLEVEL: MILD PAIN (3)

## 2023-12-12 NOTE — PROGRESS NOTES
"SUBJECTIVE:   Alma is a 73 year old, presenting for the following:  Physical (Needs approval for colonoscopy) and Ear Problem (Left ear pain)  73 years old is accompanied by her daughter.  She recently had a stroke  She has fully recovered to speech and except for the right leg mild weakness she is doing well    She recently was found to have lung mass for which she has seen thoracic surgery and oncology and this will be observed  Pelvic ultrasound is pending for the adnexal mass found    She recently had cold and cough  Now her right ear is fine but left ear is painful  She has been even in the emergency room since last visit with dizziness which was thought to be vertigo  Vertigo has improved    She does not have any cough fever or chills  She is doing well overall and blood sugars are running excellent  Right leg has been painful and she has been feeling quite anxious  Are you in the first 12 months of your Medicare coverage?  No  Part B 07/01/2014    Healthy Habits:     In general, how would you rate your overall health?  Good    Frequency of exercise:  None    Do you usually eat at least 4 servings of fruit and vegetables a day, include whole grains    & fiber and avoid regularly eating high fat or \"junk\" foods?  Yes    Medication side effects:  None    Ability to successfully perform activities of daily living:  No assistance needed    Home Safety:  No safety concerns identified    Hearing Impairment:  Difficulty following a conversation in a noisy restaurant or crowded room    In the past 6 months, have you been bothered by leaking of urine? Yes    In general, how would you rate your overall mental or emotional health?  Fair    Additional concerns today:  Yes      Today's PHQ-2 Score:       12/11/2023    10:52 AM   PHQ-2 ( 1999 [employer])   Q1: Little interest or pleasure in doing things 0   Q2: Feeling down, depressed or hopeless 1   PHQ-2 Score 1   Q1: Little interest or pleasure in doing things Not at all "   Q2: Feeling down, depressed or hopeless Several days   PHQ-2 Score 1       Have you ever done Advance Care Planning? (For example, a Health Directive, POLST, or a discussion with a medical provider or your loved ones about your wishes): Yes, patient states has an Advance Care Planning document and will bring a copy to the clinic.     Fall risk  Fallen 2 or more times in the past year?: No  Any fall with injury in the past year?: No    Cognitive Screening   1) Repeat 3 items (Leader, Season, Table)    2) Clock draw: NORMAL  3) 3 item recall: Recalls 3 objects  Results: 3 items recalled: COGNITIVE IMPAIRMENT LESS LIKELY    Mini-CogTM Copyright S Julissa. Licensed by the author for use in [facility]; reprinted with permission (beck@[facility]). All rights reserved.        Reviewed and updated as needed this visit by clinical staff   Tobacco  Allergies  Meds  Problems     Soc Hx        Reviewed and updated as needed this visit by Provider    Allergies  Meds  Problems            Social History     Tobacco Use    Smoking status: Former     Packs/day: 1.00     Years: 10.00     Additional pack years: 0.00     Total pack years: 10.00     Types: Cigarettes     Quit date: 1982     Years since quittin.3    Smokeless tobacco: Never   Substance Use Topics    Alcohol use: Yes     Comment: glass of wine on the weekends             2023    10:51 AM   Alcohol Use   Prescreen: >3 drinks/day or >7 drinks/week? No         2022     8:48 AM   AUDIT - Alcohol Use Disorders Identification Test - Reproduced from the World Health Organization Audit 2001 (Second Edition)   1.  How often do you have a drink containing alcohol? 4 or more times a week   2.  How many drinks containing alcohol do you have on a typical day when you are drinking? 1 or 2   3.  How often do you have five or more drinks on one occasion? Never   9.  Have you or someone else been injured because of your drinking? No   10. Has a  relative, friend, doctor or other health care worker been concerned about your drinking or suggested you cut down? No     Do you have a current opioid prescription? No  Do you use any other controlled substances or medications that are not prescribed by a provider? None          Diabetes Follow-up    How often are you checking your blood sugar? A few times a week  What time of day are you checking your blood sugars (select all that apply)?  Before meals  Have you had any blood sugars above 200?  No  Have you had any blood sugars below 70?  No  What symptoms do you notice when your blood sugar is low?  None  What concerns do you have today about your diabetes? None   Do you have any of these symptoms? (Select all that apply)  No numbness or tingling in feet.  No redness, sores or blisters on feet.  No complaints of excessive thirst.  No reports of blurry vision.  No significant changes to weight.      BP Readings from Last 2 Encounters:   12/12/23 117/78   12/01/23 131/82     Hemoglobin A1C (%)   Date Value   11/28/2023 6.0 (H)   09/20/2023 6.1 (H)   06/14/2021 6.1 (H)   03/26/2021 5.9 (H)     LDL Cholesterol Calculated (mg/dL)   Date Value   09/12/2023 66   06/13/2023 69   06/14/2021 65   06/23/2020 64             Hypertension Follow-up    Do you check your blood pressure regularly outside of the clinic? Yes   Are you following a low salt diet? Yes  Are your blood pressures ever more than 140 on the top number (systolic) OR more   than 90 on the bottom number (diastolic), for example 140/90? No    Current providers sharing in care for this patient include:   Patient Care Team:  Masood Osborne MD as PCP - General (Internal Medicine)  Masood Osborne MD as PCP - Internal Medicine  Masood Osborne MD as Assigned PCP  Danyelle Jonas, PharmD as Pharmacist (Pharmacist)  Zain Marshall MD as Assigned Allergy Provider  Danyelle Jonas, PharmD as Assigned MTM Pharmacist  Marietta Burgess PA-C as Physician Assistant  (Psychiatry & Neurology Vascular Neurology)  Renato Plata MD as Referring Physician (Family Medicine)  Varun Murray MD as MD (Cardiovascular Disease)  Marietta Burgess PA-C as Assigned Surgical Provider    The following health maintenance items are reviewed in Epic and correct as of today:  Health Maintenance   Topic Date Due    RSV VACCINE (Pregnancy & 60+) (1 - 1-dose 60+ series) Never done    ZOSTER IMMUNIZATION (2 of 3) 01/14/2010    DTAP/TDAP/TD IMMUNIZATION (2 - Td or Tdap) 03/11/2023    DIABETIC FOOT EXAM  04/11/2024    MAMMO SCREENING  05/02/2024    A1C  05/28/2024    EYE EXAM  06/05/2024    MICROALBUMIN  06/13/2024    DEXA  06/17/2024    LIPID  09/12/2024    ANNUAL REVIEW OF HM ORDERS  11/28/2024    BMP  12/01/2024    CREATININE  12/01/2024    MEDICARE ANNUAL WELLNESS VISIT  12/12/2024    FALL RISK ASSESSMENT  09/16/2025    COLORECTAL CANCER SCREENING  09/12/2027    ADVANCE CARE PLANNING  12/12/2028    HEPATITIS C SCREENING  Completed    PHQ-2 (once per calendar year)  Completed    INFLUENZA VACCINE  Completed    Pneumococcal Vaccine: 65+ Years  Completed    COVID-19 Vaccine  Completed    IPV IMMUNIZATION  Aged Out    HPV IMMUNIZATION  Aged Out    MENINGITIS IMMUNIZATION  Aged Out    RSV MONOCLONAL ANTIBODY  Aged Out     BP Readings from Last 3 Encounters:   12/12/23 117/78   12/01/23 131/82   11/29/23 123/84    Wt Readings from Last 3 Encounters:   12/12/23 77.6 kg (171 lb)   12/01/23 77.1 kg (170 lb)   11/29/23 78 kg (172 lb)                  Patient Active Problem List   Diagnosis    Allergic rhinitis    Essential hypertension, benign    Postmenopausal atrophic vaginitis    Bee sting reaction    Torticollis    Type 2 diabetes mellitus without complications (H)    HYPERLIPIDEMIA LDL GOAL <100    Post-nasal drip    Advanced directives, counseling/discussion    Osteopenia    Urticaria    Arthritis of knee    Pes planus    Tinnitus    Left-sided weakness    Acute ischemic right MCA stroke (H)     Past  Surgical History:   Procedure Laterality Date    ABDOMEN SURGERY  1988    ceasaran birth    COLONOSCOPY      COLONOSCOPY N/A 2022    Procedure: COLONOSCOPY, WITH POLYPECTOMY AND BIOPSY;  Surgeon: Nicole Grubbs MD;  Location:  GI    ENT SURGERY      frequent sinus infections    HC ENDOMETRIAL BIOPSY W/O CERVICAL DILATION      ORTHOPEDIC SURGERY      broken right ankle/plates and screws    SOFT TISSUE SURGERY      tendonitis in right upper leg and below right elbow    ZZC  DELIVERY ONLY      ZZC LIGATE FALLOPIAN TUBE,POSTPARTUM      ZZC NONSPECIFIC PROCEDURE      right ankle roe and pins       Social History     Tobacco Use    Smoking status: Former     Packs/day: 1.00     Years: 10.00     Additional pack years: 0.00     Total pack years: 10.00     Types: Cigarettes     Quit date: 1982     Years since quittin.3    Smokeless tobacco: Never   Substance Use Topics    Alcohol use: Yes     Comment: glass of wine on the weekends     Family History   Problem Relation Age of Onset    Musculoskeletal Disorder Mother         b:1938   Hx Fibromyalgia    Hypertension Mother     Cerebrovascular Disease Mother     Anesthesia Reaction Mother     Cerebrovascular Disease Father         b:193,  age 68  massive stroke    Diabetes Father         dx age 50s and his family    Gastrointestinal Disease Sister         b:1950   Hx of reflux    Cerebrovascular Disease Sister     Cerebrovascular Disease Sister 62        ? TIA    Bronchitis Sister     Anxiety Disorder Sister     Anesthesia Reaction Sister     Family History Negative Brother         b:    Cancer Maternal Grandfather         throat    Diabetes Paternal Grandmother     Hypertension Daughter     Family History Negative Daughter         b:    Diabetes Daughter         b:    Diabetes dx age 22**insulin    CABG Daughter 43    Hypertension Daughter     Lipids Son         b: high cholosterol and triglycerides          Current Outpatient Medications   Medication Sig Dispense Refill    aspirin (ASA) 325 MG EC tablet Take 325 mg by mouth At Bedtime      atorvastatin (LIPITOR) 10 MG tablet Take 1 tablet (10 mg) by mouth daily 90 tablet 1    azelastine (ASTELIN) 0.1 % nasal spray Spray 1 spray into both nostrils 2 times daily 30 mL 3    blood glucose (NO BRAND SPECIFIED) lancets standard Use to test blood sugar one* times daily or as directed. 100 each 3    blood glucose (ONETOUCH ULTRA) test strip Use to test blood sugar daily or as directed. 100 strip 3    blood glucose calibration (ONETOUCH ULTRA CONTROL) solution Use to calibrate blood glucose monitor as directed. 1 Bottle 1    blood glucose monitoring (ONE TOUCH ULTRA 2) meter device kit Use to test blood sugar 1 times daily. 1 kit 0    carvedilol (COREG) 3.125 MG tablet Take 1 tablet (3.125 mg) by mouth 2 times daily (with meals) 180 tablet 3    Cholecalciferol (VITAMIN D3 PO) Take 2,000 Units by mouth daily       fluticasone (FLONASE) 50 MCG/ACT spray Spray 1-2 sprays into both nostrils daily 16 g 0    lisinopril-hydrochlorothiazide (ZESTORETIC) 20-25 MG tablet Take 0.5 tablets by mouth daily 45 tablet 1    metFORMIN (GLUCOPHAGE) 850 MG tablet Take 1 tablet (850 mg) by mouth 2 times daily (with meals) 180 tablet 3    Omega-3 Fatty Acids (OMEGA-3 FISH OIL PO) Take 1 g by mouth daily       sertraline (ZOLOFT) 25 MG tablet Take 1 tablet (25 mg) by mouth daily 90 tablet 1    sodium chloride (OCEAN) 0.65 % nasal spray Spray 1 spray into both nostrils daily as needed for congestion      vitamin B complex with vitamin C (STRESS TAB) tablet Take 1 tablet by mouth daily      EPINEPHrine (ANY BX GENERIC EQUIV) 0.3 MG/0.3ML injection 2-pack INJECT 0.3 MLS INTO THE MUSCLE ONCE AS NEEDED FOR ANAPHYLAXIS 2 each 0    meclizine (ANTIVERT) 25 MG tablet Take 1 tablet (25 mg) by mouth 3 times daily as needed for dizziness 30 tablet 0     Allergies   Allergen Reactions    Atovaquone-Proguanil  "Hcl Hives    Amoxicillin Hives    Bee Hives and Swelling     carries Epi Pen     Ceftin Hives     hives    Clindamycin Hives    Erythromycin GI Disturbance    Seasonal Allergies     Shellfish-Derived Products Unknown    Shrimp Hives     Happened twice    Tetracycline Other (See Comments)     ?severe headaches  & nausea              Review of Systems   Constitutional:  Negative for chills and fever.   HENT:  Positive for ear pain. Negative for congestion, hearing loss and sore throat.    Eyes:  Negative for pain and visual disturbance.   Respiratory:  Negative for cough and shortness of breath.    Cardiovascular:  Negative for chest pain, palpitations and peripheral edema.   Gastrointestinal:  Negative for abdominal pain, constipation, diarrhea, heartburn, hematochezia and nausea.   Breasts:  Negative for tenderness, breast mass and discharge.   Genitourinary:  Positive for urgency. Negative for dysuria, frequency, genital sores, hematuria, pelvic pain, vaginal bleeding and vaginal discharge.   Musculoskeletal:  Positive for arthralgias and myalgias. Negative for joint swelling.   Skin:  Negative for rash.   Neurological:  Positive for dizziness. Negative for weakness, headaches and paresthesias.   Psychiatric/Behavioral:  Negative for mood changes. The patient is nervous/anxious.          OBJECTIVE:   /78 (BP Location: Right arm, Patient Position: Sitting, Cuff Size: Adult Regular)   Pulse 78   Temp 97.8  F (36.6  C)   Resp 18   Ht 1.577 m (5' 2.09\")   Wt 77.6 kg (171 lb)   LMP 03/17/2001   SpO2 98%   BMI 31.19 kg/m   Estimated body mass index is 31.19 kg/m  as calculated from the following:    Height as of this encounter: 1.577 m (5' 2.09\").    Weight as of this encounter: 77.6 kg (171 lb).  Physical Exam  GENERAL APPEARANCE: healthy, alert and no distress  EYES: Eyes grossly normal to inspection, PERRL and conjunctivae and sclerae normal  HENT: ear canals and TM's normal, nose and mouth without " ulcers or lesions, oropharynx clear and oral mucous membranes moist  NECK: no adenopathy, no asymmetry, masses, or scars and thyroid normal to palpation  RESP: lungs clear to auscultation - no rales, rhonchi or wheezes  BREAST: normal without masses, tenderness or nipple discharge and no palpable axillary masses or adenopathy  CV: regular rate and rhythm, normal S1 S2, no S3 or S4, no murmur, click or rub, no peripheral edema and peripheral pulses strong  ABDOMEN: soft, nontender, no hepatosplenomegaly, no masses and bowel sounds normal  MS: no musculoskeletal defects are noted and gait is age appropriate without ataxia  SKIN: no suspicious lesions or rashes  NEURO: Speech is much improved since last visit normal strength and tone, sensory exam grossly normal, mentation intact and speech normal  PSYCH: mentation appears normal and affect normal/bright  Foot exam shows no ulceration, no neuropathy, microfilament well felt. Skin on the feet not dry.  Pulses in the feet well felt.      Lab Results   Component Value Date    A1C 6.0 11/28/2023    A1C 6.1 09/20/2023    A1C 6.3 09/12/2023    A1C 6.5 02/02/2023    A1C 6.2 09/29/2022    A1C 6.1 06/14/2021    A1C 5.9 03/26/2021    A1C 6.4 06/23/2020    A1C 6.1 11/18/2019    A1C 6.1 05/17/2019     LDL Cholesterol Calculated   Date Value Ref Range Status   09/12/2023 66 <=100 mg/dL Final   06/14/2021 65 <100 mg/dL Final     Comment:     Desirable:       <100 mg/dl         ASSESSMENT / PLAN:   Alma was seen today for physical and ear problem.  Current Outpatient Medications   Medication Instructions    aspirin (ASA) 325 mg, Oral, AT BEDTIME    atorvastatin (LIPITOR) 10 mg, Oral, DAILY    azelastine (ASTELIN) 0.1 % nasal spray 1 spray, Both Nostrils, 2 TIMES DAILY    blood glucose (NO BRAND SPECIFIED) lancets standard Use to test blood sugar one* times daily or as directed.    blood glucose (ONETOUCH ULTRA) test strip Use to test blood sugar daily or as directed.    blood  glucose calibration (ONETOUCH ULTRA CONTROL) solution Use to calibrate blood glucose monitor as directed.    blood glucose monitoring (ONE TOUCH ULTRA 2) meter device kit Use to test blood sugar 1 times daily.    carvedilol (COREG) 3.125 mg, Oral, 2 TIMES DAILY WITH MEALS    Cholecalciferol (VITAMIN D3 PO) 2,000 Units, Oral, DAILY    EPINEPHrine (ANY BX GENERIC EQUIV) 0.3 MG/0.3ML injection 2-pack INJECT 0.3 MLS INTO THE MUSCLE ONCE AS NEEDED FOR ANAPHYLAXIS    fluticasone (FLONASE) 50 MCG/ACT spray 1-2 sprays, Both Nostrils, DAILY    lisinopril-hydrochlorothiazide (ZESTORETIC) 20-25 MG tablet 0.5 tablets, Oral, DAILY    meclizine (ANTIVERT) 25 mg, Oral, 3 TIMES DAILY PRN    metFORMIN (GLUCOPHAGE) 850 mg, Oral, 2 TIMES DAILY WITH MEALS    Omega-3 Fatty Acids (OMEGA-3 FISH OIL PO) 1 g, Oral, DAILY    sertraline (ZOLOFT) 25 mg, Oral, DAILY    sodium chloride (OCEAN) 0.65 % nasal spray 1 spray, Both Nostrils, DAILY PRN    vitamin B complex with vitamin C (STRESS TAB) tablet 1 tablet, Oral, DAILY      Diagnoses and all orders for this visit:    Encounter for annual wellness visit (AWV) in Medicare patient  -     PRIMARY CARE FOLLOW-UP SCHEDULING; Future  Very pleasant woman who recently recovered from stroke and the workup has been negative only the monitor is pending  She will now see me in 6 months and continue to work up for secondary risk factors and manage them and control her blood sugar and weight  Acute ischemic stroke (H)  Patient is on aspirin now and statins Lipitor  -     carvedilol (COREG) 3.125 MG tablet; Take 1 tablet (3.125 mg) by mouth 2 times daily (with meals)  -     PRIMARY CARE FOLLOW-UP SCHEDULING; Future    Essential hypertension, benign on Zestoretic and carvedilol and excellent blood pressure control  -     carvedilol (COREG) 3.125 MG tablet; Take 1 tablet (3.125 mg) by mouth 2 times daily (with meals)  -     PRIMARY CARE FOLLOW-UP SCHEDULING; Future    Sinus tachycardia carvedilol has helped  "and she has seen cardiology  -     carvedilol (COREG) 3.125 MG tablet; Take 1 tablet (3.125 mg) by mouth 2 times daily (with meals)  -     PRIMARY CARE FOLLOW-UP SCHEDULING; Future    Benign paroxysmal positional vertigo of right ear  I also do not find any signs of infection to treat the left ear pain  This seems to be eustachian tube dysfunction  -     PRIMARY CARE FOLLOW-UP SCHEDULING; Future  She was given Epley's exercises  Type 2 diabetes mellitus without complication, without long-term current use of insulin (H) on metformin and doing very well  -     **Hemoglobin A1c FUTURE 3mo; Future  -     PRIMARY CARE FOLLOW-UP SCHEDULING; Future    Mass of upper lobe of right lung thoracic surgery note is appreciated and we will repeat CT scan  -     PRIMARY CARE FOLLOW-UP SCHEDULING; Future    Situational anxiety  This is a new situation  Patient and her daughter admit to anxiety and want to start some medication we will start selective serotonin reuptake inhibitor  We discussed the side effects of tremor and weight gain and diarrhea  -     sertraline (ZOLOFT) 25 MG tablet; Take 1 tablet (25 mg) by mouth daily  -     PRIMARY CARE FOLLOW-UP SCHEDULING; Future    Hyperlipidemia LDL goal <100 on Lipitor  -     Lipid panel reflex to direct LDL Fasting; Future  -     **Comprehensive metabolic panel FUTURE 6mo; Future  -     PRIMARY CARE FOLLOW-UP SCHEDULING; Future    Pain of right lower extremity  -     Orthopedic  Referral; Future  Patient will see sports medicine to see if she needs to be fitted with orthotics            COUNSELING:  RSV vaccine      BMI:   Estimated body mass index is 31.19 kg/m  as calculated from the following:    Height as of this encounter: 1.577 m (5' 2.09\").    Weight as of this encounter: 77.6 kg (171 lb).         She reports that she quit smoking about 41 years ago. Her smoking use included cigarettes. She has a 10.00 pack-year smoking history. She has never used smokeless " tobacco.      Appropriate preventive services were discussed with this patient, including applicable screening as appropriate for fall prevention, nutrition, physical activity, Tobacco-use cessation, weight loss and cognition.  Checklist reviewing preventive services available has been given to the patient.    Reviewed patients plan of care and provided an AVS. The Complex Care Plan (for patients with higher acuity and needing more deliberate coordination of services) for Elvia meets the Care Plan requirement. This Care Plan has been established and reviewed with the Patient and daughter.    Masood Osborne MD  [facility]    Identified Health Risks:

## 2023-12-14 ENCOUNTER — THERAPY VISIT (OUTPATIENT)
Dept: PHYSICAL THERAPY | Facility: CLINIC | Age: 73
End: 2023-12-14
Payer: COMMERCIAL

## 2023-12-14 DIAGNOSIS — R42 DIZZINESS: ICD-10-CM

## 2023-12-14 DIAGNOSIS — H81.10 BENIGN PAROXYSMAL POSITIONAL VERTIGO, UNSPECIFIED LATERALITY: Primary | ICD-10-CM

## 2023-12-14 DIAGNOSIS — R42 VERTIGO: ICD-10-CM

## 2023-12-14 PROCEDURE — 97530 THERAPEUTIC ACTIVITIES: CPT | Mod: GP | Performed by: PHYSICAL THERAPIST

## 2023-12-14 PROCEDURE — 97164 PT RE-EVAL EST PLAN CARE: CPT | Mod: GP | Performed by: PHYSICAL THERAPIST

## 2023-12-14 NOTE — PROGRESS NOTES
12/14/23 1000   Signing Clinician's Name / Credentials   Signing clinician's name / credentials Kushal Garcia DPT   Dynamic Gait Index (Grayson and Gee [place], 1995)   Gait Level Surface 2  (subtle gait deviations and scuffing, chronic issue post CVA and known leg length discrepency)   Change in Gait Speed 3   Gait and Horizontal Head Turns 3   Gait with Vertical Head Turns 3   Gait and Pivot Turns 3   Step Over Obstacle 3   Step Around Obstacles 3   Steps 2   Total Dynamic Gait Index Score  (A score of 19 or less has been correlated to an increased risk of falls in community dwelling older adults, patients with vestibular disorders, and patients with MS.)   Total Score (out of 24) 22     Dynamic Gait Index (DGI):The DGI is a measure of balance during gait that is reliable and valid for the elderly and individuals with Parkinson's disease, MS, vestibular disorders, or s/p stroke. Gait assistive device used: none     Patient score: 22/24  Scores ?19/24 indicate an increased risk for falls according to Kristi et al 2000  Minimal Detectable Change = 2.9 in community dwelling elderly according to Nicholas et al 2011    Assessment (rationale for performing, application to patient s function & care plan): 22/24 score on the DGI indicates low risk for falls and return to baseline functional status.  Subtle dynamic gait impairment noted, preferring railing support on stairs and mild path deviations/foot scuffing related to chronic leg length discrepency and post-CVA deficits.  Current status appears stable, resolved dizziness condition.  Minutes billed as physical performance test: 0 (incorporated within re-eval on this date)

## 2023-12-14 NOTE — PROGRESS NOTES
12/14/23 0500   Appointment Info   Signing clinician's name / credentials Kushal Garcia, PT, DPT   Visits Used 2/10 [employer]   Medical Diagnosis Dizziness (R42), BPPV  (updated referral sent to PCP with BPPV dx given subjective hx)   PT Tx Diagnosis Vertigo with impaired gait and balance   Quick Adds Certification   Progress Note/Certification   Start of Care Date 12/05/23   Onset of illness/injury or Date of Surgery 12/01/23   Therapy Frequency 1x/wk   Predicted Duration up to 60 days   Certification date from 12/05/23   Certification date to 02/02/24   Progress Note Due Date 03/03/24   Progress Note Completed Date 12/05/23   PT Goal 1   Goal Identifier DHI   Goal Description Patient to score <10/100 on the DHI indicating significant improvement in dizziness condition and improved QOL.   Rationale to maximize safety and independence with performance of ADLs and functional tasks;to maximize safety and independence within the home;to maximize safety and independence within the community   Goal Progress Baseline score 46/100. Goal met 12/14/23 - DHI score 4/100 indicating significant improvement in dizziness condition.   Target Date 02/02/24   Date Met 12/14/23   PT Goal 2   Goal Identifier DGI   Goal Description Patient to demonstrate DGI score >19/24 indicating low fall risk and improved safety/tolerance resuming normal level of function.   Rationale to maximize safety and independence with performance of ADLs and functional tasks;to maximize safety and independence within the home;to maximize safety and independence within the community   Goal Progress baseline evidence of slowed/guarded gait, mild instability.  12/14/23: Goal met, scored 22/24 on the DGI, low fall risk, stable status.   Target Date 02/02/24   Date Met 12/14/23   PT Goal 3   Goal Identifier HEP   Goal Description Patient will demonstrate understanding of HEP for longterm management of condition.   Rationale to maximize safety and  independence with performance of ADLs and functional tasks;to maximize safety and independence within the home;to maximize safety and independence within the community   Goal Progress Goal met 12/14/23 - patient comfortable with HEP and discharge plan.   Target Date 02/02/24   Date Met 12/14/23   Subjective Report   Subjective Report Alma reports ongoing overall improvement in dizziness condition. Reports aural fullness that started after last visit, saw her doctor and diagnosed with eustachian tube dysfunction.  Occasionally noticing some episodic and transient dizziness with bed mobilities.  Feels like she is nearly back to 100% PLOF.   Objective Measure 1   Objective Measure CTSIB   Details 30'' all conditions - WNL   Therapeutic Activity   Therapeutic Activities: dynamic activities to improve functional performance minutes (58644) 10   Ther Act 1 HEP   Ther Act 1 - Details Time spent reviewing HEP and activity progressions including regular/improved hydration levels to support vestibular function and known eustachian tube dysfunction. Advised in regular walking program and incorporation of dynamic activity of interest - gardening, walking, yardwork, etc. to facilitate longterm balance and vestibular function.   Skilled Intervention patient education, HEP   Patient Response/Progress all goals met, improved/resolved dizziness condition. Significant improvement noted with goals, stable status for discharge.   Oculomotor Exam   Oculomotor ROM Normal   Smooth Pursuit Normal   VOR Normal   Head Impulse Test Normal   Infrared Goggle Exam or Frenzel Lense Exam   Vestibular Suppressant in Last 24 Hours? No   Exam completed with Infrared Goggles   Spontaneous Nystagmus Negative   Gaze Evoked Nystagmus Negative   Head Shake Horizontal Nystagmus Negative   [place]-Hallpike (Right) Negative   [place]-Hallpike (Left) Negative   [facility] Supine Roll Test (Right) Negative   [facility] Supine Roll Test (Left) Negative   Dynamic Visual Acuity  (DVA)   Static Acuity (LogMar) 0.1   Horizontal Head Movement at 2 Hz (LogMar) 0.2   DVA Comments 1-line loss = WNL testing.  Glasses on, 9' distance, large chart, seated position.   Eval/Assessments   PT Eval, Re-eval Minutes (61782) 22   Education   Learner/Method Patient;Listening;Demonstration;No Barriers to Learning   Education Comments Today's findings, dc plan, HEP   Plan   Home program increase walking, neck AROM/stretching, unsupported sidestepping/walking as able and safe, return to unrestricted activity   Plan for next session dc   Total Session Time   Timed Code Treatment Minutes 10   Total Treatment Time (sum of timed and untimed services) 32   Re-Eval: Reassessment of vestibular testing unremarkable - WNL positional testing, occulomotor testing, balance/gait testing, and DVA testing.  Resolved dizziness condition, stable status and appropriate for discharge.        DISCHARGE  Reason for Discharge: Patient has met all goals.    Equipment Issued: none    Discharge Plan: Patient to continue home program.  Patient will reach out to medical team/vestibular PT clinic should symptoms worsen/recur.    Referring Provider:  Yogi Hernandez

## 2023-12-15 DIAGNOSIS — J30.1 SEASONAL ALLERGIC RHINITIS DUE TO POLLEN: ICD-10-CM

## 2023-12-15 RX ORDER — AZELASTINE 1 MG/ML
1 SPRAY, METERED NASAL 2 TIMES DAILY
Qty: 30 ML | Refills: 0 | Status: SHIPPED | OUTPATIENT
Start: 2023-12-15 | End: 2024-06-04

## 2023-12-15 NOTE — TELEPHONE ENCOUNTER
Refilled given x1. Patient will need to follow up for additional refills.    Joe Oshea, KELLENN, RN  12/15/2023 12:44 PM

## 2023-12-15 NOTE — TELEPHONE ENCOUNTER
Requested Prescriptions   Pending Prescriptions Disp Refills    azelastine (ASTELIN) 0.1 % nasal spray 30 mL 3     Sig: Spray 1 spray into both nostrils 2 times daily       There is no refill protocol information for this order        Last Written Prescription Date:  6/13/22  Last Fill Quantity: 30 mL,  # refills: 3  Last office visit: 6/13/2022 ; last virtual visit: Visit date not found with prescribing provider:  Dr. Marshall    Future Office Visit:  n/a      Hans Garrett MA

## 2023-12-19 ENCOUNTER — PATIENT OUTREACH (OUTPATIENT)
Dept: GASTROENTEROLOGY | Facility: CLINIC | Age: 73
End: 2023-12-19
Payer: COMMERCIAL

## 2023-12-27 ENCOUNTER — HOSPITAL ENCOUNTER (OUTPATIENT)
Facility: CLINIC | Age: 73
End: 2023-12-27
Attending: COLON & RECTAL SURGERY | Admitting: COLON & RECTAL SURGERY
Payer: COMMERCIAL

## 2023-12-27 ENCOUNTER — TELEPHONE (OUTPATIENT)
Dept: GASTROENTEROLOGY | Facility: CLINIC | Age: 73
End: 2023-12-27
Payer: COMMERCIAL

## 2023-12-27 DIAGNOSIS — Z12.11 SPECIAL SCREENING FOR MALIGNANT NEOPLASMS, COLON: Primary | ICD-10-CM

## 2023-12-27 NOTE — TELEPHONE ENCOUNTER
"Endoscopy Scheduling Screen    Have you had a positive Covid test in the last 14 days?  No    Are you active on MyChart?   Yes    What insurance is in the chart?  Other:  [facility]    Ordering/Referring Provider: SHASTA LEON    (If ordering provider performs procedure, schedule with ordering provider unless otherwise instructed. )    BMI: Estimated body mass index is 31.19 kg/m  as calculated from the following:    Height as of 12/12/23: 1.577 m (5' 2.09\").    Weight as of 12/12/23: 77.6 kg (171 lb).     Sedation Ordered  moderate sedation.   If patient BMI > 50 do not schedule in [facility].    If patient BMI > 45 do not schedule at [facility].    Are you taking methadone or Suboxone?  No    Are you taking any prescription medications for pain 3 or more times per week?   NO - No RN review required.    Do you have a history of malignant hyperthermia or adverse reaction to anesthesia?  No    (Females) Are you currently pregnant?   No     Have you been diagnosed or told you have pulmonary hypertension?   No    Do you have an LVAD?  No    Have you been told you have moderate to severe sleep apnea?  No    Have you been told you have COPD, asthma, or any other lung disease?  No    Do you have any heart conditions?  No     Have you ever had an organ transplant?   No    Have you ever had or are you awaiting a heart or lung transplant?   No    Have you had a stroke or transient ischemic attack (TIA aka \"mini stroke\" in the last 6 months?   Yes (RN Review required for scheduling.)    Have you been diagnosed with or been told you have cirrhosis of the liver?   No    Are you currently on dialysis?   No    Do you need assistance transferring?   No    BMI: Estimated body mass index is 31.19 kg/m  as calculated from the following:    Height as of 12/12/23: 1.577 m (5' 2.09\").    Weight as of 12/12/23: 77.6 kg (171 lb).     Is patients BMI > 40 and scheduling location [facility]?  No    Do you take an injectable medication for weight loss or diabetes " (excluding insulin)?  No    Do you take the medication Naltrexone?  No    Do you take blood thinners?  No       Prep   Are you currently on dialysis or do you have chronic kidney disease?  No    Do you have a diagnosis of diabetes?  Yes (Golytely Prep)    Do you have a diagnosis of cystic fibrosis (CF)?  No    On a regular basis do you go 3 -5 days between bowel movements?  Yes (Extended Prep)    BMI > 40?  No    Preferred Pharmacy:    [id number] [id number] [facility] [address]  Phone: [phone] Fax: [phone]    [facility] EPRESCR[facility] ONLY  [id number]  [address] [address]  [facility] [address]  Phone: [phone] Fax: [phone]      Final Scheduling Details   Colonoscopy prep sent?  Golytely Extended Prep    Procedure scheduled  Colonoscopy    Surgeon:  allan     Date of procedure:  4/2     Pre-OP / PAC:   No - Not required for this site.    Location  [facility] - Per order.    Sedation   Moderate Sedation - Per order.      Patient Reminders:   You will receive a call from a Nurse to review instructions and health history.  This assessment must be completed prior to your procedure.  Failure to complete the Nurse assessment may result in the procedure being cancelled.      On the day of your procedure, please designate an adult(s) who can drive you home stay with you for the next 24 hours. The medicines used in the exam will make you sleepy. You will not be able to drive.      You cannot take public transportation, ride share services, or non-medical taxi service without a responsible caregiver.  Medical transport services are allowed with the requirement that a responsible caregiver will receive you at your destination.  We require that drivers and caregivers are confirmed prior to your procedure.

## 2023-12-27 NOTE — TELEPHONE ENCOUNTER
Pre Assessment RN Review    Focused Assessments    Stroke/TIA Review    Patient has hx of stroke in the last 6 months. Recommended delay of procedure for at least 6 months as this is a routine order and pt opted out of writer reaching out to provider to  at an earlier date.   Scheduling Status & Recommendations    Pt decided to wait > 6 months to schedule colonoscopy. Okay to proceed with scheduling

## 2024-01-02 NOTE — PROGRESS NOTES
"CHIEF COMPLAINT:  Pain of the Right Leg     HISTORY OF PRESENT ILLNESS  Ms. Helm is a pleasant 73 year old year old female who presents to clinic today with right leg pain.  Elvia explains that her leg pain started years ago however it has increased since the spring of 2023. No injury or trauma. Notes pain has been lateral thigh radiating to mid thigh. No radiation to knee or lower leg however she has had pain at anterolateral calf at times.    Pain increases with walking >1 block and will resolve when she sits and rests. No pain with shorter distances such as walk from waiting area to our exam room today.  Pain is tingling and dull at lateral hip and does travel more proximally to buttock.  No significant low back pain.      Onset: gradual  Location: right leg  Quality:  dull and tingling  Duration: 1 years   Severity: 6/10 at worst  Timing:intermittent episodes with activity level.  Modifying factors:  resting and non-use makes it better, movement and use makes it worse  Associated signs & symptoms: \"falling asleep\"  Previous similar pain: Yes, had an ankle repair  Treatments to date: cortisone injection years ago     Additional history: Previously had ORIF right ankle.    Review of Systems:  Have you recently had a a fever, chills, weight loss? No  Do you have any vision problems? No  Do you have any chest pain or edema? No  Do you have any shortness of breath or wheezing?  No  Do you have stomach problems? No  Do you have any numbness or focal weakness? No  Do you have diabetes? No  Do you have problems with bleeding or clotting? No  Do you have an rashes or other skin lesions? No    MEDICAL HISTORY  Patient Active Problem List   Diagnosis    Allergic rhinitis    Essential hypertension, benign    Postmenopausal atrophic vaginitis    Bee sting reaction    Torticollis    Type 2 diabetes mellitus without complications (H)    HYPERLIPIDEMIA LDL GOAL <100    Post-nasal drip    Advanced directives, " counseling/discussion    Osteopenia    Urticaria    Arthritis of knee    Pes planus    Tinnitus    Left-sided weakness    Acute ischemic right MCA stroke (H)       Current Outpatient Medications   Medication Sig Dispense Refill    aspirin (ASA) 325 MG EC tablet Take 325 mg by mouth At Bedtime      atorvastatin (LIPITOR) 10 MG tablet Take 1 tablet (10 mg) by mouth daily 90 tablet 1    azelastine (ASTELIN) 0.1 % nasal spray Spray 1 spray into both nostrils 2 times daily 30 mL 0    blood glucose (NO BRAND SPECIFIED) lancets standard Use to test blood sugar one* times daily or as directed. 100 each 3    blood glucose (ONETOUCH ULTRA) test strip Use to test blood sugar daily or as directed. 100 strip 3    blood glucose calibration (ONETOUCH ULTRA CONTROL) solution Use to calibrate blood glucose monitor as directed. 1 Bottle 1    blood glucose monitoring (ONE TOUCH ULTRA 2) meter device kit Use to test blood sugar 1 times daily. 1 kit 0    carvedilol (COREG) 3.125 MG tablet Take 1 tablet (3.125 mg) by mouth 2 times daily (with meals) 180 tablet 3    Cholecalciferol (VITAMIN D3 PO) Take 2,000 Units by mouth daily       EPINEPHrine (ANY BX GENERIC EQUIV) 0.3 MG/0.3ML injection 2-pack INJECT 0.3 MLS INTO THE MUSCLE ONCE AS NEEDED FOR ANAPHYLAXIS 2 each 0    fluticasone (FLONASE) 50 MCG/ACT spray Spray 1-2 sprays into both nostrils daily 16 g 0    lisinopril-hydrochlorothiazide (ZESTORETIC) 20-25 MG tablet Take 0.5 tablets by mouth daily 45 tablet 1    metFORMIN (GLUCOPHAGE) 850 MG tablet Take 1 tablet (850 mg) by mouth 2 times daily (with meals) 180 tablet 3    Omega-3 Fatty Acids (OMEGA-3 FISH OIL PO) Take 1 g by mouth daily       sertraline (ZOLOFT) 25 MG tablet Take 1 tablet (25 mg) by mouth daily 90 tablet 1    sodium chloride (OCEAN) 0.65 % nasal spray Spray 1 spray into both nostrils daily as needed for congestion      vitamin B complex with vitamin C (STRESS TAB) tablet Take 1 tablet by mouth daily         Allergies  "  Allergen Reactions    Atovaquone-Proguanil Hcl Hives    Amoxicillin Hives    Bee Hives and Swelling     carries Epi Pen     Ceftin Hives     hives    Clindamycin Hives    Erythromycin GI Disturbance    Seasonal Allergies     Shellfish-Derived Products Unknown    Shrimp Hives     Happened twice    Tetracycline Other (See Comments)     ?severe headaches  & nausea        Family History   Problem Relation Age of Onset    Musculoskeletal Disorder Mother         b:1938   Hx Fibromyalgia    Hypertension Mother     Cerebrovascular Disease Mother     Anesthesia Reaction Mother     Cerebrovascular Disease Father         b:,  age 68  massive stroke    Diabetes Father         dx age 50s and his family    Gastrointestinal Disease Sister         b:   Hx of reflux    Cerebrovascular Disease Sister     Cerebrovascular Disease Sister 62        ? TIA    Bronchitis Sister     Anxiety Disorder Sister     Anesthesia Reaction Sister     Family History Negative Brother         b:    Cancer Maternal Grandfather         throat    Diabetes Paternal Grandmother     Hypertension Daughter     Family History Negative Daughter         b:    Diabetes Daughter         b:    Diabetes dx age 22**insulin    CABG Daughter 43    Hypertension Daughter     Lipids Son         b: high cholosterol and triglycerides       Additional medical/Social/Surgical histories reviewed in [facility] and updated as appropriate.       PHYSICAL EXAM  /85   Ht 1.575 m (5' 2\")   Wt 77.6 kg (171 lb)   LMP 2001   BMI 31.28 kg/m      General  - normal appearance, in no obvious distress  CV  - normal peripheral perfusion  Pulm  - normal respiratory pattern, non-labored  Musculoskeletal - lumbar spine  - stance: normal gait without limp, no obvious leg length discrepancy, normal heel and toe walk  - inspection: normal bone and joint alignment, no obvious scoliosis  - palpation: no paravertebral or bony tenderness  - ROM: flexion exacerbates " pain, normal extension, sidebending, rotation  - strength: lower extremities 5/5 in all planes  - special tests:  (-) straight leg raise  (-) slump test  Musculoskeletal - Right hip  - inspection: no swelling of anterolateral hip,  normal bone and joint alignment, no obvious deformity  - palpation: no lateral or anterior hip tenderness  - ROM: normal flexion, extension, IR, ER, abduction, adduction, not painful, no crepitus  - strength: 5/5 in all planes  - special tests:  (-) ALISSON  (-) FADIR  no pain with axial femoral load  Neuro  - No lower extremity sensory deficits, grossly normal coordination, normal muscle tone  Skin  - no ecchymosis, erythema, warmth, or induration, no obvious rash  Psych  - interactive, appropriate, normal mood and affect    IMAGING : XR lumbar 2 views and pelvis 2 views. Final results and radiologist's interpretation, available in the [facility] health record. Images were reviewed with the patient/family members in the office today. My personal interpretation of the performed imaging is lumbar degenerative disc disease.  Minimal degenerative changes of right hip.    CT chest /abdomen/pelvis from 11/16/23 reviewed indicating no significant hip pathology, lumbar DDD with spondylosis.     ASSESSMENT & PLAN  Ms. Helm is a 73 year old year old female history of ORIF right ankle who presents to clinic today with chronic intermittent lateral hip and thigh pain increased by weightbearing ambulation > 1 block.    Etiology is not clear at this time however concern at this time greatest for lumbar related claudicatory symptoms affecting right hip/thigh.  Less likely trochanteric bursitis given lack of palpable or provocative hip pain today.    Diagnosis:   Lumbar degenerative disc disease  Chronic pain of right hip    Discussed further diagnostic and treatment options for Alma.  At this time she would like to maintain a very conservative approach. We discussed additional imaging such as MRI to  identify possible stenosis. She declines at this time and would like to start a HEP for lumbar spine.  Discussed formal PT as well but wishes to start home program.  I would like her to keep a symptom diary with HEP.  If worsening symptoms or interested in further workup/treatment consider MRI lumbar spine/hip or formal PT.      Red flag symptoms reviewed that would necessitate further workup in an expedited manner.      All questions answered to Alma's satisfaction    It was a pleasure seeing Elvia today.    Robbie Siegel, DO, CAQSM  Primary Care Sports Medicine

## 2024-01-10 ENCOUNTER — ANCILLARY PROCEDURE (OUTPATIENT)
Dept: GENERAL RADIOLOGY | Facility: CLINIC | Age: 74
End: 2024-01-10
Attending: FAMILY MEDICINE
Payer: COMMERCIAL

## 2024-01-10 ENCOUNTER — OFFICE VISIT (OUTPATIENT)
Dept: ORTHOPEDICS | Facility: CLINIC | Age: 74
End: 2024-01-10
Attending: INTERNAL MEDICINE
Payer: COMMERCIAL

## 2024-01-10 VITALS
SYSTOLIC BLOOD PRESSURE: 135 MMHG | BODY MASS INDEX: 31.47 KG/M2 | DIASTOLIC BLOOD PRESSURE: 85 MMHG | HEIGHT: 62 IN | WEIGHT: 171 LBS

## 2024-01-10 DIAGNOSIS — M79.604 PAIN OF RIGHT LOWER EXTREMITY: ICD-10-CM

## 2024-01-10 DIAGNOSIS — M79.604 PAIN OF RIGHT LOWER EXTREMITY: Primary | ICD-10-CM

## 2024-01-10 PROCEDURE — 72100 X-RAY EXAM L-S SPINE 2/3 VWS: CPT | Mod: TC | Performed by: INTERNAL MEDICINE

## 2024-01-10 PROCEDURE — 73501 X-RAY EXAM HIP UNI 1 VIEW: CPT | Mod: TC | Performed by: RADIOLOGY

## 2024-01-10 PROCEDURE — 99204 OFFICE O/P NEW MOD 45 MIN: CPT | Performed by: FAMILY MEDICINE

## 2024-01-10 NOTE — LETTER
"    1/10/2024         RE: Elvia Helm  [address] [facility] [id number]        Dear Colleague,    Thank you for referring your patient, Elvia Helm, to the [facility] [place]. Please see a copy of my visit note below.    CHIEF COMPLAINT:  Pain of the Right Leg     HISTORY OF PRESENT ILLNESS  Ms. Helm is a pleasant 73 year old year old female who presents to clinic today with right leg pain.  Elvia explains that her leg pain started years ago however it has increased since the spring of 2023. No injury or trauma. Notes pain has been lateral thigh radiating to mid thigh. No radiation to knee or lower leg however she has had pain at anterolateral calf at times.    Pain increases with walking >1 block and will resolve when she sits and rests. No pain with shorter distances such as walk from waiting area to our exam room today.  Pain is tingling and dull at lateral hip and does travel more proximally to buttock.  No significant low back pain.      Onset: gradual  Location: right leg  Quality:  dull and tingling  Duration: 1 years   Severity: 6/10 at worst  Timing:intermittent episodes with activity level.  Modifying factors:  resting and non-use makes it better, movement and use makes it worse  Associated signs & symptoms: \"falling asleep\"  Previous similar pain: Yes, had an ankle repair  Treatments to date: cortisone injection years ago     Additional history: Previously had ORIF right ankle.    Review of Systems:  Have you recently had a a fever, chills, weight loss? No  Do you have any vision problems? No  Do you have any chest pain or edema? No  Do you have any shortness of breath or wheezing?  No  Do you have stomach problems? No  Do you have any numbness or focal weakness? No  Do you have diabetes? No  Do you have problems with bleeding or clotting? No  Do you have an rashes or other skin lesions? No    MEDICAL HISTORY  Patient Active Problem List "   Diagnosis     Allergic rhinitis     Essential hypertension, benign     Postmenopausal atrophic vaginitis     Bee sting reaction     Torticollis     Type 2 diabetes mellitus without complications (H)     HYPERLIPIDEMIA LDL GOAL <100     Post-nasal drip     Advanced directives, counseling/discussion     Osteopenia     Urticaria     Arthritis of knee     Pes planus     Tinnitus     Left-sided weakness     Acute ischemic right MCA stroke (H)       Current Outpatient Medications   Medication Sig Dispense Refill     aspirin (ASA) 325 MG EC tablet Take 325 mg by mouth At Bedtime       atorvastatin (LIPITOR) 10 MG tablet Take 1 tablet (10 mg) by mouth daily 90 tablet 1     azelastine (ASTELIN) 0.1 % nasal spray Spray 1 spray into both nostrils 2 times daily 30 mL 0     blood glucose (NO BRAND SPECIFIED) lancets standard Use to test blood sugar one* times daily or as directed. 100 each 3     blood glucose (ONETOUCH ULTRA) test strip Use to test blood sugar daily or as directed. 100 strip 3     blood glucose calibration (ONETOUCH ULTRA CONTROL) solution Use to calibrate blood glucose monitor as directed. 1 Bottle 1     blood glucose monitoring (ONE TOUCH ULTRA 2) meter device kit Use to test blood sugar 1 times daily. 1 kit 0     carvedilol (COREG) 3.125 MG tablet Take 1 tablet (3.125 mg) by mouth 2 times daily (with meals) 180 tablet 3     Cholecalciferol (VITAMIN D3 PO) Take 2,000 Units by mouth daily        EPINEPHrine (ANY BX GENERIC EQUIV) 0.3 MG/0.3ML injection 2-pack INJECT 0.3 MLS INTO THE MUSCLE ONCE AS NEEDED FOR ANAPHYLAXIS 2 each 0     fluticasone (FLONASE) 50 MCG/ACT spray Spray 1-2 sprays into both nostrils daily 16 g 0     lisinopril-hydrochlorothiazide (ZESTORETIC) 20-25 MG tablet Take 0.5 tablets by mouth daily 45 tablet 1     metFORMIN (GLUCOPHAGE) 850 MG tablet Take 1 tablet (850 mg) by mouth 2 times daily (with meals) 180 tablet 3     Omega-3 Fatty Acids (OMEGA-3 FISH OIL PO) Take 1 g by mouth daily     "    sertraline (ZOLOFT) 25 MG tablet Take 1 tablet (25 mg) by mouth daily 90 tablet 1     sodium chloride (OCEAN) 0.65 % nasal spray Spray 1 spray into both nostrils daily as needed for congestion       vitamin B complex with vitamin C (STRESS TAB) tablet Take 1 tablet by mouth daily         Allergies   Allergen Reactions     Atovaquone-Proguanil Hcl Hives     Amoxicillin Hives     Bee Hives and Swelling     carries Epi Pen      Ceftin Hives     hives     Clindamycin Hives     Erythromycin GI Disturbance     Seasonal Allergies      Shellfish-Derived Products Unknown     Shrimp Hives     Happened twice     Tetracycline Other (See Comments)     ?severe headaches  & nausea        Family History   Problem Relation Age of Onset     Musculoskeletal Disorder Mother         b:1938   Hx Fibromyalgia     Hypertension Mother      Cerebrovascular Disease Mother      Anesthesia Reaction Mother      Cerebrovascular Disease Father         b:,  age 68  massive stroke     Diabetes Father         dx age 50s and his family     Gastrointestinal Disease Sister         b:   Hx of reflux     Cerebrovascular Disease Sister      Cerebrovascular Disease Sister 62        ? TIA     Bronchitis Sister      Anxiety Disorder Sister      Anesthesia Reaction Sister      Family History Negative Brother         b:     Cancer Maternal Grandfather         throat     Diabetes Paternal Grandmother      Hypertension Daughter      Family History Negative Daughter         b:     Diabetes Daughter         b:    Diabetes dx age 22**insulin     CABG Daughter 43     Hypertension Daughter      Lipids Son         b: high cholosterol and triglycerides       Additional medical/Social/Surgical histories reviewed in [facility] and updated as appropriate.       PHYSICAL EXAM  /85   Ht 1.575 m (5' 2\")   Wt 77.6 kg (171 lb)   LMP 2001   BMI 31.28 kg/m      General  - normal appearance, in no obvious distress  CV  - normal peripheral " perfusion  Pulm  - normal respiratory pattern, non-labored  Musculoskeletal - lumbar spine  - stance: normal gait without limp, no obvious leg length discrepancy, normal heel and toe walk  - inspection: normal bone and joint alignment, no obvious scoliosis  - palpation: no paravertebral or bony tenderness  - ROM: flexion exacerbates pain, normal extension, sidebending, rotation  - strength: lower extremities 5/5 in all planes  - special tests:  (-) straight leg raise  (-) slump test  Musculoskeletal - Right hip  - inspection: no swelling of anterolateral hip,  normal bone and joint alignment, no obvious deformity  - palpation: no lateral or anterior hip tenderness  - ROM: normal flexion, extension, IR, ER, abduction, adduction, not painful, no crepitus  - strength: 5/5 in all planes  - special tests:  (-) ALISSON  (-) FADIR  no pain with axial femoral load  Neuro  - No lower extremity sensory deficits, grossly normal coordination, normal muscle tone  Skin  - no ecchymosis, erythema, warmth, or induration, no obvious rash  Psych  - interactive, appropriate, normal mood and affect    IMAGING : XR lumbar 2 views and pelvis 2 views. Final results and radiologist's interpretation, available in the [facility] health record. Images were reviewed with the patient/family members in the office today. My personal interpretation of the performed imaging is lumbar degenerative disc disease.  Minimal degenerative changes of right hip.    CT chest /abdomen/pelvis from 11/16/23 reviewed indicating no significant hip pathology, lumbar DDD with spondylosis.     ASSESSMENT & PLAN  Ms. Helm is a 73 year old year old female history of ORIF right ankle who presents to clinic today with chronic intermittent lateral hip and thigh pain increased by weightbearing ambulation > 1 block.    Etiology is not clear at this time however concern at this time greatest for lumbar related claudicatory symptoms affecting right hip/thigh.  Less likely  trochanteric bursitis given lack of palpable or provocative hip pain today.    Diagnosis:   Lumbar degenerative disc disease  Chronic pain of right hip    Discussed further diagnostic and treatment options for Alma.  At this time she would like to maintain a very conservative approach. We discussed additional imaging such as MRI to identify possible stenosis. She declines at this time and would like to start a HEP for lumbar spine.  Discussed formal PT as well but wishes to start home program.  I would like her to keep a symptom diary with HEP.  If worsening symptoms or interested in further workup/treatment consider MRI lumbar spine/hip or formal PT.      Red flag symptoms reviewed that would necessitate further workup in an expedited manner.      All questions answered to Alma's satisfaction    It was a pleasure seeing Elvia today.    Robbie Siegel DO, [facility]  Primary Care Sports Medicine      Again, thank you for allowing me to participate in the care of your patient.        Sincerely,        Robbie Siegel DO

## 2024-01-10 NOTE — PATIENT INSTRUCTIONS
WHAT IS A HERNIATED DISK?    A herniated disk is a disk that has bulged out from its proper place in your neck or back. Disks are rubbery cushions between the bones of the spine (vertebrae). Disks act as shock absorbers between each of the bones of the spine. When a disk bulges out, it may press on nearby nerves and cause pain and other symptoms.    Sometimes a herniated disk is called a ruptured disk.    WHAT IS THE CAUSE?    A herniated disk most often results from wear and tear on the spine as you get older. Sometimes it s caused by an injury. You may be more likely to have a herniated disk if you keep straining your back. This could happen, for example, from not using proper technique when you lift, push, or pull something heavy. Being overweight can also put extra stress on your back. You may also be at higher risk for a herniated disk if:    You are a smoker.  You sit for long periods of time without lower back support.  You drive a lot--for example, you are a .  WHAT ARE THE SYMPTOMS?    Symptoms of a herniated disk may start slowly or suddenly. Where you have symptoms depends on where the herniated disk is in your spine. The most common symptoms are numbness, tingling, pain, or weakness in your buttocks, shoulders, legs, or arms.    HOW IS IT DIAGNOSED?    Your healthcare provider will ask about your symptoms, activities, and medical history. Your provider will examine your spine. Tests may include:    Tests of the movement and reflexes of your arms and legs  X-rays or other types of scans of your spine  Electromyogram, which is a test of electrical activity in your muscles  HOW IS IT TREATED?    Your healthcare provider may recommend:    Rest. It's best to try to stay active, so try not to rest in bed longer than 1 to 2 days or the time your provider recommends.  Medicine. Several types of medicines may help lessen back pain. It may be medicine you take by mouth, or your provider may give a  steroid shot into your spine. Take all medicine as recommended by your healthcare provider.  Physical therapy. This may include massage, traction (force applied to your spine to help relieve pressure on your nerves), or other treatments. You may be given exercises to help strengthen your back so you are less likely to hurt it. You may learn how to protect your back when you are working or playing sports.  A neck collar or neck brace. Wearing a brace for a short time may help keep your neck in the right position while it is healing.  With treatment, the pain should get better within a few weeks, but you may keep having some pain for a few months. If you keep having symptoms, your provider may recommend surgery, but usually surgery isn t needed.    HOW CAN I TAKE CARE OF MYSELF?    To help relieve pain:    Take pain medicine according to your healthcare provider s instructions.  Put an ice pack, gel pack, or package of frozen vegetables wrapped in a cloth on the painful area every 3 to 4 hours for up to 20 minutes at a time. After a few days, a heating pad set on low, or a covered hot water bottle, may also help.  Always use good posture to keep extra pressure off your spine.    Stand up straight with your shoulders back and your belly in. If you have to stand for a long time, move around often and shift your weight from one foot to another. If possible, put one foot up on a footrest that is about 6 to 8 inches high. This keeps your back straight and puts less pressure on your spine.  Sit in chairs that give good support for your lower back Keep your feet flat on the floor or up on a foot rest. Get up every 20 minutes or so and stretch.  When you need to lift something heavy, don't bend from your waist. Bend your knees and squat down by the thing you are lifting. Keep your back as straight as possible. Use your thigh muscles instead of your back to do the lifting. Don t twist. Always keep things close to your body  when you lift, lower, or carry them.    When you sleep, find the position that s most comfortable for you and that supports your back. For example:    Lie flat on your back on a firm mattress or on a mattress with a stiff board under it. Put a pillow under your knees when you lie on your back.  Lie on your belly with a pillow under your chest  Lie on your side with a pillow between your legs.  If you cannot get comfortable, try lying flat on your back with your legs raised so that your knees are bent at a 90-degree angle. This is the same angle they would be if you were sitting up straight in a chair. One way to rest in this position is to lie on the floor, bend your knees, and rest your lower legs on the seat of a chair.  Follow your healthcare provider's instructions, including any exercises recommended by your provider. Ask your provider:    How and when you will hear your test results  How long it will take to recover  What activities you should avoid and when you can return to your normal activities  How to take care of yourself at home  What symptoms or problems you should watch for and what to do if you have them  Make sure you know when you should come back for a checkup.    HOW CAN I HELP PREVENT A HERNIATED DISK?    Keep your muscles strong so that they can help support your spine better. Walking and swimming are examples of good exercise for strengthening and protecting your spine.  Lose weight if you are overweight.  Practice good posture.        Herniated Disc Exercises    Side plank: Lie on your side with your legs, hips, and shoulders in a straight line. Prop yourself up onto your forearm with your elbow directly under your shoulder. Lift your hips off the floor and balance on your forearm and the outside of your foot. Try to hold this position for 15 seconds and then slowly lower your hip to the ground. Switch sides and repeat. Work up to holding for 1 minute. This exercise can be made easier by  starting with your knees and hips flexed toward your chest.    Gluteal stretch: Lie on your back with both knees bent. Rest your right ankle over the knee of your left leg. Grasp the thigh of the left leg and pull toward your chest. You will feel a stretch along the buttocks and possibly along the outside of your hip. Hold the stretch for 15 to 30 seconds. Then repeat the exercise with your left ankle over your right knee. Do the exercise 3 times with each leg.    Quadruped arm and leg raise: Get down on your hands and knees. Pull in your belly button and tighten your abdominal muscles to stiffen your spine. While keeping your abdominals tight, raise one arm and the opposite leg away from you. Hold this position for 5 seconds. Lower your arm and leg slowly and change sides. Do this 10 times on each side.    Extension exercise  Lie face down on the floor for 5 minutes. If this hurts too much, lie face down with a pillow under your stomach. This should relieve your leg or back pain. When you can lie on your stomach for 5 minutes without a pillow, you can continue with Part B of this exercise.    After lying on your stomach for 5 minutes, prop yourself up on your elbows for another 5 minutes. If you can do this without having more leg or buttock pain, you can start doing part C of this exercise.    Lie on your stomach with your hands under your shoulders. Then press down on your hands and extend your elbows while keeping your hips flat on the floor. Hold for 1 second and lower yourself to the floor. Do 3 to 5 sets of 10 repetitions. Rest for 1 minute between sets. You should have no pain in your legs when you do this, but it is normal to feel some pain in your lower back.    Do this exercise several times a day.    Dead bug: Lie on your back with your knees bent, arms at your sides, and feet flat on the floor. Draw in your abdomen and tighten your abdominal muscles. While keeping your abdominal muscles tight and knees  bent, lift one leg several inches off the floor, hold for 5 seconds, and then lower it. Repeat this exercise with the opposite leg. Then lift your arm over your head, hold for 5 seconds, and then lower it. Repeat with the opposite arm. Do 5 repetitions with each leg and arm.  Once this exercise gets easy, raise one leg and the opposite arm together. Hold for 5 seconds. Lower your arm and leg and raise the opposite arm and leg up and hold for 5 seconds. Do 3 sets of 5 repetitions.    Walking is also good exercise for you.    If you have a herniated disk, you should not drive or sit for more than 30 minutes at a time.    Developed by [employer].  Published by [employer].  Copyright  2014 [employer] and/or one of its subsidiaries. All rights reserved.

## 2024-01-31 DIAGNOSIS — E11.9 TYPE 2 DIABETES MELLITUS WITHOUT COMPLICATION, WITHOUT LONG-TERM CURRENT USE OF INSULIN (H): ICD-10-CM

## 2024-01-31 NOTE — PROGRESS NOTES
Cardiology Clinic Progress Note  Elvia Helm MRN# [id number]   YOB: 1950 Age: 73 year old   Primary Cardiologist: Dr. Murray Reason for visit: 2 month follow up             Assessment and Plan:       1.  Left MCA CVA, no residual deficits  -9/2023 MRI showed subacute cortical ischemia in the left middle frontal gyrus encroaching upon the lateral precentral gyrus   -Post hospital 30-day event monitor did not show atrial fibrillation  -Hypercoagulable workup including: chest CT (did not show pulmonary embolism), negative lupus anticoagulant, protein C, Antithrombin III, factor V, activated protein C resistance, positive for heterozygous factor II mutation  -12/2023 Limited TTE with bubble study that was mildly positive for flow across the interatrial septum a more positive with Valsalva  -With consideration for proceeding with DAMASO: All indications, risks and benefits for transesophageal echocardiogram have been explained to the patient.  This includes but is not limited to esophageal irritation, damage to the oral cavity, esophageal perforation, GI bleeding, pharyngeal hematoma, transient bronchospasm, transient hypoxia, arrhthymias (NSVT, transient atrial fibrillation), vomiting, hemoptysis, and complications from anesthesia. Patient understands and wishes to proceed with it. Patient has no history of esophageal stricture, odynphagia, dysphagia, airway problems, or medication allergies. A formal consent form will be signed by the procedural physician.    2. Heterozygous for Factor 2    3.  Right upper lobe groundglass nodule  -Evaluated by Dr. Montalvo and plan is for repeat CT in 6 months    4. Ovarian cyst  -2.3cm on right ovary found incidentally on chest/abdomen/pelvis CT in November 2023    5. Hypertension, controlled  -On multidrug regimen    6. Hyperlipidemia, controlled  -9/2023 LDL 66    Plan:  New current cardiac meds including aspirin, carvedilol, atorvastatin, and  lisinopril-HCTZ  Will review results of pelvic ultrasound and decide regarding timing of implantation of loop recorder  Will have Dr. Murray review echocardiogram and decide regarding necessity of DAMASO for additional measurements    Follow up plan: Follow up with me in 2 months        History of Presenting Illness:    Elvia Helm is a very pleasant 73 year old female with a history of left MCA CVA.     Patient was hospitalized for CVA in September 2023.  Felt to be possibly a left MCA pattern her workup included an MRI which showed a CVA, CT angiogram did not find any significant blockages.  An echocardiogram was done without any bubble study.  No atrial fibrillation was seen in the hospital nor on a post discharge 30-day event monitor.    In November 2023 as part of hypercoagulability workup, a chest CT showed an 8x6mm groundglass appearing nodule in the right upper lobe, possible adenocarcinoma.  2.3 cm possible right ovarian cyst was also seen.  Follow up was arranged with Dr. Montalvo, and patient was seen 11/29/2023.  His recommendation was for a repeat CT scan in 6 months as the nodule was too small for CT-guided biopsy, surgical resection, or accurate characterization with a PET CT scan.    She was seen in clinic follow-up in November by Dr. Murray.  He discussed implanting a loop recorder following a hypercoagulable workup, however wanted to wait until she underwent her surgical consult with Dr. Montalvo.    On 12/8/2023 a limited echo was repeated with a bubble study which showed a mildly positive flow across the interatrial septum, more positive with Valsalva.    Patient is here today for a follow-up of her CVA. Patient reports feeling good. Her daughter is present with her today.    Patient denies chest pain or chest tightness. Denies dizziness, lightheadedness or other presyncopal symptoms. Denies tachycardia or palpitations. Denies shortness of breath.     Blood pressure 116/62 and HR 76 in  clinic today. Denies bleeding with aspirin.         Recent Hospitalizations   2023 L MCA CVA          Social History      Social History     Socioeconomic History    Marital status:      Spouse name: Not on file    Number of children: 3    Years of education: Not on file    Highest education level: Not on file   Occupational History    Not on file   Tobacco Use    Smoking status: Former     Packs/day: 1.00     Years: 10.00     Additional pack years: 0.00     Total pack years: 10.00     Types: Cigarettes     Quit date: 1982     Years since quittin.4    Smokeless tobacco: Never   Vaping Use    Vaping Use: Never used   Substance and Sexual Activity    Alcohol use: Yes     Comment: glass of wine on the weekends    Drug use: No     Comment: caffeine tea 3-4/d    Sexual activity: Not Currently     Partners: Male     Birth control/protection: Female Surgical, None     Comment: Post menopausal   Other Topics Concern     Service Not Asked    Blood Transfusions Not Asked    Caffeine Concern Not Asked    Occupational Exposure Not Asked    Hobby Hazards Not Asked    Sleep Concern Not Asked    Stress Concern Not Asked    Weight Concern Yes     Comment: trying to lose    Special Diet No     Comment: Diabetic diet    Back Care Not Asked    Exercise Yes     Comment: walks 20 mt    Bike Helmet Not Asked    Seat Belt Not Asked    Self-Exams Not Asked    Parent/sibling w/ CABG, MI or angioplasty before 65F 55M? 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          Social Determinants of Health     Financial Resource Strain: Low Risk  (2023)    Financial Resource Strain     Within the past 12 months, have you or your family members you live with been unable to get utilities (heat, electricity) when it was really needed?: No   Food Insecurity: Low Risk  (2023)    Food Insecurity     Within the past 12 months, did you worry that your food would run out before you got  "money to buy more?: No     Within the past 12 months, did the food you bought just not last and you didn t have money to get more?: No   Transportation Needs: Low Risk  (12/11/2023)    Transportation Needs     Within the past 12 months, has lack of transportation kept you from medical appointments, getting your medicines, non-medical meetings or appointments, work, or from getting things that you need?: No   Physical Activity: Not on file   Stress: Not on file   Social Connections: Not on file   Interpersonal Safety: Not on file   Housing Stability: Low Risk  (12/11/2023)    Housing Stability     Do you have housing? : Yes     Are you worried about losing your housing?: No            Review of Systems:   Skin:  not assessed     Eyes:  not assessed    ENT:  not assessed    Respiratory:  Negative    Cardiovascular:  Negative    Gastroenterology: not assessed    Genitourinary:  not assessed    Musculoskeletal:  not assessed    Neurologic:  not assessed    Psychiatric:  not assessed    Heme/Lymph/Imm:  not assessed    Endocrine:  not assessed           Physical Exam:   Vitals: /62   Pulse 76   Ht 1.575 m (5' 2\")   Wt 76.5 kg (168 lb 11.2 oz)   LMP 03/17/2001   SpO2 94%   BMI 30.86 kg/m     Wt Readings from Last 4 Encounters:   02/01/24 76.5 kg (168 lb 11.2 oz)   01/10/24 77.6 kg (171 lb)   12/12/23 77.6 kg (171 lb)   12/01/23 77.1 kg (170 lb)     GEN: well nourished, in no acute distress.  HEENT:  Pupils equal, round. Sclerae nonicteric.   NECK: Supple, no masses appreciated.   C/V:  Regular rate and rhythm, no murmur, rub or gallop.    RESP: Respirations are unlabored. Clear to auscultation bilaterally without wheezing, rales, or rhonchi.  GI: Abdomen soft, nontender.  EXTREM: No LE edema.  NEURO: Alert and oriented, cooperative.  SKIN: Warm and dry.        Data:       LIPID RESULTS:  Lab Results   Component Value Date    CHOL 155 09/12/2023    CHOL 151 06/14/2021    HDL 47 (L) 09/12/2023    HDL 60 " 06/14/2021    LDL 66 09/12/2023    LDL 65 06/14/2021    TRIG 212 (H) 09/12/2023    TRIG 128 06/14/2021    CHOLHDLRATIO 2.9 06/30/2015     LIVER ENZYME RESULTS:  Lab Results   Component Value Date    AST 35 12/01/2023    AST 23 06/14/2021    ALT 38 12/01/2023    ALT 32 06/14/2021     CBC RESULTS:  Lab Results   Component Value Date    WBC 7.6 12/01/2023    WBC 7.5 06/14/2021    RBC 4.46 12/01/2023    RBC 4.36 06/14/2021    HGB 13.2 12/01/2023    HGB 13.5 06/14/2021    HCT 40.9 12/01/2023    HCT 40.5 06/14/2021    MCV 92 12/01/2023    MCV 93 06/14/2021    MCH 29.6 12/01/2023    MCH 31.0 06/14/2021    MCHC 32.3 12/01/2023    MCHC 33.3 06/14/2021    RDW 12.8 12/01/2023    RDW 14.0 06/14/2021     12/01/2023     06/14/2021     BMP RESULTS:  Lab Results   Component Value Date     12/01/2023     06/14/2021    POTASSIUM 4.1 12/01/2023    POTASSIUM 4.3 09/29/2022    POTASSIUM 4.0 06/14/2021    CHLORIDE 102 12/01/2023    CHLORIDE 104 09/29/2022    CHLORIDE 104 06/14/2021    CO2 26 12/01/2023    CO2 30 09/29/2022    CO2 29 06/14/2021    ANIONGAP 12 12/01/2023    ANIONGAP 3 09/29/2022    ANIONGAP 4 06/14/2021     (H) 12/01/2023     (H) 09/13/2023     (H) 09/29/2022     (H) 06/14/2021    BUN 10.7 12/01/2023    BUN 16 09/29/2022    BUN 15 06/14/2021    CR 0.69 12/01/2023    CR 0.81 06/14/2021    GFRESTIMATED >90 12/01/2023    GFRESTIMATED >60 11/16/2023    GFRESTIMATED 72 06/14/2021    GFRESTBLACK 84 06/14/2021    KIM 9.9 12/01/2023    KIM 9.4 06/14/2021      A1C RESULTS:  Lab Results   Component Value Date    A1C 6.0 (H) 11/28/2023    A1C 6.1 (H) 06/14/2021     INR RESULTS:  Lab Results   Component Value Date    INR 1.03 12/07/2023    INR 0.91 09/11/2023            Medications     Current Outpatient Medications   Medication Sig Dispense Refill    aspirin (ASA) 325 MG EC tablet Take 325 mg by mouth At Bedtime      atorvastatin (LIPITOR) 10 MG tablet Take 1 tablet (10 mg) by  mouth daily 90 tablet 1    azelastine (ASTELIN) 0.1 % nasal spray Spray 1 spray into both nostrils 2 times daily 30 mL 0    blood glucose (NO BRAND SPECIFIED) lancets standard Use to test blood sugar one* times daily or as directed. 100 each 3    blood glucose (ONETOUCH ULTRA) test strip Use to test blood sugar daily or as directed. 100 strip 3    blood glucose calibration (ONETOUCH ULTRA CONTROL) solution Use to calibrate blood glucose monitor as directed. 1 Bottle 1    blood glucose monitoring (ONE TOUCH ULTRA 2) meter device kit Use to test blood sugar 1 times daily. 1 kit 0    carvedilol (COREG) 3.125 MG tablet Take 1 tablet (3.125 mg) by mouth 2 times daily (with meals) 180 tablet 3    Cholecalciferol (VITAMIN D3 PO) Take 2,000 Units by mouth daily       EPINEPHrine (ANY BX GENERIC EQUIV) 0.3 MG/0.3ML injection 2-pack INJECT 0.3 MLS INTO THE MUSCLE ONCE AS NEEDED FOR ANAPHYLAXIS 2 each 0    fluticasone (FLONASE) 50 MCG/ACT spray Spray 1-2 sprays into both nostrils daily 16 g 0    lisinopril-hydrochlorothiazide (ZESTORETIC) 20-25 MG tablet Take 0.5 tablets by mouth daily 45 tablet 1    metFORMIN (GLUCOPHAGE) 850 MG tablet Take 1 tablet (850 mg) by mouth 2 times daily (with meals) 180 tablet 0    Omega-3 Fatty Acids (OMEGA-3 FISH OIL PO) Take 1 g by mouth daily       sertraline (ZOLOFT) 25 MG tablet Take 1 tablet (25 mg) by mouth daily 90 tablet 1    sodium chloride (OCEAN) 0.65 % nasal spray Spray 1 spray into both nostrils daily as needed for congestion      vitamin B complex with vitamin C (STRESS TAB) tablet Take 1 tablet by mouth daily            Past Medical History     Past Medical History:   Diagnosis Date    Acute ischemic right MCA stroke (H) 10/10/2023    Allergic rhinitis, cause unspecified 2003a    Arthritis of knee 06/06/2014    Bee sting reaction 07/2009    DIABETES TYPE II 2003    Essential hypertension, benign     Flat foot(734) 06/06/2014    Hyperlipidaemia LDL goal < 100     Localized  osteoarthrosis not specified whether primary or secondary, ankle and foot     After surgery    Metabolic syndrome X     PVC's (premature ventricular contractions)     Tinnitus 2014     Past Surgical History:   Procedure Laterality Date    ABDOMEN SURGERY  1988    ceasaran birth    COLONOSCOPY      COLONOSCOPY N/A 2022    Procedure: COLONOSCOPY, WITH POLYPECTOMY AND BIOPSY;  Surgeon: Nicole Grubbs MD;  Location:  GI    ENT SURGERY      frequent sinus infections    HC ENDOMETRIAL BIOPSY W/O CERVICAL DILATION      ORTHOPEDIC SURGERY      broken right ankle/plates and screws    SOFT TISSUE SURGERY      tendonitis in right upper leg and below right elbow    ZZC  DELIVERY ONLY      ZZC LIGATE FALLOPIAN TUBE,POSTPARTUM      ZZC NONSPECIFIC PROCEDURE      right ankle roe and pins     Family History   Problem Relation Age of Onset    Musculoskeletal Disorder Mother         b:1938   Hx Fibromyalgia    Hypertension Mother     Cerebrovascular Disease Mother     Anesthesia Reaction Mother     Cerebrovascular Disease Father         b:,  age 68  massive stroke    Diabetes Father         dx age 50s and his family    Gastrointestinal Disease Sister         b:1950   Hx of reflux    Cerebrovascular Disease Sister     Cerebrovascular Disease Sister 62        ? TIA    Bronchitis Sister     Anxiety Disorder Sister     Anesthesia Reaction Sister     Family History Negative Brother         b:    Cancer Maternal Grandfather         throat    Diabetes Paternal Grandmother     Hypertension Daughter     Family History Negative Daughter         b:    Diabetes Daughter         b:    Diabetes dx age 22**insulin    CABG Daughter 43    Hypertension Daughter     Lipids Son         b: high cholosterol and triglycerides            Allergies   Atovaquone-proguanil hcl, Amoxicillin, Bee, Ceftin, Clindamycin, Erythromycin, Seasonal allergies, Shellfish-derived products, Shrimp, and  Carny Carlson NP  [facility]  Pager: [phone]

## 2024-01-31 NOTE — TELEPHONE ENCOUNTER
Prescription approved per [facility] Refill Protocol.  Lisette Israel, RN  [facility] Triage Nurse

## 2024-02-01 ENCOUNTER — OFFICE VISIT (OUTPATIENT)
Dept: CARDIOLOGY | Facility: CLINIC | Age: 74
End: 2024-02-01
Attending: INTERNAL MEDICINE
Payer: COMMERCIAL

## 2024-02-01 VITALS
HEIGHT: 62 IN | OXYGEN SATURATION: 94 % | WEIGHT: 168.7 LBS | SYSTOLIC BLOOD PRESSURE: 116 MMHG | BODY MASS INDEX: 31.04 KG/M2 | HEART RATE: 76 BPM | DIASTOLIC BLOOD PRESSURE: 62 MMHG

## 2024-02-01 DIAGNOSIS — I63.511 ACUTE ISCHEMIC RIGHT MCA STROKE (H): ICD-10-CM

## 2024-02-01 PROCEDURE — 99214 OFFICE O/P EST MOD 30 MIN: CPT | Performed by: NURSE PRACTITIONER

## 2024-02-01 NOTE — LETTER
2/1/2024    Masood Osborne MD  [address]  [place] [address]    RE: Elvia Helm       Dear Colleague,     I had the pleasure of seeing Elvia Helm in the [facility].    Cardiology Clinic Progress Note  Elvia Helm MRN# [id number]   YOB: 1950 Age: 73 year old   Primary Cardiologist: Dr. Murray Reason for visit: 2 month follow up             Assessment and Plan:       1.  Left MCA CVA, no residual deficits  -9/2023 MRI showed subacute cortical ischemia in the left middle frontal gyrus encroaching upon the lateral precentral gyrus   -Post hospital 30-day event monitor did not show atrial fibrillation  -Hypercoagulable workup including: chest CT (did not show pulmonary embolism), negative lupus anticoagulant, protein C, Antithrombin III, factor V, activated protein C resistance, positive for heterozygous factor II mutation  -12/2023 Limited TTE with bubble study that was mildly positive for flow across the interatrial septum a more positive with Valsalva  -With consideration for proceeding with DAMASO: All indications, risks and benefits for transesophageal echocardiogram have been explained to the patient.  This includes but is not limited to esophageal irritation, damage to the oral cavity, esophageal perforation, GI bleeding, pharyngeal hematoma, transient bronchospasm, transient hypoxia, arrhthymias (NSVT, transient atrial fibrillation), vomiting, hemoptysis, and complications from anesthesia. Patient understands and wishes to proceed with it. Patient has no history of esophageal stricture, odynphagia, dysphagia, airway problems, or medication allergies. A formal consent form will be signed by the procedural physician.    2. Heterozygous for Factor 2    3.  Right upper lobe groundglass nodule  -Evaluated by Dr. Montalvo and plan is for repeat CT in 6 months    4. Ovarian cyst  -2.3cm on right ovary found incidentally on chest/abdomen/pelvis CT in  November 2023    5. Hypertension, controlled  -On multidrug regimen    6. Hyperlipidemia, controlled  -9/2023 LDL 66    Plan:  New current cardiac meds including aspirin, carvedilol, atorvastatin, and lisinopril-HCTZ  Will review results of pelvic ultrasound and decide regarding timing of implantation of loop recorder  Will have Dr. Murray review echocardiogram and decide regarding necessity of DAMASO for additional measurements    Follow up plan: Follow up with me in 2 months        History of Presenting Illness:    Elvia Helm is a very pleasant 73 year old female with a history of left MCA CVA.     Patient was hospitalized for CVA in September 2023.  Felt to be possibly a left MCA pattern her workup included an MRI which showed a CVA, CT angiogram did not find any significant blockages.  An echocardiogram was done without any bubble study.  No atrial fibrillation was seen in the hospital nor on a post discharge 30-day event monitor.    In November 2023 as part of hypercoagulability workup, a chest CT showed an 8x6mm groundglass appearing nodule in the right upper lobe, possible adenocarcinoma.  2.3 cm possible right ovarian cyst was also seen.  Follow up was arranged with Dr. Montalvo, and patient was seen 11/29/2023.  His recommendation was for a repeat CT scan in 6 months as the nodule was too small for CT-guided biopsy, surgical resection, or accurate characterization with a PET CT scan.    She was seen in clinic follow-up in November by Dr. Murray.  He discussed implanting a loop recorder following a hypercoagulable workup, however wanted to wait until she underwent her surgical consult with Dr. Montalvo.    On 12/8/2023 a limited echo was repeated with a bubble study which showed a mildly positive flow across the interatrial septum, more positive with Valsalva.    Patient is here today for a follow-up of her CVA. Patient reports feeling good. Her daughter is present with her today.    Patient denies  chest pain or chest tightness. Denies dizziness, lightheadedness or other presyncopal symptoms. Denies tachycardia or palpitations. Denies shortness of breath.     Blood pressure 116/62 and HR 76 in clinic today. Denies bleeding with aspirin.         Recent Hospitalizations   2023 L MCA CVA          Social History      Social History     Socioeconomic History    Marital status:      Spouse name: Not on file    Number of children: 3    Years of education: Not on file    Highest education level: Not on file   Occupational History    Not on file   Tobacco Use    Smoking status: Former     Packs/day: 1.00     Years: 10.00     Additional pack years: 0.00     Total pack years: 10.00     Types: Cigarettes     Quit date: 1982     Years since quittin.4    Smokeless tobacco: Never   Vaping Use    Vaping Use: Never used   Substance and Sexual Activity    Alcohol use: Yes     Comment: glass of wine on the weekends    Drug use: No     Comment: caffeine tea 3-4/d    Sexual activity: Not Currently     Partners: Male     Birth control/protection: Female Surgical, None     Comment: Post menopausal   Other Topics Concern     Service Not Asked    Blood Transfusions Not Asked    Caffeine Concern Not Asked    Occupational Exposure Not Asked    Hobby Hazards Not Asked    Sleep Concern Not Asked    Stress Concern Not Asked    Weight Concern Yes     Comment: trying to lose    Special Diet No     Comment: Diabetic diet    Back Care Not Asked    Exercise Yes     Comment: walks 20 mt    Bike Helmet Not Asked    Seat Belt Not Asked    Self-Exams Not Asked    Parent/sibling w/ CABG, MI or angioplasty before 65F 55M? 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          Social Determinants of Health     Financial Resource Strain: Low Risk  (2023)    Financial Resource Strain     Within the past 12 months, have you or your family members you live with been unable to get utilities  "(heat, electricity) when it was really needed?: No   Food Insecurity: Low Risk  (12/11/2023)    Food Insecurity     Within the past 12 months, did you worry that your food would run out before you got money to buy more?: No     Within the past 12 months, did the food you bought just not last and you didn t have money to get more?: No   Transportation Needs: Low Risk  (12/11/2023)    Transportation Needs     Within the past 12 months, has lack of transportation kept you from medical appointments, getting your medicines, non-medical meetings or appointments, work, or from getting things that you need?: No   Physical Activity: Not on file   Stress: Not on file   Social Connections: Not on file   Interpersonal Safety: Not on file   Housing Stability: Low Risk  (12/11/2023)    Housing Stability     Do you have housing? : Yes     Are you worried about losing your housing?: No            Review of Systems:   Skin:  not assessed     Eyes:  not assessed    ENT:  not assessed    Respiratory:  Negative    Cardiovascular:  Negative    Gastroenterology: not assessed    Genitourinary:  not assessed    Musculoskeletal:  not assessed    Neurologic:  not assessed    Psychiatric:  not assessed    Heme/Lymph/Imm:  not assessed    Endocrine:  not assessed           Physical Exam:   Vitals: /62   Pulse 76   Ht 1.575 m (5' 2\")   Wt 76.5 kg (168 lb 11.2 oz)   LMP 03/17/2001   SpO2 94%   BMI 30.86 kg/m     Wt Readings from Last 4 Encounters:   02/01/24 76.5 kg (168 lb 11.2 oz)   01/10/24 77.6 kg (171 lb)   12/12/23 77.6 kg (171 lb)   12/01/23 77.1 kg (170 lb)     GEN: well nourished, in no acute distress.  HEENT:  Pupils equal, round. Sclerae nonicteric.   NECK: Supple, no masses appreciated.   C/V:  Regular rate and rhythm, no murmur, rub or gallop.    RESP: Respirations are unlabored. Clear to auscultation bilaterally without wheezing, rales, or rhonchi.  GI: Abdomen soft, nontender.  EXTREM: No LE edema.  NEURO: Alert and " oriented, cooperative.  SKIN: Warm and dry.        Data:       LIPID RESULTS:  Lab Results   Component Value Date    CHOL 155 09/12/2023    CHOL 151 06/14/2021    HDL 47 (L) 09/12/2023    HDL 60 06/14/2021    LDL 66 09/12/2023    LDL 65 06/14/2021    TRIG 212 (H) 09/12/2023    TRIG 128 06/14/2021    CHOLHDLRATIO 2.9 06/30/2015     LIVER ENZYME RESULTS:  Lab Results   Component Value Date    AST 35 12/01/2023    AST 23 06/14/2021    ALT 38 12/01/2023    ALT 32 06/14/2021     CBC RESULTS:  Lab Results   Component Value Date    WBC 7.6 12/01/2023    WBC 7.5 06/14/2021    RBC 4.46 12/01/2023    RBC 4.36 06/14/2021    HGB 13.2 12/01/2023    HGB 13.5 06/14/2021    HCT 40.9 12/01/2023    HCT 40.5 06/14/2021    MCV 92 12/01/2023    MCV 93 06/14/2021    MCH 29.6 12/01/2023    MCH 31.0 06/14/2021    MCHC 32.3 12/01/2023    MCHC 33.3 06/14/2021    RDW 12.8 12/01/2023    RDW 14.0 06/14/2021     12/01/2023     06/14/2021     BMP RESULTS:  Lab Results   Component Value Date     12/01/2023     06/14/2021    POTASSIUM 4.1 12/01/2023    POTASSIUM 4.3 09/29/2022    POTASSIUM 4.0 06/14/2021    CHLORIDE 102 12/01/2023    CHLORIDE 104 09/29/2022    CHLORIDE 104 06/14/2021    CO2 26 12/01/2023    CO2 30 09/29/2022    CO2 29 06/14/2021    ANIONGAP 12 12/01/2023    ANIONGAP 3 09/29/2022    ANIONGAP 4 06/14/2021     (H) 12/01/2023     (H) 09/13/2023     (H) 09/29/2022     (H) 06/14/2021    BUN 10.7 12/01/2023    BUN 16 09/29/2022    BUN 15 06/14/2021    CR 0.69 12/01/2023    CR 0.81 06/14/2021    GFRESTIMATED >90 12/01/2023    GFRESTIMATED >60 11/16/2023    GFRESTIMATED 72 06/14/2021    GFRESTBLACK 84 06/14/2021    KIM 9.9 12/01/2023    KIM 9.4 06/14/2021      A1C RESULTS:  Lab Results   Component Value Date    A1C 6.0 (H) 11/28/2023    A1C 6.1 (H) 06/14/2021     INR RESULTS:  Lab Results   Component Value Date    INR 1.03 12/07/2023    INR 0.91 09/11/2023            Medications      Current Outpatient Medications   Medication Sig Dispense Refill    aspirin (ASA) 325 MG EC tablet Take 325 mg by mouth At Bedtime      atorvastatin (LIPITOR) 10 MG tablet Take 1 tablet (10 mg) by mouth daily 90 tablet 1    azelastine (ASTELIN) 0.1 % nasal spray Spray 1 spray into both nostrils 2 times daily 30 mL 0    blood glucose (NO BRAND SPECIFIED) lancets standard Use to test blood sugar one* times daily or as directed. 100 each 3    blood glucose (ONETOUCH ULTRA) test strip Use to test blood sugar daily or as directed. 100 strip 3    blood glucose calibration (ONETOUCH ULTRA CONTROL) solution Use to calibrate blood glucose monitor as directed. 1 Bottle 1    blood glucose monitoring (ONE TOUCH ULTRA 2) meter device kit Use to test blood sugar 1 times daily. 1 kit 0    carvedilol (COREG) 3.125 MG tablet Take 1 tablet (3.125 mg) by mouth 2 times daily (with meals) 180 tablet 3    Cholecalciferol (VITAMIN D3 PO) Take 2,000 Units by mouth daily       EPINEPHrine (ANY BX GENERIC EQUIV) 0.3 MG/0.3ML injection 2-pack INJECT 0.3 MLS INTO THE MUSCLE ONCE AS NEEDED FOR ANAPHYLAXIS 2 each 0    fluticasone (FLONASE) 50 MCG/ACT spray Spray 1-2 sprays into both nostrils daily 16 g 0    lisinopril-hydrochlorothiazide (ZESTORETIC) 20-25 MG tablet Take 0.5 tablets by mouth daily 45 tablet 1    metFORMIN (GLUCOPHAGE) 850 MG tablet Take 1 tablet (850 mg) by mouth 2 times daily (with meals) 180 tablet 0    Omega-3 Fatty Acids (OMEGA-3 FISH OIL PO) Take 1 g by mouth daily       sertraline (ZOLOFT) 25 MG tablet Take 1 tablet (25 mg) by mouth daily 90 tablet 1    sodium chloride (OCEAN) 0.65 % nasal spray Spray 1 spray into both nostrils daily as needed for congestion      vitamin B complex with vitamin C (STRESS TAB) tablet Take 1 tablet by mouth daily            Past Medical History     Past Medical History:   Diagnosis Date    Acute ischemic right MCA stroke (H) 10/10/2023    Allergic rhinitis, cause unspecified 2003a     Arthritis of knee 2014    Bee sting reaction 2009    DIABETES TYPE II     Essential hypertension, benign     Flat foot(734) 2014    Hyperlipidaemia LDL goal < 100     Localized osteoarthrosis not specified whether primary or secondary, ankle and foot     After surgery    Metabolic syndrome X     PVC's (premature ventricular contractions)     Tinnitus 2014     Past Surgical History:   Procedure Laterality Date    ABDOMEN SURGERY  1988    ceasaran birth    COLONOSCOPY      COLONOSCOPY N/A 2022    Procedure: COLONOSCOPY, WITH POLYPECTOMY AND BIOPSY;  Surgeon: Nicole Grubbs MD;  Location:  GI    ENT SURGERY      frequent sinus infections    HC ENDOMETRIAL BIOPSY W/O CERVICAL DILATION      ORTHOPEDIC SURGERY      broken right ankle/plates and screws    SOFT TISSUE SURGERY      tendonitis in right upper leg and below right elbow    ZZC  DELIVERY ONLY      ZZC LIGATE FALLOPIAN TUBE,POSTPARTUM      ZZC NONSPECIFIC PROCEDURE      right ankle roe and pins     Family History   Problem Relation Age of Onset    Musculoskeletal Disorder Mother         b:1938   Hx Fibromyalgia    Hypertension Mother     Cerebrovascular Disease Mother     Anesthesia Reaction Mother     Cerebrovascular Disease Father         b:193,  age 68  massive stroke    Diabetes Father         dx age 50s and his family    Gastrointestinal Disease Sister         b:1950   Hx of reflux    Cerebrovascular Disease Sister     Cerebrovascular Disease Sister 62        ? TIA    Bronchitis Sister     Anxiety Disorder Sister     Anesthesia Reaction Sister     Family History Negative Brother         b:    Cancer Maternal Grandfather         throat    Diabetes Paternal Grandmother     Hypertension Daughter     Family History Negative Daughter         b:    Diabetes Daughter         b:    Diabetes dx age 22**insulin    CABG Daughter 43    Hypertension Daughter     Lipids Son         b:  high cholosterol and triglycerides            Allergies   Atovaquone-proguanil hcl, Amoxicillin, Bee, Ceftin, Clindamycin, Erythromycin, Seasonal allergies, Shellfish-derived products, Shrimp, and Tetracycline        Jenny Carlson NP  [facility]  Pager: [phone]       Thank you for allowing me to participate in the care of your patient.      Sincerely,     Jenny Carlson NP     [facility]  cc:   Varun Murray MD  [address] [place] [address]  [place] [address]

## 2024-02-01 NOTE — PATIENT INSTRUCTIONS
Today's Recommendations    I will review the echocardiogram and factor 2 + with Dr. Murray, we may proceed with a transesophageal echocardiogram  I will review the results of your pelvic ultrasound next week and then we can decide on the loop recorder  Continue all medications without changes.  Please follow up with me in 2 months.    Please send a [employer] message or call [phone] to the RN team with questions or concerns.     Scheduling number [phone]  KRISTI Rivera, CNP

## 2024-02-05 ENCOUNTER — TELEPHONE (OUTPATIENT)
Dept: CARDIOLOGY | Facility: CLINIC | Age: 74
End: 2024-02-05
Payer: COMMERCIAL

## 2024-02-05 DIAGNOSIS — I63.511 ACUTE ISCHEMIC RIGHT MCA STROKE (H): Primary | ICD-10-CM

## 2024-02-05 NOTE — TELEPHONE ENCOUNTER
Reviewed the results of her hypercoagulable workup with Dr. Murray.  He recommends a referral to hematology for further evaluation.  We can meet after the results of this appointment and discuss if a loop recorder or a DAMASO would be indicated. Please arrange a follow up with me in 2 months.

## 2024-02-05 NOTE — TELEPHONE ENCOUNTER
Attempted to call patient with recommendations from Dr. Craven and Estrella Carlson NP, left message for patient to call back.  MEGHANN Feliz RN

## 2024-02-05 NOTE — TELEPHONE ENCOUNTER
Patient called back, informed of recommendations from Dr. Craven and Estrella Carlson NP.  Patient advised she will be contacted to make an appointment with hematology.  Will message scheduling to call patient to arrange follow-up with both JAYNA Carlson in 2 months.  [place] LANGE

## 2024-02-07 ENCOUNTER — ANCILLARY PROCEDURE (OUTPATIENT)
Dept: ULTRASOUND IMAGING | Facility: CLINIC | Age: 74
End: 2024-02-07
Attending: INTERNAL MEDICINE
Payer: COMMERCIAL

## 2024-02-07 DIAGNOSIS — N83.8 OVARIAN MASS: ICD-10-CM

## 2024-02-07 PROCEDURE — 76830 TRANSVAGINAL US NON-OB: CPT

## 2024-02-07 PROCEDURE — 76856 US EXAM PELVIC COMPLETE: CPT

## 2024-02-07 NOTE — RESULT ENCOUNTER NOTE
Hello -    I'm covering for Dr. Osborne today:  Here are my comments about the recent results: pelvic ultrasound shows right ovarian cyst and a uterine fibroid.      Please let us know if you have any questions or concerns.    Regards,  Nathalie Bruce PA-C

## 2024-02-13 ENCOUNTER — TELEPHONE (OUTPATIENT)
Dept: NEUROLOGY | Facility: CLINIC | Age: 74
End: 2024-02-13
Payer: COMMERCIAL

## 2024-02-16 NOTE — TELEPHONE ENCOUNTER
[facility] Call Center    Phone Message    May a detailed message be left on voicemail: yes     Reason for Call: Other: Patient is calling to reschedule appt with Janelle Thakkar. Writer unable to schedule due to no template available for return stroke. Please contact patient to schedule at [phone]     Action Taken: Other: [place] [facility]    Travel Screening: Not Applicable

## 2024-02-16 NOTE — TELEPHONE ENCOUNTER
Called patient, LVM OK to use 4/3/24 at 8AM hold if needed- Virtual or offer ro schedule with ANY stroke LISA.    QUINCY ROBERTSON, CMA

## 2024-02-27 ENCOUNTER — VIRTUAL VISIT (OUTPATIENT)
Dept: PHARMACY | Facility: CLINIC | Age: 74
End: 2024-02-27
Payer: COMMERCIAL

## 2024-02-27 ENCOUNTER — TELEPHONE (OUTPATIENT)
Dept: NEUROLOGY | Facility: CLINIC | Age: 74
End: 2024-02-27
Payer: COMMERCIAL

## 2024-02-27 DIAGNOSIS — Z78.9 TAKES DIETARY SUPPLEMENTS: ICD-10-CM

## 2024-02-27 DIAGNOSIS — E11.9 TYPE 2 DIABETES MELLITUS WITHOUT COMPLICATION, WITHOUT LONG-TERM CURRENT USE OF INSULIN (H): ICD-10-CM

## 2024-02-27 DIAGNOSIS — F43.22 ADJUSTMENT DISORDER WITH ANXIOUS MOOD: Primary | ICD-10-CM

## 2024-02-27 DIAGNOSIS — E78.5 HYPERLIPIDEMIA LDL GOAL <100: ICD-10-CM

## 2024-02-27 DIAGNOSIS — R00.0 SINUS TACHYCARDIA: ICD-10-CM

## 2024-02-27 DIAGNOSIS — J30.2 SEASONAL ALLERGIC RHINITIS, UNSPECIFIED TRIGGER: ICD-10-CM

## 2024-02-27 DIAGNOSIS — I10 ESSENTIAL HYPERTENSION, BENIGN: ICD-10-CM

## 2024-02-27 PROCEDURE — 99607 MTMS BY PHARM ADDL 15 MIN: CPT | Mod: 95 | Performed by: PHARMACIST

## 2024-02-27 PROCEDURE — 99605 MTMS BY PHARM NP 15 MIN: CPT | Mod: 95 | Performed by: PHARMACIST

## 2024-02-27 NOTE — TELEPHONE ENCOUNTER
Called patient, as I was reviewing providers schedule and noted she is scheduled for an in person appointment on 4/3/24 and provider is VIRTUAL ONLY and won't be in clinic.     Please review 4/3/24 with patient offer virtual visit at same time. If patient prefers in person, please schedule with ANY AVAILABLE stroke LISA in any open slot, Recall HOLD OK.     QUINCY ROBERTSON, CMA

## 2024-02-27 NOTE — LETTER
_  Medication List        Prepared on: 02/27/2024     Bring your Medication List when you go to the doctor, hospital, or   emergency room. And, share it with your family or caregivers.     Note any changes to how you take your medications.  Cross out medications when you no longer use them.    Medication How I take it Why I use it Prescriber   aspirin (ASA) 325 MG EC tablet Take 325 mg by mouth At Bedtime Stroke  Patient Reported   atorvastatin (LIPITOR) 10 MG tablet Take 1 tablet (10 mg) by mouth daily Hyperlipidemia LDL Goal <100 Masood Osborne MD   azelastine (ASTELIN) 0.1 % nasal spray Spray 1 spray into both nostrils 2 times daily Seasonal allergic rhinitis due to pollen Zain Marshall MD   blood glucose (NO BRAND SPECIFIED) lancets standard Use to test blood sugar one* times daily or as directed. Type 2 diabetes mellitus without complication, without long-term current use of insulin (H) Masood Osborne MD   blood glucose (ONETOUCH ULTRA) test strip Use to test blood sugar daily or as directed. Type 2 diabetes mellitus without complication, without long-term current use of insulin (H) Masood Osborne MD   blood glucose calibration (ONETOUCH ULTRA CONTROL) solution Use to calibrate blood glucose monitor as directed. Type 2 diabetes mellitus without complication, without long-term current use of insulin (H) Masood Osborne MD   blood glucose monitoring (ONE TOUCH ULTRA 2) meter device kit Use to test blood sugar 1 times daily. Type 2 diabetes mellitus without complication, without long-term current use of insulin (H) Masood Osborne MD   carvedilol (COREG) 3.125 MG tablet Take 1 tablet (3.125 mg) by mouth 2 times daily (with meals) Acute Ischemic Stroke (H); Essential Hypertension, Benign; Sinus Tachycardia Masood Osborne MD   Cholecalciferol (VITAMIN D3 PO) Take 2,000 Units by mouth daily  General Health  self   EPINEPHrine (ANY BX GENERIC EQUIV) 0.3 MG/0.3ML injection 2-pack INJECT 0.3 MLS INTO THE MUSCLE ONCE AS  NEEDED FOR ANAPHYLAXIS Bee sting reaction, accidental or unintentional, sequela Masood Osborne MD   fluticasone (FLONASE) 50 MCG/ACT spray Spray 1-2 sprays into both nostrils daily Seasonal allergic rhinitis due to other allergic trigger Vincent Encinas PA-C   lisinopril-hydrochlorothiazide (ZESTORETIC) 20-25 MG tablet Take 0.5 tablets by mouth daily Essential Hypertension, Benign Masood Osborne MD   metFORMIN (GLUCOPHAGE) 850 MG tablet Take 1 tablet (850 mg) by mouth 2 times daily (with meals) Type 2 diabetes mellitus without complication, without long-term current use of insulin (H) Masood Osborne MD   Omega-3 Fatty Acids (OMEGA-3 FISH OIL PO) Take 1 g by mouth daily  General Health  Self   sertraline (ZOLOFT) 50 MG tablet Take 1 tablet (50 mg) by mouth daily Adjustment Disorder with Anxious Mood Masood Osborne MD   sodium chloride (OCEAN) 0.65 % nasal spray Spray 1 spray into both nostrils daily as needed for congestion Allergy Self   vitamin B complex with vitamin C (STRESS TAB) tablet Take 1 tablet by mouth daily General Health  self         Add new medications, over-the-counter drugs, herbals, vitamins, or  minerals in the blank rows below.    Medication How I take it Why I use it Prescriber                                      Allergies:      atovaquone-proguanil hcl; amoxicillin; bee; ceftin; clindamycin; erythromycin; seasonal allergies; shellfish-derived products; shrimp; tetracycline        Side effects I have had:               Other Information:              My notes and questions:

## 2024-02-27 NOTE — PATIENT INSTRUCTIONS
"Recommendations from today's MTM visit:                                                    MTM (medication therapy management) is a service provided by a clinical pharmacist designed to help you get the most of out of your medicines.   Today we reviewed what your medicines are for, how to know if they are working, that your medicines are safe and how to make your medicine regimen as easy as possible.      Increase sertraline to 50mg daily.  I sent a new prescription for 50mg tablets to your pharmacy, but you can use up the 25mg tablets by taking 2 tablets per day.    Follow-up: Return in about 8 weeks (around 4/23/2024) for check in via [employer].    It was great speaking with you today.  I value your experience and would be very thankful for your time in providing feedback in our clinic survey. In the next few days, you may receive an email or text message from [employer] with a link to a survey related to your  clinical pharmacist.\"     To schedule another MTM appointment, please call the clinic directly or you may call the MTM scheduling line at [phone] or toll-free at [phone].     My Clinical Pharmacist's contact information:                                                      Please feel free to contact me with any questions or concerns you have.      Danyelle Jonas, Tresa, [facility]  Medication Therapy Management Provider  [phone]    "

## 2024-02-27 NOTE — PROGRESS NOTES
Medication Therapy Management (MTM) Encounter    ASSESSMENT:                            Medication Adherence/Access: No issues identified    Anxiety:   May benefit from increasing sertraline dose.    Type 2 Diabetes:   Stable. Patient is meeting A1c goal of < 7%    Hypertension/Sinus Tachycardia:   Stable. Patient is meeting blood pressure goal of <130/80mmHg.    Hyperlipidemia:   Stable. Patient is on maximally tolerated statin therapy, no changes needed.     Allergic rhinitis:   Stable.     Supplements:   Stable.    PLAN:                            Increased sertraline to 50mg daily.  Updated Rx sent to pharmacy.    Follow-up: Return in about 8 weeks (around 2024) for check in via [employer].    SUBJECTIVE/OBJECTIVE:                          Alma Helm is a 73 year old female contacted via secure video for a follow-up visit from 2023.       Reason for visit: Routine follow-up.    Tobacco: She reports that she quit smoking about 41 years ago. Her smoking use included cigarettes. She has a 10 pack-year smoking history. She has never used smokeless tobacco.  Alcohol: she stopped drinking wine daily, still has a glass occasionally    Medication Adherence/Access: no issues reported    Anxiety:  Sertraline 25mg once daily - started in December  Patient reports no current medication side effects.  Patient reports symptoms are unchanged.  She denies suicidal ideation.    Diabetes   Metformin 850mg twice daily   Aspirin 325mg daily for secondary prevention (history of CVA)  Patient is not experiencing side effects.  Blood sugar monitorin time(s) daily; Ranges: (patient reported) Fasting - <125mg/dL   Current diabetes symptoms: none  Diet/Exercise: After eliminating wine, has also noticed she's snacking less     Eye exam is up to date  Foot exam is up to date  Urine Albumin:   Lab Results   Component Value Date    UMALCR  2023      Comment:      Unable to calculate, urine albumin and/or urine creatinine  is outside detectable limits.  Microalbuminuria is defined as an albumin:creatinine ratio of 17 to 299 for males and 25 to 299 for females. A ratio of albumin:creatinine of 300 or higher is indicative of overt proteinuria.  Due to biologic variability, positive results should be confirmed by a second, first-morning random or 24-hour timed urine specimen. If there is discrepancy, a third specimen is recommended. When 2 out of 3 results are in the microalbuminuria range, this is evidence for incipient nephropathy and warrants increased efforts at glucose control, blood pressure control, and institution of therapy with an angiotensin-converting-enzyme (ACE) inhibitor (if the patient can tolerate it).        Lab Results   Component Value Date    A1C 6.0 (H) 2023        Hypertension /Sinus Tachycardia:  Carvedilol 3.125mg twice daily   Lisinopril/hydrochlorothiazide 20/25mg - 1/2 tablet daily  Patient reports no current medication side effects  Patient does not self-monitor blood pressure.       BP Readings from Last 3 Encounters:   24 116/62   01/10/24 135/85   23 117/78     Pulse Readings from Last 3 Encounters:   24 76   23 78   23 87     Hyperlipidemia   Atorvastatin 10mg daily - has not been able to tolerate higher dose due to myalgias  Patient reports no significant myalgias or other side effects.     Recent Labs   Lab Test 23  0605 23  1032   CHOL 155 154   HDL 47* 51   LDL 66 69   TRIG 212* 169*       Allergic Rhinitis:   Astelin nasal spray twice daily as needed   EpiPen as needed - no use, but confirms is not   Flonase nasal spray 1-2 sprays daily as needed   Saline nasal spray as needed   Patient reports no current medication side effects.     Patient feels that current therapy is effective.       Supplements   Vitamin D 2000 international unit(s) daily  Fish oil 1g daily  Vitamin B complex daily  No reported issues at this time.         Today's Vitals:  [facility] 03/[birth year]/2001   ----------------    I spent 14 minutes with this patient today. All changes were made via collaborative practice agreement with Dr. Osborne. A copy of the visit note was provided to the patient's provider(s).    A summary of these recommendations was sent via [employer].    Danyelle Jonas, PharmD, [facility]  Medication Therapy Management Provider  [phone]     Telemedicine Visit Details  Type of service:  Video Conference via [employer]  Start Time: 8:32 AM  End Time: 8:46 AM     Medication Therapy Recommendations  Adjustment disorder with anxious mood    Current Medication: sertraline (ZOLOFT) 25 MG tablet (Discontinued)   Rationale: Dose too low - Dosage too low - Effectiveness   Recommendation: Increase Dose - sertraline 50 MG tablet   Status: Accepted per CPA

## 2024-02-27 NOTE — LETTER
February 27, 2024  Elvia Helm  [address] [address] [facility] [id number]    Dear Ms. Helm, [facility]     Thank you for talking with me on Feb 27, 2024 about your health and medications. As a follow-up to our conversation, I have included two documents:      Your Recommended To-Do List has steps you should take to get the best results from your medications.  Your Medication List will help you keep track of your medications and how to take them.    If you want to talk about these documents, please call Danyelle Jonas PharmD at phone: [phone], Monday-Friday 8-4:30pm.    I look forward to working with you and your doctors to make sure your medications work well for you.    Sincerely,  Danyelle Jonas PharmD  [facility] Pharmacist, [facility]

## 2024-02-27 NOTE — LETTER
"Recommended To-Do List      Prepared on: 02/27/2024       You can get the best results from your medications by completing the items on this \"To-Do List.\"      Bring your To-Do List when you go to your doctor. And, share it with your family or caregivers.    My To-Do List:  What we talked about: What I should do:   Your medication dosage being too low    Increase your dosage of sertraline (ZOLOFT) to 50mg daily          What we talked about: What I should do:                     "

## 2024-02-28 NOTE — TELEPHONE ENCOUNTER
Spoke to patient, explained that provider is virtual only that day and agreed to virtual visit. Appointment type updated.   QUINCY ROBERTSON, CMA

## 2024-03-14 ENCOUNTER — TELEPHONE (OUTPATIENT)
Dept: GASTROENTEROLOGY | Facility: CLINIC | Age: 74
End: 2024-03-14
Payer: COMMERCIAL

## 2024-03-14 NOTE — TELEPHONE ENCOUNTER
Caller: writer to pt    Reason for Reschedule/Cancellation   (please be detailed, any staff messages or encounters to note?): Provider OOO      Prior to reschedule please review:  Ordering Provider: India Keller Determined: Mod  Does patient have any ASC Exclusions, please identify?: N      Notes on Cancelled Procedure:  Procedure: Lower Endoscopy [Colonoscopy]   Date: 04/02/2024  Location: [facility]; [address] [place] [address]  Surgeon: Humera      Rescheduled: No, Case in depot       Did you cancel or rescheduled an EUS procedure? No.

## 2024-03-14 NOTE — TELEPHONE ENCOUNTER
Caller: Alma    Reason for Reschedule/Cancellation   (please be detailed, any staff messages or encounters to note?): provider out      Prior to reschedule please review:  Ordering Provider: India Keller Determined: Mod  Does patient have any ASC Exclusions, please identify?: N        Notes on Cancelled Procedure:  Procedure: Lower Endoscopy [Colonoscopy]   Date: 04/02/2024  Location: [facility]; [address] [place] [address]  Surgeon: Humera      Rescheduled: Yes,   Procedure: Lower Endoscopy [Colonoscopy]    Date: 6/6   Location: [facility]; [address] [place] [address]    Surgeon: Hunter   Sedation Level Scheduled  CS ,  Reason for Sedation Level order   Instructions updated and sent: y     Does patient need PAC or Pre -Op Rescheduled? : n       Did you cancel or rescheduled an EUS procedure? No.

## 2024-04-02 ENCOUNTER — PATIENT OUTREACH (OUTPATIENT)
Dept: CARE COORDINATION | Facility: CLINIC | Age: 74
End: 2024-04-02
Payer: COMMERCIAL

## 2024-04-02 ENCOUNTER — TELEPHONE (OUTPATIENT)
Dept: NEUROSURGERY | Facility: CLINIC | Age: 74
End: 2024-04-02
Payer: COMMERCIAL

## 2024-04-02 NOTE — PROGRESS NOTES
"Virtual Visit Details    Type of service:  Video Visit   Video Start Time: 0803  Video End Time:0827    Originating Location (pt. Location): Home    Distant Location (provider location):  On-site  Platform used for Video Visit: Tasia    __________________________________________________________      [facility]   [phone]  __________________________________________________________           History of Present Illness   Chief Complaint: Patient presents with:  Stroke      Elvia Helm is a 73 year old female with PMH of  HTN, HLD, DM2, past smoking, colon adenoma presenting to follow up for stroke. She was admitted to [facility] 9/11/23 with speech difficulty and LLE weakness. MRI with L middle frontal gyrus infarct of unclear etiology. She was treated with DPAT x21 days and Lipitor.     Most recent stroke follow up was with JAYNA Burgess 11/1/23. At that time she reported some residual speech and LLE weakness, particularly when she was tired. She as continued on ASA and Lipitor and encouraged to start checking BP at home for stroke prevention. It was also recommended that she see cardiology for consideration of ILR and complete CT CAP for further evaluation of possible stroke etiology.     CT CAP showed R upper lung nodule and R ovarian cyst. She was seen by Dr. Berry at [facility] who recommended repeat CT in 6 monthsd for surveillence of the R lung nocdule. Pelvic US showed benign appearing ovarian cyst.      She saw cardiology 11/29/23 who recommended completing of TTE with bubble study; this was mildly positive. Hypercoags were completed which included positive factor II; she was referred to hematology with plan for subsequent follow up with cardiology for further discussion of possible DAMASO or loop recorder.      Today, Alma reports that she has been \"good\" since her most recent stroke follow-up.  She notes that she does continue to \"have problems\" with her speech, particularly " "\"trouble expressing myself.\"  However, she notes that it is \"a lot better\" than it was several months ago.  She does continue to find the speech difficulties frustrating and at times she prefers to stay quiet as sometimes others are not patient did not for her to get her thoughts across.  She denies any other residual neurological symptoms or new neurological concerns.  She does note that her children have expressed concerns with her mood; she recently transition from Zoloft to Celexa for this purpose.  She herself however does not identify with significant symptoms of depression or other mood changes.    She confirms that she continues on aspirin and Lipitor for stroke prevention.  She denies any concern of side effects of these medications regularly missed dosages.  She also continues on lisinopril/hydrochlorothiazide for blood pressure management.  She is not checking blood pressure at home, but notes that in the clinic it generally looks good.    On an unrelated note she does endorse concerns with a dry cough.  We discussed that this could be related to lisinopril, however she notes she has been on a stable dosage of this medication for \"many years\" so does not feel this is related.  She does have some concern that the dry cough could be related to her antidepressant, as she seemed to have this while she was on Zoloft and it started again when she started the Celexa.  She is agreeable to monitoring this and following up with her primary care provider if concerns persist.    Modified Jordan Scale  Score: 1-No significant disability despite symptoms; able to carry out all usual duties and activities    Stroke Evaluation Summarized:  MRI/Head CT MRI showed subuacte stroke L middle frontal gyrus   Intracranial Vasculature CTA head normal   Cervical Vasculature CTA neck normal         Echocardiogram TTE: EF 60-65%  Bubble study:  mildly positive for flow across the interatrial septum and more positive with Valsalva. "   EKG/Telemetry SR on hospital EKG. Since discharge, 30 day event monitor showed no atrial fibrillation or flutter   Other Testing CT CAP:   1.  There is an 8 x 6 mm right upper lobe groundglass nodule, which may represent neoplasm on the spectrum of adenocarcinoma in situ. Please see follow-up guidelines below.     2.  There is a 2.3 cm possible right ovarian cyst. This can be further evaluated by nonemergent transabdominal and transvaginal pelvic ultrasound.     Pelvic  US:   1.  Simple right paraovarian cysts measuring up to 2.7 cm.  2.  A 2.2 cm uterine fibroid.      Hypercoags:  Lupus anticoagulant: negative/normal   Protein C: negative/normal   Antithrombin III: negative/normal   Factor 5: negative/normal   Factor 2: present     Labs Lab Results   Component Value Date    LDL 66 09/12/2023    A1C 6.0 (H) 11/28/2023    CTROPT <6 12/01/2023    INR 1.03 12/07/2023    INR 0.91 09/11/2023                 Home Medications     Current Outpatient Medications   Medication Sig Dispense Refill    aspirin (ASA) 325 MG EC tablet Take 325 mg by mouth At Bedtime      atorvastatin (LIPITOR) 10 MG tablet Take 1 tablet (10 mg) by mouth daily 90 tablet 1    azelastine (ASTELIN) 0.1 % nasal spray Spray 1 spray into both nostrils 2 times daily (Patient taking differently: Spray 1 spray into both nostrils 2 times daily as needed for allergies) 30 mL 0    blood glucose (NO BRAND SPECIFIED) lancets standard Use to test blood sugar one* times daily or as directed. 100 each 3    blood glucose (ONETOUCH ULTRA) test strip Use to test blood sugar daily or as directed. 100 strip 3    blood glucose calibration (ONETOUCH ULTRA CONTROL) solution Use to calibrate blood glucose monitor as directed. 1 Bottle 1    blood glucose monitoring (ONE TOUCH ULTRA 2) meter device kit Use to test blood sugar 1 times daily. 1 kit 0    carvedilol (COREG) 3.125 MG tablet Take 1 tablet (3.125 mg) by mouth 2 times daily (with meals) 180 tablet 3     Cholecalciferol (VITAMIN D3 PO) Take 2,000 Units by mouth daily       citalopram (CELEXA) 10 MG tablet Take 1 tablet (10 mg) by mouth daily 90 tablet 0    EPINEPHrine (ANY BX GENERIC EQUIV) 0.3 MG/0.3ML injection 2-pack INJECT 0.3 MLS INTO THE MUSCLE ONCE AS NEEDED FOR ANAPHYLAXIS 2 each 0    fluticasone (FLONASE) 50 MCG/ACT spray Spray 1-2 sprays into both nostrils daily (Patient taking differently: Spray 1-2 sprays into both nostrils daily as needed for allergies) 16 g 0    lisinopril-hydrochlorothiazide (ZESTORETIC) 20-25 MG tablet Take 0.5 tablets by mouth daily 45 tablet 1    metFORMIN (GLUCOPHAGE) 850 MG tablet Take 1 tablet (850 mg) by mouth 2 times daily (with meals) 180 tablet 0    Omega-3 Fatty Acids (OMEGA-3 FISH OIL PO) Take 1 g by mouth daily       sodium chloride (OCEAN) 0.65 % nasal spray Spray 1 spray into both nostrils daily as needed for congestion      vitamin B complex with vitamin C (STRESS TAB) tablet Take 1 tablet by mouth daily       No current facility-administered medications for this visit.            Physical Examination     Vitals:  Vitals - Patient Reported  Pain Score: No Pain (0)         BMI Readings from Last 1 Encounters:   24 30.18 kg/m        Neurologic: Completed via telemedicine video call  Mental Status:  alert, oriented x 3, speech clear and fluent  Cranial Nerves:  EOMI with normal smooth pursuit, facial movements symmetric, hearing not formally tested but intact to conversation, no dysarthria  Motor:  no abnormal movements, able to move all limbs antigravity spontaneously with no signs of hemiparesis observed  Coordination: No ataxia identified         Screenings and Questionnaires:     Tobacco:    Tobacco Use      Smoking status: Former        Packs/day: 1.00        Years: 10.00        Additional pack years: 0.00        Total pack years: 10.00        Types: Cigarettes        Quit date: 1982        Years since quittin.6      Smokeless tobacco:  Never      Sleep Apnea:      11/1/2023     9:51 AM   STOP-Bang Questionnaire   Do you SNORE loudly (louder than talking or loud enough to be heard through closed doors)? 0   Do you often feel TIRED, fatigued, or sleepy during daytime? 1   Has anyone OBSERVED you stop breathing during your sleep? 0   Do you have or are you being treated for high blood PRESSURE? 1   BMI more than 35kg/m2? 0   AGE over 50 years old? 1   NECK circumference > 16 inches (40cm)? 0   GENDER: Male? 0   Total Score 3       Depression:      4/3/2024     7:49 AM 2/12/2024     8:16 AM   PHQ-2 ( 1999 [employer])   Q1: Little interest or pleasure in doing things 0 1   Q2: Feeling down, depressed or hopeless 0 1   PHQ-2 Score 0 2   Q1: Little interest or pleasure in doing things  Several days   Q2: Feeling down, depressed or hopeless  Several days   PHQ-2 Score  2       Stroke Recovery and Risk Factors:      10/28/2023     1:01 PM   Stroke Questionnaire   Residual effects: Any residual effects from stroke? I have some symptoms, but I'm not sure if they're residual effects of the stroke   Residual effects: Describe speech   Level of independence: Could you live alone? Yes   Quality of Life: Rating (0-100%) 85   Quality of Life: What work now or before stroke Retired   Quality of Life: Current work status Retired   Quality of Life: How do you get around Family member drives me   Quality of Life: Living situation before stroke With family or significant other   Quality of Life: Living situation now With family or significant other   Medication: How do you take your meds Myself, from a pillbox that I set up   Medication: Do you ever miss or forget meds Rarely   Risk Factor: Checking blood pressure at home No, I have a blood pressure cuff but do not use it   Risk Factor: Checking blood sugar at home Yes   Risk Factor: Usual blood sugar numbers 100 to 150   Risk Factor: Second-hand smoke at home No   Risk Factor: How much caffeine per day hot tea 2-3 cups,  tea drink 1, diet soda 1. average   Who completed this questionnaire? The patient independently           Assessment and Plan   L MCA infarct of unclear etiology, September 2023.     Positive Factor 2     Mildly positive bubble study on TTE     R lung nodule, planning for repeat study in 6 months (approx May 2024)     -Continue ASA 325mg for stroke prevention  - Continue Lipitor, titrate to maintain goal LDL 40-70  - Blood glucose monitoring, Hgb A1c 6.0%, (goal <7% for secondary stroke prevention), follow-up with PCP  - Hematology consult pending for positive Factor 2; scheduled 4/16  - Cardiology follow up for further discussion of DAMASO and/or ILR; scheduled 4/16  - Oncology follow up/6 month repeat CT scan regarding lung nodule; due in approx May 2024  - Return in about 6 months (around 10/3/2024) for with Bijan or  .     Stroke Education provided.  She will call us with any questions.  For any acute neurologic deficits she was advised to  go directly to the hospital rather than call the clinic.      KRISTI Mcgowan [facility]urology  04/03/2024         ____________________________________________________________________    Billing:    I spent a total of 35 minutes on the day of the visit.   Time spent by me doing chart review, history and exam, documentation and further activities per the note

## 2024-04-03 ENCOUNTER — VIRTUAL VISIT (OUTPATIENT)
Dept: NEUROLOGY | Facility: CLINIC | Age: 74
End: 2024-04-03
Payer: COMMERCIAL

## 2024-04-03 VITALS — HEIGHT: 62 IN | BODY MASS INDEX: 30.36 KG/M2 | WEIGHT: 165 LBS

## 2024-04-03 DIAGNOSIS — Z86.73 HISTORY OF ISCHEMIC RIGHT MCA STROKE: Primary | ICD-10-CM

## 2024-04-03 PROCEDURE — 99214 OFFICE O/P EST MOD 30 MIN: CPT | Mod: 95 | Performed by: NURSE PRACTITIONER

## 2024-04-03 ASSESSMENT — PAIN SCALES - GENERAL: PAINLEVEL: NO PAIN (0)

## 2024-04-03 NOTE — NURSING NOTE
Is the patient currently in the state of [place]? YES    Visit mode:VIDEO    If the visit is dropped, the patient can be reconnected by: VIDEO VISIT: Text to cell phone:   Telephone Information:   Mobile [phone]       Will anyone else be joining the visit? NO  (If patient encounters technical issues they should call [phone] :[id number])    How would you like to obtain your AVS? MyChart    Are changes needed to the allergy or medication list? No    Are refills needed on medications prescribed by this physician? No    Reason for visit: Stroke    Linda Vaz MA

## 2024-04-03 NOTE — LETTER
4/3/2024         RE: Elvia Helm  [id number] [facility] [place] [address]        Dear Colleague,    Thank you for referring your patient, Elvia Helm, to the [facility] NEUROLOGY [facility]. Please see a copy of my visit note below.    Virtual Visit Details    Type of service:  Video Visit   Video Start Time: 0803  Video End Time:0827    Originating Location (pt. Location): Home    Distant Location (provider location):  On-site  Platform used for Video Visit: [employer]    __________________________________________________________      [facility] - [place]   [phone]  __________________________________________________________           History of Present Illness   Chief Complaint: Patient presents with:  Stroke      Elvia Helm is a 73 year old female with PMH of  HTN, HLD, DM2, past smoking, colon adenoma presenting to follow up for stroke. She was admitted to [facility] 9/11/23 with speech difficulty and LLE weakness. MRI with L middle frontal gyrus infarct of unclear etiology. She was treated with DPAT x21 days and Lipitor.     Most recent stroke follow up was with JAYNA Burgess 11/1/23. At that time she reported some residual speech and LLE weakness, particularly when she was tired. She as continued on ASA and Lipitor and encouraged to start checking BP at home for stroke prevention. It was also recommended that she see cardiology for consideration of ILR and complete CT CAP for further evaluation of possible stroke etiology.     CT CAP showed R upper lung nodule and R ovarian cyst. She was seen by Dr. Berry at [facility] who recommended repeat CT in 6 monthsd for surveillence of the R lung nocdule. Pelvic US showed benign appearing ovarian cyst.      She saw cardiology 11/29/23 who recommended completing of TTE with bubble study; this was mildly positive. Hypercoags were completed which included positive factor II; she was referred to hematology with plan  "for subsequent follow up with cardiology for further discussion of possible DAMASO or loop recorder.      Today, Alma reports that she has been \"good\" since her most recent stroke follow-up.  She notes that she does continue to \"have problems\" with her speech, particularly \"trouble expressing myself.\"  However, she notes that it is \"a lot better\" than it was several months ago.  She does continue to find the speech difficulties frustrating and at times she prefers to stay quiet as sometimes others are not patient did not for her to get her thoughts across.  She denies any other residual neurological symptoms or new neurological concerns.  She does note that her children have expressed concerns with her mood; she recently transition from Zoloft to Celexa for this purpose.  She herself however does not identify with significant symptoms of depression or other mood changes.    She confirms that she continues on aspirin and Lipitor for stroke prevention.  She denies any concern of side effects of these medications regularly missed dosages.  She also continues on lisinopril/hydrochlorothiazide for blood pressure management.  She is not checking blood pressure at home, but notes that in the clinic it generally looks good.    On an unrelated note she does endorse concerns with a dry cough.  We discussed that this could be related to lisinopril, however she notes she has been on a stable dosage of this medication for \"many years\" so does not feel this is related.  She does have some concern that the dry cough could be related to her antidepressant, as she seemed to have this while she was on Zoloft and it started again when she started the Celexa.  She is agreeable to monitoring this and following up with her primary care provider if concerns persist.    Modified Jacobson Scale  Score: 1-No significant disability despite symptoms; able to carry out all usual duties and activities    Stroke Evaluation Summarized:  MRI/Head CT " MRI showed subuacte stroke L middle frontal gyrus   Intracranial Vasculature CTA head normal   Cervical Vasculature CTA neck normal         Echocardiogram TTE: EF 60-65%  Bubble study:  mildly positive for flow across the interatrial septum and more positive with Valsalva.   EKG/Telemetry SR on hospital EKG. Since discharge, 30 day event monitor showed no atrial fibrillation or flutter   Other Testing CT CAP:   1.  There is an 8 x 6 mm right upper lobe groundglass nodule, which may represent neoplasm on the spectrum of adenocarcinoma in situ. Please see follow-up guidelines below.     2.  There is a 2.3 cm possible right ovarian cyst. This can be further evaluated by nonemergent transabdominal and transvaginal pelvic ultrasound.     Pelvic  US:   1.  Simple right paraovarian cysts measuring up to 2.7 cm.  2.  A 2.2 cm uterine fibroid.      Hypercoags:  Lupus anticoagulant: negative/normal   Protein C: negative/normal   Antithrombin III: negative/normal   Factor 5: negative/normal   Factor 2: present     Labs Lab Results   Component Value Date    LDL 66 09/12/2023    A1C 6.0 (H) 11/28/2023    CTROPT <6 12/01/2023    INR 1.03 12/07/2023    INR 0.91 09/11/2023                 Home Medications     Current Outpatient Medications   Medication Sig Dispense Refill     aspirin (ASA) 325 MG EC tablet Take 325 mg by mouth At Bedtime       atorvastatin (LIPITOR) 10 MG tablet Take 1 tablet (10 mg) by mouth daily 90 tablet 1     azelastine (ASTELIN) 0.1 % nasal spray Spray 1 spray into both nostrils 2 times daily (Patient taking differently: Spray 1 spray into both nostrils 2 times daily as needed for allergies) 30 mL 0     blood glucose (NO BRAND SPECIFIED) lancets standard Use to test blood sugar one* times daily or as directed. 100 each 3     blood glucose (ONETOUCH ULTRA) test strip Use to test blood sugar daily or as directed. 100 strip 3     blood glucose calibration (ONETOUCH ULTRA CONTROL) solution Use to calibrate blood  glucose monitor as directed. 1 Bottle 1     blood glucose monitoring (ONE TOUCH ULTRA 2) meter device kit Use to test blood sugar 1 times daily. 1 kit 0     carvedilol (COREG) 3.125 MG tablet Take 1 tablet (3.125 mg) by mouth 2 times daily (with meals) 180 tablet 3     Cholecalciferol (VITAMIN D3 PO) Take 2,000 Units by mouth daily        citalopram (CELEXA) 10 MG tablet Take 1 tablet (10 mg) by mouth daily 90 tablet 0     EPINEPHrine (ANY BX GENERIC EQUIV) 0.3 MG/0.3ML injection 2-pack INJECT 0.3 MLS INTO THE MUSCLE ONCE AS NEEDED FOR ANAPHYLAXIS 2 each 0     fluticasone (FLONASE) 50 MCG/ACT spray Spray 1-2 sprays into both nostrils daily (Patient taking differently: Spray 1-2 sprays into both nostrils daily as needed for allergies) 16 g 0     lisinopril-hydrochlorothiazide (ZESTORETIC) 20-25 MG tablet Take 0.5 tablets by mouth daily 45 tablet 1     metFORMIN (GLUCOPHAGE) 850 MG tablet Take 1 tablet (850 mg) by mouth 2 times daily (with meals) 180 tablet 0     Omega-3 Fatty Acids (OMEGA-3 FISH OIL PO) Take 1 g by mouth daily        sodium chloride (OCEAN) 0.65 % nasal spray Spray 1 spray into both nostrils daily as needed for congestion       vitamin B complex with vitamin C (STRESS TAB) tablet Take 1 tablet by mouth daily       No current facility-administered medications for this visit.            Physical Examination     Vitals:  Vitals - Patient Reported  Pain Score: No Pain (0)         BMI Readings from Last 1 Encounters:   04/03/24 30.18 kg/m        Neurologic: Completed via telemedicine video call  Mental Status:  alert, oriented x 3, speech clear and fluent  Cranial Nerves:  EOMI with normal smooth pursuit, facial movements symmetric, hearing not formally tested but intact to conversation, no dysarthria  Motor:  no abnormal movements, able to move all limbs antigravity spontaneously with no signs of hemiparesis observed  Coordination: No ataxia identified         Screenings and Questionnaires:      Tobacco:    Tobacco Use      Smoking status: Former        Packs/day: 1.00        Years: 10.00        Additional pack years: 0.00        Total pack years: 10.00        Types: Cigarettes        Quit date: 1982        Years since quittin.6      Smokeless tobacco: Never      Sleep Apnea:      2023     9:51 AM   STOP-Bang Questionnaire   Do you SNORE loudly (louder than talking or loud enough to be heard through closed doors)? 0   Do you often feel TIRED, fatigued, or sleepy during daytime? 1   Has anyone OBSERVED you stop breathing during your sleep? 0   Do you have or are you being treated for high blood PRESSURE? 1   BMI more than 35kg/m2? 0   AGE over 50 years old? 1   NECK circumference > 16 inches (40cm)? 0   GENDER: Male? 0   Total Score 3       Depression:      4/3/2024     7:49 AM 2024     8:16 AM   PHQ-2 (  [employer])   Q1: Little interest or pleasure in doing things 0 1   Q2: Feeling down, depressed or hopeless 0 1   PHQ-2 Score 0 2   Q1: Little interest or pleasure in doing things  Several days   Q2: Feeling down, depressed or hopeless  Several days   PHQ-2 Score  2       Stroke Recovery and Risk Factors:      10/28/2023     1:01 PM   Stroke Questionnaire   Residual effects: Any residual effects from stroke? I have some symptoms, but I'm not sure if they're residual effects of the stroke   Residual effects: Describe speech   Level of independence: Could you live alone? Yes   Quality of Life: Rating (0-100%) 85   Quality of Life: What work now or before stroke Retired   Quality of Life: Current work status Retired   Quality of Life: How do you get around Family member drives me   Quality of Life: Living situation before stroke With family or significant other   Quality of Life: Living situation now With family or significant other   Medication: How do you take your meds Myself, from a pillbox that I set up   Medication: Do you ever miss or forget meds Rarely   Risk Factor: Checking  blood pressure at home No, I have a blood pressure cuff but do not use it   Risk Factor: Checking blood sugar at home Yes   Risk Factor: Usual blood sugar numbers 100 to 150   Risk Factor: Second-hand smoke at home No   Risk Factor: How much caffeine per day hot tea 2-3 cups, tea drink 1, diet soda 1. average   Who completed this questionnaire? The patient independently           Assessment and Plan   L MCA infarct of unclear etiology, September 2023.     Positive Factor 2     Mildly positive bubble study on TTE     R lung nodule, planning for repeat study in 6 months (approx May 2024)     -Continue ASA 325mg for stroke prevention  - Continue Lipitor, titrate to maintain goal LDL 40-70  - Blood glucose monitoring, Hgb A1c 6.0%, (goal <7% for secondary stroke prevention), follow-up with PCP  - Hematology consult pending for positive Factor 2; scheduled 4/16  - Cardiology follow up for further discussion of DAMASO and/or ILR; scheduled 4/16  - Oncology follow up/6 month repeat CT scan regarding lung nodule; due in approx May 2024  - Return in about 6 months (around 10/3/2024) for with Bijan or  .     Stroke Education provided.  She will call us with any questions.  For any acute neurologic deficits she was advised to  go directly to the hospital rather than call the clinic.      KRISTI Mcgowan CNP  Neurology  04/03/2024         ____________________________________________________________________    Billing:    I spent a total of 35 minutes on the day of the visit.   Time spent by me doing chart review, history and exam, documentation and further activities per the note      Again, thank you for allowing me to participate in the care of your patient.        Sincerely,        KRISTI Mcgowan CNP

## 2024-04-19 NOTE — PROGRESS NOTES
Cardiology Clinic Progress Note  Elvia Helm MRN# [id number]   YOB: 1950 Age: 73 year old   Primary Cardiologist: Dr. Murray Reason for visit: 2 month follow up             Assessment and Plan:       1.  Left MCA CVA, no residual deficits  -9/2023 MRI showed subacute cortical ischemia in the left middle frontal gyrus encroaching upon the lateral precentral gyrus   -Post hospital 30-day event monitor did not show atrial fibrillation  -Hypercoagulable workup including: chest CT (did not show pulmonary embolism), negative lupus anticoagulant, protein C, Antithrombin III, factor V, activated protein C resistance, positive for heterozygous factor II mutation  -12/2023 Limited TTE with bubble study that was mildly positive for flow across the interatrial septum a more positive with Valsalva  -With consideration for proceeding with DAMASO: All indications, risks and benefits for transesophageal echocardiogram have been explained to the patient.  This includes but is not limited to esophageal irritation, damage to the oral cavity, esophageal perforation, GI bleeding, pharyngeal hematoma, transient bronchospasm, transient hypoxia, arrhthymias (NSVT, transient atrial fibrillation), vomiting, hemoptysis, and complications from anesthesia. Patient understands and wishes to proceed with it. Patient has no history of esophageal stricture, odynphagia, dysphagia, airway problems, or medication allergies. A formal consent form will be signed by the procedural physician.    2. Heterozygous for Factor 2  -Hematology visit pending for today    3.  Right upper lobe groundglass nodule  -Evaluated by Dr. Montalvo and plan is for repeat CT in 6 months, in May 2024     4. Ovarian cyst  -2.3cm on right ovary found incidentally on chest/abdomen/pelvis CT in November 2023  -Felt to be benign finding-fibroids    5. Hypertension, controlled  -On multidrug regimen    6. Hyperlipidemia, controlled  -9/2023 LDL  66    Plan:  Continue current cardiac meds including aspirin, carvedilol, atorvastatin, and lisinopril-HCTZ  Will review visit with Dr. Rosales today and pending their recommendations regarding  anticoagulation, if anticoagulation is not recommended will plan for implantation of loop recorder and DAMASO.  Recommended she follow up with her primary regarding her ongoing cough     Follow up plan: Follow up with Dr. Murray in 6 months or sooner based on the results of above appointments        History of Presenting Illness:    Elvia Helm is a very pleasant 73 year old female with a history of left MCA CVA.     Patient was hospitalized for CVA in September 2023.  Felt to be possibly a left MCA pattern her workup included an MRI which showed a CVA, CT angiogram did not find any significant blockages.  An echocardiogram was done without any bubble study.  No atrial fibrillation was seen in the hospital nor on a post discharge 30-day event monitor.    In November 2023 as part of hypercoagulability workup, a chest CT showed an 8x6mm groundglass appearing nodule in the right upper lobe, possible adenocarcinoma.  2.3 cm possible right ovarian cyst was also seen.  Follow up was arranged with Dr. Montalvo, and patient was seen 11/29/2023.  His recommendation was for a repeat CT scan in 6 months as the nodule was too small for CT-guided biopsy, surgical resection, or accurate characterization with a PET CT scan.    She was seen in clinic follow-up in November by Dr. Murray.  He discussed implanting a loop recorder following a hypercoagulable workup, however wanted to wait until she underwent her surgical consult with Dr. Montalvo.    On 12/8/2023 a limited echo was repeated with a bubble study which showed a mildly positive flow across the interatrial septum, more positive with Valsalva. Reviewed results of study with Dr. Murray who recommends a DAMASO and loop recorder implantation depending on if hematology recommended  coumadin.     In early April, she was seen by neurology with no medication changes or further recommendations.  Plan is for her to see them again in 6 months.    Patient is here today for further review. Patient reports feeling good. Her daughter is present with her today. Her main concern is a dry cough, she will cough so hard she has vomiting.  She has been on lisinopril for several years and her cough started in December when her sertraline was increased.  Unfortunately due to scheduling issues her hematology visit was rescheduled to this afternoon.    Patient denies chest pain or chest tightness. Denies dizziness, lightheadedness or other presyncopal symptoms. Denies tachycardia or palpitations. Denies shortness of breath.     Blood pressure 129/83 and HR 84 in clinic today. Denies bleeding with aspirin.         Recent Hospitalizations   2023 L MCA CVA          Social History      Social History     Socioeconomic History    Marital status:      Spouse name: Not on file    Number of children: 3    Years of education: Not on file    Highest education level: Not on file   Occupational History    Not on file   Tobacco Use    Smoking status: Former     Current packs/day: 0.00     Average packs/day: 1 pack/day for 10.0 years (10.0 ttl pk-yrs)     Types: Cigarettes     Start date: 1972     Quit date: 1982     Years since quittin.7    Smokeless tobacco: Never   Vaping Use    Vaping status: Never Used   Substance and Sexual Activity    Alcohol use: Yes     Comment: glass of wine on the weekends    Drug use: No     Comment: caffeine tea 3-4/d    Sexual activity: Not Currently     Partners: Male     Birth control/protection: Female Surgical, None     Comment: Post menopausal   Other Topics Concern     Service Not Asked    Blood Transfusions Not Asked    Caffeine Concern Not Asked    Occupational Exposure Not Asked    Hobby Hazards Not Asked    Sleep Concern Not Asked    Stress Concern Not  Asked    Weight Concern Yes     Comment: trying to lose    Special Diet No     Comment: Diabetic diet    Back Care Not Asked    Exercise Yes     Comment: walks 20 mt    Bike Helmet Not Asked    Seat Belt Not Asked    Self-Exams Not Asked    Parent/sibling w/ CABG, MI or angioplasty before 65F 55M? 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          Social Determinants of Health     Financial Resource Strain: Low Risk  (12/11/2023)    Financial Resource Strain     Within the past 12 months, have you or your family members you live with been unable to get utilities (heat, electricity) when it was really needed?: No   Food Insecurity: Low Risk  (12/11/2023)    Food Insecurity     Within the past 12 months, did you worry that your food would run out before you got money to buy more?: No     Within the past 12 months, did the food you bought just not last and you didn t have money to get more?: No   Transportation Needs: Low Risk  (12/11/2023)    Transportation Needs     Within the past 12 months, has lack of transportation kept you from medical appointments, getting your medicines, non-medical meetings or appointments, work, or from getting things that you need?: No   Physical Activity: Not on file   Stress: Not on file   Social Connections: Not on file   Interpersonal Safety: Not on file   Housing Stability: Low Risk  (12/11/2023)    Housing Stability     Do you have housing? : Yes     Are you worried about losing your housing?: No            Review of Systems:   Skin:  not assessed     Eyes:  not assessed    ENT:  Positive for nasal congestion  Respiratory:  Positive for cough  Cardiovascular:  Negative for;palpitations;chest pain;syncope or near-syncope;dizziness;edema Positive for;lightheadedness  Gastroenterology: Positive for vomiting  Genitourinary:  not assessed    Musculoskeletal:  not assessed    Neurologic:  Positive for headaches  Psychiatric:  Positive for sleep  "disturbances  Heme/Lymph/Imm:  not assessed    Endocrine:  not assessed           Physical Exam:   Vitals: /83   Pulse 84   Ht 1.575 m (5' 2\")   Wt 75.8 kg (167 lb)   LMP 03/17/2001   SpO2 96%   BMI 30.54 kg/m     Wt Readings from Last 4 Encounters:   04/25/24 75.8 kg (167 lb)   04/03/24 74.8 kg (165 lb)   02/01/24 76.5 kg (168 lb 11.2 oz)   01/10/24 77.6 kg (171 lb)     GEN: well nourished, in no acute distress.  HEENT:  Pupils equal, round. Sclerae nonicteric.   NECK: Supple, no masses appreciated.   C/V:  Regular rate and rhythm, no murmur, rub or gallop.    RESP: Respirations are unlabored. Clear to auscultation bilaterally without wheezing, rales, or rhonchi.  GI: Abdomen soft, nontender.  EXTREM: No LE edema.  NEURO: Alert and oriented, cooperative.  SKIN: Warm and dry.        Data:       LIPID RESULTS:  Lab Results   Component Value Date    CHOL 155 09/12/2023    CHOL 151 06/14/2021    HDL 47 (L) 09/12/2023    HDL 60 06/14/2021    LDL 66 09/12/2023    LDL 65 06/14/2021    TRIG 212 (H) 09/12/2023    TRIG 128 06/14/2021    CHOLHDLRATIO 2.9 06/30/2015     LIVER ENZYME RESULTS:  Lab Results   Component Value Date    AST 35 12/01/2023    AST 23 06/14/2021    ALT 38 12/01/2023    ALT 32 06/14/2021     CBC RESULTS:  Lab Results   Component Value Date    WBC 7.6 12/01/2023    WBC 7.5 06/14/2021    RBC 4.46 12/01/2023    RBC 4.36 06/14/2021    HGB 13.2 12/01/2023    HGB 13.5 06/14/2021    HCT 40.9 12/01/2023    HCT 40.5 06/14/2021    MCV 92 12/01/2023    MCV 93 06/14/2021    MCH 29.6 12/01/2023    MCH 31.0 06/14/2021    MCHC 32.3 12/01/2023    MCHC 33.3 06/14/2021    RDW 12.8 12/01/2023    RDW 14.0 06/14/2021     12/01/2023     06/14/2021     BMP RESULTS:  Lab Results   Component Value Date     12/01/2023     06/14/2021    POTASSIUM 4.1 12/01/2023    POTASSIUM 4.3 09/29/2022    POTASSIUM 4.0 06/14/2021    CHLORIDE 102 12/01/2023    CHLORIDE 104 09/29/2022    CHLORIDE 104 " 06/14/2021    CO2 26 12/01/2023    CO2 30 09/29/2022    CO2 29 06/14/2021    ANIONGAP 12 12/01/2023    ANIONGAP 3 09/29/2022    ANIONGAP 4 06/14/2021     (H) 12/01/2023     (H) 09/13/2023     (H) 09/29/2022     (H) 06/14/2021    BUN 10.7 12/01/2023    BUN 16 09/29/2022    BUN 15 06/14/2021    CR 0.69 12/01/2023    CR 0.81 06/14/2021    GFRESTIMATED >90 12/01/2023    GFRESTIMATED >60 11/16/2023    GFRESTIMATED 72 06/14/2021    GFRESTBLACK 84 06/14/2021    KIM 9.9 12/01/2023    KIM 9.4 06/14/2021      A1C RESULTS:  Lab Results   Component Value Date    A1C 6.0 (H) 11/28/2023    A1C 6.1 (H) 06/14/2021     INR RESULTS:  Lab Results   Component Value Date    INR 1.03 12/07/2023    INR 0.91 09/11/2023            Medications     Current Outpatient Medications   Medication Sig Dispense Refill    aspirin (ASA) 325 MG EC tablet Take 325 mg by mouth At Bedtime      atorvastatin (LIPITOR) 10 MG tablet Take 1 tablet (10 mg) by mouth daily 90 tablet 1    azelastine (ASTELIN) 0.1 % nasal spray Spray 1 spray into both nostrils 2 times daily (Patient taking differently: Spray 1 spray into both nostrils 2 times daily as needed for allergies) 30 mL 0    blood glucose (NO BRAND SPECIFIED) lancets standard Use to test blood sugar one* times daily or as directed. 100 each 3    blood glucose (ONETOUCH ULTRA) test strip Use to test blood sugar daily or as directed. 100 strip 3    blood glucose calibration (ONETOUCH ULTRA CONTROL) solution Use to calibrate blood glucose monitor as directed. 1 Bottle 1    blood glucose monitoring (ONE TOUCH ULTRA 2) meter device kit Use to test blood sugar 1 times daily. 1 kit 0    carvedilol (COREG) 3.125 MG tablet Take 1 tablet (3.125 mg) by mouth 2 times daily (with meals) 180 tablet 3    Cholecalciferol (VITAMIN D3 PO) Take 2,000 Units by mouth daily       citalopram (CELEXA) 10 MG tablet Take 1 tablet (10 mg) by mouth daily 90 tablet 0    EPINEPHrine (ANY BX GENERIC EQUIV)  0.3 MG/0.3ML injection 2-pack INJECT 0.3 MLS INTO THE MUSCLE ONCE AS NEEDED FOR ANAPHYLAXIS 2 each 0    fluticasone (FLONASE) 50 MCG/ACT spray Spray 1-2 sprays into both nostrils daily (Patient taking differently: Spray 1-2 sprays into both nostrils daily as needed for allergies) 16 g 0    lisinopril-hydrochlorothiazide (ZESTORETIC) 20-25 MG tablet Take 0.5 tablets by mouth daily 45 tablet 1    metFORMIN (GLUCOPHAGE) 850 MG tablet TAKE 1 TABLET (850 MG) BY MOUTH 2 TIMES DAILY (WITH MEALS) 180 tablet 0    Omega-3 Fatty Acids (OMEGA-3 FISH OIL PO) Take 1 g by mouth daily       sodium chloride (OCEAN) 0.65 % nasal spray Spray 1 spray into both nostrils daily as needed for congestion      vitamin B complex with vitamin C (STRESS TAB) tablet Take 1 tablet by mouth daily            Past Medical History     Past Medical History:   Diagnosis Date    Acute ischemic right MCA stroke (H) 10/10/2023    Allergic rhinitis, cause unspecified a    Arthritis of knee 2014    Bee sting reaction 2009    DIABETES TYPE II 2003    Essential hypertension, benign     Flat foot(734) 2014    Hyperlipidaemia LDL goal < 100     Localized osteoarthrosis not specified whether primary or secondary, ankle and foot     After surgery    Metabolic syndrome X     PVC's (premature ventricular contractions)     Tinnitus 2014     Past Surgical History:   Procedure Laterality Date    ABDOMEN SURGERY  1988    ceasaran birth    COLONOSCOPY      COLONOSCOPY N/A 2022    Procedure: COLONOSCOPY, WITH POLYPECTOMY AND BIOPSY;  Surgeon: Nicole Grubbs MD;  Location:  GI    ENT SURGERY      frequent sinus infections    HC ENDOMETRIAL BIOPSY W/O CERVICAL DILATION      ORTHOPEDIC SURGERY      broken right ankle/plates and screws    SOFT TISSUE SURGERY      tendonitis in right upper leg and below right elbow    ZZC  DELIVERY ONLY      ZZ LIGATE FALLOPIAN TUBE,POSTPARTUM      Z NONSPECIFIC PROCEDURE       right ankle roe and pins     Family History   Problem Relation Age of Onset    Musculoskeletal Disorder Mother         b:1938   Hx Fibromyalgia    Hypertension Mother     Cerebrovascular Disease Mother     Anesthesia Reaction Mother     Cerebrovascular Disease Father         b:,  age 68  massive stroke    Diabetes Father         dx age 50s and his family    Gastrointestinal Disease Sister         b:   Hx of reflux    Cerebrovascular Disease Sister     Cerebrovascular Disease Sister 62        ? TIA    Bronchitis Sister     Anxiety Disorder Sister     Anesthesia Reaction Sister     Family History Negative Brother         b:    Cancer Maternal Grandfather         throat    Diabetes Paternal Grandmother     Hypertension Daughter     Family History Negative Daughter         b:    Diabetes Daughter         b:    Diabetes dx age 22**insulin    CABG Daughter 43    Hypertension Daughter     Lipids Son         b: high cholosterol and triglycerides            Allergies   Atovaquone-proguanil hcl, Amoxicillin, Bee, Ceftin, Clindamycin, Erythromycin, Seasonal allergies, Sertraline, Shellfish-derived products, Shrimp, and Tetracycline        Jenny Carlson NP  [facility]  Pager: [phone]

## 2024-04-20 DIAGNOSIS — E11.9 TYPE 2 DIABETES MELLITUS WITHOUT COMPLICATION, WITHOUT LONG-TERM CURRENT USE OF INSULIN (H): ICD-10-CM

## 2024-04-22 NOTE — TELEPHONE ENCOUNTER
Prescription approved per [facility] Refill Protocol.  Aleah Pham RN  [facility]

## 2024-04-24 NOTE — TELEPHONE ENCOUNTER
RECORDS STATUS - ALL OTHER DIAGNOSIS      Referring Provider/Location:  Jenny Carlson NP  Dx and Code: I63.511 (ICD-10-CM) - Acute ischemic right MCA stroke (H)   Appt Date:  4.25.24  Provider: Bobby BARRON NEEDED: None   NOTES STATUS COMMENTS   OFFICE NOTE from referring provider     OFFICE NOTE from medical oncologist     OFFICE NOTE from other specialist [facility] 2.1.24 E Aldo   DISCHARGE SUMMARY from hospital     DISCHARGE REPORT from the ER     OPERATIVE REPORT     MEDICATION LIST   [facility]    LABS     PATHOLOGY REPORTS     ANYTHING RELATED TO DIAGNOSIS  12.12.23 Most recent Labs   GENONOMIC TESTING     TYPE:     IMAGING (NEED IMAGES & REPORT)     CT SCANS Future appt    PACS Chest 5.29.24  CAP 11.16.23     Head/Neck 9.11.23  Head  9.11.23   MRI PACS  Brain 9.11.23   MAMMO Future appt 5.7.24; 5.2.23   ULTRASOUND PACS Pelvic 2.7.24   PET

## 2024-04-25 ENCOUNTER — PRE VISIT (OUTPATIENT)
Dept: ONCOLOGY | Facility: CLINIC | Age: 74
End: 2024-04-25

## 2024-04-25 ENCOUNTER — TELEPHONE (OUTPATIENT)
Dept: CARDIOLOGY | Facility: CLINIC | Age: 74
End: 2024-04-25

## 2024-04-25 ENCOUNTER — OFFICE VISIT (OUTPATIENT)
Dept: CARDIOLOGY | Facility: CLINIC | Age: 74
End: 2024-04-25
Attending: NURSE PRACTITIONER
Payer: COMMERCIAL

## 2024-04-25 ENCOUNTER — ONCOLOGY VISIT (OUTPATIENT)
Dept: ONCOLOGY | Facility: CLINIC | Age: 74
End: 2024-04-25
Attending: NURSE PRACTITIONER
Payer: COMMERCIAL

## 2024-04-25 VITALS
WEIGHT: 167.6 LBS | RESPIRATION RATE: 16 BRPM | TEMPERATURE: 98.1 F | OXYGEN SATURATION: 95 % | HEART RATE: 88 BPM | DIASTOLIC BLOOD PRESSURE: 85 MMHG | BODY MASS INDEX: 30.65 KG/M2 | SYSTOLIC BLOOD PRESSURE: 130 MMHG

## 2024-04-25 VITALS
OXYGEN SATURATION: 96 % | DIASTOLIC BLOOD PRESSURE: 83 MMHG | SYSTOLIC BLOOD PRESSURE: 129 MMHG | WEIGHT: 167 LBS | HEART RATE: 84 BPM | HEIGHT: 62 IN | BODY MASS INDEX: 30.73 KG/M2

## 2024-04-25 DIAGNOSIS — I63.511 ACUTE ISCHEMIC RIGHT MCA STROKE (H): ICD-10-CM

## 2024-04-25 DIAGNOSIS — I63.511 ACUTE ISCHEMIC RIGHT MCA STROKE (H): Primary | ICD-10-CM

## 2024-04-25 PROCEDURE — 99204 OFFICE O/P NEW MOD 45 MIN: CPT | Performed by: INTERNAL MEDICINE

## 2024-04-25 PROCEDURE — G0463 HOSPITAL OUTPT CLINIC VISIT: HCPCS | Performed by: INTERNAL MEDICINE

## 2024-04-25 PROCEDURE — 99213 OFFICE O/P EST LOW 20 MIN: CPT | Performed by: NURSE PRACTITIONER

## 2024-04-25 ASSESSMENT — PAIN SCALES - GENERAL: PAINLEVEL: NO PAIN (0)

## 2024-04-25 NOTE — PROGRESS NOTES
"Oncology Rooming Note    April 25, 2024 1:41 PM   Elvia Helm is a 73 year old female who presents for:    Chief Complaint   Patient presents with    Oncology Clinic Visit     Initial Vitals: /85   Pulse 88   Temp 98.1  F (36.7  C) (Oral)   Resp 16   Wt 76 kg (167 lb 9.6 oz)   LMP 03/17/2001   SpO2 95%   BMI 30.65 kg/m   Estimated body mass index is 30.65 kg/m  as calculated from the following:    Height as of an earlier encounter on 4/25/24: 1.575 m (5' 2\").    Weight as of this encounter: 76 kg (167 lb 9.6 oz). Body surface area is 1.82 meters squared.  No Pain (0) Comment: Data Unavailable   Patient's last menstrual period was 03/17/2001.  Allergies reviewed: Yes  Medications reviewed: Yes    Medications: Medication refills not needed today.  Pharmacy name entered into NTQ-Data:    [id number] [place], [place] - [address] [employer] MAIL ORDER - EPRESCRIBE ONLY    Frailty Screening:   Is the patient here for a new oncology consult visit in cancer care? 1. Yes. Over the past month, have you experienced difficulty or required a caregiver to assist with:   1. Balance, walking or general mobility (including any falls)? NO  2. Completion of self-care tasks such as bathing, dressing, toileting, grooming/hygiene?  NO  3. Concentration or memory that affects your daily life?  NO       Clinical concerns: next steps   Dr. Rosales  was notified.      Shari J. Schoenberger, [facility]            "

## 2024-04-25 NOTE — TELEPHONE ENCOUNTER
Called patient with recommendations from Estrella Carlson NP.  Orders in chart and will message scheduling to call patient to arrange.  MEGHANN Feliz RN

## 2024-04-25 NOTE — TELEPHONE ENCOUNTER
Received a message from Dr. Rosales with hematology that she is not planning to anticoagulate. Will plan to proceed with DAMASO and EP LISA visit to discuss loop recorder implantation. Patient is aware of this and H&P was completed for DAMASO at my visit with her today. Please let her know.

## 2024-04-25 NOTE — LETTER
"    4/25/2024         RE: Elvia Helm  [id number] [address] [id number]        Dear Colleague,    Thank you for referring your patient, Elvia Helm, to the [facility]. Please see a copy of my visit note below.    Oncology Rooming Note    April 25, 2024 1:41 PM   Elvia Helm is a 73 year old female who presents for:    Chief Complaint   Patient presents with     Oncology Clinic Visit     Initial Vitals: /85   Pulse 88   Temp 98.1  F (36.7  C) (Oral)   Resp 16   Wt 76 kg (167 lb 9.6 oz)   LMP 03/17/2001   SpO2 95%   BMI 30.65 kg/m   Estimated body mass index is 30.65 kg/m  as calculated from the following:    Height as of an earlier encounter on 4/25/24: 1.575 m (5' 2\").    Weight as of this encounter: 76 kg (167 lb 9.6 oz). Body surface area is 1.82 meters squared.  No Pain (0) Comment: Data Unavailable   Patient's last menstrual period was 03/17/2001.  Allergies reviewed: Yes  Medications reviewed: Yes    Medications: Medication refills not needed today.  Pharmacy name entered into [employer]en[employer]:    [id number] - [place], [place] - [address] [employer] MAIL ORDER - EPRESCRIBE ONLY    Frailty Screening:   Is the patient here for a new oncology consult visit in cancer care? 1. Yes. Over the past month, have you experienced difficulty or required a caregiver to assist with:   1. Balance, walking or general mobility (including any falls)? NO  2. Completion of self-care tasks such as bathing, dressing, toileting, grooming/hygiene?  NO  3. Concentration or memory that affects your daily life?  NO       Clinical concerns: next steps   Dr. Rosales  was notified.      Shari J. Schoenberger, CMA              This consult has been requested by Jenny Carlson NP for prothrombin gene mutation.    Ms. Helm is a 73-year-old female who had acute ischemic stroke in September 2023.  She subsequently had multiple investigations done and " found to be heterozygous for prothrombin gene mutation.  She also has PFO.  Patient is currently on aspirin.  Hematology consult requested for anticoagulation recommendation.    1.  On 2023:  -CBC is normal.  -Brain MRI revealed subacute cortical ischemia in left middle frontal gyrus encroaching upon the lateral precentral gyrus.  -CT angiogram of head and neck did not reveal any large vessel occlusion or high-grade stenosis.  2.  CT chest, abdomen and pelvis on 2023 did not reveal any malignancy.  There is 8 mm right upper lobe groundglass nodule.  There is also 2.3 cm right ovarian cyst.  -Pelvic ultrasound on 2024 revealed right paraovarian simple cyst and also uterine fibroid.  -Echocardiogram on 2023 is normal.  -Bubble study on 2023 is mildly positive for flow across interatrial septum and more positive with Valsalva.  3.  On 2023:  -Lupus is negative.  -Normal Antithrombin III.  -Normal protein C.  -Negative for factor V Leiden mutation.  -Heterozygous for prothrombin gene mutation.    Patient denies any history of blood disorder or cancer. Patient denies any personal history of thrombosis.  No history of DVT or PE.  No history of angina or MI.    Patient has recovered well from stroke.  No headache.  No dizziness.  No chest pain.  No shortness of breath.  She has gets allergies.  Because of that she has some cough.  Nonproductive.  No hemoptysis.  No abdominal pain.  No nausea or vomiting.  No bleeding.  All other review of system is negative.    Allergies: Reviewed.    Medications: Reviewed.    Past medical history:  -Stroke as described above  -Allergic rhinitis  -Osteoarthritis  -Diabetes mellitus  -Hypertension  -Hyperlipidemia  -  -Sinus surgery    Social history:  -She quit smoking in .  -Occasional alcohol use.    Exam:  She is alert and oriented x 3.  Not in any distress.  Vitals: Reviewed.  Rest of the system is not examined.    Labs:  Reviewed.    Assessment:  1.  A 73-year-old female with prothrombin gene mutation, heterozygous.  No personal history of thrombosis.  2.  Acute ischemic stroke in September 2023.  3.  Mildly positive bubble study.  4.  Right lung nodule.  5.  Other medical problems including diabetes mellitus and hypertension.    Recommendation:  -No anticoagulation needed from hematology side.  Antiplatelet/anticoagulation as per neurology and cardiology.  -Follow-up as needed.    Discussion:  1. I had a long discussion with the patient and her family.  Investigations including labs, CT scan, MRI and echocardiogram reviewed with them.    Patient had acute ischemic stroke in September 2023.  Multiple investigations were done.  Patient was found to to be heterozygous for prothrombin gene mutation.    Discussed regarding prothrombin gene mutation.  This is generally associated with slightly increased risk of venous thrombosis.  Patient never had any DVT or PE or any venous thrombosis.  Prothrombin gene mutation is not major risk factor for arterial thrombosis.  Its association with stroke in elderly people is doubtful. Unlikely that patient's heterozygosity for prothrombin gene mutation contributed to stroke.    Patient had bubble study done.  It is mildly positive.  Patient is going to have DAMASO done.     2.  Patient is currently on aspirin.  She will continue on it.    No indication for any anticoagulation from hematology side.  Antiplatelet/anticoagulation as per neurology and cardiology indication    3.  She has right lung nodule.  She was evaluated by thoracic surgeon.  She has a follow-up CT scan with them.    Patient has some cough.  This is secondary to allergies.  She will continue on allergy medications.    4.  She and her family had few questions which were all answered.  No return appointment needed for hematology/oncology.    Thanks for the consult.    Total visit time of 55 minutes.  Time spent in today's visit, review of  chart/investigations today and documentation today.        Again, thank you for allowing me to participate in the care of your patient.        Sincerely,        Davis Rosales MD

## 2024-04-25 NOTE — LETTER
4/25/2024    Masood Osborne MD  [address]  [place] [address]    RE: Elvia Helm       Dear Colleague,     I had the pleasure of seeing Elvia Helm in the [facility].    Cardiology Clinic Progress Note  Elvia Helm MRN# [id number]   YOB: 1950 Age: 73 year old   Primary Cardiologist: Dr. Murray Reason for visit: 2 month follow up             Assessment and Plan:       1.  Left MCA CVA, no residual deficits  -9/2023 MRI showed subacute cortical ischemia in the left middle frontal gyrus encroaching upon the lateral precentral gyrus   -Post hospital 30-day event monitor did not show atrial fibrillation  -Hypercoagulable workup including: chest CT (did not show pulmonary embolism), negative lupus anticoagulant, protein C, Antithrombin III, factor V, activated protein C resistance, positive for heterozygous factor II mutation  -12/2023 Limited TTE with bubble study that was mildly positive for flow across the interatrial septum a more positive with Valsalva  -With consideration for proceeding with DAMASO: All indications, risks and benefits for transesophageal echocardiogram have been explained to the patient.  This includes but is not limited to esophageal irritation, damage to the oral cavity, esophageal perforation, GI bleeding, pharyngeal hematoma, transient bronchospasm, transient hypoxia, arrhthymias (NSVT, transient atrial fibrillation), vomiting, hemoptysis, and complications from anesthesia. Patient understands and wishes to proceed with it. Patient has no history of esophageal stricture, odynphagia, dysphagia, airway problems, or medication allergies. A formal consent form will be signed by the procedural physician.    2. Heterozygous for Factor 2  -Hematology visit pending for today    3.  Right upper lobe groundglass nodule  -Evaluated by Dr. Montalvo and plan is for repeat CT in 6 months, in May 2024     4. Ovarian cyst  -2.3cm on right ovary  found incidentally on chest/abdomen/pelvis CT in November 2023  -Felt to be benign finding-fibroids    5. Hypertension, controlled  -On multidrug regimen    6. Hyperlipidemia, controlled  -9/2023 LDL 66    Plan:  Continue current cardiac meds including aspirin, carvedilol, atorvastatin, and lisinopril-HCTZ  Will review visit with Dr. Rosales today and pending their recommendations regarding  anticoagulation, if anticoagulation is not recommended will plan for implantation of loop recorder and DAMASO.  Recommended she follow up with her primary regarding her ongoing cough     Follow up plan: Follow up with Dr. Murray in 6 months or sooner based on the results of above appointments        History of Presenting Illness:    Elvia Helm is a very pleasant 73 year old female with a history of left MCA CVA.     Patient was hospitalized for CVA in September 2023.  Felt to be possibly a left MCA pattern her workup included an MRI which showed a CVA, CT angiogram did not find any significant blockages.  An echocardiogram was done without any bubble study.  No atrial fibrillation was seen in the hospital nor on a post discharge 30-day event monitor.    In November 2023 as part of hypercoagulability workup, a chest CT showed an 8x6mm groundglass appearing nodule in the right upper lobe, possible adenocarcinoma.  2.3 cm possible right ovarian cyst was also seen.  Follow up was arranged with Dr. Montalvo, and patient was seen 11/29/2023.  His recommendation was for a repeat CT scan in 6 months as the nodule was too small for CT-guided biopsy, surgical resection, or accurate characterization with a PET CT scan.    She was seen in clinic follow-up in November by Dr. Murray.  He discussed implanting a loop recorder following a hypercoagulable workup, however wanted to wait until she underwent her surgical consult with Dr. Montalvo.    On 12/8/2023 a limited echo was repeated with a bubble study which showed a mildly positive flow  across the interatrial septum, more positive with Valsalva. Reviewed results of study with Dr. Murray who recommends a DAMASO and loop recorder implantation depending on if hematology recommended coumadin.     In early April, she was seen by neurology with no medication changes or further recommendations.  Plan is for her to see them again in 6 months.    Patient is here today for further review. Patient reports feeling good. Her daughter is present with her today. Her main concern is a dry cough, she will cough so hard she has vomiting.  She has been on lisinopril for several years and her cough started in December when her sertraline was increased.  Unfortunately due to scheduling issues her hematology visit was rescheduled to this afternoon.    Patient denies chest pain or chest tightness. Denies dizziness, lightheadedness or other presyncopal symptoms. Denies tachycardia or palpitations. Denies shortness of breath.     Blood pressure 129/83 and HR 84 in clinic today. Denies bleeding with aspirin.         Recent Hospitalizations   2023 L MCA CVA          Social History      Social History     Socioeconomic History    Marital status:      Spouse name: Not on file    Number of children: 3    Years of education: Not on file    Highest education level: Not on file   Occupational History    Not on file   Tobacco Use    Smoking status: Former     Current packs/day: 0.00     Average packs/day: 1 pack/day for 10.0 years (10.0 ttl pk-yrs)     Types: Cigarettes     Start date: 1972     Quit date: 1982     Years since quittin.7    Smokeless tobacco: Never   Vaping Use    Vaping status: Never Used   Substance and Sexual Activity    Alcohol use: Yes     Comment: glass of wine on the weekends    Drug use: No     Comment: caffeine tea 3-4/d    Sexual activity: Not Currently     Partners: Male     Birth control/protection: Female Surgical, None     Comment: Post menopausal   Other Topics Concern      Service Not Asked    Blood Transfusions Not Asked    Caffeine Concern Not Asked    Occupational Exposure Not Asked    Hobby Hazards Not Asked    Sleep Concern Not Asked    Stress Concern Not Asked    Weight Concern Yes     Comment: trying to lose    Special Diet No     Comment: Diabetic diet    Back Care Not Asked    Exercise Yes     Comment: walks 20 mt    Bike Helmet Not Asked    Seat Belt Not Asked    Self-Exams Not Asked    Parent/sibling w/ CABG, MI or angioplasty before 65F 55M? 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          Social Determinants of Health     Financial Resource Strain: Low Risk  (12/11/2023)    Financial Resource Strain     Within the past 12 months, have you or your family members you live with been unable to get utilities (heat, electricity) when it was really needed?: No   Food Insecurity: Low Risk  (12/11/2023)    Food Insecurity     Within the past 12 months, did you worry that your food would run out before you got money to buy more?: No     Within the past 12 months, did the food you bought just not last and you didn t have money to get more?: No   Transportation Needs: Low Risk  (12/11/2023)    Transportation Needs     Within the past 12 months, has lack of transportation kept you from medical appointments, getting your medicines, non-medical meetings or appointments, work, or from getting things that you need?: No   Physical Activity: Not on file   Stress: Not on file   Social Connections: Not on file   Interpersonal Safety: Not on file   Housing Stability: Low Risk  (12/11/2023)    Housing Stability     Do you have housing? : Yes     Are you worried about losing your housing?: No            Review of Systems:   Skin:  not assessed     Eyes:  not assessed    ENT:  Positive for nasal congestion  Respiratory:  Positive for cough  Cardiovascular:  Negative for;palpitations;chest pain;syncope or near-syncope;dizziness;edema Positive  "for;lightheadedness  Gastroenterology: Positive for vomiting  Genitourinary:  not assessed    Musculoskeletal:  not assessed    Neurologic:  Positive for headaches  Psychiatric:  Positive for sleep disturbances  Heme/Lymph/Imm:  not assessed    Endocrine:  not assessed           Physical Exam:   Vitals: /83   Pulse 84   Ht 1.575 m (5' 2\")   Wt 75.8 kg (167 lb)   LMP 03/17/2001   SpO2 96%   BMI 30.54 kg/m     Wt Readings from Last 4 Encounters:   04/25/24 75.8 kg (167 lb)   04/03/24 74.8 kg (165 lb)   02/01/24 76.5 kg (168 lb 11.2 oz)   01/10/24 77.6 kg (171 lb)     GEN: well nourished, in no acute distress.  HEENT:  Pupils equal, round. Sclerae nonicteric.   NECK: Supple, no masses appreciated.   C/V:  Regular rate and rhythm, no murmur, rub or gallop.    RESP: Respirations are unlabored. Clear to auscultation bilaterally without wheezing, rales, or rhonchi.  GI: Abdomen soft, nontender.  EXTREM: No LE edema.  NEURO: Alert and oriented, cooperative.  SKIN: Warm and dry.        Data:       LIPID RESULTS:  Lab Results   Component Value Date    CHOL 155 09/12/2023    CHOL 151 06/14/2021    HDL 47 (L) 09/12/2023    HDL 60 06/14/2021    LDL 66 09/12/2023    LDL 65 06/14/2021    TRIG 212 (H) 09/12/2023    TRIG 128 06/14/2021    CHOLHDLRATIO 2.9 06/30/2015     LIVER ENZYME RESULTS:  Lab Results   Component Value Date    AST 35 12/01/2023    AST 23 06/14/2021    ALT 38 12/01/2023    ALT 32 06/14/2021     CBC RESULTS:  Lab Results   Component Value Date    WBC 7.6 12/01/2023    WBC 7.5 06/14/2021    RBC 4.46 12/01/2023    RBC 4.36 06/14/2021    HGB 13.2 12/01/2023    HGB 13.5 06/14/2021    HCT 40.9 12/01/2023    HCT 40.5 06/14/2021    MCV 92 12/01/2023    MCV 93 06/14/2021    MCH 29.6 12/01/2023    MCH 31.0 06/14/2021    MCHC 32.3 12/01/2023    MCHC 33.3 06/14/2021    RDW 12.8 12/01/2023    RDW 14.0 06/14/2021     12/01/2023     06/14/2021     BMP RESULTS:  Lab Results   Component Value Date    NA " 140 12/01/2023     06/14/2021    POTASSIUM 4.1 12/01/2023    POTASSIUM 4.3 09/29/2022    POTASSIUM 4.0 06/14/2021    CHLORIDE 102 12/01/2023    CHLORIDE 104 09/29/2022    CHLORIDE 104 06/14/2021    CO2 26 12/01/2023    CO2 30 09/29/2022    CO2 29 06/14/2021    ANIONGAP 12 12/01/2023    ANIONGAP 3 09/29/2022    ANIONGAP 4 06/14/2021     (H) 12/01/2023     (H) 09/13/2023     (H) 09/29/2022     (H) 06/14/2021    BUN 10.7 12/01/2023    BUN 16 09/29/2022    BUN 15 06/14/2021    CR 0.69 12/01/2023    CR 0.81 06/14/2021    GFRESTIMATED >90 12/01/2023    GFRESTIMATED >60 11/16/2023    GFRESTIMATED 72 06/14/2021    GFRESTBLACK 84 06/14/2021    KIM 9.9 12/01/2023    KIM 9.4 06/14/2021      A1C RESULTS:  Lab Results   Component Value Date    A1C 6.0 (H) 11/28/2023    A1C 6.1 (H) 06/14/2021     INR RESULTS:  Lab Results   Component Value Date    INR 1.03 12/07/2023    INR 0.91 09/11/2023            Medications     Current Outpatient Medications   Medication Sig Dispense Refill    aspirin (ASA) 325 MG EC tablet Take 325 mg by mouth At Bedtime      atorvastatin (LIPITOR) 10 MG tablet Take 1 tablet (10 mg) by mouth daily 90 tablet 1    azelastine (ASTELIN) 0.1 % nasal spray Spray 1 spray into both nostrils 2 times daily (Patient taking differently: Spray 1 spray into both nostrils 2 times daily as needed for allergies) 30 mL 0    blood glucose (NO BRAND SPECIFIED) lancets standard Use to test blood sugar one* times daily or as directed. 100 each 3    blood glucose (ONETOUCH ULTRA) test strip Use to test blood sugar daily or as directed. 100 strip 3    blood glucose calibration (ONETOUCH ULTRA CONTROL) solution Use to calibrate blood glucose monitor as directed. 1 Bottle 1    blood glucose monitoring (ONE TOUCH ULTRA 2) meter device kit Use to test blood sugar 1 times daily. 1 kit 0    carvedilol (COREG) 3.125 MG tablet Take 1 tablet (3.125 mg) by mouth 2 times daily (with meals) 180 tablet 3     Cholecalciferol (VITAMIN D3 PO) Take 2,000 Units by mouth daily       citalopram (CELEXA) 10 MG tablet Take 1 tablet (10 mg) by mouth daily 90 tablet 0    EPINEPHrine (ANY BX GENERIC EQUIV) 0.3 MG/0.3ML injection 2-pack INJECT 0.3 MLS INTO THE MUSCLE ONCE AS NEEDED FOR ANAPHYLAXIS 2 each 0    fluticasone (FLONASE) 50 MCG/ACT spray Spray 1-2 sprays into both nostrils daily (Patient taking differently: Spray 1-2 sprays into both nostrils daily as needed for allergies) 16 g 0    lisinopril-hydrochlorothiazide (ZESTORETIC) 20-25 MG tablet Take 0.5 tablets by mouth daily 45 tablet 1    metFORMIN (GLUCOPHAGE) 850 MG tablet TAKE 1 TABLET (850 MG) BY MOUTH 2 TIMES DAILY (WITH MEALS) 180 tablet 0    Omega-3 Fatty Acids (OMEGA-3 FISH OIL PO) Take 1 g by mouth daily       sodium chloride (OCEAN) 0.65 % nasal spray Spray 1 spray into both nostrils daily as needed for congestion      vitamin B complex with vitamin C (STRESS TAB) tablet Take 1 tablet by mouth daily            Past Medical History     Past Medical History:   Diagnosis Date    Acute ischemic right MCA stroke (H) 10/10/2023    Allergic rhinitis, cause unspecified 2003a    Arthritis of knee 06/06/2014    Bee sting reaction 07/2009    DIABETES TYPE II 2003    Essential hypertension, benign     Flat foot(734) 06/06/2014    Hyperlipidaemia LDL goal < 100     Localized osteoarthrosis not specified whether primary or secondary, ankle and foot     After surgery    Metabolic syndrome X     PVC's (premature ventricular contractions)     Tinnitus 06/06/2014     Past Surgical History:   Procedure Laterality Date    ABDOMEN SURGERY  7/1988    ceasaran birth    COLONOSCOPY  12/07    COLONOSCOPY N/A 1/31/2022    Procedure: COLONOSCOPY, WITH POLYPECTOMY AND BIOPSY;  Surgeon: Nicole Grubbs MD;  Location:  GI    ENT SURGERY      frequent sinus infections    HC ENDOMETRIAL BIOPSY W/O CERVICAL DILATION  2001    ORTHOPEDIC SURGERY      broken right ankle/plates and screws     SOFT TISSUE SURGERY      tendonitis in right upper leg and below right elbow    ZZC  DELIVERY ONLY      ZZC LIGATE FALLOPIAN TUBE,POSTPARTUM      ZZC NONSPECIFIC PROCEDURE      right ankle roe and pins     Family History   Problem Relation Age of Onset    Musculoskeletal Disorder Mother         b:1938   Hx Fibromyalgia    Hypertension Mother     Cerebrovascular Disease Mother     Anesthesia Reaction Mother     Cerebrovascular Disease Father         b:,  age 68  massive stroke    Diabetes Father         dx age 50s and his family    Gastrointestinal Disease Sister         b:1950   Hx of reflux    Cerebrovascular Disease Sister     Cerebrovascular Disease Sister 62        ? TIA    Bronchitis Sister     Anxiety Disorder Sister     Anesthesia Reaction Sister     Family History Negative Brother         b:    Cancer Maternal Grandfather         throat    Diabetes Paternal Grandmother     Hypertension Daughter     Family History Negative Daughter         b:    Diabetes Daughter         b:    Diabetes dx age 22**insulin    CABG Daughter 43    Hypertension Daughter     Lipids Son         b: high cholosterol and triglycerides            Allergies   Atovaquone-proguanil hcl, Amoxicillin, Bee, Ceftin, Clindamycin, Erythromycin, Seasonal allergies, Sertraline, Shellfish-derived products, Shrimp, and Tetracycline        Jenny Carlson NP  [facility]  Pager: [phone]       Thank you for allowing me to participate in the care of your patient.      Sincerely,     Jenny Carlson NP     [facility]  cc:   Jenny Carlson NP  [address]  [place] [address]

## 2024-04-25 NOTE — TELEPHONE ENCOUNTER
----- Message from Jenny Carlson NP sent at 4/25/2024  9:40 AM CDT -----  Can we keep an eye out for the visit note from her hematology visit today?  I need to know specifically if they plan to start her on anticoagulation so I can set her up for a DAMASO and implanted loop recorder if that is not the plan.  Thanks.

## 2024-04-25 NOTE — PATIENT INSTRUCTIONS
Today's Recommendations    I will review your visit with hematology and we will touch base after regarding need for further testing   I recommend you call your primary and follow up regarding your ongoing cough issue  Continue all medications without changes.  Please follow up with Dr. Murray in 6 months.    Please send a [employer] message or call [phone] to the RN team with questions or concerns.     Scheduling number [phone]  KRISTI Rivera, CNP

## 2024-04-25 NOTE — LETTER
"    4/25/2024         RE: Elvia Helm  [address] [facility] [id number]      Oncology Rooming Note    April 25, 2024 1:41 PM   Elvia Helm is a 73 year old female who presents for:    Chief Complaint   Patient presents with     Oncology Clinic Visit     Initial Vitals: /85   Pulse 88   Temp 98.1  F (36.7  C) (Oral)   Resp 16   Wt 76 kg (167 lb 9.6 oz)   LMP 03/17/2001   SpO2 95%   BMI 30.65 kg/m   Estimated body mass index is 30.65 kg/m  as calculated from the following:    Height as of an earlier encounter on 4/25/24: 1.575 m (5' 2\").    Weight as of this encounter: 76 kg (167 lb 9.6 oz). Body surface area is 1.82 meters squared.  No Pain (0) Comment: Data Unavailable   Patient's last menstrual period was 03/17/2001.  Allergies reviewed: Yes  Medications reviewed: Yes    Medications: Medication refills not needed today.  Pharmacy name entered into [facility]:    [id number] - [place], [place] - [address] [employer] - EPRESCRIBE ONLY    Frailty Screening:   Is the patient here for a new oncology consult visit in cancer care? 1. Yes. Over the past month, have you experienced difficulty or required a caregiver to assist with:   1. Balance, walking or general mobility (including any falls)? NO  2. Completion of self-care tasks such as bathing, dressing, toileting, grooming/hygiene?  NO  3. Concentration or memory that affects your daily life?  NO       Clinical concerns: next steps   Dr. Rosales  was notified.      Shari J. Schoenberger, CMA              This consult has been requested by Jenny Carlson NP for prothrombin gene mutation.    Ms. Helm is a 73-year-old female who had acute ischemic stroke in September 2023.  She subsequently had multiple investigations done and found to be heterozygous for prothrombin gene mutation.  She also has PFO.  Patient is currently on aspirin.  Hematology consult requested for anticoagulation " recommendation.    1.  On 2023:  -CBC is normal.  -Brain MRI revealed subacute cortical ischemia in left middle frontal gyrus encroaching upon the lateral precentral gyrus.  -CT angiogram of head and neck did not reveal any large vessel occlusion or high-grade stenosis.  2.  CT chest, abdomen and pelvis on 2023 did not reveal any malignancy.  There is 8 mm right upper lobe groundglass nodule.  There is also 2.3 cm right ovarian cyst.  -Pelvic ultrasound on 2024 revealed right paraovarian simple cyst and also uterine fibroid.  -Echocardiogram on 2023 is normal.  -Bubble study on 2023 is mildly positive for flow across interatrial septum and more positive with Valsalva.  3.  On 2023:  -Lupus is negative.  -Normal Antithrombin III.  -Normal protein C.  -Negative for factor V Leiden mutation.  -Heterozygous for prothrombin gene mutation.    Patient denies any history of blood disorder or cancer. Patient denies any personal history of thrombosis.  No history of DVT or PE.  No history of angina or MI.    Patient has recovered well from stroke.  No headache.  No dizziness.  No chest pain.  No shortness of breath.  She has gets allergies.  Because of that she has some cough.  Nonproductive.  No hemoptysis.  No abdominal pain.  No nausea or vomiting.  No bleeding.  All other review of system is negative.    Allergies: Reviewed.    Medications: Reviewed.    Past medical history:  -Stroke as described above  -Allergic rhinitis  -Osteoarthritis  -Diabetes mellitus  -Hypertension  -Hyperlipidemia  -  -Sinus surgery    Social history:  -She quit smoking in .  -Occasional alcohol use.    Exam:  She is alert and oriented x 3.  Not in any distress.  Vitals: Reviewed.  Rest of the system is not examined.    Labs: Reviewed.    Assessment:  1.  A 73-year-old female with prothrombin gene mutation, heterozygous.  No personal history of thrombosis.  2.  Acute ischemic stroke in September  2023.  3.  Mildly positive bubble study.  4.  Right lung nodule.  5.  Other medical problems including diabetes mellitus and hypertension.    Recommendation:  -No anticoagulation needed from hematology side.  Antiplatelet/anticoagulation as per neurology and cardiology.  -Follow-up as needed.    Discussion:  1. I had a long discussion with the patient and her family.  Investigations including labs, CT scan, MRI and echocardiogram reviewed with them.    Patient had acute ischemic stroke in September 2023.  Multiple investigations were done.  Patient was found to to be heterozygous for prothrombin gene mutation.    Discussed regarding prothrombin gene mutation.  This is generally associated with slightly increased risk of venous thrombosis.  Patient never had any DVT or PE or any venous thrombosis.  Prothrombin gene mutation is not major risk factor for arterial thrombosis.  Its association with stroke in elderly people is doubtful. Unlikely that patient's heterozygosity for prothrombin gene mutation contributed to stroke.    Patient had bubble study done.  It is mildly positive.  Patient is going to have DAMASO done.     2.  Patient is currently on aspirin.  She will continue on it.    No indication for any anticoagulation from hematology side.  Antiplatelet/anticoagulation as per neurology and cardiology indication    3.  She has right lung nodule.  She was evaluated by thoracic surgeon.  She has a follow-up CT scan with them.    Patient has some cough.  This is secondary to allergies.  She will continue on allergy medications.    4.  She and her family had few questions which were all answered.  No return appointment needed for hematology/oncology.    Thanks for the consult.    Total visit time of 55 minutes.  Time spent in today's visit, review of chart/investigations today and documentation today.          Davis Rosales MD

## 2024-04-27 NOTE — PROGRESS NOTES
This consult has been requested by Jenny Carlson NP for prothrombin gene mutation.    Ms. Helm is a 73-year-old female who had acute ischemic stroke in September 2023.  She subsequently had multiple investigations done and found to be heterozygous for prothrombin gene mutation.  She also has PFO.  Patient is currently on aspirin.  Hematology consult requested for anticoagulation recommendation.    1.  On 09/11/2023:  -CBC is normal.  -Brain MRI revealed subacute cortical ischemia in left middle frontal gyrus encroaching upon the lateral precentral gyrus.  -CT angiogram of head and neck did not reveal any large vessel occlusion or high-grade stenosis.  2.  CT chest, abdomen and pelvis on 11/16/2023 did not reveal any malignancy.  There is 8 mm right upper lobe groundglass nodule.  There is also 2.3 cm right ovarian cyst.  -Pelvic ultrasound on 02/07/2024 revealed right paraovarian simple cyst and also uterine fibroid.  -Echocardiogram on 09/12/2023 is normal.  -Bubble study on 12/08/2023 is mildly positive for flow across interatrial septum and more positive with Valsalva.  3.  On 12/07/2023:  -Lupus is negative.  -Normal Antithrombin III.  -Normal protein C.  -Negative for factor V Leiden mutation.  -Heterozygous for prothrombin gene mutation.    Patient denies any history of blood disorder or cancer. Patient denies any personal history of thrombosis.  No history of DVT or PE.  No history of angina or MI.    Patient has recovered well from stroke.  No headache.  No dizziness.  No chest pain.  No shortness of breath.  She has gets allergies.  Because of that she has some cough.  Nonproductive.  No hemoptysis.  No abdominal pain.  No nausea or vomiting.  No bleeding.  All other review of system is negative.    Allergies: Reviewed.    Medications: Reviewed.    Past medical history:  -Stroke as described above  -Allergic rhinitis  -Osteoarthritis  -Diabetes  mellitus  -Hypertension  -Hyperlipidemia  -  -Sinus surgery    Social history:  -She quit smoking in .  -Occasional alcohol use.    Exam:  She is alert and oriented x 3.  Not in any distress.  Vitals: Reviewed.  Rest of the system is not examined.    Labs: Reviewed.    Assessment:  1.  A 73-year-old female with prothrombin gene mutation, heterozygous.  No personal history of thrombosis.  2.  Acute ischemic stroke in 2023.  3.  Mildly positive bubble study.  4.  Right lung nodule.  5.  Other medical problems including diabetes mellitus and hypertension.    Recommendation:  -No anticoagulation needed from hematology side.  Antiplatelet/anticoagulation as per neurology and cardiology.  -Follow-up as needed.    Discussion:  1. I had a long discussion with the patient and her family.  Investigations including labs, CT scan, MRI and echocardiogram reviewed with them.    Patient had acute ischemic stroke in 2023.  Multiple investigations were done.  Patient was found to to be heterozygous for prothrombin gene mutation.    Discussed regarding prothrombin gene mutation.  This is generally associated with slightly increased risk of venous thrombosis.  Patient never had any DVT or PE or any venous thrombosis.  Prothrombin gene mutation is not major risk factor for arterial thrombosis.  Its association with stroke in elderly people is doubtful. Unlikely that patient's heterozygosity for prothrombin gene mutation contributed to stroke.    Patient had bubble study done.  It is mildly positive.  Patient is going to have DAMASO done.     2.  Patient is currently on aspirin.  She will continue on it.    No indication for any anticoagulation from hematology side.  Antiplatelet/anticoagulation as per neurology and cardiology indication    3.  She has right lung nodule.  She was evaluated by thoracic surgeon.  She has a follow-up CT scan with them.    Patient has some cough.  This is secondary to  allergies.  She will continue on allergy medications.    4.  She and her family had few questions which were all answered.  No return appointment needed for hematology/oncology.    Thanks for the consult.    Total visit time of 55 minutes.  Time spent in today's visit, review of chart/investigations today and documentation today.

## 2024-04-30 ENCOUNTER — PATIENT OUTREACH (OUTPATIENT)
Dept: CARE COORDINATION | Facility: CLINIC | Age: 74
End: 2024-04-30

## 2024-05-06 ENCOUNTER — TELEPHONE (OUTPATIENT)
Dept: CARDIOLOGY | Facility: CLINIC | Age: 74
End: 2024-05-06
Payer: COMMERCIAL

## 2024-05-06 NOTE — TELEPHONE ENCOUNTER
DCCV/DAMASO prep instructions    Patient is scheduled for a DAMASO at [facility] - [address], [place] [address] - Main Entrance of the Hospital, on 5/9.  Check in time is at 630 and procedure to follow.    Patient instructed to remain NPO for solid foods 8 hours prior to arrival and may have clear liquids up to 2 hours prior to arrival for their DCCV.    no anticoagulation      Patient is on oral diabetic medications and has been instructed to reach out to primary care provider for recommendations.     Patient is not taking Digoxin.    Patient is taking taking hydrochlorothiazide and has been instructed to hold it the morning of procedure. and has been advised to hold this the morning of the procedure.    Pt is not on a SGLT2 inhibitor.    Pt is not on a GLP-1 Agonist    Patient advised to take their other daily medications the morning of the procedure with small sips of water.     Verified patient has someone available to drive them home from the hospital and can stay with them for 24 hours after the procedure.     Patient advised to notify care team with any new COVID like symptoms prior to procedure.    Patient advised to notify care team with any new COVID like symptoms prior to procedure. Day of procedure phone number: [facility] at [phone]    Patient is aware of visitor policy.    Patient expresses understanding of above instructions and denies further questions at this time.    Left voice message on personal cell phone.   Alina Barber RN  [facility]

## 2024-05-07 ENCOUNTER — ANCILLARY PROCEDURE (OUTPATIENT)
Dept: MAMMOGRAPHY | Facility: CLINIC | Age: 74
End: 2024-05-07
Attending: INTERNAL MEDICINE
Payer: COMMERCIAL

## 2024-05-07 DIAGNOSIS — Z12.31 VISIT FOR SCREENING MAMMOGRAM: ICD-10-CM

## 2024-05-07 DIAGNOSIS — I10 ESSENTIAL HYPERTENSION, BENIGN: ICD-10-CM

## 2024-05-07 PROCEDURE — 77067 SCR MAMMO BI INCL CAD: CPT | Mod: TC | Performed by: STUDENT IN AN ORGANIZED HEALTH CARE EDUCATION/TRAINING PROGRAM

## 2024-05-07 PROCEDURE — 77063 BREAST TOMOSYNTHESIS BI: CPT | Mod: TC | Performed by: STUDENT IN AN ORGANIZED HEALTH CARE EDUCATION/TRAINING PROGRAM

## 2024-05-08 RX ORDER — LISINOPRIL AND HYDROCHLOROTHIAZIDE 20; 25 MG/1; MG/1
0.5 TABLET ORAL DAILY
Qty: 45 TABLET | Refills: 1 | Status: SHIPPED | OUTPATIENT
Start: 2024-05-08

## 2024-05-08 NOTE — TELEPHONE ENCOUNTER
Prescription approved per [facility] Refill Protocol.  Lisette Israel, RN  [facility] Triage Nurse

## 2024-05-09 ENCOUNTER — HOSPITAL ENCOUNTER (OUTPATIENT)
Facility: CLINIC | Age: 74
Discharge: HOME | End: 2024-05-09
Admitting: INTERNAL MEDICINE
Payer: COMMERCIAL

## 2024-05-09 ENCOUNTER — HOSPITAL ENCOUNTER (OUTPATIENT)
Dept: CARDIOLOGY | Facility: CLINIC | Age: 74
Discharge: HOME | End: 2024-05-09
Attending: NURSE PRACTITIONER | Admitting: INTERNAL MEDICINE
Payer: COMMERCIAL

## 2024-05-09 VITALS
WEIGHT: 168 LBS | SYSTOLIC BLOOD PRESSURE: 109 MMHG | TEMPERATURE: 98.3 F | RESPIRATION RATE: 16 BRPM | OXYGEN SATURATION: 93 % | HEIGHT: 62 IN | HEART RATE: 75 BPM | DIASTOLIC BLOOD PRESSURE: 65 MMHG | BODY MASS INDEX: 30.91 KG/M2

## 2024-05-09 DIAGNOSIS — I63.511 ACUTE ISCHEMIC RIGHT MCA STROKE (H): ICD-10-CM

## 2024-05-09 LAB — LVEF ECHO: NORMAL

## 2024-05-09 PROCEDURE — 93010 ELECTROCARDIOGRAM REPORT: CPT | Performed by: INTERNAL MEDICINE

## 2024-05-09 PROCEDURE — 999N000054 HC STATISTIC EKG NON-CHARGEABLE

## 2024-05-09 PROCEDURE — 93320 DOPPLER ECHO COMPLETE: CPT | Mod: 26 | Performed by: INTERNAL MEDICINE

## 2024-05-09 PROCEDURE — 93325 DOPPLER ECHO COLOR FLOW MAPG: CPT

## 2024-05-09 PROCEDURE — 93325 DOPPLER ECHO COLOR FLOW MAPG: CPT | Mod: 26 | Performed by: INTERNAL MEDICINE

## 2024-05-09 PROCEDURE — 93312 ECHO TRANSESOPHAGEAL: CPT | Mod: 26 | Performed by: INTERNAL MEDICINE

## 2024-05-09 PROCEDURE — 250N000009 HC RX 250: Performed by: INTERNAL MEDICINE

## 2024-05-09 PROCEDURE — 99152 MOD SED SAME PHYS/QHP 5/>YRS: CPT | Performed by: INTERNAL MEDICINE

## 2024-05-09 PROCEDURE — 93005 ELECTROCARDIOGRAM TRACING: CPT

## 2024-05-09 PROCEDURE — 999N000184 HC STATISTIC TELEMETRY

## 2024-05-09 PROCEDURE — 258N000003 HC RX IP 258 OP 636: Performed by: INTERNAL MEDICINE

## 2024-05-09 PROCEDURE — 250N000011 HC RX IP 250 OP 636: Performed by: INTERNAL MEDICINE

## 2024-05-09 PROCEDURE — 999N000183 HC STATISTIC TEE INCLUDES SEDATION

## 2024-05-09 RX ORDER — NALOXONE HYDROCHLORIDE 0.4 MG/ML
0.4 INJECTION, SOLUTION INTRAMUSCULAR; INTRAVENOUS; SUBCUTANEOUS
Status: DISCONTINUED | OUTPATIENT
Start: 2024-05-09 | End: 2024-05-09 | Stop reason: HOSPADM

## 2024-05-09 RX ORDER — GLYCOPYRROLATE 0.2 MG/ML
0.1 INJECTION, SOLUTION INTRAMUSCULAR; INTRAVENOUS ONCE
Status: COMPLETED | OUTPATIENT
Start: 2024-05-09 | End: 2024-05-09

## 2024-05-09 RX ORDER — NALOXONE HYDROCHLORIDE 0.4 MG/ML
0.2 INJECTION, SOLUTION INTRAMUSCULAR; INTRAVENOUS; SUBCUTANEOUS
Status: DISCONTINUED | OUTPATIENT
Start: 2024-05-09 | End: 2024-05-09 | Stop reason: HOSPADM

## 2024-05-09 RX ORDER — DEXTROSE MONOHYDRATE 25 G/50ML
9.5 INJECTION, SOLUTION INTRAVENOUS
Status: DISCONTINUED | OUTPATIENT
Start: 2024-05-09 | End: 2024-05-09 | Stop reason: HOSPADM

## 2024-05-09 RX ORDER — LIDOCAINE 50 MG/G
OINTMENT TOPICAL ONCE
Status: COMPLETED | OUTPATIENT
Start: 2024-05-09 | End: 2024-05-09

## 2024-05-09 RX ORDER — LIDOCAINE 40 MG/G
CREAM TOPICAL
Status: DISCONTINUED | OUTPATIENT
Start: 2024-05-09 | End: 2024-05-09 | Stop reason: HOSPADM

## 2024-05-09 RX ORDER — BISACODYL 5 MG/1
TABLET, DELAYED RELEASE ORAL
Qty: 4 TABLET | Refills: 0 | Status: SHIPPED | OUTPATIENT
Start: 2024-05-09

## 2024-05-09 RX ORDER — ACETAMINOPHEN 325 MG/1
650 TABLET ORAL EVERY 4 HOURS PRN
COMMUNITY
Start: 2024-05-09 | End: 2024-05-11

## 2024-05-09 RX ORDER — LIDOCAINE HYDROCHLORIDE 40 MG/ML
1.5 SOLUTION TOPICAL ONCE
Status: COMPLETED | OUTPATIENT
Start: 2024-05-09 | End: 2024-05-09

## 2024-05-09 RX ORDER — SODIUM CHLORIDE 9 MG/ML
INJECTION, SOLUTION INTRAVENOUS CONTINUOUS PRN
Status: DISCONTINUED | OUTPATIENT
Start: 2024-05-09 | End: 2024-05-09 | Stop reason: HOSPADM

## 2024-05-09 RX ORDER — MAGNESIUM HYDROXIDE/ALUMINUM HYDROXICE/SIMETHICONE 120; 1200; 1200 MG/30ML; MG/30ML; MG/30ML
SUSPENSION ORAL
COMMUNITY
Start: 2024-05-09 | End: 2024-06-04

## 2024-05-09 RX ORDER — FENTANYL CITRATE 50 UG/ML
25 INJECTION, SOLUTION INTRAMUSCULAR; INTRAVENOUS
Status: DISCONTINUED | OUTPATIENT
Start: 2024-05-09 | End: 2024-05-09 | Stop reason: HOSPADM

## 2024-05-09 RX ORDER — FLUMAZENIL 0.1 MG/ML
0.2 INJECTION, SOLUTION INTRAVENOUS
Status: DISCONTINUED | OUTPATIENT
Start: 2024-05-09 | End: 2024-05-09 | Stop reason: HOSPADM

## 2024-05-09 RX ADMIN — FENTANYL CITRATE 25 MCG: 50 INJECTION, SOLUTION INTRAMUSCULAR; INTRAVENOUS at 08:42

## 2024-05-09 RX ADMIN — GLYCOPYRROLATE 0.1 MG: 0.2 INJECTION, SOLUTION INTRAMUSCULAR; INTRAVENOUS at 08:19

## 2024-05-09 RX ADMIN — LIDOCAINE HYDROCHLORIDE 1.5 ML: 40 SOLUTION TOPICAL at 08:17

## 2024-05-09 RX ADMIN — MIDAZOLAM 2 MG: 1 INJECTION INTRAMUSCULAR; INTRAVENOUS at 08:32

## 2024-05-09 RX ADMIN — SODIUM CHLORIDE 30 ML/HR: 9 INJECTION, SOLUTION INTRAVENOUS at 07:32

## 2024-05-09 RX ADMIN — MIDAZOLAM 1 MG: 1 INJECTION INTRAMUSCULAR; INTRAVENOUS at 08:41

## 2024-05-09 RX ADMIN — MIDAZOLAM 1 MG: 1 INJECTION INTRAMUSCULAR; INTRAVENOUS at 08:35

## 2024-05-09 RX ADMIN — FENTANYL CITRATE 50 MCG: 50 INJECTION, SOLUTION INTRAMUSCULAR; INTRAVENOUS at 08:33

## 2024-05-09 RX ADMIN — TOPICAL ANESTHETIC 1 ML: 200 SPRAY DENTAL; PERIODONTAL at 08:29

## 2024-05-09 ASSESSMENT — ACTIVITIES OF DAILY LIVING (ADL)
ADLS_ACUITY_SCORE: 38

## 2024-05-09 NOTE — DISCHARGE INSTRUCTIONS
DAMASO  (Transesophageal Echocardiogram)  Discharge Instructions    After you go home:    Have an adult stay with you for 6 hours.       For 24 hours - due to the sedation you received:  Relax and take it easy.  Do NOT make any important or legal decisions.  Do NOT drive or operate machines at home or at work.  Do NOT drink alcohol.    Diet:  You may resume your normal diet, but no scratchy foods for two days.  If your throat is sore, eat cold, bland or soft foods.  You may have heartburn if the tube used in the exam entered your stomach.  If so:   - Do not eat acidic and spicy foods.   - Do not eat three hours before bedtime. Clear liquids are okay.   - When lying down, use two pillows to raise your head.    Medicines:    Take your medications, including blood thinners, unless your provider tells you not to.  If you have stopped any medicines, check with your provider about when to restart them.  You may take Tylenol (Acetaminophen) if your throat is sore.  You may take antacids if you have heartburn.      Follow Up Appointments:    Follow up with your cardiologist at [facility] of patient preference as instructed.  Follow up with your primary care provider as needed.    Call the clinic if:    You have heartburn that is severe or lasts more than 72 hours.  You have a sore throat that feels worse after 72 hours.  You have shortness of breath, neck pain, chest pain, fever, chills, coughing up blood, or other unusual signs.  Questions or concerns      [facility] [place]:    [phone] [facility] (7 days a week)    Or you can contact your provider via My Chart

## 2024-05-09 NOTE — TELEPHONE ENCOUNTER
Extended Golytely Bowel Prep . Instructions were sent via [email or web address]. Bowel prep was sent 5/9/2024 to    [id number] - [place], [place] - [address] TARYN Rothman RN Colorectal Cancer    Division of Gastroenterology at [facility]

## 2024-05-09 NOTE — PROGRESS NOTES
Care Suites Discharge Nursing Note    Patient Information  Name: Elvia Helm  Age: 73 year old    Discharge Education:  Discharge instructions reviewed: Yes  Additional education/resources provided: no  Patient/patient representative verbalizes understanding: Yes  Patient discharging on new medications: No  Medication education completed: N/A    Discharge Plans:   Discharge location: home  Discharge ride contacted: Yes  Approximate discharge time: 11:00    Discharge Criteria:  Discharge criteria met and vital signs stable: Yes    Patient Belongs:  Patient belongings returned to patient: Yes    Chris Gale RN

## 2024-05-09 NOTE — PRE-PROCEDURE
GENERAL PRE-PROCEDURE:   Procedure:  DAMASO  Date/Time:  5/9/2024 8:30 AM    Written consent obtained?: Yes    Risks and benefits: Risks, benefits and alternatives were discussed    Consent given by:  Patient  Patient states understanding of procedure being performed: Yes    Patient's understanding of procedure matches consent: Yes    Procedure consent matches procedure scheduled: Yes    Expected level of sedation:  Moderate  Appropriately NPO:  Yes  Mallampati  :  Grade 2- soft palate, base of uvula, tonsillar pillars, and portion of posterior pharyngeal wall visible  Lungs:  Lungs clear with good breath sounds bilaterally  Heart:  Normal heart sounds and rate  History & Physical reviewed:  History and physical reviewed and no updates needed  Statement of review:  I have reviewed the lab findings, diagnostic data, medications, and the plan for sedation

## 2024-05-09 NOTE — PROGRESS NOTES
Care Suites Admission Nursing Note    Patient Information  Name: Elvia Helm  Age: 73 year old  Reason for admission: DAMASO post CVA   Residual - trouble with speech/conversation   Care Suites arrival time: 0635    Visitor Information  Name: none    Patient Admission/Assessment   Pre-procedure assessment complete: Yes  If abnormal assessment/labs, provider notified: N/A  NPO: Yes  Medications held per instructions/orders: Yes  Consent: obtained  Patient oriented to room: Yes  Education/questions answered: Yes  Plan/other:   DAMASO with Dr. Kraig Frederick     Discharge Planning  Discharge name/phone number: Janis ramos   [phone]  Overnight post sedation caregiver: daughter  Discharge location: home    Chris Gale RN

## 2024-05-09 NOTE — PROGRESS NOTES
Care Suites Procedure Nursing Note    Patient Information  Name: Elvia Helm  Age: 73 year old    Procedure- DAMASO with Dr. Kraig Frederick   Procedure start time: 0830  Procedure complete time: 0853  *See Procedural sedation flowsheet   Sedation time 22 minutes    Versed 4 mg and Fentanyl 75 mcgs given   Concerns/abnormal assessment: no  If abnormal assessment, provider notified: N/A    Plan/Other:   Monitor patient post procedure tehen discharge to home.   AVS reviewed and understood with patient and daughter       Chris Gale RN

## 2024-05-10 LAB
ATRIAL RATE - MUSE: 87 BPM
DIASTOLIC BLOOD PRESSURE - MUSE: NORMAL MMHG
INTERPRETATION ECG - MUSE: NORMAL
P AXIS - MUSE: 36 DEGREES
PR INTERVAL - MUSE: 124 MS
QRS DURATION - MUSE: 72 MS
QT - MUSE: 386 MS
QTC - MUSE: 464 MS
R AXIS - MUSE: 57 DEGREES
SYSTOLIC BLOOD PRESSURE - MUSE: NORMAL MMHG
T AXIS - MUSE: 28 DEGREES
VENTRICULAR RATE- MUSE: 87 BPM

## 2024-05-13 ENCOUNTER — TELEPHONE (OUTPATIENT)
Dept: CARDIOLOGY | Facility: CLINIC | Age: 74
End: 2024-05-13
Payer: COMMERCIAL

## 2024-05-13 DIAGNOSIS — I71.9 PENETRATING ATHEROSCLEROTIC ULCER OF AORTA (H): Primary | ICD-10-CM

## 2024-05-13 NOTE — TELEPHONE ENCOUNTER
Jenny Carlson, Varun Mena MD  Cc: Alina Barber RN  Please review. PFO with small bi-directional shunting. No anticoagulation recommended by heme/onc. Seeing EP to discuss loop recorder implantation 5/20/24  I  ordered the CT scan for assessment of the ulceration.       DAMASO:Interpretation Summary     A patent foramen ovale is present.  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  A contrast injection (Bubble Study) was performed that was mildly positive for  flow across the interatrial septum.  Left to right atrial shunt, small  Right to left atrial shunt, small  The visual ejection fraction is estimated at 75%.  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 CT  scan with contrast would better review this area would be recommended.    REID Barber RN

## 2024-05-13 NOTE — TELEPHONE ENCOUNTER
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 CT  scan with contrast would better review this area was recommended. Can we set her up to get this done this week? Thanks.

## 2024-05-14 ENCOUNTER — OFFICE VISIT (OUTPATIENT)
Dept: URGENT CARE | Facility: URGENT CARE | Age: 74
End: 2024-05-14
Payer: COMMERCIAL

## 2024-05-14 VITALS
TEMPERATURE: 98.1 F | HEART RATE: 80 BPM | BODY MASS INDEX: 30.33 KG/M2 | OXYGEN SATURATION: 97 % | SYSTOLIC BLOOD PRESSURE: 136 MMHG | DIASTOLIC BLOOD PRESSURE: 80 MMHG | WEIGHT: 165.8 LBS

## 2024-05-14 DIAGNOSIS — E11.9 TYPE 2 DIABETES MELLITUS WITHOUT COMPLICATION, WITHOUT LONG-TERM CURRENT USE OF INSULIN (H): ICD-10-CM

## 2024-05-14 DIAGNOSIS — N89.8 VAGINAL ITCHING: ICD-10-CM

## 2024-05-14 DIAGNOSIS — N89.8 DRYNESS OF VAGINA: ICD-10-CM

## 2024-05-14 DIAGNOSIS — N39.0 URINARY TRACT INFECTION WITHOUT HEMATURIA, SITE UNSPECIFIED: Primary | ICD-10-CM

## 2024-05-14 DIAGNOSIS — N94.89 VAGINAL BURNING: ICD-10-CM

## 2024-05-14 LAB
ALBUMIN UR-MCNC: NEGATIVE MG/DL
APPEARANCE UR: CLEAR
BACTERIA #/AREA URNS HPF: ABNORMAL /HPF
BILIRUB UR QL STRIP: NEGATIVE
CLUE CELLS: ABNORMAL
COLOR UR AUTO: YELLOW
GLUCOSE UR STRIP-MCNC: 500 MG/DL
HGB UR QL STRIP: NEGATIVE
KETONES UR STRIP-MCNC: NEGATIVE MG/DL
LEUKOCYTE ESTERASE UR QL STRIP: ABNORMAL
NITRATE UR QL: NEGATIVE
PH UR STRIP: 6 [PH] (ref 5–7)
RBC #/AREA URNS AUTO: ABNORMAL /HPF
SP GR UR STRIP: 1.01 (ref 1–1.03)
SQUAMOUS #/AREA URNS AUTO: ABNORMAL /LPF
TRICHOMONAS, WET PREP: ABNORMAL
UROBILINOGEN UR STRIP-ACNC: 0.2 E.U./DL
WBC #/AREA URNS AUTO: ABNORMAL /HPF
WBC'S/HIGH POWER FIELD, WET PREP: ABNORMAL
YEAST, WET PREP: ABNORMAL

## 2024-05-14 PROCEDURE — 81001 URINALYSIS AUTO W/SCOPE: CPT | Performed by: PHYSICIAN ASSISTANT

## 2024-05-14 PROCEDURE — 99214 OFFICE O/P EST MOD 30 MIN: CPT | Performed by: PHYSICIAN ASSISTANT

## 2024-05-14 PROCEDURE — 87086 URINE CULTURE/COLONY COUNT: CPT | Performed by: PHYSICIAN ASSISTANT

## 2024-05-14 PROCEDURE — 87210 SMEAR WET MOUNT SALINE/INK: CPT | Performed by: PHYSICIAN ASSISTANT

## 2024-05-14 RX ORDER — FLUCONAZOLE 150 MG/1
150 TABLET ORAL DAILY
Qty: 1 TABLET | Refills: 1 | Status: SHIPPED | OUTPATIENT
Start: 2024-05-14 | End: 2024-05-15

## 2024-05-14 NOTE — PROGRESS NOTES
"  Assessment & Plan     Urinary tract infection without hematuria, site unspecified    UA appears to be normal  Urine culture pending  No history of UTI  She does have some vaginal dryness    - Wet prep - Clinic Collect  - UA Macroscopic with reflex to Microscopic and Culture - Clinic Collect  - UA Microscopic with Reflex to Culture  - Urine Culture    Type 2 diabetes mellitus without complication, without long-term current use of insulin (H)    Patient is diabetic  Monitor blood sugars  UA shows > 500 glucose in UA    Vaginal burning    This could be due to vaginal atrophy or yeast  Wet prep was neg  Advised to use diflucan and if this persists then follow up with GYN  Urine culture pending    - fluconazole (DIFLUCAN) 150 MG tablet; Take 1 tablet (150 mg) by mouth daily for 1 dose    Vaginal itching    Take for vaginal itching  - fluconazole (DIFLUCAN) 150 MG tablet; Take 1 tablet (150 mg) by mouth daily for 1 dose    Dryness of vagina    Referral to GYN  - Ob/Gyn  Referral; Future    Review of external notes as documented elsewhere in note    BMI  Estimated body mass index is 30.33 kg/m  as calculated from the following:    Height as of 5/9/24: 1.575 m (5' 2\").    Weight as of this encounter: 75.2 kg (165 lb 12.8 oz).       CONSULTATION/REFERRAL to GYN    No follow-ups on file.    Miguel A Marquez is a 73 year old, presenting for the following health issues:  Urgent Care (Pt present with burning in vaginal area, itchiness, watery discharge, symptoms has been going on for couple weeks. )    HPI     Review of Systems  Constitutional, neuro, ENT, endocrine, pulmonary, cardiac, gastrointestinal, genitourinary, musculoskeletal, integument and psychiatric systems are negative, except as otherwise noted.      Objective    /80   Pulse 80   Temp 98.1  F (36.7  C) (Tympanic)   Wt 75.2 kg (165 lb 12.8 oz)   LMP 03/17/2001   SpO2 97%   BMI 30.33 kg/m    Body mass index is 30.33 kg/m .  Physical Exam "   GENERAL: alert and no distress  ABDOMEN: soft, nontender, no hepatosplenomegaly, no masses and bowel sounds normal   (female): deferred  MS: no gross musculoskeletal defects noted, no edema  SKIN: no suspicious lesions or rashes  NEURO: Normal strength and tone, mentation intact and speech normal  PSYCH: mentation appears normal, affect normal/bright      Results for orders placed or performed in visit on 05/14/24   UA Macroscopic with reflex to Microscopic and Culture - Clinic Collect     Status: Abnormal    Specimen: Urine, Clean Catch   Result Value Ref Range    Color Urine Yellow Colorless, Straw, Light Yellow, Yellow    Appearance Urine Clear Clear    Glucose Urine 500 (A) Negative mg/dL    Bilirubin Urine Negative Negative    Ketones Urine Negative Negative mg/dL    Specific Gravity Urine 1.010 1.003 - 1.035    Blood Urine Negative Negative    pH Urine 6.0 5.0 - 7.0    Protein Albumin Urine Negative Negative mg/dL    Urobilinogen Urine 0.2 0.2, 1.0 E.U./dL    Nitrite Urine Negative Negative    Leukocyte Esterase Urine Small (A) Negative   UA Microscopic with Reflex to Culture     Status: Abnormal   Result Value Ref Range    Bacteria Urine Moderate (A) None Seen /HPF    RBC Urine 0-2 0-2 /HPF /HPF    WBC Urine 0-5 0-5 /HPF /HPF    Squamous Epithelials Urine Moderate (A) None Seen /LPF    Narrative    Urine Culture not indicated   Wet prep - Clinic Collect     Status: Abnormal    Specimen: Vagina; Swab   Result Value Ref Range    Trichomonas Absent Absent    Yeast Absent Absent    Clue Cells Absent Absent    WBCs/high power field 2+ (A) None             Signed Electronically by: Vincent Encinas, [facility], [facility]

## 2024-05-14 NOTE — TELEPHONE ENCOUNTER
Called pt with results of DAMASO & recommendations from Estrella Carlson NP. Pt states she is leaving to go out of town for a few days tomorrow morning. Will call Dr. Montalvo tomorrow. Sonja LANGE

## 2024-05-15 LAB — BACTERIA UR CULT: NORMAL

## 2024-05-16 NOTE — TELEPHONE ENCOUNTER
Called [facility] (PH:[phone]) spoke w/ Sherrill, nurse w/ Dr. Montalvo, states CT w/out contrast was ordered to follow a lung nodule. Sherrill states pt does have appt w/ Dr. 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 Order in chart & will message scheduling to call pt to arrange. Sonja LANGE

## 2024-05-20 ENCOUNTER — OFFICE VISIT (OUTPATIENT)
Dept: CARDIOLOGY | Facility: CLINIC | Age: 74
End: 2024-05-20
Payer: COMMERCIAL

## 2024-05-20 VITALS
OXYGEN SATURATION: 96 % | DIASTOLIC BLOOD PRESSURE: 78 MMHG | BODY MASS INDEX: 30.62 KG/M2 | SYSTOLIC BLOOD PRESSURE: 119 MMHG | HEIGHT: 62 IN | HEART RATE: 87 BPM | WEIGHT: 166.4 LBS

## 2024-05-20 DIAGNOSIS — I63.511 ACUTE ISCHEMIC RIGHT MCA STROKE (H): ICD-10-CM

## 2024-05-20 PROCEDURE — 99203 OFFICE O/P NEW LOW 30 MIN: CPT | Performed by: INTERNAL MEDICINE

## 2024-05-20 NOTE — LETTER
"5/20/2024    Masood Osborne MD  [address]  [place] [address]    RE: Elvia Helm       Dear Colleague,     I had the pleasure of seeing Elvia Helm in the [facility].    Electrophysiology Clinic Progress Note    Elvia Helm MRN# [id number]   YOB: 1950 Age: 73 year old     Primary cardiologist:          Assessment and Plan   Delightful pt with hx of a stroke last September when presented with words finding difficulty along with left leg weakness due to subacute cortical ischemia in the left middle frontal gyrus encroaching upon the lateral precentral gyrus. Fortunately, she has recovered from it. Event monitor shows no AF. 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 Pt was eval by Dr. Murray last Nov whom had recommended an ILR for further eval.  Unclear why pt needs to see me when any one could have order it. Pt's daughter works for [employer] and requests one from [facility]. Pt is scheduled for CT for further eval of her descending aorta ulcer. Agree with ILR. Went over very small risk of vascular injury and infection. Plan for local anesthetic. No need for labs or conscious sedation.                Review of Systems     12-pt ROS is negative except for as noted in the HPI.          Physical Exam     Vitals: /78   Pulse 87   Ht 1.575 m (5' 2\")   Wt 75.5 kg (166 lb 6.4 oz)   LMP 03/17/2001   SpO2 96%   BMI 30.43 kg/m    Wt Readings from Last 10 Encounters:   05/20/24 75.5 kg (166 lb 6.4 oz)   05/14/24 75.2 kg (165 lb 12.8 oz)   05/09/24 76.2 kg (168 lb)   04/25/24 76 kg (167 lb 9.6 oz)   04/25/24 75.8 kg (167 lb)   04/03/24 74.8 kg (165 lb)   02/01/24 76.5 kg (168 lb 11.2 oz)   01/10/24 77.6 kg (171 lb)   12/12/23 77.6 kg (171 lb)   12/01/23 77.1 kg (170 lb)       Constitutional:  Patient is pleasant, alert, cooperative, and in NAD.  HEENT:  NCAT. PERRLA. EOM's intact.   Neck:  " CVP appears normal. No carotid bruits.   Pulmonary: Normal respiratory effort. CTAB.   Cardiac: RRR, normal S1/S2, no S3/S4, no murmur or rub.   Abdomen:  Non-tender abdomen, no hepatosplenomegaly appreciated.   Vascular: Pulses in the upper and lower extremities are 2+ and equal bilaterally.  Extremities: No edema, erythema, cyanosis or tenderness appreciated.  Skin:  No rashes or lesions appreciated.   Neurological:  No gross motor or sensory deficits.   Psych: Appropriate affect.          Data   Labs reviewed:  Recent Labs   Lab Test 09/12/23  0605 06/13/23  1032 06/16/22  1334 06/14/21  1010 03/26/21  1032 06/23/20  0919 05/17/19  0921   LDL 66 69 47 65  --    < > 66   HDL 47* 51 56 60  --    < > 55   NHDL 108 103 87 91  --    < > 104   CHOL 155 154 143 151  --    < > 159   TRIG 212* 169* 202* 128  --    < > 190*   TSH  --   --   --  1.24 1.61  --  1.42    < > = values in this interval not displayed.       Lab Results   Component Value Date    WBC 7.6 12/01/2023    WBC 7.5 06/14/2021    RBC 4.46 12/01/2023    RBC 4.36 06/14/2021    HGB 13.2 12/01/2023    HGB 13.5 06/14/2021    HCT 40.9 12/01/2023    HCT 40.5 06/14/2021    MCV 92 12/01/2023    MCV 93 06/14/2021    MCH 29.6 12/01/2023    MCH 31.0 06/14/2021    MCHC 32.3 12/01/2023    MCHC 33.3 06/14/2021    RDW 12.8 12/01/2023    RDW 14.0 06/14/2021     12/01/2023     06/14/2021       Lab Results   Component Value Date     12/01/2023     06/14/2021    POTASSIUM 4.1 12/01/2023    POTASSIUM 4.3 09/29/2022    POTASSIUM 4.0 06/14/2021    CHLORIDE 102 12/01/2023    CHLORIDE 104 09/29/2022    CHLORIDE 104 06/14/2021    CO2 26 12/01/2023    CO2 30 09/29/2022    CO2 29 06/14/2021    ANIONGAP 12 12/01/2023    ANIONGAP 3 09/29/2022    ANIONGAP 4 06/14/2021     (H) 12/01/2023     (H) 09/13/2023     (H) 09/29/2022     (H) 06/14/2021    BUN 10.7 12/01/2023    BUN 16 09/29/2022    BUN 15 06/14/2021    CR 0.69 12/01/2023     CR 0.81 06/14/2021    GFRESTIMATED >90 12/01/2023    GFRESTIMATED >60 11/16/2023    GFRESTIMATED 72 06/14/2021    GFRESTBLACK 84 06/14/2021    KIM 9.9 12/01/2023    KIM 9.4 06/14/2021      Lab Results   Component Value Date    AST 35 12/01/2023    AST 23 06/14/2021    ALT 38 12/01/2023    ALT 32 06/14/2021       Lab Results   Component Value Date    A1C 6.0 (H) 11/28/2023    A1C 6.1 (H) 06/14/2021       Lab Results   Component Value Date    INR 1.03 12/07/2023    INR 0.91 09/11/2023            Problem List     Patient Active Problem List   Diagnosis    Allergic rhinitis    Essential hypertension, benign    Postmenopausal atrophic vaginitis    Bee sting reaction    Torticollis    Type 2 diabetes mellitus without complications (H)    HYPERLIPIDEMIA LDL GOAL <100    Post-nasal drip    Advanced directives, counseling/discussion    Osteopenia    Urticaria    Arthritis of knee    Pes planus    Tinnitus    Left-sided weakness    Acute ischemic right MCA stroke (H)            Medications     Current Outpatient Medications   Medication Sig Dispense Refill    aspirin (ASA) 325 MG EC tablet Take 325 mg by mouth At Bedtime      atorvastatin (LIPITOR) 10 MG tablet Take 1 tablet (10 mg) by mouth daily 90 tablet 1    azelastine (ASTELIN) 0.1 % nasal spray Spray 1 spray into both nostrils 2 times daily (Patient taking differently: Spray 1 spray into both nostrils 2 times daily as needed for allergies) 30 mL 0    blood glucose (NO BRAND SPECIFIED) lancets standard Use to test blood sugar one* times daily or as directed. 100 each 3    blood glucose (ONETOUCH ULTRA) test strip Use to test blood sugar daily or as directed. 100 strip 3    blood glucose calibration (ONETOUCH ULTRA CONTROL) solution Use to calibrate blood glucose monitor as directed. 1 Bottle 1    blood glucose monitoring (ONE TOUCH ULTRA 2) meter device kit Use to test blood sugar 1 times daily. 1 kit 0    carvedilol (COREG) 3.125 MG tablet Take 1 tablet (3.125 mg) by  mouth 2 times daily (with meals) 180 tablet 3    Cholecalciferol (VITAMIN D3 PO) Take 2,000 Units by mouth daily       citalopram (CELEXA) 10 MG tablet Take 1 tablet (10 mg) by mouth daily 90 tablet 0    EPINEPHrine (ANY BX GENERIC EQUIV) 0.3 MG/0.3ML injection 2-pack INJECT 0.3 MLS INTO THE MUSCLE ONCE AS NEEDED FOR ANAPHYLAXIS 2 each 0    fluticasone (FLONASE) 50 MCG/ACT spray Spray 1-2 sprays into both nostrils daily (Patient taking differently: Spray 1-2 sprays into both nostrils daily as needed for allergies) 16 g 0    lisinopril-hydrochlorothiazide (ZESTORETIC) 20-25 MG tablet Take 0.5 tablets by mouth daily 45 tablet 1    metFORMIN (GLUCOPHAGE) 850 MG tablet TAKE 1 TABLET (850 MG) BY MOUTH 2 TIMES DAILY (WITH MEALS) 180 tablet 0    Omega-3 Fatty Acids (OMEGA-3 FISH OIL PO) Take 1 g by mouth daily       sodium chloride (OCEAN) 0.65 % nasal spray Spray 1 spray into both nostrils daily as needed for congestion      vitamin B complex with vitamin C (STRESS TAB) tablet Take 1 tablet by mouth daily      alum & mag hydroxide-simethicone (MAALOX) 200-200-20 MG/5ML SUSP suspension Shake well.  Take 30 mL (2 tablespoons) by mouth every 8 hours as needed for heartburn for 2 days. (Patient not taking: Reported on 5/20/2024)      bisacodyl (DULCOLAX) 5 MG EC tablet Two days prior to exam take two (2) tablets at 4pm. One day prior to exam take two (2) tablets at 4pm (Patient not taking: Reported on 5/20/2024) 4 tablet 0    polyethylene glycol (GOLYTELY) 236 g suspension Take as directed in colonoscopy prep instructions (Patient not taking: Reported on 5/20/2024) 8000 mL 0            Past Medical History     Past Medical History:   Diagnosis Date    Acute ischemic right MCA stroke (H) 10/10/2023    Allergic rhinitis, cause unspecified 2003a    Arthritis of knee 06/06/2014    Bee sting reaction 07/2009    DIABETES TYPE II 2003    Essential hypertension, benign     Flat foot(734) 06/06/2014    Hyperlipidaemia LDL goal < 100      Localized osteoarthrosis not specified whether primary or secondary, ankle and foot     After surgery    Metabolic syndrome X     PVC's (premature ventricular contractions)     Tinnitus 2014     Past Surgical History:   Procedure Laterality Date    ABDOMEN SURGERY  1988    ceasaran birth    COLONOSCOPY      COLONOSCOPY N/A 2022    Procedure: COLONOSCOPY, WITH POLYPECTOMY AND BIOPSY;  Surgeon: Nicole Grubbs MD;  Location:  GI    ENT SURGERY      frequent sinus infections    HC ENDOMETRIAL BIOPSY W/O CERVICAL DILATION      ORTHOPEDIC SURGERY      broken right ankle/plates and screws    SOFT TISSUE SURGERY      tendonitis in right upper leg and below right elbow    ZZC  DELIVERY ONLY      ZZC LIGATE FALLOPIAN TUBE,POSTPARTUM      ZZC NONSPECIFIC PROCEDURE      right ankle roe and pins     Family History   Problem Relation Age of Onset    Musculoskeletal Disorder Mother         b:1938   Hx Fibromyalgia    Hypertension Mother     Cerebrovascular Disease Mother     Anesthesia Reaction Mother     Cerebrovascular Disease Father         b:193,  age 68  massive stroke    Diabetes Father         dx age 50s and his family    Gastrointestinal Disease Sister         b:1950   Hx of reflux    Cerebrovascular Disease Sister     Cerebrovascular Disease Sister 62        ? TIA    Bronchitis Sister     Anxiety Disorder Sister     Anesthesia Reaction Sister     Family History Negative Brother         b:    Cancer Maternal Grandfather         throat    Diabetes Paternal Grandmother     Hypertension Daughter     Family History Negative Daughter         b:    Diabetes Daughter         b:    Diabetes dx age 22**insulin    CABG Daughter 43    Hypertension Daughter     Lipids Son         b: high cholosterol and triglycerides     Social History     Socioeconomic History    Marital status:      Spouse name: Not on file    Number of children: 3    Years of education:  Not on file    Highest education level: Not on file   Occupational History    Not on file   Tobacco Use    Smoking status: Former     Current packs/day: 0.00     Average packs/day: 1 pack/day for 10.0 years (10.0 ttl pk-yrs)     Types: Cigarettes     Start date: 1972     Quit date: 1982     Years since quittin.7    Smokeless tobacco: Never   Vaping Use    Vaping status: Never Used   Substance and Sexual Activity    Alcohol use: Yes     Comment: glass of wine on the weekends    Drug use: No     Comment: caffeine tea 3-4/d    Sexual activity: Not Currently     Partners: Male     Birth control/protection: Female Surgical, None     Comment: Post menopausal   Other Topics Concern     Service Not Asked    Blood Transfusions Not Asked    Caffeine Concern Not Asked    Occupational Exposure Not Asked    Hobby Hazards Not Asked    Sleep Concern Not Asked    Stress Concern Not Asked    Weight Concern Yes     Comment: trying to lose    Special Diet No     Comment: Diabetic diet    Back Care Not Asked    Exercise Yes     Comment: walks 20 mt    Bike Helmet Not Asked    Seat Belt Not Asked    Self-Exams Not Asked    Parent/sibling w/ CABG, MI or angioplasty before 65F 55M? 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          Social Determinants of Health     Financial Resource Strain: Low Risk  (2023)    Financial Resource Strain     Within the past 12 months, have you or your family members you live with been unable to get utilities (heat, electricity) when it was really needed?: No   Food Insecurity: Low Risk  (2023)    Food Insecurity     Within the past 12 months, did you worry that your food would run out before you got money to buy more?: No     Within the past 12 months, did the food you bought just not last and you didn t have money to get more?: No   Transportation Needs: Low Risk  (2023)    Transportation Needs     Within the past 12 months, has  lack of transportation kept you from medical appointments, getting your medicines, non-medical meetings or appointments, work, or from getting things that you need?: No   Physical Activity: Not on file   Stress: Not on file   Social Connections: Not on file   Interpersonal Safety: Not on file   Housing Stability: Low Risk  (12/11/2023)    Housing Stability     Do you have housing? : Yes     Are you worried about losing your housing?: No            Allergies   Atovaquone-proguanil hcl, Amoxicillin, Bee, Ceftin, Clindamycin, Erythromycin, Seasonal allergies, Sertraline, Shellfish-derived products, Shrimp, and Tetracycline    Today's clinic visit entailed:  The following tests were independently interpreted by me as noted in my documentation: ecg, andrew, event  30 minutes spent by me on the date of the encounter doing chart review, history and exam, documentation and further activities per the note  Provider  Link to [facility] Help Grid     The level of medical decision making during this visit was of moderate complexity.       Thank you for allowing me to participate in the care of your patient.      Sincerely,     Rm Tong MD     [facility]  cc:   Jenny Carlson NP  [address] [id number]

## 2024-05-20 NOTE — PROGRESS NOTES
"  Electrophysiology Clinic Progress Note    Elvia Helm MRN# [id number]   YOB: 1950 Age: 73 year old     Primary cardiologist:          Assessment and Plan   Delightful pt with hx of a stroke last September when presented with words finding difficulty along with left leg weakness due to subacute cortical ischemia in the left middle frontal gyrus encroaching upon the lateral precentral gyrus. Fortunately, she has recovered from it. Event monitor shows no AF. 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 Pt was eval by Dr. Murray last Nov whom had recommended an ILR for further eval.  Unclear why pt needs to see me when any one could have order it. Pt's daughter works for [employer] and requests one from [facility]. Pt is scheduled for CT for further eval of her descending aorta ulcer. Agree with ILR. Went over very small risk of vascular injury and infection. Plan for local anesthetic. No need for labs or conscious sedation.                Review of Systems     12-pt ROS is negative except for as noted in the HPI.          Physical Exam     Vitals: /78   Pulse 87   Ht 1.575 m (5' 2\")   Wt 75.5 kg (166 lb 6.4 oz)   LMP 03/17/2001   SpO2 96%   BMI 30.43 kg/m    Wt Readings from Last 10 Encounters:   05/20/24 75.5 kg (166 lb 6.4 oz)   05/14/24 75.2 kg (165 lb 12.8 oz)   05/09/24 76.2 kg (168 lb)   04/25/24 76 kg (167 lb 9.6 oz)   04/25/24 75.8 kg (167 lb)   04/03/24 74.8 kg (165 lb)   02/01/24 76.5 kg (168 lb 11.2 oz)   01/10/24 77.6 kg (171 lb)   12/12/23 77.6 kg (171 lb)   12/01/23 77.1 kg (170 lb)       Constitutional:  Patient is pleasant, alert, cooperative, and in NAD.  HEENT:  NCAT. PERRLA. EOM's intact.   Neck:  CVP appears normal. No carotid bruits.   Pulmonary: Normal respiratory effort. CTAB.   Cardiac: RRR, normal S1/S2, no S3/S4, no murmur or rub.   Abdomen:  Non-tender abdomen, no hepatosplenomegaly appreciated. "   Vascular: Pulses in the upper and lower extremities are 2+ and equal bilaterally.  Extremities: No edema, erythema, cyanosis or tenderness appreciated.  Skin:  No rashes or lesions appreciated.   Neurological:  No gross motor or sensory deficits.   Psych: Appropriate affect.          Data   Labs reviewed:  Recent Labs   Lab Test 09/12/23  0605 06/13/23  1032 06/16/22  1334 06/14/21  1010 03/26/21  1032 06/23/20  0919 05/17/19  0921   LDL 66 69 47 65  --    < > 66   HDL 47* 51 56 60  --    < > 55   NHDL 108 103 87 91  --    < > 104   CHOL 155 154 143 151  --    < > 159   TRIG 212* 169* 202* 128  --    < > 190*   TSH  --   --   --  1.24 1.61  --  1.42    < > = values in this interval not displayed.       Lab Results   Component Value Date    WBC 7.6 12/01/2023    WBC 7.5 06/14/2021    RBC 4.46 12/01/2023    RBC 4.36 06/14/2021    HGB 13.2 12/01/2023    HGB 13.5 06/14/2021    HCT 40.9 12/01/2023    HCT 40.5 06/14/2021    MCV 92 12/01/2023    MCV 93 06/14/2021    MCH 29.6 12/01/2023    MCH 31.0 06/14/2021    MCHC 32.3 12/01/2023    MCHC 33.3 06/14/2021    RDW 12.8 12/01/2023    RDW 14.0 06/14/2021     12/01/2023     06/14/2021       Lab Results   Component Value Date     12/01/2023     06/14/2021    POTASSIUM 4.1 12/01/2023    POTASSIUM 4.3 09/29/2022    POTASSIUM 4.0 06/14/2021    CHLORIDE 102 12/01/2023    CHLORIDE 104 09/29/2022    CHLORIDE 104 06/14/2021    CO2 26 12/01/2023    CO2 30 09/29/2022    CO2 29 06/14/2021    ANIONGAP 12 12/01/2023    ANIONGAP 3 09/29/2022    ANIONGAP 4 06/14/2021     (H) 12/01/2023     (H) 09/13/2023     (H) 09/29/2022     (H) 06/14/2021    BUN 10.7 12/01/2023    BUN 16 09/29/2022    BUN 15 06/14/2021    CR 0.69 12/01/2023    CR 0.81 06/14/2021    GFRESTIMATED >90 12/01/2023    GFRESTIMATED >60 11/16/2023    GFRESTIMATED 72 06/14/2021    GFRESTBLACK 84 06/14/2021    KIM 9.9 12/01/2023    KIM 9.4 06/14/2021      Lab Results    Component Value Date    AST 35 12/01/2023    AST 23 06/14/2021    ALT 38 12/01/2023    ALT 32 06/14/2021       Lab Results   Component Value Date    A1C 6.0 (H) 11/28/2023    A1C 6.1 (H) 06/14/2021       Lab Results   Component Value Date    INR 1.03 12/07/2023    INR 0.91 09/11/2023            Problem List     Patient Active Problem List   Diagnosis    Allergic rhinitis    Essential hypertension, benign    Postmenopausal atrophic vaginitis    Bee sting reaction    Torticollis    Type 2 diabetes mellitus without complications (H)    HYPERLIPIDEMIA LDL GOAL <100    Post-nasal drip    Advanced directives, counseling/discussion    Osteopenia    Urticaria    Arthritis of knee    Pes planus    Tinnitus    Left-sided weakness    Acute ischemic right MCA stroke (H)            Medications     Current Outpatient Medications   Medication Sig Dispense Refill    aspirin (ASA) 325 MG EC tablet Take 325 mg by mouth At Bedtime      atorvastatin (LIPITOR) 10 MG tablet Take 1 tablet (10 mg) by mouth daily 90 tablet 1    azelastine (ASTELIN) 0.1 % nasal spray Spray 1 spray into both nostrils 2 times daily (Patient taking differently: Spray 1 spray into both nostrils 2 times daily as needed for allergies) 30 mL 0    blood glucose (NO BRAND SPECIFIED) lancets standard Use to test blood sugar one* times daily or as directed. 100 each 3    blood glucose (ONETOUCH ULTRA) test strip Use to test blood sugar daily or as directed. 100 strip 3    blood glucose calibration (ONETOUCH ULTRA CONTROL) solution Use to calibrate blood glucose monitor as directed. 1 Bottle 1    blood glucose monitoring (ONE TOUCH ULTRA 2) meter device kit Use to test blood sugar 1 times daily. 1 kit 0    carvedilol (COREG) 3.125 MG tablet Take 1 tablet (3.125 mg) by mouth 2 times daily (with meals) 180 tablet 3    Cholecalciferol (VITAMIN D3 PO) Take 2,000 Units by mouth daily       citalopram (CELEXA) 10 MG tablet Take 1 tablet (10 mg) by mouth daily 90 tablet 0     EPINEPHrine (ANY BX GENERIC EQUIV) 0.3 MG/0.3ML injection 2-pack INJECT 0.3 MLS INTO THE MUSCLE ONCE AS NEEDED FOR ANAPHYLAXIS 2 each 0    fluticasone (FLONASE) 50 MCG/ACT spray Spray 1-2 sprays into both nostrils daily (Patient taking differently: Spray 1-2 sprays into both nostrils daily as needed for allergies) 16 g 0    lisinopril-hydrochlorothiazide (ZESTORETIC) 20-25 MG tablet Take 0.5 tablets by mouth daily 45 tablet 1    metFORMIN (GLUCOPHAGE) 850 MG tablet TAKE 1 TABLET (850 MG) BY MOUTH 2 TIMES DAILY (WITH MEALS) 180 tablet 0    Omega-3 Fatty Acids (OMEGA-3 FISH OIL PO) Take 1 g by mouth daily       sodium chloride (OCEAN) 0.65 % nasal spray Spray 1 spray into both nostrils daily as needed for congestion      vitamin B complex with vitamin C (STRESS TAB) tablet Take 1 tablet by mouth daily      alum & mag hydroxide-simethicone (MAALOX) 200-200-20 MG/5ML SUSP suspension Shake well.  Take 30 mL (2 tablespoons) by mouth every 8 hours as needed for heartburn for 2 days. (Patient not taking: Reported on 5/20/2024)      bisacodyl (DULCOLAX) 5 MG EC tablet Two days prior to exam take two (2) tablets at 4pm. One day prior to exam take two (2) tablets at 4pm (Patient not taking: Reported on 5/20/2024) 4 tablet 0    polyethylene glycol (GOLYTELY) 236 g suspension Take as directed in colonoscopy prep instructions (Patient not taking: Reported on 5/20/2024) 8000 mL 0            Past Medical History     Past Medical History:   Diagnosis Date    Acute ischemic right MCA stroke (H) 10/10/2023    Allergic rhinitis, cause unspecified 2003a    Arthritis of knee 06/06/2014    Bee sting reaction 07/2009    DIABETES TYPE II 2003    Essential hypertension, benign     Flat foot(734) 06/06/2014    Hyperlipidaemia LDL goal < 100     Localized osteoarthrosis not specified whether primary or secondary, ankle and foot     After surgery    Metabolic syndrome X     PVC's (premature ventricular contractions)     Tinnitus 06/06/2014      Past Surgical History:   Procedure Laterality Date    ABDOMEN SURGERY  1988    ceasaran birth    COLONOSCOPY      COLONOSCOPY N/A 2022    Procedure: COLONOSCOPY, WITH POLYPECTOMY AND BIOPSY;  Surgeon: Nicole Grubbs MD;  Location:  GI    ENT SURGERY      frequent sinus infections    HC ENDOMETRIAL BIOPSY W/O CERVICAL DILATION      ORTHOPEDIC SURGERY      broken right ankle/plates and screws    SOFT TISSUE SURGERY      tendonitis in right upper leg and below right elbow    ZZC  DELIVERY ONLY      ZZC LIGATE FALLOPIAN TUBE,POSTPARTUM      ZZC NONSPECIFIC PROCEDURE      right ankle roe and pins     Family History   Problem Relation Age of Onset    Musculoskeletal Disorder Mother         b:1938   Hx Fibromyalgia    Hypertension Mother     Cerebrovascular Disease Mother     Anesthesia Reaction Mother     Cerebrovascular Disease Father         b:,  age 68  massive stroke    Diabetes Father         dx age 50s and his family    Gastrointestinal Disease Sister         b:   Hx of reflux    Cerebrovascular Disease Sister     Cerebrovascular Disease Sister 62        ? TIA    Bronchitis Sister     Anxiety Disorder Sister     Anesthesia Reaction Sister     Family History Negative Brother         b:    Cancer Maternal Grandfather         throat    Diabetes Paternal Grandmother     Hypertension Daughter     Family History Negative Daughter         b:    Diabetes Daughter         b:    Diabetes dx age 22**insulin    CABG Daughter 43    Hypertension Daughter     Lipids Son         b: high cholosterol and triglycerides     Social History     Socioeconomic History    Marital status:      Spouse name: Not on file    Number of children: 3    Years of education: Not on file    Highest education level: Not on file   Occupational History    Not on file   Tobacco Use    Smoking status: Former     Current packs/day: 0.00     Average packs/day: 1 pack/day for 10.0  years (10.0 ttl pk-yrs)     Types: Cigarettes     Start date: 1972     Quit date: 1982     Years since quittin.7    Smokeless tobacco: Never   Vaping Use    Vaping status: Never Used   Substance and Sexual Activity    Alcohol use: Yes     Comment: glass of wine on the weekends    Drug use: No     Comment: caffeine tea 3-4/d    Sexual activity: Not Currently     Partners: Male     Birth control/protection: Female Surgical, None     Comment: Post menopausal   Other Topics Concern     Service Not Asked    Blood Transfusions Not Asked    Caffeine Concern Not Asked    Occupational Exposure Not Asked    Hobby Hazards Not Asked    Sleep Concern Not Asked    Stress Concern Not Asked    Weight Concern Yes     Comment: trying to lose    Special Diet No     Comment: Diabetic diet    Back Care Not Asked    Exercise Yes     Comment: walks 20 mt    Bike Helmet Not Asked    Seat Belt Not Asked    Self-Exams Not Asked    Parent/sibling w/ CABG, MI or angioplasty before 65F 55M? 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          Social Determinants of Health     Financial Resource Strain: Low Risk  (2023)    Financial Resource Strain     Within the past 12 months, have you or your family members you live with been unable to get utilities (heat, electricity) when it was really needed?: No   Food Insecurity: Low Risk  (2023)    Food Insecurity     Within the past 12 months, did you worry that your food would run out before you got money to buy more?: No     Within the past 12 months, did the food you bought just not last and you didn t have money to get more?: No   Transportation Needs: Low Risk  (2023)    Transportation Needs     Within the past 12 months, has lack of transportation kept you from medical appointments, getting your medicines, non-medical meetings or appointments, work, or from getting things that you need?: No   Physical Activity: Not on file    Stress: Not on file   Social Connections: Not on file   Interpersonal Safety: Not on file   Housing Stability: Low Risk  (12/11/2023)    Housing Stability     Do you have housing? : Yes     Are you worried about losing your housing?: No            Allergies   Atovaquone-proguanil hcl, Amoxicillin, Bee, Ceftin, Clindamycin, Erythromycin, Seasonal allergies, Sertraline, Shellfish-derived products, Shrimp, and Tetracycline    Today's clinic visit entailed:  The following tests were independently interpreted by me as noted in my documentation: ecg, andrew, event  30 minutes spent by me on the date of the encounter doing chart review, history and exam, documentation and further activities per the note  Provider  Link to MDM Help Grid     The level of medical decision making during this visit was of moderate complexity.

## 2024-05-28 ENCOUNTER — TELEPHONE (OUTPATIENT)
Dept: GASTROENTEROLOGY | Facility: CLINIC | Age: 74
End: 2024-05-28
Payer: COMMERCIAL

## 2024-05-28 NOTE — TELEPHONE ENCOUNTER
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 CT scan with contrast would better review this area would be recommended.  PFO also noted. Recent cardiology notes (5/20/24) indicate implantable loop recorder is recommended as well - scheduled for 6/7/24.   CT scheduled for tomorrow 5/29/24. Per LUIS De La Rosa endo nurse manager - wait until CT results are in to determine if colonoscopy needs to be rescheduled or not.    Addendum Manisha Ruiz RN on 5/31/2024 at 8:33 AM  CT results in - sent for site review.   OK to proceed per LUIS Norman endo nurse manager.     Also seeking approval from Dr. Harrison in order to proceed with CS d/t hx tortuous colon and looping noted in prior colonoscopies.    Addendum Manisha Ruiz RN on 5/29/2024 at 3:55 PM  OK for conscious sedation per Dr. Harrison.       ----------------------------------------    Pre visit planning completed.      Procedure details:    Patient scheduled for Colonoscopy  on 6/6/24.     Arrival time: 0915. Procedure time 1000    Facility location: [facility]; [address] [place] [address]. Check in location: [address] [place] [address].     Sedation type: Conscious sedation - approved for CS per Dr. Harrison.     Pre op exam needed? N/A     Indication for procedure: Screening per order, Hx colon polyps per chart review.      Chart review:     Electronic implanted devices? No    Recent diagnosis of diverticulitis within the last 6 weeks? No    Diabetic? Yes. Diabetic medication HOLDING recommendations: Oral diabetic medications: Yes:  Metformin (glucophage): HOLD day of procedure.       Medication review:    Anticoagulants? No    NSAIDS? No NSAID medications per patient's medication list.  RN will verify with pre-assessment call.    Other medication HOLDING recommendations:  N/A      Prep for procedure:     Bowel prep recommendation: Extended Golytely. Bowel prep prescription sent to  [id number] - [facility] [address] by [facility] nursing team on 5/9/24.  Due to: constipation noted or reported.  and diabetes.     Prep instructions sent via [employer] by [facility] nursing team on 5/9/24.        Manisha Ruiz RN  Endoscopy Procedure Pre Assessment RN  [phone] option 4

## 2024-05-29 ENCOUNTER — ANCILLARY PROCEDURE (OUTPATIENT)
Dept: CT IMAGING | Facility: CLINIC | Age: 74
End: 2024-05-29
Attending: NURSE PRACTITIONER
Payer: COMMERCIAL

## 2024-05-29 DIAGNOSIS — I71.9 PENETRATING ATHEROSCLEROTIC ULCER OF AORTA (H): ICD-10-CM

## 2024-05-29 DIAGNOSIS — E78.5 HYPERLIPIDEMIA LDL GOAL <100: ICD-10-CM

## 2024-05-29 LAB
CREAT BLD-MCNC: 0.8 MG/DL (ref 0.5–1)
EGFRCR SERPLBLD CKD-EPI 2021: >60 ML/MIN/1.73M2

## 2024-05-29 PROCEDURE — 82565 ASSAY OF CREATININE: CPT

## 2024-05-29 PROCEDURE — 250N000009 HC RX 250: Performed by: NURSE PRACTITIONER

## 2024-05-29 PROCEDURE — 250N000011 HC RX IP 250 OP 636: Performed by: NURSE PRACTITIONER

## 2024-05-29 PROCEDURE — 71260 CT THORAX DX C+: CPT

## 2024-05-29 RX ORDER — IOPAMIDOL 755 MG/ML
72 INJECTION, SOLUTION INTRAVASCULAR ONCE
Status: COMPLETED | OUTPATIENT
Start: 2024-05-29 | End: 2024-05-29

## 2024-05-29 RX ADMIN — IOPAMIDOL 72 ML: 755 INJECTION, SOLUTION INTRAVENOUS at 12:53

## 2024-05-29 RX ADMIN — SODIUM CHLORIDE 40 ML: 9 INJECTION, SOLUTION INTRAVENOUS at 12:53

## 2024-05-30 ENCOUNTER — TELEPHONE (OUTPATIENT)
Dept: CARDIOLOGY | Facility: CLINIC | Age: 74
End: 2024-05-30
Payer: COMMERCIAL

## 2024-05-30 RX ORDER — ATORVASTATIN CALCIUM 10 MG/1
10 TABLET, FILM COATED ORAL DAILY
Qty: 90 TABLET | Refills: 0 | Status: SHIPPED | OUTPATIENT
Start: 2024-05-30 | End: 2024-08-22

## 2024-05-30 NOTE — TELEPHONE ENCOUNTER
Prescription approved per [facility] Refill Protocol.  Aleah Pham RN  [facility]

## 2024-05-30 NOTE — TELEPHONE ENCOUNTER
Called to schedule PFO Consult w / Dr. Craven per carlin - LVM and callback information    [phone]    Gayathri Argueta  Structural Heart Procedure   [facility]

## 2024-05-31 DIAGNOSIS — Z86.73 HISTORY OF CVA (CEREBROVASCULAR ACCIDENT): ICD-10-CM

## 2024-05-31 DIAGNOSIS — I63.511 ACUTE ISCHEMIC RIGHT MCA STROKE (H): Primary | ICD-10-CM

## 2024-05-31 NOTE — PROGRESS NOTES
Reviewed pre procedure instructions with pt. Questions answered. Pt verb understanding of all instructions.  Zakiya LANGE

## 2024-05-31 NOTE — TELEPHONE ENCOUNTER
Patient returned call for PA. Relayed message below that her Echo and CT were reviewed and both Dr. Harrison and anesthesia approved her to proceed.     Patient prefers to reschedule since she has a lot going on.     At this time patient states she will call back at a later date to reschedule.    Message sent to scheduling to cancel per patient request.    Chantal Guadalupe RN  Endoscopy Procedure Pre Assessment RN  [phone] option 4

## 2024-05-31 NOTE — TELEPHONE ENCOUNTER
Caller: No Call Made    Reason for Reschedule/Cancellation   (please be detailed, any staff messages or encounters to note?): Pt stated to pre-assessment nurse that she would like to cancel her procedure as she has other health concerns she would like to take care of first.      Prior to reschedule please review:  Ordering Provider: Masood Osborne MD  Sedation Determined: Moderate   Does patient have any ASC Exclusions, please identify?: No      Notes on Cancelled Procedure:  Procedure: Lower Endoscopy [Colonoscopy]   Date: 06/06/2024  Location: [facility]; [address] [place] [id number]   Surgeon: AURELIO      Rescheduled: No, Pt will call back once she feels she is ready to reschedule       Did you cancel or rescheduled an EUS procedure? No.

## 2024-05-31 NOTE — TELEPHONE ENCOUNTER
Attempted to contact patient in order to complete pre assessment questions.     No answer at mobile or home number. Left message to return call to [phone] option 4    Callback required communication sent via [employer].      Manisha Ruiz RN  Endoscopy Procedure Pre Assessment   Negative

## 2024-06-04 ENCOUNTER — OFFICE VISIT (OUTPATIENT)
Dept: ALLERGY | Facility: CLINIC | Age: 74
End: 2024-06-04
Payer: COMMERCIAL

## 2024-06-04 VITALS
DIASTOLIC BLOOD PRESSURE: 84 MMHG | HEART RATE: 79 BPM | WEIGHT: 167 LBS | SYSTOLIC BLOOD PRESSURE: 138 MMHG | BODY MASS INDEX: 30.54 KG/M2 | OXYGEN SATURATION: 98 %

## 2024-06-04 DIAGNOSIS — J34.89 NASAL VESTIBULITIS: ICD-10-CM

## 2024-06-04 DIAGNOSIS — J30.1 SEASONAL ALLERGIC RHINITIS DUE TO POLLEN: ICD-10-CM

## 2024-06-04 PROCEDURE — 99214 OFFICE O/P EST MOD 30 MIN: CPT | Performed by: INTERNAL MEDICINE

## 2024-06-04 RX ORDER — MUPIROCIN 20 MG/G
OINTMENT TOPICAL
Qty: 15 G | Refills: 1 | Status: SHIPPED | OUTPATIENT
Start: 2024-06-04

## 2024-06-04 RX ORDER — AZELASTINE 1 MG/ML
1 SPRAY, METERED NASAL 2 TIMES DAILY
Qty: 30 ML | Refills: 11 | Status: SHIPPED | OUTPATIENT
Start: 2024-06-04

## 2024-06-04 NOTE — PATIENT INSTRUCTIONS
Flonase 1 spray each nostril twice daily with azelastine (or ASTEPRO) 1 spray each nostril once to twice daily.    For the nasal crusting can restart mupirocin antibiotic ointment which will be applied to both nostrils twice daily for 5 days.  This can be repeated repeated in the future if the crusting redevelops.

## 2024-06-04 NOTE — PROGRESS NOTES
Elvia Helm was seen in the Allergy Clinic at [facility].    Elvia Helm is a 73 year old female being seen today for ongoing evaluation of shellfish allergy.    Since the last visit the patient has been having a cough, which started around December after a bad viral illness.  She was coughing to the point of vomiting.  When she started using Astelin and Flonase together twice daily the cough significantly reduced.  She missed the medications within the last week and she did have the cough return.    The last appointment was June 2022 and she did have skin testing that was borderline positive to shrimp and then blood testing 0.93 to shrimp but negative to lobster.  She did subsequently go to [place] and eat lobster without any problems.    She did have some nasal crusting at the last appointment for which mupirocin was used which she thought was beneficial.  She would like to have that available again.    She also has a history of environmental allergens and uses Flonase and Zyrtec and Pataday as needed.  She would like to have additional refills for the azelastine.     Latest Reference Range & Units 06/16/22 13:34   Allergen Crab <0.10 KU(A)/L <0.10   Allergen Lobster <0.10 KU(A)/L <0.10   Allergen Shrimp <0.10 KU(A)/L 0.93 (H)   (H): Data is abnormally high    Past Medical History:   Diagnosis Date    Acute ischemic right MCA stroke (H) 10/10/2023    Allergic rhinitis, cause unspecified 2003a    Arthritis of knee 06/06/2014    Bee sting reaction 07/2009    DIABETES TYPE II 2003    Essential hypertension, benign     Flat foot(734) 06/06/2014    Hyperlipidaemia LDL goal < 100     Localized osteoarthrosis not specified whether primary or secondary, ankle and foot     After surgery    Metabolic syndrome X     PVC's (premature ventricular contractions)     Tinnitus 06/06/2014     Family History   Problem Relation Age of Onset    Musculoskeletal Disorder Mother         b:1938   Hx  Fibromyalgia    Hypertension Mother     Cerebrovascular Disease Mother     Anesthesia Reaction Mother     Cerebrovascular Disease Father         b:1939,  age 68  massive stroke    Diabetes Father         dx age 50s and his family    Gastrointestinal Disease Sister         b:1950   Hx of reflux    Cerebrovascular Disease Sister     Cerebrovascular Disease Sister 62        ? TIA    Bronchitis Sister     Anxiety Disorder Sister     Anesthesia Reaction Sister     Family History Negative Brother         b:    Cancer Maternal Grandfather         throat    Diabetes Paternal Grandmother     Hypertension Daughter     Family History Negative Daughter         b:    Diabetes Daughter         b:    Diabetes dx age 22**insulin    CABG Daughter 43    Hypertension Daughter     Lipids Son         b: high cholosterol and triglycerides     Past Surgical History:   Procedure Laterality Date    ABDOMEN SURGERY  1988    ceasaran birth    COLONOSCOPY      COLONOSCOPY N/A 2022    Procedure: COLONOSCOPY, WITH POLYPECTOMY AND BIOPSY;  Surgeon: Nicole Grubbs MD;  Location:  GI    ENT SURGERY      frequent sinus infections    HC ENDOMETRIAL BIOPSY W/O CERVICAL DILATION      ORTHOPEDIC SURGERY      broken right ankle/plates and screws    SOFT TISSUE SURGERY      tendonitis in right upper leg and below right elbow    ZZC  DELIVERY ONLY      ZZC LIGATE FALLOPIAN TUBE,POSTPARTUM      ZZC NONSPECIFIC PROCEDURE      right ankle roe and pins         Current Outpatient Medications:     aspirin (ASA) 325 MG EC tablet, Take 325 mg by mouth At Bedtime, Disp: , Rfl:     atorvastatin (LIPITOR) 10 MG tablet, Take 1 tablet (10 mg) by mouth daily, Disp: 90 tablet, Rfl: 0    azelastine (ASTELIN) 0.1 % nasal spray, Spray 1 spray into both nostrils 2 times daily, Disp: 30 mL, Rfl: 11    bisacodyl (DULCOLAX) 5 MG EC tablet, Two days prior to exam take two (2) tablets at 4pm. One day prior to exam take  two (2) tablets at 4pm, Disp: 4 tablet, Rfl: 0    blood glucose (NO BRAND SPECIFIED) lancets standard, Use to test blood sugar one* times daily or as directed., Disp: 100 each, Rfl: 3    blood glucose (ONETOUCH ULTRA) test strip, Use to test blood sugar daily or as directed., Disp: 100 strip, Rfl: 3    blood glucose calibration (ONETOUCH ULTRA CONTROL) solution, Use to calibrate blood glucose monitor as directed., Disp: 1 Bottle, Rfl: 1    blood glucose monitoring (ONE TOUCH ULTRA 2) meter device kit, Use to test blood sugar 1 times daily., Disp: 1 kit, Rfl: 0    carvedilol (COREG) 3.125 MG tablet, Take 1 tablet (3.125 mg) by mouth 2 times daily (with meals), Disp: 180 tablet, Rfl: 3    Cholecalciferol (VITAMIN D3 PO), Take 2,000 Units by mouth daily , Disp: , Rfl:     citalopram (CELEXA) 10 MG tablet, Take 1 tablet (10 mg) by mouth daily, Disp: 90 tablet, Rfl: 0    EPINEPHrine (ANY BX GENERIC EQUIV) 0.3 MG/0.3ML injection 2-pack, INJECT 0.3 MLS INTO THE MUSCLE ONCE AS NEEDED FOR ANAPHYLAXIS, Disp: 2 each, Rfl: 0    fluticasone (FLONASE) 50 MCG/ACT spray, Spray 1-2 sprays into both nostrils daily (Patient taking differently: Spray 1-2 sprays into both nostrils daily as needed for allergies), Disp: 16 g, Rfl: 0    lisinopril-hydrochlorothiazide (ZESTORETIC) 20-25 MG tablet, Take 0.5 tablets by mouth daily, Disp: 45 tablet, Rfl: 1    metFORMIN (GLUCOPHAGE) 850 MG tablet, TAKE 1 TABLET (850 MG) BY MOUTH 2 TIMES DAILY (WITH MEALS), Disp: 180 tablet, Rfl: 0    mupirocin (BACTROBAN) 2 % external ointment, Apply twice daily to each nostril x 5 days., Disp: 15 g, Rfl: 1    Omega-3 Fatty Acids (OMEGA-3 FISH OIL PO), Take 1 g by mouth daily , Disp: , Rfl:     polyethylene glycol (GOLYTELY) 236 g suspension, Take as directed in colonoscopy prep instructions, Disp: 8000 mL, Rfl: 0    vitamin B complex with vitamin C (STRESS TAB) tablet, Take 1 tablet by mouth daily, Disp: , Rfl:     sodium chloride (OCEAN) 0.65 % nasal spray,  Spray 1 spray into both nostrils daily as needed for congestion (Patient not taking: Reported on 6/4/2024), Disp: , Rfl:   Allergies   Allergen Reactions    Atovaquone-Proguanil Hcl Hives    Amoxicillin Hives    Bee Hives and Swelling     carries Epi Pen     Ceftin Hives     hives    Clindamycin Hives    Erythromycin GI Disturbance    Seasonal Allergies     Sertraline       cough, gag and many times I vomit    Shellfish-Derived Products Unknown    Shrimp Hives     Happened twice    Tetracycline Other (See Comments)     ?severe headaches  & nausea          EXAM:   /84   Pulse 79   Wt 75.8 kg (167 lb)   LMP 03/17/2001   SpO2 98%   BMI 30.54 kg/m      Constitutional:       General: She is not in acute distress.     Appearance: Normal appearance. She is not ill-appearing.   HENT:      Head: Normocephalic and atraumatic.      Nose: Nose normal. No congestion or rhinorrhea.      Mouth/Throat:      Mouth: Mucous membranes are moist.      Pharynx: Oropharynx is clear. No posterior oropharyngeal erythema.   Eyes:      General:         Right eye: No discharge.         Left eye: No discharge.   Cardiovascular:      Rate and Rhythm: Normal rate and regular rhythm.      Heart sounds: Normal heart sounds.   Pulmonary:      Effort: Pulmonary effort is normal.      Breath sounds: Normal breath sounds. No wheezing or rhonchi.   Skin:     General: Skin is warm.      Findings: No erythema or rash.   Neurological:      General: No focal deficit present.      Mental Status: She is alert. Mental status is at baseline.   Psychiatric:         Mood and Affect: Mood normal.         Behavior: Behavior normal.        ASSESSMENT/PLAN:  Elvia Helm is a 73 year old female seen today for evaluation of allergic rhinitis with a shrimp allergy.  She cannot tolerate lobster.  She also has a history of nasal vestibulitis and no significant evidence noted today however she has had recent crusting of the nose and she would like to  have a refill of the mupirocin.    She has found the combination of the azelastine and Flonase to work quite well helping to control the cough.  We talked about a further evaluation of the cough if it these medications become less effective.  She has had a recent chest CT scan.  There is a nodule which is 8 mm in the right upper lobe which is stable.    Flonase 1 spray each nostril twice daily with azelastine (or ASTEPRO) 1 spray each nostril once to twice daily.    For the nasal crusting can restart mupirocin antibiotic ointment which will be applied to both nostrils twice daily for 5 days.  This can be repeated repeated in the future if the crusting redevelops.    Follow-up if the cough is not responding to her nasal spray treatment.      Thank you for allowing me to participate in the care of Elvia Helm.      I spent 35 minutes on the date of the encounter doing chart review, history and exam, documentation and further coordination as noted above exclusive of separately reported interpretations    Zain Marshall MD  Allergy/Immunology  [facility]

## 2024-06-04 NOTE — LETTER
6/4/2024         RE: Elvia Helm  [address] [place] [place] [id number]        Dear Colleague,    Thank you for referring your patient, Elvia Helm, to the [facility] [place]. Please see a copy of my visit note below.    Elvia Helm was seen in the Allergy Clinic at [facility].    Elvia Helm is a 73 year old female being seen today for ongoing evaluation of shellfish allergy.    Since the last visit the patient has been having a cough, which started around December after a bad viral illness.  She was coughing to the point of vomiting.  When she started using Astelin and Flonase together twice daily the cough significantly reduced.  She missed the medications within the last week and she did have the cough return.    The last appointment was June 2022 and she did have skin testing that was borderline positive to shrimp and then blood testing 0.93 to shrimp but negative to lobster.  She did subsequently go to [place] and eat lobster without any problems.    She did have some nasal crusting at the last appointment for which mupirocin was used which she thought was beneficial.  She would like to have that available again.    She also has a history of environmental allergens and uses Flonase and Zyrtec and Pataday as needed.  She would like to have additional refills for the azelastine.     Latest Reference Range & Units 06/16/22 13:34   Allergen Crab <0.10 KU(A)/L <0.10   Allergen Lobster <0.10 KU(A)/L <0.10   Allergen Shrimp <0.10 KU(A)/L 0.93 (H)   (H): Data is abnormally high    Past Medical History:   Diagnosis Date     Acute ischemic right MCA stroke (H) 10/10/2023     Allergic rhinitis, cause unspecified 2003a     Arthritis of knee 06/06/2014     Bee sting reaction 07/2009     DIABETES TYPE II 2003     Essential hypertension, benign      Flat foot(734) 06/06/2014     Hyperlipidaemia LDL goal < 100      Localized osteoarthrosis not  specified whether primary or secondary, ankle and foot     After surgery     Metabolic syndrome X      PVC's (premature ventricular contractions)      Tinnitus 2014     Family History   Problem Relation Age of Onset     Musculoskeletal Disorder Mother         b:1938   Hx Fibromyalgia     Hypertension Mother      Cerebrovascular Disease Mother      Anesthesia Reaction Mother      Cerebrovascular Disease Father         b:,  age 68  massive stroke     Diabetes Father         dx age 50s and his family     Gastrointestinal Disease Sister         b:1950   Hx of reflux     Cerebrovascular Disease Sister      Cerebrovascular Disease Sister 62        ? TIA     Bronchitis Sister      Anxiety Disorder Sister      Anesthesia Reaction Sister      Family History Negative Brother         b:     Cancer Maternal Grandfather         throat     Diabetes Paternal Grandmother      Hypertension Daughter      Family History Negative Daughter         b:     Diabetes Daughter         b:    Diabetes dx age 22**insulin     CABG Daughter 43     Hypertension Daughter      Lipids Son         b: high cholosterol and triglycerides     Past Surgical History:   Procedure Laterality Date     ABDOMEN SURGERY  1988    ceasaran birth     COLONOSCOPY       COLONOSCOPY N/A 2022    Procedure: COLONOSCOPY, WITH POLYPECTOMY AND BIOPSY;  Surgeon: Nicole Grubbs MD;  Location:  GI     ENT SURGERY      frequent sinus infections     HC ENDOMETRIAL BIOPSY W/O CERVICAL DILATION       ORTHOPEDIC SURGERY      broken right ankle/plates and screws     SOFT TISSUE SURGERY      tendonitis in right upper leg and below right elbow     ZZC  DELIVERY ONLY       ZZC LIGATE FALLOPIAN TUBE,POSTPARTUM       ZZC NONSPECIFIC PROCEDURE      right ankle roe and pins         Current Outpatient Medications:      aspirin (ASA) 325 MG EC tablet, Take 325 mg by mouth At Bedtime, Disp: , Rfl:      atorvastatin  (LIPITOR) 10 MG tablet, Take 1 tablet (10 mg) by mouth daily, Disp: 90 tablet, Rfl: 0     azelastine (ASTELIN) 0.1 % nasal spray, Spray 1 spray into both nostrils 2 times daily, Disp: 30 mL, Rfl: 11     bisacodyl (DULCOLAX) 5 MG EC tablet, Two days prior to exam take two (2) tablets at 4pm. One day prior to exam take two (2) tablets at 4pm, Disp: 4 tablet, Rfl: 0     blood glucose (NO BRAND SPECIFIED) lancets standard, Use to test blood sugar one* times daily or as directed., Disp: 100 each, Rfl: 3     blood glucose (ONETOUCH ULTRA) test strip, Use to test blood sugar daily or as directed., Disp: 100 strip, Rfl: 3     blood glucose calibration (ONETOUCH ULTRA CONTROL) solution, Use to calibrate blood glucose monitor as directed., Disp: 1 Bottle, Rfl: 1     blood glucose monitoring (ONE TOUCH ULTRA 2) meter device kit, Use to test blood sugar 1 times daily., Disp: 1 kit, Rfl: 0     carvedilol (COREG) 3.125 MG tablet, Take 1 tablet (3.125 mg) by mouth 2 times daily (with meals), Disp: 180 tablet, Rfl: 3     Cholecalciferol (VITAMIN D3 PO), Take 2,000 Units by mouth daily , Disp: , Rfl:      citalopram (CELEXA) 10 MG tablet, Take 1 tablet (10 mg) by mouth daily, Disp: 90 tablet, Rfl: 0     EPINEPHrine (ANY BX GENERIC EQUIV) 0.3 MG/0.3ML injection 2-pack, INJECT 0.3 MLS INTO THE MUSCLE ONCE AS NEEDED FOR ANAPHYLAXIS, Disp: 2 each, Rfl: 0     fluticasone (FLONASE) 50 MCG/ACT spray, Spray 1-2 sprays into both nostrils daily (Patient taking differently: Spray 1-2 sprays into both nostrils daily as needed for allergies), Disp: 16 g, Rfl: 0     lisinopril-hydrochlorothiazide (ZESTORETIC) 20-25 MG tablet, Take 0.5 tablets by mouth daily, Disp: 45 tablet, Rfl: 1     metFORMIN (GLUCOPHAGE) 850 MG tablet, TAKE 1 TABLET (850 MG) BY MOUTH 2 TIMES DAILY (WITH MEALS), Disp: 180 tablet, Rfl: 0     mupirocin (BACTROBAN) 2 % external ointment, Apply twice daily to each nostril x 5 days., Disp: 15 g, Rfl: 1     Omega-3 Fatty Acids  (OMEGA-3 FISH OIL PO), Take 1 g by mouth daily , Disp: , Rfl:      polyethylene glycol (GOLYTELY) 236 g suspension, Take as directed in colonoscopy prep instructions, Disp: 8000 mL, Rfl: 0     vitamin B complex with vitamin C (STRESS TAB) tablet, Take 1 tablet by mouth daily, Disp: , Rfl:      sodium chloride (OCEAN) 0.65 % nasal spray, Spray 1 spray into both nostrils daily as needed for congestion (Patient not taking: Reported on 6/4/2024), Disp: , Rfl:   Allergies   Allergen Reactions     Atovaquone-Proguanil Hcl Hives     Amoxicillin Hives     Bee Hives and Swelling     carries Epi Pen      Ceftin Hives     hives     Clindamycin Hives     Erythromycin GI Disturbance     Seasonal Allergies      Sertraline       cough, gag and many times I vomit     Shellfish-Derived Products Unknown     Shrimp Hives     Happened twice     Tetracycline Other (See Comments)     ?severe headaches  & nausea          EXAM:   /84   Pulse 79   Wt 75.8 kg (167 lb)   LMP 03/17/2001   SpO2 98%   BMI 30.54 kg/m      Constitutional:       General: She is not in acute distress.     Appearance: Normal appearance. She is not ill-appearing.   HENT:      Head: Normocephalic and atraumatic.      Nose: Nose normal. No congestion or rhinorrhea.      Mouth/Throat:      Mouth: Mucous membranes are moist.      Pharynx: Oropharynx is clear. No posterior oropharyngeal erythema.   Eyes:      General:         Right eye: No discharge.         Left eye: No discharge.   Cardiovascular:      Rate and Rhythm: Normal rate and regular rhythm.      Heart sounds: Normal heart sounds.   Pulmonary:      Effort: Pulmonary effort is normal.      Breath sounds: Normal breath sounds. No wheezing or rhonchi.   Skin:     General: Skin is warm.      Findings: No erythema or rash.   Neurological:      General: No focal deficit present.      Mental Status: She is alert. Mental status is at baseline.   Psychiatric:         Mood and Affect: Mood normal.          Behavior: Behavior normal.        ASSESSMENT/PLAN:  Elvia Helm is a 73 year old female seen today for evaluation of allergic rhinitis with a shrimp allergy.  She cannot tolerate lobster.  She also has a history of nasal vestibulitis and no significant evidence noted today however she has had recent crusting of the nose and she would like to have a refill of the mupirocin.    She has found the combination of the azelastine and Flonase to work quite well helping to control the cough.  We talked about a further evaluation of the cough if it these medications become less effective.  She has had a recent chest CT scan.  There is a nodule which is 8 mm in the right upper lobe which is stable.    Flonase 1 spray each nostril twice daily with azelastine (or ASTEPRO) 1 spray each nostril once to twice daily.    For the nasal crusting can restart mupirocin antibiotic ointment which will be applied to both nostrils twice daily for 5 days.  This can be repeated repeated in the future if the crusting redevelops.    Follow-up if the cough is not responding to her nasal spray treatment.      Thank you for allowing me to participate in the care of Elvia Helm.      I spent 35 minutes on the date of the encounter doing chart review, history and exam, documentation and further coordination as noted above exclusive of separately reported interpretations    Zain Marshall MD  Allergy/Immunology  [facility]      Again, thank you for allowing me to participate in the care of your patient.        Sincerely,        Zain Marshall MD

## 2024-06-05 ENCOUNTER — LAB (OUTPATIENT)
Dept: LAB | Facility: CLINIC | Age: 74
End: 2024-06-05
Payer: COMMERCIAL

## 2024-06-05 DIAGNOSIS — E78.5 HYPERLIPIDEMIA LDL GOAL <100: ICD-10-CM

## 2024-06-05 DIAGNOSIS — E11.9 TYPE 2 DIABETES MELLITUS WITHOUT COMPLICATIONS (H): Primary | ICD-10-CM

## 2024-06-05 DIAGNOSIS — E11.9 TYPE 2 DIABETES MELLITUS WITHOUT COMPLICATION, WITHOUT LONG-TERM CURRENT USE OF INSULIN (H): ICD-10-CM

## 2024-06-05 LAB — HBA1C MFR BLD: 6 % (ref 0–5.6)

## 2024-06-05 PROCEDURE — 82570 ASSAY OF URINE CREATININE: CPT

## 2024-06-05 PROCEDURE — 83036 HEMOGLOBIN GLYCOSYLATED A1C: CPT

## 2024-06-05 PROCEDURE — 80061 LIPID PANEL: CPT

## 2024-06-05 PROCEDURE — 36415 COLL VENOUS BLD VENIPUNCTURE: CPT

## 2024-06-05 PROCEDURE — 80053 COMPREHEN METABOLIC PANEL: CPT

## 2024-06-05 PROCEDURE — 82043 UR ALBUMIN QUANTITATIVE: CPT

## 2024-06-05 RX ORDER — CITALOPRAM HYDROBROMIDE 10 MG/1
10 TABLET ORAL DAILY
Qty: 90 TABLET | Refills: 0 | Status: SHIPPED | OUTPATIENT
Start: 2024-06-05 | End: 2024-09-12

## 2024-06-06 LAB
ALBUMIN SERPL BCG-MCNC: 4.5 G/DL (ref 3.5–5.2)
ALP SERPL-CCNC: 48 U/L (ref 40–150)
ALT SERPL W P-5'-P-CCNC: 18 U/L (ref 0–50)
ANION GAP SERPL CALCULATED.3IONS-SCNC: 11 MMOL/L (ref 7–15)
AST SERPL W P-5'-P-CCNC: 19 U/L (ref 0–45)
BILIRUB SERPL-MCNC: 0.4 MG/DL
BUN SERPL-MCNC: 14.2 MG/DL (ref 8–23)
CALCIUM SERPL-MCNC: 9.8 MG/DL (ref 8.8–10.2)
CHLORIDE SERPL-SCNC: 103 MMOL/L (ref 98–107)
CHOLEST SERPL-MCNC: 148 MG/DL
CREAT SERPL-MCNC: 0.81 MG/DL (ref 0.51–0.95)
CREAT UR-MCNC: 32.9 MG/DL
DEPRECATED HCO3 PLAS-SCNC: 27 MMOL/L (ref 22–29)
EGFRCR SERPLBLD CKD-EPI 2021: 76 ML/MIN/1.73M2
FASTING STATUS PATIENT QL REPORTED: YES
FASTING STATUS PATIENT QL REPORTED: YES
GLUCOSE SERPL-MCNC: 115 MG/DL (ref 70–99)
HDLC SERPL-MCNC: 53 MG/DL
LDLC SERPL CALC-MCNC: 72 MG/DL
MICROALBUMIN UR-MCNC: <12 MG/L
MICROALBUMIN/CREAT UR: NORMAL MG/G{CREAT}
NONHDLC SERPL-MCNC: 95 MG/DL
POTASSIUM SERPL-SCNC: 4.5 MMOL/L (ref 3.4–5.3)
PROT SERPL-MCNC: 6.8 G/DL (ref 6.4–8.3)
SODIUM SERPL-SCNC: 141 MMOL/L (ref 135–145)
TRIGL SERPL-MCNC: 115 MG/DL

## 2024-06-07 ENCOUNTER — NURSE TRIAGE (OUTPATIENT)
Dept: CARDIOLOGY | Facility: CLINIC | Age: 74
End: 2024-06-07

## 2024-06-07 ENCOUNTER — HOSPITAL ENCOUNTER (OUTPATIENT)
Facility: CLINIC | Age: 74
Discharge: HOME | End: 2024-06-07
Attending: INTERNAL MEDICINE | Admitting: INTERNAL MEDICINE
Payer: COMMERCIAL

## 2024-06-07 ENCOUNTER — OFFICE VISIT (OUTPATIENT)
Dept: CARDIOLOGY | Facility: CLINIC | Age: 74
End: 2024-06-07
Attending: NURSE PRACTITIONER
Payer: COMMERCIAL

## 2024-06-07 ENCOUNTER — HOSPITAL ENCOUNTER (EMERGENCY)
Facility: CLINIC | Age: 74
Discharge: HOME | End: 2024-06-07
Attending: EMERGENCY MEDICINE | Admitting: EMERGENCY MEDICINE
Payer: COMMERCIAL

## 2024-06-07 VITALS
HEIGHT: 63 IN | SYSTOLIC BLOOD PRESSURE: 127 MMHG | HEART RATE: 73 BPM | DIASTOLIC BLOOD PRESSURE: 79 MMHG | WEIGHT: 166.5 LBS | BODY MASS INDEX: 29.5 KG/M2 | OXYGEN SATURATION: 96 % | TEMPERATURE: 98.3 F | RESPIRATION RATE: 16 BRPM

## 2024-06-07 VITALS
DIASTOLIC BLOOD PRESSURE: 73 MMHG | OXYGEN SATURATION: 94 % | HEART RATE: 71 BPM | SYSTOLIC BLOOD PRESSURE: 121 MMHG | HEIGHT: 62 IN | TEMPERATURE: 97 F | WEIGHT: 166 LBS | RESPIRATION RATE: 15 BRPM | BODY MASS INDEX: 30.55 KG/M2

## 2024-06-07 VITALS
HEIGHT: 62 IN | HEART RATE: 74 BPM | SYSTOLIC BLOOD PRESSURE: 130 MMHG | WEIGHT: 166 LBS | OXYGEN SATURATION: 96 % | DIASTOLIC BLOOD PRESSURE: 80 MMHG | BODY MASS INDEX: 30.55 KG/M2

## 2024-06-07 DIAGNOSIS — L76.82 BLEEDING AT INSERTION SITE: ICD-10-CM

## 2024-06-07 DIAGNOSIS — I63.511 ACUTE ISCHEMIC RIGHT MCA STROKE (H): ICD-10-CM

## 2024-06-07 DIAGNOSIS — Z95.818 STATUS POST PLACEMENT OF IMPLANTABLE LOOP RECORDER: ICD-10-CM

## 2024-06-07 DIAGNOSIS — Z86.73 HISTORY OF CVA (CEREBROVASCULAR ACCIDENT): ICD-10-CM

## 2024-06-07 DIAGNOSIS — Q21.12 PATENT FORAMEN OVALE: Primary | ICD-10-CM

## 2024-06-07 PROCEDURE — 99282 EMERGENCY DEPT VISIT SF MDM: CPT

## 2024-06-07 PROCEDURE — 33285 INSJ SUBQ CAR RHYTHM MNTR: CPT | Performed by: INTERNAL MEDICINE

## 2024-06-07 PROCEDURE — 250N000009 HC RX 250: Performed by: INTERNAL MEDICINE

## 2024-06-07 PROCEDURE — 99214 OFFICE O/P EST MOD 30 MIN: CPT | Performed by: INTERNAL MEDICINE

## 2024-06-07 PROCEDURE — C1764 EVENT RECORDER, CARDIAC: HCPCS | Performed by: INTERNAL MEDICINE

## 2024-06-07 PROCEDURE — 999N000071 HC STATISTIC HEART CATH LAB OR EP LAB

## 2024-06-07 DEVICE — MONITOR CARDIAC LUX-DX II+ M312: Type: IMPLANTABLE DEVICE | Status: FUNCTIONAL

## 2024-06-07 RX ORDER — OXYCODONE AND ACETAMINOPHEN 5; 325 MG/1; MG/1
1 TABLET ORAL EVERY 4 HOURS PRN
Status: DISCONTINUED | OUTPATIENT
Start: 2024-06-07 | End: 2024-06-07 | Stop reason: HOSPADM

## 2024-06-07 RX ORDER — NALOXONE HYDROCHLORIDE 0.4 MG/ML
0.4 INJECTION, SOLUTION INTRAMUSCULAR; INTRAVENOUS; SUBCUTANEOUS
Status: DISCONTINUED | OUTPATIENT
Start: 2024-06-07 | End: 2024-06-07 | Stop reason: HOSPADM

## 2024-06-07 RX ORDER — NALOXONE HYDROCHLORIDE 0.4 MG/ML
0.2 INJECTION, SOLUTION INTRAMUSCULAR; INTRAVENOUS; SUBCUTANEOUS
Status: DISCONTINUED | OUTPATIENT
Start: 2024-06-07 | End: 2024-06-07 | Stop reason: HOSPADM

## 2024-06-07 RX ORDER — LIDOCAINE HYDROCHLORIDE AND EPINEPHRINE 10; 10 MG/ML; UG/ML
INJECTION, SOLUTION INFILTRATION; PERINEURAL
Status: DISCONTINUED | OUTPATIENT
Start: 2024-06-07 | End: 2024-06-07 | Stop reason: HOSPADM

## 2024-06-07 ASSESSMENT — ACTIVITIES OF DAILY LIVING (ADL)
ADLS_ACUITY_SCORE: 38
ADLS_ACUITY_SCORE: 36

## 2024-06-07 NOTE — DISCHARGE SUMMARY
Care Suites Discharge Nursing Note    Patient Information  Name: Elvia Helm  Age: 73 year old    Discharge Education:  Discharge instructions reviewed: Yes  Additional education/resources provided: no  Patient/patient representative verbalizes understanding: Yes  Patient discharging on new medications: No  Medication education completed: N/A    Discharge Plans:   Discharge location: home  Discharge ride contacted: Yes  Approximate discharge time: 1415    Discharge Criteria:  Discharge criteria met and vital signs stable: Yes    Patient Belongs:  Patient belongings returned to patient: Yes    Ashutosh Sanders RN

## 2024-06-07 NOTE — ED PROVIDER NOTES
"  Emergency Department Note      History of Present Illness     Chief Complaint  loop port bleeding    HPI  Elvia Helm is a 73 year old female with a history of type 2 diabetes mellitus, hypertension, stroke, and hyperlipidemia who presents to the ED for evaluation of loop recorder implant bleeding. The patient had a loop recorder implant put in at 1 pm this afternoon. The patient was instructed to keep the bandage on the port for 3 days. The port started bleeding at 4 pm today, prompting her to come to the ED. She is on aspirin, but no other blood thinning medications. The loop recorder implant is supposed to stay in for several years.    Independent Historian  None    Review of External Notes  None  Past Medical History   Medical History and Problem List  Acute ischemic right MCA stroke  Allergic rhinitis  Arthritis of knee  Bee sting reaction  Type 2 diabetes mellitus  Hypertension   Flat foot  Hyperlipidaemia   Ankle and foot osteoarthritsis  Metabolic syndrome X  PVC  Tinnitus    Medications  Aspirin 325 mg  Atorvastatin  Bisacodyl  Carvedilol  Citalopram  Epinephrine  Fluticasone  Lisinopril-hydrochlorothiazide  Metformin  Polyethylene glycol    Surgical History    section  Colonoscopy X 2  ENT surgery  Loop recorder implant   Endometrial biopsy]  Right ankle fracture surgery  Fallopian tube ligation     Physical Exam   Patient Vitals for the past 24 hrs:   BP Temp Temp src Pulse Resp SpO2 Height Weight   24 1930 121/73 -- -- 71 15 94 % -- --   24 1900 125/77 -- -- 71 18 95 % -- --   24 1830 116/82 -- -- 75 24 93 % -- --   24 1800 121/81 -- -- 75 17 93 % -- --   24 1730 (!) 149/91 -- -- 75 10 96 % -- --   24 1711 (!) 167/81 97  F (36.1  C) Oral 77 16 97 % 1.575 m (5' 2\") 75.3 kg (166 lb)     Physical Exam  Constitutional: Well appearing.  HEENT: Atraumatic.    Moist mucous membranes.  Neck: Soft.  Supple.  No JVD.  Cardiac: Regular rate and rhythm.  No " murmur or rub.  Chest wall exhibits 1 cm incision site with minimal oozing.  No underlying fluctuance or hematoma.  No surrounding erythema.  Minimal dehiscence with no visible loop recorder present.  Respiratory: Clear to auscultation bilaterally.  No respiratory distress.  No wheezing, rhonchi, or rales.  Abdomen: Soft and nontender.  No rebound or guarding.  Nondistended.  Musculoskeletal: No edema.  Normal range of motion.  Neurologic: Alert and oriented x3.  Normal tone and bulk.    Skin: No rashes.  No edema.  Psych: Normal affect.  Normal behavior.      ED Course    Procedures  Procedures     Discussion of Management  Cardiology,      Social Determinants of Health adding to complexity of care  None    ED Course  ED Course as of 06/08/24 0020   Fri Jun 07, 2024 1732 I obtained history and examined the patient as noted above.    1829 I spoke with cardiology regarding the patient's presentation and plan of care.      1844 I rechecked the patient and explained findings.      Medical Decision Making / Diagnosis   MDM  Elvia Helm is a 73 year old female who is afebrile and hemodynamically stable.  Her dressing was removed and there is a small amount of oozing blood from her incision site from her implantable loop recorder.  She has no underlying hematoma or fluctuance noted.  I discussed with cardiology who is okay with any means of stopping the bleeding such as stitches, glue, or Steri-Strips as needed.  We placed pressure with no improvement and continued to ooze slightly.  With further pressure this appear to be stopped and we discussed the options including sutures versus glue and patient would prefer glue at this time.  I placed glue and then Steri-Strip overlying at her request and her dressing was reapplied.  We discussed supportive care at home and strict return precautions were given.  Her questions were answered and she was in no distress at time of discharge.  She will follow-up with her  electrophysiology clinic and let them know about this on Monday.    Disposition  The patient was discharged.     ICD-10 Codes:    ICD-10-CM    1. Status post placement of implantable loop recorder  Z95.818       2. Bleeding at insertion site  L76.82         Discharge Medications  Discharge Medication List as of 6/7/2024  7:51 PM        Scribe Disclosure:  I, Solo Ying, am serving as a scribe at 9:07 PM on 6/7/2024 to document services personally performed by Christoph Kevni MD, on my observations and the provider's statements to me.        Christoph Kevin MD  06/08/24 0139

## 2024-06-07 NOTE — PROGRESS NOTES
HPI and Plan:   I the pleasure of seeing Ms. Helm today she had her loop recorder implanted this morning.  Today really was a visit to review everything because there was some confusion about what is going on.  Please see my previous notes.  This woman had a significant stroke the cause was unclear her transesophageal echo was done today I reviewed the actual pictures with her she does indeed have a PFO the left atrium was clean no clots.  The reader thought there might have been a penetrating descending thoracic aortic  ulcer I explained to the family what that means.  We are going to go ahead and get a CT scan of the descending aorta in the next month or so.    We did review the PFO trials I explained that she is at the outer end of age and when we do the rope score it actually says her chance of the stroke being from the PFO is 0%.  It is this type a day as the reason I do not want to rush in and closed the PFO and instead as a mention she has a loop recorder again to look and see if she has paroxysmal atrial fibs    The patient tells me she was seen by hematology they felt she does not have a hypercoagulable state.  She has had no recurrence of her stroke her blood pressure and lipids are in order    This point then the plan is I will see her back in about 2 months we will review the results of her thoracic aorta for penetrating ulcer we will see if there is been any activity on the loop recorder we will continue her on aspirin alone at this point.  Today's visit was 34-minute visit to discuss these options to clarify it in the family what we meant by PFO to review the actual transesophageal echo we talked about aortic ulcer we talked about the loop recorder and how we can use it to see if there is atrial fibs we reviewed the rope score and her blood work    No orders of the defined types were placed in this encounter.    No orders of the defined types were placed in this encounter.    There are no  discontinued medications.      Encounter Diagnosis   Name Primary?    Acute ischemic right MCA stroke (H)        CURRENT MEDICATIONS:  Current Outpatient Medications   Medication Sig Dispense Refill    aspirin (ASA) 325 MG EC tablet Take 325 mg by mouth At Bedtime      atorvastatin (LIPITOR) 10 MG tablet Take 1 tablet (10 mg) by mouth daily 90 tablet 0    azelastine (ASTELIN) 0.1 % nasal spray Spray 1 spray into both nostrils 2 times daily 30 mL 11    bisacodyl (DULCOLAX) 5 MG EC tablet Two days prior to exam take two (2) tablets at 4pm. One day prior to exam take two (2) tablets at 4pm 4 tablet 0    blood glucose (NO BRAND SPECIFIED) lancets standard Use to test blood sugar one* times daily or as directed. 100 each 3    blood glucose (ONETOUCH ULTRA) test strip Use to test blood sugar daily or as directed. 100 strip 3    blood glucose calibration (ONETOUCH ULTRA CONTROL) solution Use to calibrate blood glucose monitor as directed. 1 Bottle 1    blood glucose monitoring (ONE TOUCH ULTRA 2) meter device kit Use to test blood sugar 1 times daily. 1 kit 0    carvedilol (COREG) 3.125 MG tablet Take 1 tablet (3.125 mg) by mouth 2 times daily (with meals) 180 tablet 3    Cholecalciferol (VITAMIN D3 PO) Take 2,000 Units by mouth daily       citalopram (CELEXA) 10 MG tablet Take 1 tablet (10 mg) by mouth daily 90 tablet 0    EPINEPHrine (ANY BX GENERIC EQUIV) 0.3 MG/0.3ML injection 2-pack INJECT 0.3 MLS INTO THE MUSCLE ONCE AS NEEDED FOR ANAPHYLAXIS 2 each 0    fluticasone (FLONASE) 50 MCG/ACT spray Spray 1-2 sprays into both nostrils daily (Patient taking differently: Spray 1-2 sprays into both nostrils daily as needed for allergies) 16 g 0    lisinopril-hydrochlorothiazide (ZESTORETIC) 20-25 MG tablet Take 0.5 tablets by mouth daily 45 tablet 1    metFORMIN (GLUCOPHAGE) 850 MG tablet TAKE 1 TABLET (850 MG) BY MOUTH 2 TIMES DAILY (WITH MEALS) 180 tablet 0    mupirocin (BACTROBAN) 2 % external ointment Apply twice daily to  each nostril x 5 days. 15 g 1    Omega-3 Fatty Acids (OMEGA-3 FISH OIL PO) Take 1 g by mouth daily       polyethylene glycol (GOLYTELY) 236 g suspension Take as directed in colonoscopy prep instructions 8000 mL 0    sodium chloride (OCEAN) 0.65 % nasal spray Spray 1 spray into both nostrils daily as needed for congestion      vitamin B complex with vitamin C (STRESS TAB) tablet Take 1 tablet by mouth daily         ALLERGIES     Allergies   Allergen Reactions    Atovaquone-Proguanil Hcl Hives    Amoxicillin Hives    Bee Hives and Swelling     carries Epi Pen     Ceftin Hives     hives    Clindamycin Hives    Erythromycin GI Disturbance    Seasonal Allergies     Sertraline       cough, gag and many times I vomit    Shellfish-Derived Products Unknown    Shrimp Hives     Happened twice    Tetracycline Other (See Comments)     ?severe headaches  & nausea        PAST MEDICAL HISTORY:  Past Medical History:   Diagnosis Date    Acute ischemic right MCA stroke (H) 10/10/2023    Allergic rhinitis, cause unspecified 2003a    Arthritis of knee 06/06/2014    Bee sting reaction 07/2009    DIABETES TYPE II 2003    Essential hypertension, benign     Flat foot(734) 06/06/2014    Hyperlipidaemia LDL goal < 100     Localized osteoarthrosis not specified whether primary or secondary, ankle and foot     After surgery    Metabolic syndrome X     PVC's (premature ventricular contractions)     Tinnitus 06/06/2014       PAST SURGICAL HISTORY:  Past Surgical History:   Procedure Laterality Date    ABDOMEN SURGERY  7/1988    ceasaran birth    COLONOSCOPY  12/07    COLONOSCOPY N/A 1/31/2022    Procedure: COLONOSCOPY, WITH POLYPECTOMY AND BIOPSY;  Surgeon: Nicole Grubbs MD;  Location:  GI    ENT SURGERY      frequent sinus infections    EP LOOP RECORDER IMPLANT N/A 6/7/2024    Procedure: Loop Recorder Implant;  Surgeon: Rm More MD;  Location:  [facility]    HC ENDOMETRIAL BIOPSY W/O CERVICAL DILATION  2001     ORTHOPEDIC SURGERY      broken right ankle/plates and screws    SOFT TISSUE SURGERY      tendonitis in right upper leg and below right elbow    ZZC  DELIVERY ONLY      ZZC LIGATE FALLOPIAN TUBE,POSTPARTUM      ZZC NONSPECIFIC PROCEDURE      right ankle roe and pins       FAMILY HISTORY:  Family History   Problem Relation Age of Onset    Musculoskeletal Disorder Mother         b:1938   Hx Fibromyalgia    Hypertension Mother     Cerebrovascular Disease Mother     Anesthesia Reaction Mother     Cerebrovascular Disease Father         b:193,  age 68  massive stroke    Diabetes Father         dx age 50s and his family    Gastrointestinal Disease Sister         b:1950   Hx of reflux    Cerebrovascular Disease Sister     Cerebrovascular Disease Sister 62        ? TIA    Bronchitis Sister     Anxiety Disorder Sister     Anesthesia Reaction Sister     Family History Negative Brother         b:    Cancer Maternal Grandfather         throat    Diabetes Paternal Grandmother     Hypertension Daughter     Family History Negative Daughter         b:    Diabetes Daughter         b:    Diabetes dx age 22**insulin    CABG Daughter 43    Hypertension Daughter     Lipids Son         b: high cholosterol and triglycerides       SOCIAL HISTORY:  Social History     Socioeconomic History    Marital status:     Number of children: 3   Tobacco Use    Smoking status: Former     Current packs/day: 0.00     Average packs/day: 1 pack/day for 10.0 years (10.0 ttl pk-yrs)     Types: Cigarettes     Start date: 1972     Quit date: 1982     Years since quittin.8    Smokeless tobacco: Never   Vaping Use    Vaping status: Never Used   Substance and Sexual Activity    Alcohol use: Yes     Comment: glass of wine on the weekends    Drug use: No     Comment: caffeine tea 3-4/d    Sexual activity: Not Currently     Partners: Male     Birth control/protection: Female Surgical, None     Comment: Post  "menopausal   Other Topics Concern    Weight Concern Yes     Comment: trying to lose    Special Diet No     Comment: Diabetic diet    Exercise Yes     Comment: walks 20 mt    Parent/sibling w/ CABG, MI or angioplasty before 65F 55M? 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          Social Determinants of Health     Financial Resource Strain: Low Risk  (12/11/2023)    Financial Resource Strain     Within the past 12 months, have you or your family members you live with been unable to get utilities (heat, electricity) when it was really needed?: No   Food Insecurity: Low Risk  (12/11/2023)    Food Insecurity     Within the past 12 months, did you worry that your food would run out before you got money to buy more?: No     Within the past 12 months, did the food you bought just not last and you didn t have money to get more?: No   Transportation Needs: Low Risk  (12/11/2023)    Transportation Needs     Within the past 12 months, has lack of transportation kept you from medical appointments, getting your medicines, non-medical meetings or appointments, work, or from getting things that you need?: No   Housing Stability: Low Risk  (12/11/2023)    Housing Stability     Do you have housing? : Yes     Are you worried about losing your housing?: No       Review of Systems:  Skin:        Eyes:       ENT:       Respiratory:       Cardiovascular:       Gastroenterology:      Genitourinary:       Musculoskeletal:       Neurologic:       Psychiatric:       Heme/Lymph/Imm:       Endocrine:         Physical Exam:  Vitals: /80   Pulse 74   Ht 1.575 m (5' 2\")   Wt 75.3 kg (166 lb)   LMP 03/17/2001   SpO2 96%   BMI 30.36 kg/m        Constitutional:           Skin:             Head:           Eyes:           Lymph:      ENT:           Neck:           Respiratory:            Cardiac:                                                           GI:           Extremities and Muscular Skeletal:     "             Neurological:           Psych:         Recent Lab Results:  LIPID RESULTS:  Lab Results   Component Value Date    CHOL 148 06/05/2024    CHOL 151 06/14/2021    HDL 53 06/05/2024    HDL 60 06/14/2021    LDL 72 06/05/2024    LDL 65 06/14/2021    TRIG 115 06/05/2024    TRIG 128 06/14/2021    CHOLHDLRATIO 2.9 06/30/2015       LIVER ENZYME RESULTS:  Lab Results   Component Value Date    AST 19 06/05/2024    AST 23 06/14/2021    ALT 18 06/05/2024    ALT 32 06/14/2021       CBC RESULTS:  Lab Results   Component Value Date    WBC 7.6 12/01/2023    WBC 7.5 06/14/2021    RBC 4.46 12/01/2023    RBC 4.36 06/14/2021    HGB 13.2 12/01/2023    HGB 13.5 06/14/2021    HCT 40.9 12/01/2023    HCT 40.5 06/14/2021    MCV 92 12/01/2023    MCV 93 06/14/2021    MCH 29.6 12/01/2023    MCH 31.0 06/14/2021    MCHC 32.3 12/01/2023    MCHC 33.3 06/14/2021    RDW 12.8 12/01/2023    RDW 14.0 06/14/2021     12/01/2023     06/14/2021       BMP RESULTS:  Lab Results   Component Value Date     06/05/2024     06/14/2021    POTASSIUM 4.5 06/05/2024    POTASSIUM 4.3 09/29/2022    POTASSIUM 4.0 06/14/2021    CHLORIDE 103 06/05/2024    CHLORIDE 104 09/29/2022    CHLORIDE 104 06/14/2021    CO2 27 06/05/2024    CO2 30 09/29/2022    CO2 29 06/14/2021    ANIONGAP 11 06/05/2024    ANIONGAP 3 09/29/2022    ANIONGAP 4 06/14/2021     (H) 06/05/2024     (H) 09/13/2023     (H) 09/29/2022     (H) 06/14/2021    BUN 14.2 06/05/2024    BUN 16 09/29/2022    BUN 15 06/14/2021    CR 0.81 06/05/2024    CR 0.81 06/14/2021    GFRESTIMATED 76 06/05/2024    GFRESTIMATED >60 05/29/2024    GFRESTIMATED 72 06/14/2021    GFRESTBLACK 84 06/14/2021    KIM 9.8 06/05/2024    KIM 9.4 06/14/2021        A1C RESULTS:  Lab Results   Component Value Date    A1C 6.0 (H) 06/05/2024    A1C 6.1 (H) 06/14/2021       INR RESULTS:  Lab Results   Component Value Date    INR 1.03 12/07/2023    INR 0.91 09/11/2023            CC  Jenny Carlson, NP  [address] [place] [address]

## 2024-06-07 NOTE — PROGRESS NOTES
[facility] Post Procedure Note    Patient Information  Name: Elvia Helm  Age: 73 year old    Post Procedure  Time patient returned to Care [facility]: 1115  Concerns/abnormal assessment: no  If abnormal assessment, provider notified: N/A  Plan/Other: Review labs, obtain consent and proceed with scheduled treatment or intervention      Ashutosh Sanders RN

## 2024-06-07 NOTE — PROGRESS NOTES
[facility] Post Procedure Note    Patient Information  Name: Elvia Helm  Age: 73 year old    Post Procedure  Time patient returned to Care Suites: 1330  Concerns/abnormal assessment: none  If abnormal assessment, provider notified: N/A  Plan/Other: Pacer rep to see - monitor until discharge    Ashutosh Sanders RN

## 2024-06-07 NOTE — LETTER
6/7/2024    Masood Osborne MD  [address]  [place] [address]    RE: Elvia Heml       Dear Colleague,     I had the pleasure of seeing Elvia Helm in the [facility].  HPI and Plan:   I the pleasure of seeing Ms. Helm today she had her loop recorder implanted this morning.  Today really was a visit to review everything because there was some confusion about what is going on.  Please see my previous notes.  This woman had a significant stroke the cause was unclear her transesophageal echo was done today I reviewed the actual pictures with her she does indeed have a PFO the left atrium was clean no clots.  The reader thought there might have been a penetrating descending thoracic aortic  ulcer I explained to the family what that means.  We are going to go ahead and get a CT scan of the descending aorta in the next month or so.    We did review the PFO trials I explained that she is at the outer end of age and when we do the rope score it actually says her chance of the stroke being from the PFO is 0%.  It is this type a day as the reason I do not want to rush in and closed the PFO and instead as a mention she has a loop recorder again to look and see if she has paroxysmal atrial fibs    The patient tells me she was seen by hematology they felt she does not have a hypercoagulable state.  She has had no recurrence of her stroke her blood pressure and lipids are in order    This point then the plan is I will see her back in about 2 months we will review the results of her thoracic aorta for penetrating ulcer we will see if there is been any activity on the loop recorder we will continue her on aspirin alone at this point.  Today's visit was 34-minute visit to discuss these options to clarify it in the family what we meant by PFO to review the actual transesophageal echo we talked about aortic ulcer we talked about the loop recorder and how we can use it to see if there is  atrial fibs we reviewed the rope score and her blood work    No orders of the defined types were placed in this encounter.    No orders of the defined types were placed in this encounter.    There are no discontinued medications.      Encounter Diagnosis   Name Primary?    Acute ischemic right MCA stroke (H)        CURRENT MEDICATIONS:  Current Outpatient Medications   Medication Sig Dispense Refill    aspirin (ASA) 325 MG EC tablet Take 325 mg by mouth At Bedtime      atorvastatin (LIPITOR) 10 MG tablet Take 1 tablet (10 mg) by mouth daily 90 tablet 0    azelastine (ASTELIN) 0.1 % nasal spray Spray 1 spray into both nostrils 2 times daily 30 mL 11    bisacodyl (DULCOLAX) 5 MG EC tablet Two days prior to exam take two (2) tablets at 4pm. One day prior to exam take two (2) tablets at 4pm 4 tablet 0    blood glucose (NO BRAND SPECIFIED) lancets standard Use to test blood sugar one* times daily or as directed. 100 each 3    blood glucose (ONETOUCH ULTRA) test strip Use to test blood sugar daily or as directed. 100 strip 3    blood glucose calibration (ONETOUCH ULTRA CONTROL) solution Use to calibrate blood glucose monitor as directed. 1 Bottle 1    blood glucose monitoring (ONE TOUCH ULTRA 2) meter device kit Use to test blood sugar 1 times daily. 1 kit 0    carvedilol (COREG) 3.125 MG tablet Take 1 tablet (3.125 mg) by mouth 2 times daily (with meals) 180 tablet 3    Cholecalciferol (VITAMIN D3 PO) Take 2,000 Units by mouth daily       citalopram (CELEXA) 10 MG tablet Take 1 tablet (10 mg) by mouth daily 90 tablet 0    EPINEPHrine (ANY BX GENERIC EQUIV) 0.3 MG/0.3ML injection 2-pack INJECT 0.3 MLS INTO THE MUSCLE ONCE AS NEEDED FOR ANAPHYLAXIS 2 each 0    fluticasone (FLONASE) 50 MCG/ACT spray Spray 1-2 sprays into both nostrils daily (Patient taking differently: Spray 1-2 sprays into both nostrils daily as needed for allergies) 16 g 0    lisinopril-hydrochlorothiazide (ZESTORETIC) 20-25 MG tablet Take 0.5 tablets by  mouth daily 45 tablet 1    metFORMIN (GLUCOPHAGE) 850 MG tablet TAKE 1 TABLET (850 MG) BY MOUTH 2 TIMES DAILY (WITH MEALS) 180 tablet 0    mupirocin (BACTROBAN) 2 % external ointment Apply twice daily to each nostril x 5 days. 15 g 1    Omega-3 Fatty Acids (OMEGA-3 FISH OIL PO) Take 1 g by mouth daily       polyethylene glycol (GOLYTELY) 236 g suspension Take as directed in colonoscopy prep instructions 8000 mL 0    sodium chloride (OCEAN) 0.65 % nasal spray Spray 1 spray into both nostrils daily as needed for congestion      vitamin B complex with vitamin C (STRESS TAB) tablet Take 1 tablet by mouth daily         ALLERGIES     Allergies   Allergen Reactions    Atovaquone-Proguanil Hcl Hives    Amoxicillin Hives    Bee Hives and Swelling     carries Epi Pen     Ceftin Hives     hives    Clindamycin Hives    Erythromycin GI Disturbance    Seasonal Allergies     Sertraline       cough, gag and many times I vomit    Shellfish-Derived Products Unknown    Shrimp Hives     Happened twice    Tetracycline Other (See Comments)     ?severe headaches  & nausea        PAST MEDICAL HISTORY:  Past Medical History:   Diagnosis Date    Acute ischemic right MCA stroke (H) 10/10/2023    Allergic rhinitis, cause unspecified 2003a    Arthritis of knee 06/06/2014    Bee sting reaction 07/2009    DIABETES TYPE II 2003    Essential hypertension, benign     Flat foot(734) 06/06/2014    Hyperlipidaemia LDL goal < 100     Localized osteoarthrosis not specified whether primary or secondary, ankle and foot     After surgery    Metabolic syndrome X     PVC's (premature ventricular contractions)     Tinnitus 06/06/2014       PAST SURGICAL HISTORY:  Past Surgical History:   Procedure Laterality Date    ABDOMEN SURGERY  7/1988    ceasaran birth    COLONOSCOPY  12/07    COLONOSCOPY N/A 1/31/2022    Procedure: COLONOSCOPY, WITH POLYPECTOMY AND BIOPSY;  Surgeon: Nicole Grubbs MD;  Location:  GI    ENT SURGERY      frequent sinus infections     EP LOOP RECORDER IMPLANT N/A 2024    Procedure: Loop Recorder Implant;  Surgeon: Rm More MD;  Location:  HEART CARDIAC CATH LAB    HC ENDOMETRIAL BIOPSY W/O CERVICAL DILATION      ORTHOPEDIC SURGERY      broken right ankle/plates and screws    SOFT TISSUE SURGERY      tendonitis in right upper leg and below right elbow    ZZC  DELIVERY ONLY      ZZC LIGATE FALLOPIAN TUBE,POSTPARTUM      ZZC NONSPECIFIC PROCEDURE      right ankle roe and pins       FAMILY HISTORY:  Family History   Problem Relation Age of Onset    Musculoskeletal Disorder Mother         b:1938   Hx Fibromyalgia    Hypertension Mother     Cerebrovascular Disease Mother     Anesthesia Reaction Mother     Cerebrovascular Disease Father         b:,  age 68  massive stroke    Diabetes Father         dx age 50s and his family    Gastrointestinal Disease Sister         b:   Hx of reflux    Cerebrovascular Disease Sister     Cerebrovascular Disease Sister 62        ? TIA    Bronchitis Sister     Anxiety Disorder Sister     Anesthesia Reaction Sister     Family History Negative Brother         b:    Cancer Maternal Grandfather         throat    Diabetes Paternal Grandmother     Hypertension Daughter     Family History Negative Daughter         b:    Diabetes Daughter         b:    Diabetes dx age 22**insulin    CABG Daughter 43    Hypertension Daughter     Lipids Son         b: high cholosterol and triglycerides       SOCIAL HISTORY:  Social History     Socioeconomic History    Marital status:     Number of children: 3   Tobacco Use    Smoking status: Former     Current packs/day: 0.00     Average packs/day: 1 pack/day for 10.0 years (10.0 ttl pk-yrs)     Types: Cigarettes     Start date: 1972     Quit date: 1982     Years since quittin.8    Smokeless tobacco: Never   Vaping Use    Vaping status: Never Used   Substance and Sexual Activity    Alcohol use: Yes     Comment:  "glass of wine on the weekends    Drug use: No     Comment: caffeine tea 3-4/d    Sexual activity: Not Currently     Partners: Male     Birth control/protection: Female Surgical, None     Comment: Post menopausal   Other Topics Concern    Weight Concern Yes     Comment: trying to lose    Special Diet No     Comment: Diabetic diet    Exercise Yes     Comment: walks 20 mt    Parent/sibling w/ CABG, MI or angioplasty before 65F 55M? 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          Social Determinants of Health     Financial Resource Strain: Low Risk  (12/11/2023)    Financial Resource Strain     Within the past 12 months, have you or your family members you live with been unable to get utilities (heat, electricity) when it was really needed?: No   Food Insecurity: Low Risk  (12/11/2023)    Food Insecurity     Within the past 12 months, did you worry that your food would run out before you got money to buy more?: No     Within the past 12 months, did the food you bought just not last and you didn t have money to get more?: No   Transportation Needs: Low Risk  (12/11/2023)    Transportation Needs     Within the past 12 months, has lack of transportation kept you from medical appointments, getting your medicines, non-medical meetings or appointments, work, or from getting things that you need?: No   Housing Stability: Low Risk  (12/11/2023)    Housing Stability     Do you have housing? : Yes     Are you worried about losing your housing?: No       Review of Systems:  Skin:        Eyes:       ENT:       Respiratory:       Cardiovascular:       Gastroenterology:      Genitourinary:       Musculoskeletal:       Neurologic:       Psychiatric:       Heme/Lymph/Imm:       Endocrine:         Physical Exam:  Vitals: /80   Pulse 74   Ht 1.575 m (5' 2\")   Wt 75.3 kg (166 lb)   LMP 03/17/2001   SpO2 96%   BMI 30.36 kg/m        Constitutional:           Skin:             Head:       "     Eyes:           Lymph:      ENT:           Neck:           Respiratory:            Cardiac:                                                           GI:           Extremities and Muscular Skeletal:                 Neurological:           Psych:         Recent Lab Results:  LIPID RESULTS:  Lab Results   Component Value Date    CHOL 148 06/05/2024    CHOL 151 06/14/2021    HDL 53 06/05/2024    HDL 60 06/14/2021    LDL 72 06/05/2024    LDL 65 06/14/2021    TRIG 115 06/05/2024    TRIG 128 06/14/2021    CHOLHDLRATIO 2.9 06/30/2015       LIVER ENZYME RESULTS:  Lab Results   Component Value Date    AST 19 06/05/2024    AST 23 06/14/2021    ALT 18 06/05/2024    ALT 32 06/14/2021       CBC RESULTS:  Lab Results   Component Value Date    WBC 7.6 12/01/2023    WBC 7.5 06/14/2021    RBC 4.46 12/01/2023    RBC 4.36 06/14/2021    HGB 13.2 12/01/2023    HGB 13.5 06/14/2021    HCT 40.9 12/01/2023    HCT 40.5 06/14/2021    MCV 92 12/01/2023    MCV 93 06/14/2021    MCH 29.6 12/01/2023    MCH 31.0 06/14/2021    MCHC 32.3 12/01/2023    MCHC 33.3 06/14/2021    RDW 12.8 12/01/2023    RDW 14.0 06/14/2021     12/01/2023     06/14/2021       BMP RESULTS:  Lab Results   Component Value Date     06/05/2024     06/14/2021    POTASSIUM 4.5 06/05/2024    POTASSIUM 4.3 09/29/2022    POTASSIUM 4.0 06/14/2021    CHLORIDE 103 06/05/2024    CHLORIDE 104 09/29/2022    CHLORIDE 104 06/14/2021    CO2 27 06/05/2024    CO2 30 09/29/2022    CO2 29 06/14/2021    ANIONGAP 11 06/05/2024    ANIONGAP 3 09/29/2022    ANIONGAP 4 06/14/2021     (H) 06/05/2024     (H) 09/13/2023     (H) 09/29/2022     (H) 06/14/2021    BUN 14.2 06/05/2024    BUN 16 09/29/2022    BUN 15 06/14/2021    CR 0.81 06/05/2024    CR 0.81 06/14/2021    GFRESTIMATED 76 06/05/2024    GFRESTIMATED >60 05/29/2024    GFRESTIMATED 72 06/14/2021    GFRESTBLACK 84 06/14/2021    KIM 9.8 06/05/2024    KIM 9.4 06/14/2021        A1C  RESULTS:  Lab Results   Component Value Date    A1C 6.0 (H) 06/05/2024    A1C 6.1 (H) 06/14/2021       INR RESULTS:  Lab Results   Component Value Date    INR 1.03 12/07/2023    INR 0.91 09/11/2023           CC  Jenny Carlson, MUSA  [address] [id number]      Thank you for allowing me to participate in the care of your patient.      Sincerely,     Varun Murray MD     [facility]

## 2024-06-07 NOTE — ED TRIAGE NOTES
Pt has loop port bleeding   Pt just had it put in at 1pm this afternoon  Pt states it all of a sudden start bleeding at 4pm today   Pt states it is for afib

## 2024-06-07 NOTE — TELEPHONE ENCOUNTER
Patient calling clinic stating she had loop device placed today and now site is bleeding without stopping. Patient stated she has tried applying pressure and site does not stop bleeding. Patient stated bleeding has been present for over an hour with stopping.     Per disposition, RN advised patient to be seen in ED for evaluation and to stop bleeding. Patient agreed with plan of care and will go to ED now.     Reason for Disposition   Bleeding and won't stop after 10 minutes of direct pressure (using correct technique)    Additional Information   Negative: Major bleeding (e.g., actively dripping or spurting) and can't be stopped   Negative: Amputation   Negative: Shock suspected (e.g., cold/pale/clammy skin, too weak to stand, low BP, rapid pulse)   Negative: Knife wound (or other possibly deep cut) and to chest, abdomen, back, neck, or head   Negative: Self-injury (e.g., cutting, self-harm) and suicidal or out-of-control   Negative: Sounds like a life-threatening emergency to the triager   Negative: Animal bite and broken skin   Negative: Human bite and broken skin   Negative: Puncture wound    Protocols used: Cuts and [id number]

## 2024-06-10 ENCOUNTER — TELEPHONE (OUTPATIENT)
Dept: CARDIOLOGY | Facility: CLINIC | Age: 74
End: 2024-06-10
Payer: COMMERCIAL

## 2024-06-10 DIAGNOSIS — Z45.09 ENCOUNTER FOR LOOP RECORDER CHECK: ICD-10-CM

## 2024-06-10 DIAGNOSIS — I63.511 ACUTE ISCHEMIC RIGHT MCA STROKE (H): Primary | ICD-10-CM

## 2024-06-10 NOTE — TELEPHONE ENCOUNTER
Post [employer] Loop Implant 6/7/24 with Dr. More for recurrent CVA discharge phone call.    Reviewed the following:  - Remove outer dressing 3 days after implant. May shower after outer dressing removed.   - Leave steri-strips in place.  If appropriate, these will be removed at 1 week device check  - Watch for redness, drainage, warmth, or fever. Call device clinic if any signs of infection.   - No soaking in bath tub, swimming pool or hot tub until your incision completely heals (usually 1-2 weeks)    Pt lives in [place].  6/19/24 at 1100 in [place] - patient prefers in-clinic follow-up. Patient was in the ER on 6/7/24 with bleeding from her incision site.  They applied dermabond and new steri-strips.  Patient states she has not had any further bleeding since this.      Device clinic phone number provided ([phone]).  Transmission scheduled for 6/19/24 on [facility].      NAZARIO Lofton

## 2024-06-13 ENCOUNTER — OFFICE VISIT (OUTPATIENT)
Dept: FAMILY MEDICINE | Facility: CLINIC | Age: 74
End: 2024-06-13
Attending: INTERNAL MEDICINE
Payer: COMMERCIAL

## 2024-06-13 VITALS
TEMPERATURE: 98.2 F | DIASTOLIC BLOOD PRESSURE: 77 MMHG | WEIGHT: 167 LBS | RESPIRATION RATE: 16 BRPM | HEART RATE: 80 BPM | BODY MASS INDEX: 30.73 KG/M2 | OXYGEN SATURATION: 98 % | HEIGHT: 62 IN | SYSTOLIC BLOOD PRESSURE: 116 MMHG

## 2024-06-13 DIAGNOSIS — I63.9 ACUTE ISCHEMIC STROKE (H): ICD-10-CM

## 2024-06-13 DIAGNOSIS — R00.0 SINUS TACHYCARDIA: ICD-10-CM

## 2024-06-13 DIAGNOSIS — M85.89 OSTEOPENIA OF MULTIPLE SITES: ICD-10-CM

## 2024-06-13 DIAGNOSIS — I71.9 ATHEROSCLEROTIC ULCER OF AORTA (H): ICD-10-CM

## 2024-06-13 DIAGNOSIS — E11.59 TYPE 2 DIABETES MELLITUS WITH VASCULAR DISEASE (H): Primary | ICD-10-CM

## 2024-06-13 DIAGNOSIS — I70.0 ATHEROSCLEROTIC ULCER OF AORTA (H): ICD-10-CM

## 2024-06-13 DIAGNOSIS — I10 ESSENTIAL HYPERTENSION, BENIGN: ICD-10-CM

## 2024-06-13 DIAGNOSIS — Q21.12 PFO (PATENT FORAMEN OVALE): ICD-10-CM

## 2024-06-13 DIAGNOSIS — E78.5 HYPERLIPIDEMIA LDL GOAL <100: ICD-10-CM

## 2024-06-13 DIAGNOSIS — R91.8 MASS OF UPPER LOBE OF RIGHT LUNG: ICD-10-CM

## 2024-06-13 DIAGNOSIS — F41.8 SITUATIONAL ANXIETY: ICD-10-CM

## 2024-06-13 PROCEDURE — 99214 OFFICE O/P EST MOD 30 MIN: CPT | Performed by: INTERNAL MEDICINE

## 2024-06-13 ASSESSMENT — PAIN SCALES - GENERAL: PAINLEVEL: NO PAIN (0)

## 2024-06-13 NOTE — PROGRESS NOTES
"  Assessment & Plan     Type 2 diabetes mellitus with vascular disease (H)  This is a very well-controlled diabetic on metformin but we discussed about Jardiance and SGLT2's and GLP agonists  She wishes to wait on that- Albumin Random Urine Quantitative with Creat Ratio  - HEMOGLOBIN A1C    Atherosclerotic ulcer of aorta (H24)  CT films shown to the patient and I actually spoke to her cardiologist also  Due for scheduled    PFO (patent foramen ovale)  This may not be the cause of stroke but has to be potentially    And it is being studied      Hyperlipidemia LDL goal <100  Continue statins atorvastatin  - PRIMARY CARE FOLLOW-UP SCHEDULING    Essential hypertension, benign  Patient is on carvedilol that also helps her with tachycardia  She is also on Zestoretic  - PRIMARY CARE FOLLOW-UP SCHEDULING    Mass of upper lobe of right lung  Mass remains stable and is potentially cancer  - PRIMARY CARE FOLLOW-UP SCHEDULING    Osteopenia of multiple sites    - DEXA HIP/PELVIS/SPINE - Future    Acute ischemic stroke (H)  Patient has fully recovered from thalamic stroke  - PRIMARY CARE FOLLOW-UP SCHEDULING    Sinus tachycardia  Patient has implantable monitor  - PRIMARY CARE FOLLOW-UP SCHEDULING    Situational anxiety  Much better  - PRIMARY CARE FOLLOW-UP SCHEDULING      The MDM list has been removed from the note.  To document MDM use the Bill based on time quick note button above the note workspace:[id number]}      BMI  Estimated body mass index is 30.54 kg/m  as calculated from the following:    Height as of this encounter: 1.575 m (5' 2\").    Weight as of this encounter: 75.8 kg (167 lb).             Miguel A Marquez is a 73 year old, presenting for the following health issues:  Follow Up    Patient was seen by her thoracic surgeon and lung mass is remaining stable  She was in the emergency room problem bleeding at the site of lead  She is now doing okay  She does not have any chest pain or shortness of breath and has fully " "recovered from stroke  .  Her questions about her imaging which I have reviewed  Via the Health Maintenance questionnaire, the patient has reported the following services have been completed -Eye Exam: [facility] 2023-06-05, this information has been sent to the abstraction team.  History of Present Illness       Diabetes:   She presents for follow up of diabetes.  She is checking home blood glucose a few times a month.   She checks blood glucose before meals.  Blood glucose is never over 200 and never under 70.  When her blood glucose is low, the patient is asymptomatic for confusion, blurred vision, lethargy and reports not feeling dizzy, shaky, or weak.   She has no concerns regarding her diabetes at this time.   She is not experiencing numbness or burning in feet, excessive thirst, blurry vision, weight changes or redness, sores or blisters on feet. The patient has had a diabetic eye exam in the last 12 months. Eye exam performed on July 2023. Location of last eye exam [facility]        Reason for visit:  Update on my health issues    She eats 2-3 servings of fruits and vegetables daily.She consumes 0 sweetened beverage(s) daily.She exercises with enough effort to increase her heart rate 10 to 19 minutes per day.  She exercises with enough effort to increase her heart rate 3 or less days per week.   She is taking medications regularly.               Review of Systems  Constitutional, HEENT, cardiovascular, pulmonary, GI, , musculoskeletal, neuro, skin, endocrine and psych systems are negative, except as otherwise noted.      Objective    /77 (BP Location: Left arm, Patient Position: Sitting, Cuff Size: Adult Regular)   Pulse 80   Temp 98.2  F (36.8  C)   Resp 16   Ht 1.575 m (5' 2\")   Wt 75.8 kg (167 lb)   LMP 03/17/2001   SpO2 98%   BMI 30.54 kg/m    Body mass index is 30.54 kg/m .  Physical Exam   GENERAL: alert and no distress  NECK: no adenopathy, no asymmetry, masses, or scars  RESP: " lungs clear to auscultation - no rales, rhonchi or wheezes  CV: regular rate and rhythm, normal S1 S2, no S3 or S4, no murmur, click or rub, no peripheral edema     Current Outpatient Medications:     aspirin (ASA) 325 MG EC tablet, Take 325 mg by mouth At Bedtime, Disp: , Rfl:     atorvastatin (LIPITOR) 10 MG tablet, Take 1 tablet (10 mg) by mouth daily, Disp: 90 tablet, Rfl: 0    azelastine (ASTELIN) 0.1 % nasal spray, Spray 1 spray into both nostrils 2 times daily, Disp: 30 mL, Rfl: 11    blood glucose (NO BRAND SPECIFIED) lancets standard, Use to test blood sugar one* times daily or as directed., Disp: 100 each, Rfl: 3    blood glucose (ONETOUCH ULTRA) test strip, Use to test blood sugar daily or as directed., Disp: 100 strip, Rfl: 3    blood glucose calibration (ONETOUCH ULTRA CONTROL) solution, Use to calibrate blood glucose monitor as directed., Disp: 1 Bottle, Rfl: 1    blood glucose monitoring (ONE TOUCH ULTRA 2) meter device kit, Use to test blood sugar 1 times daily., Disp: 1 kit, Rfl: 0    carvedilol (COREG) 3.125 MG tablet, Take 1 tablet (3.125 mg) by mouth 2 times daily (with meals), Disp: 180 tablet, Rfl: 3    Cholecalciferol (VITAMIN D3 PO), Take 2,000 Units by mouth daily , Disp: , Rfl:     citalopram (CELEXA) 10 MG tablet, Take 1 tablet (10 mg) by mouth daily, Disp: 90 tablet, Rfl: 0    fluticasone (FLONASE) 50 MCG/ACT spray, Spray 1-2 sprays into both nostrils daily (Patient taking differently: Spray 1-2 sprays into both nostrils daily as needed for allergies), Disp: 16 g, Rfl: 0    lisinopril-hydrochlorothiazide (ZESTORETIC) 20-25 MG tablet, Take 0.5 tablets by mouth daily, Disp: 45 tablet, Rfl: 1    metFORMIN (GLUCOPHAGE) 850 MG tablet, TAKE 1 TABLET (850 MG) BY MOUTH 2 TIMES DAILY (WITH MEALS), Disp: 180 tablet, Rfl: 0    mupirocin (BACTROBAN) 2 % external ointment, Apply twice daily to each nostril x 5 days., Disp: 15 g, Rfl: 1    Omega-3 Fatty Acids (OMEGA-3 FISH OIL PO), Take 1 g by mouth  daily , Disp: , Rfl:     sodium chloride (OCEAN) 0.65 % nasal spray, Spray 1 spray into both nostrils daily as needed for congestion, Disp: , Rfl:     vitamin B complex with vitamin C (STRESS TAB) tablet, Take 1 tablet by mouth daily, Disp: , Rfl:     bisacodyl (DULCOLAX) 5 MG EC tablet, Two days prior to exam take two (2) tablets at 4pm. One day prior to exam take two (2) tablets at 4pm (Patient not taking: Reported on 6/13/2024), Disp: 4 tablet, Rfl: 0    EPINEPHrine (ANY BX GENERIC EQUIV) 0.3 MG/0.3ML injection 2-pack, INJECT 0.3 MLS INTO THE MUSCLE ONCE AS NEEDED FOR ANAPHYLAXIS, Disp: 2 each, Rfl: 0  Patient Active Problem List   Diagnosis    Allergic rhinitis    Essential hypertension, benign    Postmenopausal atrophic vaginitis    Bee sting reaction    Torticollis    Type 2 diabetes mellitus with vascular disease (H)    HYPERLIPIDEMIA LDL GOAL <100    Post-nasal drip    Advanced directives, counseling/discussion    Osteopenia    Urticaria    Arthritis of knee    Pes planus    Tinnitus    Left-sided weakness    Acute ischemic right MCA stroke (H)    PFO (patent foramen ovale)           Lab on 06/05/2024   Component Date Value Ref Range Status    Cholesterol 06/05/2024 148  <200 mg/dL Final    Triglycerides 06/05/2024 115  <150 mg/dL Final    Direct Measure HDL 06/05/2024 53  >=50 mg/dL Final    LDL Cholesterol Calculated 06/05/2024 72  <=100 mg/dL Final    Non HDL Cholesterol 06/05/2024 95  <130 mg/dL Final    Patient Fasting > 8hrs? 06/05/2024 Yes   Final    Sodium 06/05/2024 141  135 - 145 mmol/L Final    Reference intervals for this test were updated on 09/26/2023 to more accurately reflect our healthy population. There may be differences in the flagging of prior results with similar values performed with this method. Interpretation of those prior results can be made in the context of the updated reference intervals.     Potassium 06/05/2024 4.5  3.4 - 5.3 mmol/L Final    Carbon Dioxide (CO2) 06/05/2024 27   22 - 29 mmol/L Final    Anion Gap 06/05/2024 11  7 - 15 mmol/L Final    Urea Nitrogen 06/05/2024 14.2  8.0 - 23.0 mg/dL Final    Creatinine 06/05/2024 0.81  0.51 - 0.95 mg/dL Final    GFR Estimate 06/05/2024 76  >60 mL/min/1.73m2 Final    Calcium 06/05/2024 9.8  8.8 - 10.2 mg/dL Final    Chloride 06/05/2024 103  98 - 107 mmol/L Final    Glucose 06/05/2024 115 (H)  70 - 99 mg/dL Final    Alkaline Phosphatase 06/05/2024 48  40 - 150 U/L Final    AST 06/05/2024 19  0 - 45 U/L Final    Reference intervals for this test were updated on 6/12/2023 to more accurately reflect our healthy population. There may be differences in the flagging of prior results with similar values performed with this method. Interpretation of those prior results can be made in the context of the updated reference intervals.    ALT 06/05/2024 18  0 - 50 U/L Final    Reference intervals for this test were updated on 6/12/2023 to more accurately reflect our healthy population. There may be differences in the flagging of prior results with similar values performed with this method. Interpretation of those prior results can be made in the context of the updated reference intervals.      Protein Total 06/05/2024 6.8  6.4 - 8.3 g/dL Final    Albumin 06/05/2024 4.5  3.5 - 5.2 g/dL Final    Bilirubin Total 06/05/2024 0.4  <=1.2 mg/dL Final    Patient Fasting > 8hrs? 06/05/2024 Yes   Final    Hemoglobin A1C 06/05/2024 6.0 (H)  0.0 - 5.6 % Final    Normal <5.7%   Prediabetes 5.7-6.4%    Diabetes 6.5% or higher     Note: Adopted from ADA consensus guidelines.    Creatinine Urine mg/dL 06/05/2024 32.9  mg/dL Final    The reference ranges have not been established in urine creatinine. The results should be integrated into the clinical context for interpretation.    Albumin Urine mg/L 06/05/2024 <12.0  mg/L Final    The reference ranges have not been established in urine albumin. The results should be integrated into the clinical context for interpretation.     Albumin Urine mg/g Cr 06/05/2024    Final    Unable to calculate, urine albumin and/or urine creatinine is outside detectable limits.  Microalbuminuria is defined as an albumin:creatinine ratio of 17 to 299 for males and 25 to 299 for females. A ratio of albumin:creatinine of 300 or higher is indicative of overt proteinuria.  Due to biologic variability, positive results should be confirmed by a second, first-morning random or 24-hour timed urine specimen. If there is discrepancy, a third specimen is recommended. When 2 out of 3 results are in the microalbuminuria range, this is evidence for incipient nephropathy and warrants increased efforts at glucose control, blood pressure control, and institution of therapy with an angiotensin-converting-enzyme (ACE) inhibitor (if the patient can tolerate it).         A patent foramen ovale is present.  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  A contrast injection (Bubble Study) was performed that was mildly positive for  flow across the interatrial septum.  Left to right atrial shunt, small  Right to left atrial shunt, small  The visual ejection fraction is estimated at 75%.  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 CT  scan with contrast would better review this area would be recommended.  ______________________________________________________________    Signed Electronically by: Masood Osborne MD

## 2024-06-19 ENCOUNTER — APPOINTMENT (OUTPATIENT)
Dept: CT IMAGING | Facility: CLINIC | Age: 74
End: 2024-06-19
Attending: EMERGENCY MEDICINE
Payer: COMMERCIAL

## 2024-06-19 ENCOUNTER — HOSPITAL ENCOUNTER (EMERGENCY)
Facility: CLINIC | Age: 74
Discharge: HOME | End: 2024-06-19
Attending: EMERGENCY MEDICINE | Admitting: EMERGENCY MEDICINE
Payer: COMMERCIAL

## 2024-06-19 ENCOUNTER — ANCILLARY PROCEDURE (OUTPATIENT)
Dept: CARDIOLOGY | Facility: CLINIC | Age: 74
End: 2024-06-19
Attending: INTERNAL MEDICINE
Payer: COMMERCIAL

## 2024-06-19 ENCOUNTER — APPOINTMENT (OUTPATIENT)
Dept: GENERAL RADIOLOGY | Facility: CLINIC | Age: 74
End: 2024-06-19
Attending: EMERGENCY MEDICINE
Payer: COMMERCIAL

## 2024-06-19 VITALS
WEIGHT: 165 LBS | BODY MASS INDEX: 30.36 KG/M2 | TEMPERATURE: 98.3 F | DIASTOLIC BLOOD PRESSURE: 81 MMHG | SYSTOLIC BLOOD PRESSURE: 150 MMHG | RESPIRATION RATE: 16 BRPM | HEIGHT: 62 IN | OXYGEN SATURATION: 98 % | HEART RATE: 75 BPM

## 2024-06-19 DIAGNOSIS — Z45.09 ENCOUNTER FOR LOOP RECORDER CHECK: ICD-10-CM

## 2024-06-19 DIAGNOSIS — I63.511 ACUTE ISCHEMIC RIGHT MCA STROKE (H): ICD-10-CM

## 2024-06-19 DIAGNOSIS — S42.202A CLOSED FRACTURE OF PROXIMAL END OF LEFT HUMERUS, UNSPECIFIED FRACTURE MORPHOLOGY, INITIAL ENCOUNTER: ICD-10-CM

## 2024-06-19 PROCEDURE — 93291 INTERROG DEV EVAL SCRMS IP: CPT | Performed by: INTERNAL MEDICINE

## 2024-06-19 PROCEDURE — 250N000013 HC RX MED GY IP 250 OP 250 PS 637: Performed by: EMERGENCY MEDICINE

## 2024-06-19 PROCEDURE — 99284 EMERGENCY DEPT VISIT MOD MDM: CPT | Mod: 25

## 2024-06-19 PROCEDURE — 71045 X-RAY EXAM CHEST 1 VIEW: CPT

## 2024-06-19 PROCEDURE — 73060 X-RAY EXAM OF HUMERUS: CPT | Mod: LT

## 2024-06-19 PROCEDURE — 73030 X-RAY EXAM OF SHOULDER: CPT | Mod: LT

## 2024-06-19 PROCEDURE — 99207 PR NO CHARGE LOS: CPT | Performed by: PHYSICIAN ASSISTANT

## 2024-06-19 PROCEDURE — 72125 CT NECK SPINE W/O DYE: CPT

## 2024-06-19 PROCEDURE — 70450 CT HEAD/BRAIN W/O DYE: CPT

## 2024-06-19 RX ORDER — OXYCODONE HYDROCHLORIDE 5 MG/1
5 TABLET ORAL ONCE
Status: COMPLETED | OUTPATIENT
Start: 2024-06-19 | End: 2024-06-19

## 2024-06-19 RX ORDER — OXYCODONE HYDROCHLORIDE 5 MG/1
5 TABLET ORAL EVERY 6 HOURS PRN
Qty: 12 TABLET | Refills: 0 | Status: SHIPPED | OUTPATIENT
Start: 2024-06-19 | End: 2024-06-22

## 2024-06-19 RX ADMIN — OXYCODONE HYDROCHLORIDE 5 MG: 5 TABLET ORAL at 12:16

## 2024-06-19 ASSESSMENT — COLUMBIA-SUICIDE SEVERITY RATING SCALE - C-SSRS
2. HAVE YOU ACTUALLY HAD ANY THOUGHTS OF KILLING YOURSELF IN THE PAST MONTH?: NO
1. IN THE PAST MONTH, HAVE YOU WISHED YOU WERE DEAD OR WISHED YOU COULD GO TO SLEEP AND NOT WAKE UP?: NO
6. HAVE YOU EVER DONE ANYTHING, STARTED TO DO ANYTHING, OR PREPARED TO DO ANYTHING TO END YOUR LIFE?: NO

## 2024-06-19 ASSESSMENT — ACTIVITIES OF DAILY LIVING (ADL)
ADLS_ACUITY_SCORE: 38
ADLS_ACUITY_SCORE: 36

## 2024-06-19 NOTE — ED PROVIDER NOTES
"  Emergency Department Note      History of Present Illness     Chief Complaint  Fall    HPI  Elvia Helm is a 73 year old female with history of hypertension, hyperlipidemia, type 2 diabetes mellitus, and stroke who presents with a male  after a mechanical fall. Patient reports that while walking across the [facility] skyway today, her right foot became stuck and she fell, landing on her left-side. There was no chest pain, shortness of breath, or lightheadedness prior to the fall. Her pain is isolated to her left scapula. She has been able to bear weight since the fall, walking from the wheelchair to the bed. Patient hit the side of her head and says that her glasses flew off, but she denies any head or neck pain. No loss of consciousness. No pelvic or abdominal pain. Alma has history of stroke and takes 325 mg ASA daily. No other anticoagulant use.     Independent Historian  None    Review of External Notes  Progress note from 2024 reviewed.  History of type 2 diabetes, atherosclerotic ulcer of the aorta, PFO with history of ischemic stroke  Past Medical History   Medical History and Problem List  Acute ischemic right MCA stroke  Allergic rhinitis  Arthritis of knee   Type 2 diabetes mellitus   Hypertension  Hyperlipidemia  Localized osteoarthritis  Metabolic syndrome  PVCs  Tinnitus   PFO     Medications  Aspirin (ASA) 325 MG   Atorvastatin   Carvedilol   Citalopram   Lisinopril-hydrochlorothiazide  Metformin    Surgical History    section  ENT procedure for frequent sinus infections   Loop recorder implantation  Broken right ankle repair   Soft tissue procedure   Fallopian tube libation   Endometrial biopsy with cervical dilation   Physical Exam   Patient Vitals for the past 24 hrs:   BP Temp Temp src Pulse Resp SpO2 Height Weight   24 1129 (!) 150/81 98.3  F (36.8  C) Oral 75 16 98 % 1.575 m (5' 2\") 74.8 kg (165 lb)     Physical Exam  General: Laying on the ED " bed  HEENT: Normocephalic, atraumatic  Cardiac: Radial and DP pulses 2+, regular rate and rhythm  Pulm: Breathing comfortably, no accessory muscle usage, no conversational dyspnea, and lungs clear bilaterally  GI: Abdomen soft, nontender, no rigidity or guarding  MSK: C-spine nontender to palpation, no step-offs.  Pelvis stable.  Tender to palpation of the proximal left upper arm with subtle bony deformity.  Tender to palpation of the left posterior shoulder and scapula without crepitus or bony deformity.  Otherwise, extremities x4 without bony deformity, no instability, tenderness to palpation, or painful range of motion.  Skin: Warm and dry  Neuro: Sensorimotor intact extremities x4  Psych: Pleasant mood and affect  Diagnostics       Imaging  XR Shoulder Left G/E 3 Views   Final Result   IMPRESSION: Comminuted fracture of the proximal humerus. This involves   the surgical neck, where there is no displacement, and the greater   tuberosity, where there is about 1 cm of displacement. No angulation.   Mild osteoarthrosis of the AC joint. Subacromial enthesophyte   narrowing the subacromial space. Otherwise negative.      JOHN MELVIN MD            SYSTEM ID:  [id number]      Humerus XR,  G/E 2 views, left   Final Result   IMPRESSION: Comminuted fracture of the proximal humerus. This involves   the surgical neck and greater tuberosity. There is about 1 cm of   displacement of the greater tuberosity fracture fragment. No   significant displacement at the surgical neck fracture site. No   angulation. Otherwise normal appearance to the humerus. Mild   osteoarthrosis of the AC joint incidentally noted.      JOHN MELVIN MD            SYSTEM ID:  [id number]      XR Chest 1 View   Final Result   IMPRESSION: No infiltrate, pleural effusion or pneumothorax. Heart   size. No displaced rib fractures. See separate left shoulder x-ray   report for findings shoulder. Implanted device over the left chest.      FAROOQ RAMIREZ  MD CHEKO            SYSTEM ID:  [id number]      CT Cervical Spine w/o Contrast   Final Result   IMPRESSION: No evidence of acute fracture or subluxation in the   cervical spine.      TONA INGRAM MD            SYSTEM ID:  [id number]      CT Head w/o Contrast   Final Result   IMPRESSION:  No acute intracranial abnormality.      TONA INGRAM MD            SYSTEM ID:  [id number]        EKG   None    Independent Interpretation  CT head without ICH.  Left shoulder x-ray shows a proximal humerus fracture  ED Course    Medications Administered  Medications   oxyCODONE (ROXICODONE) tablet 5 mg (5 mg Oral $Given 6/19/24 1216)     Procedures  None      Discussion of Management  None    Social Determinants of Health adding to complexity of care  None    ED Course  ED Course as of 06/19/24 1323   Wed Jun 19, 2024   1140 I obtained history and examined the patient as noted above.    1306 I rechecked and updated the patient.     Medical Decision Making / Diagnosis   CMS Diagnoses: None    MIPS  None    MDM  73-year-old female presents [place].  Seems that she simply tripped, no preceding symptoms to suggest a medical cause of the patient's fall today.  Primary survey is intact.  Secondary survey is suspicious for a left-sided proximal humerus fracture with the distal left upper extremity thankfully neurovascularly intact.  The patient did strike her head when she fell.  CT of the head and neck were obtained and thankfully without acute findings.  Plain film x-rays are significant for a left proximal humerus fracture.  She had some left scapular pain on exam which I suspect is referred from the humerus injury.  She was given oxycodone for pain.  Upon reevaluation, her pain is much improved.  She was placed in a sling and plan is to discharge home with a short course of pain medication and orthopedics follow-up for further care.  The patient's  states he has had shoulder surgery  in the past and will help the patient pursue follow-up with that orthopedic provider.    Disposition  The patient was discharged.     ICD-10 Codes:    ICD-10-CM    1. Closed fracture of proximal end of left humerus, unspecified fracture morphology, initial encounter  S42.202A            Discharge Medications  New Prescriptions    OXYCODONE (ROXICODONE) 5 MG TABLET    Take 1 tablet (5 mg) by mouth every 6 hours as needed for pain     Scribe Disclosure:  IHollie, am serving as a scribe at 11:43 AM on 6/19/2024 to document services personally performed by Kumar Clark MD based on my observations and the provider's statements to me.      Kumar Clark MD  06/19/24 [id number]

## 2024-06-19 NOTE — ED TRIAGE NOTES
Pt presents to the ED after having a mechanical fall while walking in the [place] lobby.  Pt complains of left head and shoulder pain post fall.  Denies LOC, takes 325 mg ASA daily.     Triage Assessment (Adult)       Row Name 06/19/24 1130          Triage Assessment    Airway WDL WDL        Respiratory WDL    Respiratory WDL WDL        Skin Circulation/Temperature WDL    Skin Circulation/Temperature WDL WDL        Cardiac WDL    Cardiac WDL WDL        Peripheral/Neurovascular WDL    Peripheral Neurovascular WDL WDL        Cognitive/Neuro/Behavioral WDL    Cognitive/Neuro/Behavioral WDL WDL

## 2024-06-19 NOTE — CODE/RAPID RESPONSE
"[facility]  RRT/Fall Note  6/19/2024   Time Called: 1111    I was called to evaluate Elvia Helm, a 73 year old female following a fall. The patient's PMH includes CVA 9/2023 without residual effects, PTA , HLD, HTN,  DM 2, aortic atherosclerotic ulcer, PFO,right upper lobe lung mass, who is here for an outpatient appointment to get her loop recorder evaluated, when she had an accidental fall while leaving the hospital.      RRT called by bystanders, patient states that [place] was [place] when she felt that her right foot suddenly \"stuck\".  She is not sure exactly what happened if it was a shoe or floor problem but she fell onto her left side striking mainly her left shoulder left arm as well as a small head strike on the left side of the head.  Only pain is in the left shoulder area, increased with any pain with movement.  No pain/tenderness over the upper arm or elbow or wrist.  Patient has no pain whatsoever in her head, nor in her midline neck and is able to range fully without any discomfort.  She states this was nothing like her last stroke last year where it was mainly aphasia and had some transient left-sided weakness, she did not have any residual weakness especially on the right.  Patient does not have any numbness or tingling or any stroke symptoms.  Patient denies any chest pain shortness of breath.  Though she struck her left hip she does not report any pain over the left hip or left leg left ankle.  Her fall was witnessed by the volunteer staff, who heard and immediately attended,there was no LOC no seizure activity,    Impression  Mechanical trip and fall, witnessed, in a pt on antiplatelet therapy  Left shoulder pain, rule out fracture/dislocation  History of CVA 9/2023, loop recorder-  History DM 2, HTN, HLD      Plan:  -Patient gently assisted to stand/sit into wheelchair and brought to the ER triage  -Report given to Triage RN (no " provider avail)  -Recommend left shoulder x-ray imaging  -At this point no head CT is indicated, can continue close monitoring/triage evaluation   -Cervical CT deferred noting no neck pain and fully ranging w/o TTP,    Temp: 98.3  F (36.8  C) Temp src: Oral BP: (!) 150/81 Pulse: 75   Resp: 16 SpO2: 98 % O2 Device: None (Room air)       Physical Exam     Exam:  Constitutional:   General:  adult pt sitting on floor of [place]way without acute distress  Neuro: Left upper extremity exam limited by pain and shoulder not moved, otherwise no focal neurologic findings, +follows commands, face symmetric, tongue midline, speech fluent  Eyes pupils appear equal  Head, ENT & mouth: Nontender over left temple, no visible laceration, no dental trauma noted mouth moist oral mucosa  Neck: Noted to be ranging fully as I arrived, nontender midline to palpation C2-C7 area, nor with 45 degree rotation, does not appear to have distracting injury to inhibit report of neck pain.  CV S1S2  resp: CTAB upper and lower lobes  gi: Deferred  Skin: no rashes on exposed skin  Musculoskeletal Moderate-severe tenderness over the left anterior shoulder and clavicle area, no visible step-off over the shoulder or clavicle, she is holding her left arm due to discomfort and will not range shoulder at all.  nontender to palpation over the upper arm elbow and wrist.no bony joint deformities, Other areas nontender to palpation in the left lower extremity left hip right upper extremity and right lower.     Time --spent 15 minutes with the patient.  Not yet registered.  No charge for this.    [facility] House Officer/LISA  Ty Swann PA-C    [facility]  Securely message with the Vocera Web Console (learn more here)  Text page via [employer] Paging/Directory       no

## 2024-06-20 LAB
MDC_IDC_EPISODE_DTM: NORMAL
MDC_IDC_EPISODE_ID: NORMAL
MDC_IDC_EPISODE_TYPE: NORMAL
MDC_IDC_MSMT_BATTERY_DTM: NORMAL
MDC_IDC_MSMT_BATTERY_STATUS: NORMAL
MDC_IDC_PG_IMPLANT_DTM: NORMAL
MDC_IDC_PG_MFG: NORMAL
MDC_IDC_PG_MODEL: NORMAL
MDC_IDC_PG_SERIAL: NORMAL
MDC_IDC_PG_TYPE: NORMAL
MDC_IDC_SESS_CLINIC_NAME: NORMAL
MDC_IDC_SESS_DTM: NORMAL
MDC_IDC_SESS_TYPE: NORMAL
MDC_IDC_STAT_AT_BURDEN_PERCENT: 0 %
MDC_IDC_STAT_AT_DTM_END: NORMAL
MDC_IDC_STAT_AT_DTM_START: NORMAL

## 2024-07-25 DIAGNOSIS — E11.9 TYPE 2 DIABETES MELLITUS WITHOUT COMPLICATION, WITHOUT LONG-TERM CURRENT USE OF INSULIN (H): ICD-10-CM

## 2024-08-07 DIAGNOSIS — T63.441S BEE STING REACTION, ACCIDENTAL OR UNINTENTIONAL, SEQUELA: ICD-10-CM

## 2024-08-08 RX ORDER — EPINEPHRINE 0.3 MG/.3ML
INJECTION SUBCUTANEOUS
Qty: 2 EACH | Refills: 0 | Status: SHIPPED | OUTPATIENT
Start: 2024-08-08

## 2024-08-08 NOTE — CONFIDENTIAL NOTE
Call placed to patient and she is aware that her Epi Pen should be able to be picked up today at her [facility].

## 2024-08-22 DIAGNOSIS — E78.5 HYPERLIPIDEMIA LDL GOAL <100: ICD-10-CM

## 2024-08-22 RX ORDER — ATORVASTATIN CALCIUM 10 MG/1
10 TABLET, FILM COATED ORAL DAILY
Qty: 90 TABLET | Refills: 1 | Status: SHIPPED | OUTPATIENT
Start: 2024-08-22

## 2024-08-28 ENCOUNTER — HOSPITAL ENCOUNTER (OUTPATIENT)
Dept: CARDIOLOGY | Facility: CLINIC | Age: 74
Discharge: HOME | End: 2024-08-28
Attending: INTERNAL MEDICINE | Admitting: INTERNAL MEDICINE
Payer: COMMERCIAL

## 2024-08-28 DIAGNOSIS — Q21.12 PATENT FORAMEN OVALE: ICD-10-CM

## 2024-08-28 LAB
CREAT BLD-MCNC: 0.9 MG/DL (ref 0.5–1)
EGFRCR SERPLBLD CKD-EPI 2021: >60 ML/MIN/1.73M2

## 2024-08-28 PROCEDURE — 82565 ASSAY OF CREATININE: CPT

## 2024-08-28 PROCEDURE — 250N000011 HC RX IP 250 OP 636: Performed by: INTERNAL MEDICINE

## 2024-08-28 PROCEDURE — 71275 CT ANGIOGRAPHY CHEST: CPT | Mod: 26 | Performed by: INTERNAL MEDICINE

## 2024-08-28 PROCEDURE — 71275 CT ANGIOGRAPHY CHEST: CPT

## 2024-08-28 RX ORDER — DIPHENHYDRAMINE HCL 25 MG
25 CAPSULE ORAL
Status: DISCONTINUED | OUTPATIENT
Start: 2024-08-28 | End: 2024-08-29 | Stop reason: HOSPADM

## 2024-08-28 RX ORDER — METHYLPREDNISOLONE SODIUM SUCCINATE 125 MG/2ML
125 INJECTION, POWDER, LYOPHILIZED, FOR SOLUTION INTRAMUSCULAR; INTRAVENOUS
Status: DISCONTINUED | OUTPATIENT
Start: 2024-08-28 | End: 2024-08-29 | Stop reason: HOSPADM

## 2024-08-28 RX ORDER — IOPAMIDOL 755 MG/ML
500 INJECTION, SOLUTION INTRAVASCULAR ONCE
Status: COMPLETED | OUTPATIENT
Start: 2024-08-28 | End: 2024-08-28

## 2024-08-28 RX ORDER — ONDANSETRON 2 MG/ML
4 INJECTION INTRAMUSCULAR; INTRAVENOUS
Status: DISCONTINUED | OUTPATIENT
Start: 2024-08-28 | End: 2024-08-29 | Stop reason: HOSPADM

## 2024-08-28 RX ORDER — NITROGLYCERIN 0.4 MG/1
0.4 TABLET SUBLINGUAL
Status: DISCONTINUED | OUTPATIENT
Start: 2024-08-28 | End: 2024-08-29 | Stop reason: HOSPADM

## 2024-08-28 RX ORDER — DIPHENHYDRAMINE HYDROCHLORIDE 50 MG/ML
25-50 INJECTION INTRAMUSCULAR; INTRAVENOUS
Status: DISCONTINUED | OUTPATIENT
Start: 2024-08-28 | End: 2024-08-29 | Stop reason: HOSPADM

## 2024-08-28 RX ORDER — LIDOCAINE 40 MG/G
CREAM TOPICAL
OUTPATIENT
Start: 2024-08-28

## 2024-08-28 RX ADMIN — IOPAMIDOL 90 ML: 755 INJECTION, SOLUTION INTRAVENOUS at 12:32

## 2024-08-30 ENCOUNTER — TRANSFERRED RECORDS (OUTPATIENT)
Dept: HEALTH INFORMATION MANAGEMENT | Facility: CLINIC | Age: 74
End: 2024-08-30

## 2024-08-30 ENCOUNTER — OFFICE VISIT (OUTPATIENT)
Dept: CARDIOLOGY | Facility: CLINIC | Age: 74
End: 2024-08-30
Attending: INTERNAL MEDICINE
Payer: COMMERCIAL

## 2024-08-30 VITALS
WEIGHT: 165 LBS | BODY MASS INDEX: 30.36 KG/M2 | HEIGHT: 62 IN | DIASTOLIC BLOOD PRESSURE: 72 MMHG | SYSTOLIC BLOOD PRESSURE: 120 MMHG | OXYGEN SATURATION: 98 % | HEART RATE: 80 BPM

## 2024-08-30 DIAGNOSIS — I63.511 ACUTE ISCHEMIC RIGHT MCA STROKE (H): ICD-10-CM

## 2024-08-30 DIAGNOSIS — Q21.12 PFO (PATENT FORAMEN OVALE): Primary | ICD-10-CM

## 2024-08-30 LAB — RETINOPATHY: NEGATIVE

## 2024-08-30 PROCEDURE — 99214 OFFICE O/P EST MOD 30 MIN: CPT | Performed by: INTERNAL MEDICINE

## 2024-08-30 NOTE — PROGRESS NOTES
HPI and Plan:   I saw Ms. Helm today in the clinic she is accompanied by her family member who is in PA school and will be graduating this December.  For both of them I reviewed the PFO history closure study and other studies including the rope score at this point the patient is on aspirin no additional effects in terms of GI upset were seen and no recurrent stroke.  We did review her CT scan of her aorta there was no aortic dissection or ulcer.  We reviewed her Linq recorder there is been no reports of any ectopy she is due for another download next month.  At this point I again reviewed the data about closure versus no closure of the rope score would not suggest close at this point.  Will recommend that she continue to through monitoring we will see her back in 6 months looking for any atrial fibrillation etc.  Otherwise we will just see her once or twice a year until the Linq recorder battery expires if there is no recurrent strokes I do not think we need to see her once the Linq recorder reaches end-of-life.  If she is unfortunate to have another stroke with no clear indication and then I would close it with a PFO device the family understands this and agrees with our plan.  Today's visit was 30 minutes all counseling reviewing the tests and going online reviewing some of the studies for the benefit of the patient and her daughter who is here and is in PA school    Orders Placed This Encounter   Procedures    Follow-Up with Cardiology LISA     No orders of the defined types were placed in this encounter.    There are no discontinued medications.      Encounter Diagnoses   Name Primary?    Acute ischemic right MCA stroke (H)     PFO (patent foramen ovale) Yes       CURRENT MEDICATIONS:  Current Outpatient Medications   Medication Sig Dispense Refill    aspirin (ASA) 325 MG EC tablet Take 325 mg by mouth At Bedtime      atorvastatin (LIPITOR) 10 MG tablet TAKE 1 TABLET BY MOUTH DAILY. 90 tablet 1    azelastine  (ASTELIN) 0.1 % nasal spray Spray 1 spray into both nostrils 2 times daily 30 mL 11    blood glucose (NO BRAND SPECIFIED) lancets standard Use to test blood sugar one* times daily or as directed. 100 each 3    blood glucose (ONETOUCH ULTRA) test strip Use to test blood sugar daily or as directed. 100 strip 3    blood glucose calibration (ONETOUCH ULTRA CONTROL) solution Use to calibrate blood glucose monitor as directed. 1 Bottle 1    blood glucose monitoring (ONE TOUCH ULTRA 2) meter device kit Use to test blood sugar 1 times daily. 1 kit 0    carvedilol (COREG) 3.125 MG tablet Take 1 tablet (3.125 mg) by mouth 2 times daily (with meals) 180 tablet 3    Cholecalciferol (VITAMIN D3 PO) Take 2,000 Units by mouth daily       citalopram (CELEXA) 10 MG tablet Take 1 tablet (10 mg) by mouth daily 90 tablet 0    EPINEPHrine (ANY BX GENERIC EQUIV) 0.3 MG/0.3ML injection 2-pack INJECT 0.3 MLS INTO THE MUSCLE ONCE AS NEEDED FOR ANAPHYLAXIS 2 each 0    fluticasone (FLONASE) 50 MCG/ACT spray Spray 1-2 sprays into both nostrils daily (Patient taking differently: Spray 1-2 sprays into both nostrils daily as needed for allergies.) 16 g 0    lisinopril-hydrochlorothiazide (ZESTORETIC) 20-25 MG tablet Take 0.5 tablets by mouth daily 45 tablet 1    metFORMIN (GLUCOPHAGE) 850 MG tablet TAKE 1 TABLET (850 MG) BY MOUTH 2 TIMES DAILY (WITH MEALS) 180 tablet 0    mupirocin (BACTROBAN) 2 % external ointment Apply twice daily to each nostril x 5 days. 15 g 1    Omega-3 Fatty Acids (OMEGA-3 FISH OIL PO) Take 1 g by mouth daily       sodium chloride (OCEAN) 0.65 % nasal spray Spray 1 spray into both nostrils daily as needed for congestion      vitamin B complex with vitamin C (STRESS TAB) tablet Take 1 tablet by mouth daily      bisacodyl (DULCOLAX) 5 MG EC tablet Two days prior to exam take two (2) tablets at 4pm. One day prior to exam take two (2) tablets at 4pm (Patient not taking: Reported on 6/13/2024) 4 tablet 0       ALLERGIES      Allergies   Allergen Reactions    Atovaquone-Proguanil Hcl Hives    Amoxicillin Hives    Bee Hives and Swelling     carries Epi Pen     Ceftin Hives     hives    Clindamycin Hives    Erythromycin GI Disturbance    Seasonal Allergies     Sertraline       cough, gag and many times I vomit    Shellfish-Derived Products Unknown    Shrimp Hives     Happened twice    Tetracycline Other (See Comments)     ?severe headaches  & nausea        PAST MEDICAL HISTORY:  Past Medical History:   Diagnosis Date    Acute ischemic right MCA stroke (H) 10/10/2023    Allergic rhinitis, cause unspecified a    Arthritis of knee 2014    Bee sting reaction 2009    DIABETES TYPE II     Essential hypertension, benign     Flat foot(734) 2014    Hyperlipidaemia LDL goal < 100     Localized osteoarthrosis not specified whether primary or secondary, ankle and foot     After surgery    Metabolic syndrome X     PVC's (premature ventricular contractions)     Tinnitus 2014       PAST SURGICAL HISTORY:  Past Surgical History:   Procedure Laterality Date    ABDOMEN SURGERY  1988    ceasaran birth    COLONOSCOPY      COLONOSCOPY N/A 2022    Procedure: COLONOSCOPY, WITH POLYPECTOMY AND BIOPSY;  Surgeon: Nicole Grubbs MD;  Location:  GI    ENT SURGERY      frequent sinus infections    EP LOOP RECORDER IMPLANT N/A 2024    Procedure: Loop Recorder Implant;  Surgeon: Rm More MD;  Location:  HEART [facility]ARDI[facility] CATH LAB    HC ENDOMETRIAL BIOPSY W/O CERVICAL DILATION      ORTHOPEDIC SURGERY      broken right ankle/plates and screws    SOFT TISSUE SURGERY      tendonitis in right upper leg and below right elbow    ZZC  DELIVERY ONLY      ZZC LIGATE FALLOPIAN TUBE,POSTPARTUM      ZZC NONSPECIFIC PROCEDURE      right ankle roe and pins       FAMILY HISTORY:  Family History   Problem Relation Age of Onset    Musculoskeletal Disorder Mother         b:1938   Hx Fibromyalgia     Hypertension Mother     Cerebrovascular Disease Mother     Anesthesia Reaction Mother     Cerebrovascular Disease Father         b:193,  age 68  massive stroke    Diabetes Father         dx age 50s and his family    Gastrointestinal Disease Sister         b:1950   Hx of reflux    Cerebrovascular Disease Sister     Cerebrovascular Disease Sister 62        ? TIA    Bronchitis Sister     Anxiety Disorder Sister     Anesthesia Reaction Sister     Family History Negative Brother         b:    Cancer Maternal Grandfather         throat    Diabetes Paternal Grandmother     Hypertension Daughter     Family History Negative Daughter         b:    Diabetes Daughter         b:    Diabetes dx age 22**insulin    CABG Daughter 43    Hypertension Daughter     Lipids Son         b: high cholosterol and triglycerides       SOCIAL HISTORY:  Social History     Socioeconomic History    Marital status:      Spouse name: None    Number of children: 3    Years of education: None    Highest education level: None   Tobacco Use    Smoking status: Former     Current packs/day: 0.00     Average packs/day: 1 pack/day for 10.0 years (10.0 ttl pk-yrs)     Types: Cigarettes     Start date: 1972     Quit date: 1982     Years since quittin.0    Smokeless tobacco: Never   Vaping Use    Vaping status: Never Used   Substance and Sexual Activity    Alcohol use: Yes     Comment: glass of wine on the weekends    Drug use: No     Comment: caffeine tea 3-4/d    Sexual activity: Not Currently     Partners: Male     Birth control/protection: Female Surgical, None     Comment: Post menopausal   Other Topics Concern    Weight Concern Yes     Comment: trying to lose    Special Diet No     Comment: Diabetic diet    Exercise Yes     Comment: walks 20 mt    Parent/sibling w/ CABG, MI or angioplasty before 65F 55M? 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          Social Determinants of  "Health     Financial Resource Strain: Low Risk  (12/11/2023)    Financial Resource Strain     Within the past 12 months, have you or your family members you live with been unable to get utilities (heat, electricity) when it was really needed?: No   Food Insecurity: Low Risk  (12/11/2023)    Food Insecurity     Within the past 12 months, did you worry that your food would run out before you got money to buy more?: No     Within the past 12 months, did the food you bought just not last and you didn t have money to get more?: No   Transportation Needs: Low Risk  (12/11/2023)    Transportation Needs     Within the past 12 months, has lack of transportation kept you from medical appointments, getting your medicines, non-medical meetings or appointments, work, or from getting things that you need?: No   Housing Stability: Low Risk  (12/11/2023)    Housing Stability     Do you have housing? : Yes     Are you worried about losing your housing?: No       Review of Systems:  Skin:  not assessed     Eyes:  not assessed    ENT:  not assessed    Respiratory:  Negative    Cardiovascular:    Positive for;edema  Gastroenterology: not assessed    Genitourinary:  not assessed    Musculoskeletal:  not assessed    Neurologic:  not assessed    Psychiatric:  not assessed    Heme/Lymph/Imm:  not assessed    Endocrine:  not assessed      Physical Exam:  Vitals: /72 (BP Location: Right arm, Patient Position: Sitting, Cuff Size: Adult Regular)   Pulse 80   Ht 1.575 m (5' 2\")   Wt 74.8 kg (165 lb)   LMP 03/17/2001   SpO2 98%   BMI 30.18 kg/m   Orthostatic Vitals from 08/28/24 0916 to 08/30/24 0916    Date and Time Orthostatic BP Orthostatic Pulse Patient Position BP   Location Cuff Size   08/30/24 0826 -- -- Sitting Right arm Adult Regular         Constitutional:           Skin:             Head:           Eyes:           Lymph:      ENT:           Neck:           Respiratory:            Cardiac:                                    "                        GI:           Extremities and Muscular Skeletal:                 Neurological:           Psych:         Recent Lab Results:  LIPID RESULTS:  Lab Results   Component Value Date    CHOL 148 06/05/2024    CHOL 151 06/14/2021    HDL 53 06/05/2024    HDL 60 06/14/2021    LDL 72 06/05/2024    LDL 65 06/14/2021    TRIG 115 06/05/2024    TRIG 128 06/14/2021    CHOLHDLRATIO 2.9 06/30/2015       LIVER ENZYME RESULTS:  Lab Results   Component Value Date    AST 19 06/05/2024    AST 23 06/14/2021    ALT 18 06/05/2024    ALT 32 06/14/2021       CBC RESULTS:  Lab Results   Component Value Date    WBC 7.6 12/01/2023    WBC 7.5 06/14/2021    RBC 4.46 12/01/2023    RBC 4.36 06/14/2021    HGB 13.2 12/01/2023    HGB 13.5 06/14/2021    HCT 40.9 12/01/2023    HCT 40.5 06/14/2021    MCV 92 12/01/2023    MCV 93 06/14/2021    MCH 29.6 12/01/2023    MCH 31.0 06/14/2021    MCHC 32.3 12/01/2023    MCHC 33.3 06/14/2021    RDW 12.8 12/01/2023    RDW 14.0 06/14/2021     12/01/2023     06/14/2021       BMP RESULTS:  Lab Results   Component Value Date     06/05/2024     06/14/2021    POTASSIUM 4.5 06/05/2024    POTASSIUM 4.3 09/29/2022    POTASSIUM 4.0 06/14/2021    CHLORIDE 103 06/05/2024    CHLORIDE 104 09/29/2022    CHLORIDE 104 06/14/2021    CO2 27 06/05/2024    CO2 30 09/29/2022    CO2 29 06/14/2021    ANIONGAP 11 06/05/2024    ANIONGAP 3 09/29/2022    ANIONGAP 4 06/14/2021     (H) 06/05/2024     (H) 09/13/2023     (H) 09/29/2022     (H) 06/14/2021    BUN 14.2 06/05/2024    BUN 16 09/29/2022    BUN 15 06/14/2021    CR 0.9 08/28/2024    CR 0.81 06/05/2024    CR 0.81 06/14/2021    GFRESTIMATED >60 08/28/2024    GFRESTIMATED 72 06/14/2021    GFRESTBLACK 84 06/14/2021    KIM 9.8 06/05/2024    KIM 9.4 06/14/2021        A1C RESULTS:  Lab Results   Component Value Date    A1C 6.0 (H) 06/05/2024    A1C 6.1 (H) 06/14/2021       INR RESULTS:  Lab Results   Component Value Date     INR 1.03 12/07/2023    INR 0.91 09/11/2023           CC  Varun Murray MD  [id number]  [place] [id number]

## 2024-08-30 NOTE — LETTER
8/30/2024    Masood Osborne MD  [address]  [facility] [address]    RE: Elvia Helm       Dear Colleague,     I had the pleasure of seeing Elvia Helm in the [facility].  HPI and Plan:   I saw Ms. Helm today in the clinic she is accompanied by her family member who is in PA school and will be graduating this December.  For both of them I reviewed the PFO history closure study and other studies including the rope score at this point the patient is on aspirin no additional effects in terms of GI upset were seen and no recurrent stroke.  We did review her CT scan of her aorta there was no aortic dissection or ulcer.  We reviewed her Linq recorder there is been no reports of any ectopy she is due for another download next month.  At this point I again reviewed the data about closure versus no closure of the rope score would not suggest close at this point.  Will recommend that she continue to through monitoring we will see her back in 6 months looking for any atrial fibrillation etc.  Otherwise we will just see her once or twice a year until the Linq recorder battery expires if there is no recurrent strokes I do not think we need to see her once the Linq recorder reaches end-of-life.  If she is unfortunate to have another stroke with no clear indication and then I would close it with a PFO device the family understands this and agrees with our plan.  Today's visit was 30 minutes all counseling reviewing the tests and going online reviewing some of the studies for the benefit of the patient and her daughter who is here and is in PA school    Orders Placed This Encounter   Procedures     Follow-Up with Cardiology LISA     No orders of the defined types were placed in this encounter.    There are no discontinued medications.      Encounter Diagnoses   Name Primary?     Acute ischemic right MCA stroke (H)      PFO (patent foramen ovale) Yes       CURRENT MEDICATIONS:  Current  Outpatient Medications   Medication Sig Dispense Refill     aspirin (ASA) 325 MG EC tablet Take 325 mg by mouth At Bedtime       atorvastatin (LIPITOR) 10 MG tablet TAKE 1 TABLET BY MOUTH DAILY. 90 tablet 1     azelastine (ASTELIN) 0.1 % nasal spray Spray 1 spray into both nostrils 2 times daily 30 mL 11     blood glucose (NO BRAND SPECIFIED) lancets standard Use to test blood sugar one* times daily or as directed. 100 each 3     blood glucose (ONETOUCH ULTRA) test strip Use to test blood sugar daily or as directed. 100 strip 3     blood glucose calibration (ONETOUCH ULTRA CONTROL) solution Use to calibrate blood glucose monitor as directed. 1 Bottle 1     blood glucose monitoring (ONE TOUCH ULTRA 2) meter device kit Use to test blood sugar 1 times daily. 1 kit 0     carvedilol (COREG) 3.125 MG tablet Take 1 tablet (3.125 mg) by mouth 2 times daily (with meals) 180 tablet 3     Cholecalciferol (VITAMIN D3 PO) Take 2,000 Units by mouth daily        citalopram (CELEXA) 10 MG tablet Take 1 tablet (10 mg) by mouth daily 90 tablet 0     EPINEPHrine (ANY BX GENERIC EQUIV) 0.3 MG/0.3ML injection 2-pack INJECT 0.3 MLS INTO THE MUSCLE ONCE AS NEEDED FOR ANAPHYLAXIS 2 each 0     fluticasone (FLONASE) 50 MCG/ACT spray Spray 1-2 sprays into both nostrils daily (Patient taking differently: Spray 1-2 sprays into both nostrils daily as needed for allergies.) 16 g 0     lisinopril-hydrochlorothiazide (ZESTORETIC) 20-25 MG tablet Take 0.5 tablets by mouth daily 45 tablet 1     metFORMIN (GLUCOPHAGE) 850 MG tablet TAKE 1 TABLET (850 MG) BY MOUTH 2 TIMES DAILY (WITH MEALS) 180 tablet 0     mupirocin (BACTROBAN) 2 % external ointment Apply twice daily to each nostril x 5 days. 15 g 1     Omega-3 Fatty Acids (OMEGA-3 FISH OIL PO) Take 1 g by mouth daily        sodium chloride (OCEAN) 0.65 % nasal spray Spray 1 spray into both nostrils daily as needed for congestion       vitamin B complex with vitamin C (STRESS TAB) tablet Take 1 tablet  by mouth daily       bisacodyl (DULCOLAX) 5 MG EC tablet Two days prior to exam take two (2) tablets at 4pm. One day prior to exam take two (2) tablets at 4pm (Patient not taking: Reported on 2024) 4 tablet 0       ALLERGIES     Allergies   Allergen Reactions     Atovaquone-Proguanil Hcl Hives     Amoxicillin Hives     Bee Hives and Swelling     carries Epi Pen      Ceftin Hives     hives     Clindamycin Hives     Erythromycin GI Disturbance     Seasonal Allergies      Sertraline       cough, gag and many times I vomit     Shellfish-Derived Products Unknown     Shrimp Hives     Happened twice     Tetracycline Other (See Comments)     ?severe headaches  & nausea        PAST MEDICAL HISTORY:  Past Medical History:   Diagnosis Date     Acute ischemic right MCA stroke (H) 10/10/2023     Allergic rhinitis, cause unspecified a     Arthritis of knee 2014     Bee sting reaction 2009     DIABETES TYPE II      Essential hypertension, benign      Flat foot(734) 2014     Hyperlipidaemia LDL goal < 100      Localized osteoarthrosis not specified whether primary or secondary, ankle and foot     After surgery     Metabolic syndrome X      PVC's (premature ventricular contractions)      Tinnitus 2014       PAST SURGICAL HISTORY:  Past Surgical History:   Procedure Laterality Date     ABDOMEN SURGERY  1988    ceasaran birth     COLONOSCOPY       COLONOSCOPY N/A 2022    Procedure: COLONOSCOPY, WITH POLYPECTOMY AND BIOPSY;  Surgeon: Nicole Grubbs MD;  Location:  GI     ENT SURGERY      frequent sinus infections     EP LOOP RECORDER IMPLANT N/A 2024    Procedure: Loop Recorder Implant;  Surgeon: Rm More MD;  Location:  HEART CARDIAC CATH LAB     HC ENDOMETRIAL BIOPSY W/O CERVICAL DILATION       ORTHOPEDIC SURGERY      broken right ankle/plates and screws     SOFT TISSUE SURGERY      tendonitis in right upper leg and below right elbow     ZZC  DELIVERY  ONLY  1988     ZZC LIGATE FALLOPIAN TUBE,POSTPARTUM  1988     ZZC NONSPECIFIC PROCEDURE      right ankle roe and pins       FAMILY HISTORY:  Family History   Problem Relation Age of Onset     Musculoskeletal Disorder Mother         b:1938   Hx Fibromyalgia     Hypertension Mother      Cerebrovascular Disease Mother      Anesthesia Reaction Mother      Cerebrovascular Disease Father         b:,  age 68  massive stroke     Diabetes Father         dx age 50s and his family     Gastrointestinal Disease Sister         b:   Hx of reflux     Cerebrovascular Disease Sister      Cerebrovascular Disease Sister 62        ? TIA     Bronchitis Sister      Anxiety Disorder Sister      Anesthesia Reaction Sister      Family History Negative Brother         b:     Cancer Maternal Grandfather         throat     Diabetes Paternal Grandmother      Hypertension Daughter      Family History Negative Daughter         b:     Diabetes Daughter         b:    Diabetes dx age 22**insulin     CABG Daughter 43     Hypertension Daughter      Lipids Son         b: high cholosterol and triglycerides       SOCIAL HISTORY:  Social History     Socioeconomic History     Marital status:      Spouse name: None     Number of children: 3     Years of education: None     Highest education level: None   Tobacco Use     Smoking status: Former     Current packs/day: 0.00     Average packs/day: 1 pack/day for 10.0 years (10.0 ttl pk-yrs)     Types: Cigarettes     Start date: 1972     Quit date: 1982     Years since quittin.0     Smokeless tobacco: Never   Vaping Use     Vaping status: Never Used   Substance and Sexual Activity     Alcohol use: Yes     Comment: glass of wine on the weekends     Drug use: No     Comment: caffeine tea 3-4/d     Sexual activity: Not Currently     Partners: Male     Birth control/protection: Female Surgical, None     Comment: Post menopausal   Other Topics Concern     Weight Concern  "Yes     Comment: trying to lose     Special Diet No     Comment: Diabetic diet     Exercise Yes     Comment: walks 20 mt     Parent/sibling w/ CABG, MI or angioplasty before 65F 55M? 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          Social Determinants of Health     Financial Resource Strain: Low Risk  (12/11/2023)    Financial Resource Strain      Within the past 12 months, have you or your family members you live with been unable to get utilities (heat, electricity) when it was really needed?: No   Food Insecurity: Low Risk  (12/11/2023)    Food Insecurity      Within the past 12 months, did you worry that your food would run out before you got money to buy more?: No      Within the past 12 months, did the food you bought just not last and you didn t have money to get more?: No   Transportation Needs: Low Risk  (12/11/2023)    Transportation Needs      Within the past 12 months, has lack of transportation kept you from medical appointments, getting your medicines, non-medical meetings or appointments, work, or from getting things that you need?: No   Housing Stability: Low Risk  (12/11/2023)    Housing Stability      Do you have housing? : Yes      Are you worried about losing your housing?: No       Review of Systems:  Skin:  not assessed     Eyes:  not assessed    ENT:  not assessed    Respiratory:  Negative    Cardiovascular:    Positive for;edema  Gastroenterology: not assessed    Genitourinary:  not assessed    Musculoskeletal:  not assessed    Neurologic:  not assessed    Psychiatric:  not assessed    Heme/Lymph/Imm:  not assessed    Endocrine:  not assessed      Physical Exam:  Vitals: /72 (BP Location: Right arm, Patient Position: Sitting, Cuff Size: Adult Regular)   Pulse 80   Ht 1.575 m (5' 2\")   Wt 74.8 kg (165 lb)   LMP 03/17/2001   SpO2 98%   BMI 30.18 kg/m   Orthostatic Vitals from 08/28/24 0916 to 08/30/24 0916    Date and Time Orthostatic BP Orthostatic " Pulse Patient Position BP   Location Cuff Size   08/30/24 0829 -- -- Sitting Right arm Adult Regular         Constitutional:           Skin:             Head:           Eyes:           Lymph:      ENT:           Neck:           Respiratory:            Cardiac:                                                           GI:           Extremities and Muscular Skeletal:                 Neurological:           Psych:         Recent Lab Results:  LIPID RESULTS:  Lab Results   Component Value Date    CHOL 148 06/05/2024    CHOL 151 06/14/2021    HDL 53 06/05/2024    HDL 60 06/14/2021    LDL 72 06/05/2024    LDL 65 06/14/2021    TRIG 115 06/05/2024    TRIG 128 06/14/2021    CHOLHDLRATIO 2.9 06/30/2015       LIVER ENZYME RESULTS:  Lab Results   Component Value Date    AST 19 06/05/2024    AST 23 06/14/2021    ALT 18 06/05/2024    ALT 32 06/14/2021       CBC RESULTS:  Lab Results   Component Value Date    WBC 7.6 12/01/2023    WBC 7.5 06/14/2021    RBC 4.46 12/01/2023    RBC 4.36 06/14/2021    HGB 13.2 12/01/2023    HGB 13.5 06/14/2021    HCT 40.9 12/01/2023    HCT 40.5 06/14/2021    MCV 92 12/01/2023    MCV 93 06/14/2021    MCH 29.6 12/01/2023    MCH 31.0 06/14/2021    MCHC 32.3 12/01/2023    MCHC 33.3 06/14/2021    RDW 12.8 12/01/2023    RDW 14.0 06/14/2021     12/01/2023     06/14/2021       BMP RESULTS:  Lab Results   Component Value Date     06/05/2024     06/14/2021    POTASSIUM 4.5 06/05/2024    POTASSIUM 4.3 09/29/2022    POTASSIUM 4.0 06/14/2021    CHLORIDE 103 06/05/2024    CHLORIDE 104 09/29/2022    CHLORIDE 104 06/14/2021    CO2 27 06/05/2024    CO2 30 09/29/2022    CO2 29 06/14/2021    ANIONGAP 11 06/05/2024    ANIONGAP 3 09/29/2022    ANIONGAP 4 06/14/2021     (H) 06/05/2024     (H) 09/13/2023     (H) 09/29/2022     (H) 06/14/2021    BUN 14.2 06/05/2024    BUN 16 09/29/2022    BUN 15 06/14/2021    CR 0.9 08/28/2024    CR 0.81 06/05/2024    CR 0.81 06/14/2021     GFRESTIMATED >60 08/28/2024    GFRESTIMATED 72 06/14/2021    GFRESTBLACK 84 06/14/2021    KIM 9.8 06/05/2024    KIM 9.4 06/14/2021        A1C RESULTS:  Lab Results   Component Value Date    A1C 6.0 (H) 06/05/2024    A1C 6.1 (H) 06/14/2021       INR RESULTS:  Lab Results   Component Value Date    INR 1.03 12/07/2023    INR 0.91 09/11/2023           CC  Varun Murray MD  [id number]  [place] [id number]      Thank you for allowing me to participate in the care of your patient.      Sincerely,     Varun Murray MD     [facility]  cc:   Varun Murray MD  [id number]  [place] [id number]

## 2024-09-12 DIAGNOSIS — E11.9 TYPE 2 DIABETES MELLITUS WITHOUT COMPLICATION, WITHOUT LONG-TERM CURRENT USE OF INSULIN (H): ICD-10-CM

## 2024-09-12 RX ORDER — CITALOPRAM HYDROBROMIDE 10 MG/1
10 TABLET ORAL DAILY
Qty: 90 TABLET | Refills: 3 | Status: SHIPPED | OUTPATIENT
Start: 2024-09-12

## 2024-09-16 ENCOUNTER — HOSPITAL ENCOUNTER (OUTPATIENT)
Facility: CLINIC | Age: 74
End: 2024-09-16
Attending: INTERNAL MEDICINE | Admitting: INTERNAL MEDICINE
Payer: COMMERCIAL

## 2024-09-16 ENCOUNTER — TELEPHONE (OUTPATIENT)
Dept: GASTROENTEROLOGY | Facility: CLINIC | Age: 74
End: 2024-09-16

## 2024-09-16 NOTE — TELEPHONE ENCOUNTER
"Endoscopy Scheduling Screen    Have you had any respiratory illness or flu-like symptoms in the last 10 days?  No    What is your communication preference for Instructions and/or Bowel Prep?   MyChart    What insurance is in the chart?  Other:  [facility]    Ordering/Referring Provider:   SHASTA LEON        (If ordering provider performs procedure, schedule with ordering provider unless otherwise instructed. )    BMI: Estimated body mass index is 30.18 kg/m  as calculated from the following:    Height as of 8/30/24: 1.575 m (5' 2\").    Weight as of 8/30/24: 74.8 kg (165 lb).     Sedation Ordered  moderate sedation.   If patient BMI > 50 do not schedule in [facility].    If patient BMI > 45 do not schedule at [facility].    Are you taking methadone or Suboxone?  NO, No RN review required.    Have you been diagnosed and are being treated for severe PTSD or severe anxiety?  NO, No RN review required.    Are you taking any prescription medications for pain 3 or more times per week?   NO, No RN review required.    Do you have a history of malignant hyperthermia?  No    (Females) Are you currently pregnant?   No     Have you been diagnosed or told you have pulmonary hypertension?   No    Do you have an LVAD?  No    Have you been told you have moderate to severe sleep apnea?  No.    Have you been told you have COPD, asthma, or any other lung disease?  No    Do you have any heart conditions?  Yes     In the past year, have you had any hospitalizations for heart related issues including cardiomyopathy, heart attack, or stent placement?  No    Do you have any implantable devices in your body (pacemaker, ICD)?  Other Loop Monitor    Do you take nitroglycerine?  No    Have you ever had or are you waiting for an organ transplant?  No. Continue scheduling, no site restrictions.    Have you had a stroke or transient ischemic attack (TIA aka \"mini stroke\" in the last 6 months?   No    Have you been diagnosed with or been told you have cirrhosis of " "the liver?   No.    Are you currently on dialysis?   No    Do you need assistance transferring?   No    BMI: Estimated body mass index is 30.18 kg/m  as calculated from the following:    Height as of 8/30/24: 1.575 m (5' 2\").    Weight as of 8/30/24: 74.8 kg (165 lb).     Is patients BMI > 40 and scheduling location UP?  No    Do you take an injectable or oral medication for weight loss or diabetes (excluding insulin)?  No    Do you take the medication Naltrexone?  No    Do you take blood thinners?  No       Prep   Are you currently on dialysis or do you have chronic kidney disease?  No    Do you have a diagnosis of diabetes?  Yes (Golytely Prep)    Do you have a diagnosis of cystic fibrosis (CF)?  No    On a regular basis do you go 3 -5 days between bowel movements?  No    BMI > 40?  No    Preferred Pharmacy:    [id number] [address] [address] [facility] [address]  Phone: [phone] Fax: [phone]        Final Scheduling Details     Procedure scheduled  Colonoscopy    Surgeon:  Hardeep     Date of procedure:  11/6     Pre-OP / PAC:   Yes - Patient informed of pre-op requirement.    Location  [facility] - Patient preference.    Sedation   MAC/Deep Sedation - Per RN assessment.      Patient Reminders:   You will receive a call from a Nurse to review instructions and health history.  This assessment must be completed prior to your procedure.  Failure to complete the Nurse assessment may result in the procedure being cancelled.      On the day of your procedure, please designate an adult(s) who can drive you home stay with you for the next 24 hours. The medicines used in the exam will make you sleepy. You will not be able to drive.      You cannot take public transportation, ride share services, or non-medical taxi service without a responsible caregiver.  Medical transport services are allowed with the requirement that a responsible caregiver will receive you at " your destination.  We require that drivers and caregivers are confirmed prior to your procedure.

## 2024-09-19 ENCOUNTER — ANCILLARY PROCEDURE (OUTPATIENT)
Dept: CARDIOLOGY | Facility: CLINIC | Age: 74
End: 2024-09-19
Attending: INTERNAL MEDICINE
Payer: COMMERCIAL

## 2024-09-19 DIAGNOSIS — I63.511 ACUTE ISCHEMIC RIGHT MCA STROKE (H): ICD-10-CM

## 2024-09-19 DIAGNOSIS — Z45.09 ENCOUNTER FOR LOOP RECORDER CHECK: ICD-10-CM

## 2024-09-19 PROCEDURE — 93298 REM INTERROG DEV EVAL SCRMS: CPT | Performed by: INTERNAL MEDICINE

## 2024-09-27 ENCOUNTER — HOSPITAL ENCOUNTER (OUTPATIENT)
Dept: BONE DENSITY | Facility: CLINIC | Age: 74
Discharge: HOME | End: 2024-09-27
Attending: INTERNAL MEDICINE | Admitting: INTERNAL MEDICINE
Payer: COMMERCIAL

## 2024-09-27 ENCOUNTER — TELEPHONE (OUTPATIENT)
Dept: FAMILY MEDICINE | Facility: CLINIC | Age: 74
End: 2024-09-27
Payer: COMMERCIAL

## 2024-09-27 DIAGNOSIS — M85.89 OSTEOPENIA OF MULTIPLE SITES: ICD-10-CM

## 2024-09-27 PROCEDURE — 77080 DXA BONE DENSITY AXIAL: CPT

## 2024-09-27 NOTE — TELEPHONE ENCOUNTER
LM for patient to return our call back - please help patient scheduled a visit with PCP to go over DEXA scan results - there's a spot on hold for 10/7/2024 at 9:10 am. Please use if still available.      Jazmin RAMIREZ MA on 9/27/2024 at 2:32 PM

## 2024-10-07 ENCOUNTER — OFFICE VISIT (OUTPATIENT)
Dept: FAMILY MEDICINE | Facility: CLINIC | Age: 74
End: 2024-10-07
Payer: COMMERCIAL

## 2024-10-07 VITALS
RESPIRATION RATE: 16 BRPM | SYSTOLIC BLOOD PRESSURE: 127 MMHG | WEIGHT: 166 LBS | DIASTOLIC BLOOD PRESSURE: 77 MMHG | OXYGEN SATURATION: 98 % | HEART RATE: 76 BPM | BODY MASS INDEX: 30.55 KG/M2 | TEMPERATURE: 97.7 F | HEIGHT: 62 IN

## 2024-10-07 DIAGNOSIS — I10 ESSENTIAL HYPERTENSION, BENIGN: ICD-10-CM

## 2024-10-07 DIAGNOSIS — M85.89 OSTEOPENIA OF MULTIPLE SITES: ICD-10-CM

## 2024-10-07 DIAGNOSIS — Z86.73 HISTORY OF STROKE: ICD-10-CM

## 2024-10-07 DIAGNOSIS — Z12.11 SPECIAL SCREENING FOR MALIGNANT NEOPLASMS, COLON: ICD-10-CM

## 2024-10-07 DIAGNOSIS — Z01.818 PREOP GENERAL PHYSICAL EXAM: Primary | ICD-10-CM

## 2024-10-07 DIAGNOSIS — E11.59 TYPE 2 DIABETES MELLITUS WITH VASCULAR DISEASE (H): ICD-10-CM

## 2024-10-07 DIAGNOSIS — R00.0 SINUS TACHYCARDIA: ICD-10-CM

## 2024-10-07 DIAGNOSIS — E11.9 TYPE 2 DIABETES MELLITUS WITHOUT COMPLICATION, WITHOUT LONG-TERM CURRENT USE OF INSULIN (H): ICD-10-CM

## 2024-10-07 DIAGNOSIS — R91.8 MASS OF UPPER LOBE OF RIGHT LUNG: ICD-10-CM

## 2024-10-07 LAB
ANION GAP SERPL CALCULATED.3IONS-SCNC: 13 MMOL/L (ref 7–15)
BUN SERPL-MCNC: 15.6 MG/DL (ref 8–23)
CALCIUM SERPL-MCNC: 10.1 MG/DL (ref 8.8–10.4)
CHLORIDE SERPL-SCNC: 100 MMOL/L (ref 98–107)
CREAT SERPL-MCNC: 0.76 MG/DL (ref 0.51–0.95)
CREAT UR-MCNC: 30.2 MG/DL
EGFRCR SERPLBLD CKD-EPI 2021: 81 ML/MIN/1.73M2
ERYTHROCYTE [DISTWIDTH] IN BLOOD BY AUTOMATED COUNT: 13.2 % (ref 10–15)
EST. AVERAGE GLUCOSE BLD GHB EST-MCNC: 126 MG/DL
GLUCOSE SERPL-MCNC: 101 MG/DL (ref 70–99)
HBA1C MFR BLD: 6 % (ref 0–5.6)
HCO3 SERPL-SCNC: 26 MMOL/L (ref 22–29)
HCT VFR BLD AUTO: 39.6 % (ref 35–47)
HGB BLD-MCNC: 12.5 G/DL (ref 11.7–15.7)
MCH RBC QN AUTO: 28.4 PG (ref 26.5–33)
MCHC RBC AUTO-ENTMCNC: 31.6 G/DL (ref 31.5–36.5)
MCV RBC AUTO: 90 FL (ref 78–100)
MICROALBUMIN UR-MCNC: <12 MG/L
MICROALBUMIN/CREAT UR: NORMAL MG/G{CREAT}
PLATELET # BLD AUTO: 343 10E3/UL (ref 150–450)
POTASSIUM SERPL-SCNC: 4.3 MMOL/L (ref 3.4–5.3)
RBC # BLD AUTO: 4.4 10E6/UL (ref 3.8–5.2)
SODIUM SERPL-SCNC: 139 MMOL/L (ref 135–145)
VIT D+METAB SERPL-MCNC: 54 NG/ML (ref 20–50)
WBC # BLD AUTO: 9.3 10E3/UL (ref 4–11)

## 2024-10-07 PROCEDURE — 85027 COMPLETE CBC AUTOMATED: CPT | Performed by: INTERNAL MEDICINE

## 2024-10-07 PROCEDURE — 82306 VITAMIN D 25 HYDROXY: CPT | Performed by: INTERNAL MEDICINE

## 2024-10-07 PROCEDURE — 36415 COLL VENOUS BLD VENIPUNCTURE: CPT | Performed by: INTERNAL MEDICINE

## 2024-10-07 PROCEDURE — G2211 COMPLEX E/M VISIT ADD ON: HCPCS | Performed by: INTERNAL MEDICINE

## 2024-10-07 PROCEDURE — 99214 OFFICE O/P EST MOD 30 MIN: CPT | Performed by: INTERNAL MEDICINE

## 2024-10-07 PROCEDURE — 82570 ASSAY OF URINE CREATININE: CPT | Performed by: INTERNAL MEDICINE

## 2024-10-07 PROCEDURE — 80048 BASIC METABOLIC PNL TOTAL CA: CPT | Performed by: INTERNAL MEDICINE

## 2024-10-07 PROCEDURE — 83036 HEMOGLOBIN GLYCOSYLATED A1C: CPT | Performed by: INTERNAL MEDICINE

## 2024-10-07 PROCEDURE — 82043 UR ALBUMIN QUANTITATIVE: CPT | Performed by: INTERNAL MEDICINE

## 2024-10-07 RX ORDER — CARVEDILOL 3.12 MG/1
3.12 TABLET ORAL 2 TIMES DAILY WITH MEALS
Qty: 180 TABLET | Refills: 3 | Status: SHIPPED | OUTPATIENT
Start: 2024-10-07

## 2024-10-07 ASSESSMENT — PAIN SCALES - GENERAL: PAINLEVEL: NO PAIN (0)

## 2024-10-07 NOTE — PATIENT INSTRUCTIONS
Change to Baby aspirin 1 week prior to surgery with evening meal  Then back to full aspirin after procedure      Tdap     Morning of surgery, take carvedilol

## 2024-10-07 NOTE — PROGRESS NOTES
Preoperative Evaluation  [place] [id number] [facility], [address]  [facility] [address]  Phone: [phone]  Primary Provider: Masood Osborne MD  Pre-op Performing Provider: Masood Osborne MD  Oct 7, 2024             10/3/2024   Surgical Information   What procedure is being done? Colonoscopy   Facility or Hospital where procedure/surgery will be performed: [facility]   Who is doing the procedure / surgery? Tristen Meier MD    Date of surgery / procedure: November 6, 2024   Time of surgery / procedure: 9:45am colonoscopy   Where do you plan to recover after surgery? at home with family        Fax number for surgical facility: Note does not need to be faxed, will be available electronically in Epic.    Assessment & Plan     The proposed surgical procedure is considered LOW risk.    Preop general physical exam  Preop is for colonoscopy because patient had polyps in the past she is a good surgical candidate  If negative colonoscopy this may be her last colonoscopy    She does not need EKG today  Patient Instructions   Change to Baby aspirin 1 week prior to surgery with evening meal  Then back to full aspirin after procedure      Tdap     Morning of surgery, take carvedilol     Special screening for malignant neoplasms, colon  As above    Type 2 diabetes mellitus without complication, without long-term current use of insulin (H)  We might have to change to Ozempic from metformin but we will first get insurance to do it  She has history of stroke and BMI 30 she should be offered GLP agonist  And SGLT2's  - HEMOGLOBIN A1C  - semaglutide (OZEMPIC) 2 MG/3ML pen  Dispense: 3 mL; Refill: 0    Essential hypertension, benign  She will take carvedilol the morning of surgery  - CBC with Platelets    - Basic metabolic panel  - carvedilol (COREG) 3.125 MG tablet  Dispense: 180 tablet; Refill: 3    Mass of upper lobe of right lung  Chart is reviewed    History of  stroke  Patient is still on adult aspirin for cryptogenic stroke and workup is negative  I will communicate with her cardiologist and neurologist today to see if he can cut down on the dose  GLP agonist and SGLT2 should be considered- semaglutide (OZEMPIC) 2 MG/3ML pen  Dispense: 3 mL; Refill: 0    Sinus tachycardia    - carvedilol (COREG) 3.125 MG tablet  Dispense: 180 tablet; Refill: 3    BMI 30.0-30.9,adult    - semaglutide (OZEMPIC) 2 MG/3ML pen  Dispense: 3 mL; Refill: 0    Osteopenia of multiple sites  We discussed the bone density scan and we will check vitamin D level patient does not drink milk and she will do 4 servings of dairy per day    - Vitamin D Deficiency         Implanted Device   - Type of device: to  rhythm, Loop recorder Patient advised to bring device information on day of surgery.          Longitudinal plan of care was discussed with this patient about complex care of her condition during the visit today.I will continue to follow up and support her in the care of this complex condition.I will also follow up as needed by phone or my chart.        Recommendation  Approval given to proceed with proposed procedure, without further diagnostic evaluation.    Miguel A Marquez is a 74 year old, presenting for the following:  Pre-Op Exam and Results (DEXA scan)      HPI related to upcoming procedure: This is a very nice patient who last September had a stroke which caused aphasia  She is now doing very well  Except for minor speech difficulty  She is dealing with right lung upper lobe mass which may be cancer  She is a type 2 diabetic    She has hypertension and hyperlipidemia  She has a loop recorder in place  We also need to discuss her bone density which has improved in the spine but worsened in the hip and she is still osteopenic      10/3/2024   Pre-Op Questionnaire   Have you ever had a heart attack or stroke? No   Have you ever had surgery on your heart or blood vessels, such as a stent  placement, a coronary artery bypass, or surgery on an artery in your head, neck, heart, or legs? No   Do you have chest pain with activity? No   Do you have a history of heart failure? No   Do you currently have a cold, bronchitis or symptoms of other infection? No   Do you have a cough, shortness of breath, or wheezing? No   Do you or anyone in your family have previous history of blood clots? No   Do you or does anyone in your family have a serious bleeding problem such as prolonged bleeding following surgeries or cuts? No   Have you ever had problems with anemia or been told to take iron pills? No   Have you had any abnormal blood loss such as black, tarry or bloody stools, or abnormal vaginal bleeding? No   Have you ever had a blood transfusion? No   Are you willing to have a blood transfusion if it is medically needed before, during, or after your surgery? Yes   Have you or any of your relatives ever had problems with anesthesia? (!) YES    Do you have sleep apnea, excessive snoring or daytime drowsiness? No   Do you have any artifical heart valves or other implanted medical devices like a pacemaker, defibrillator, or continuous glucose monitor? (!) YES   What type of device do you have? Loop monitor   [facility]   Do you have artificial joints? No   Are you allergic to latex? No        Health Care Directive  Patient does not have a Health Care Directive or Living Will:     Preoperative Review of    reviewed - no record of controlled substances prescribed.          Patient Active Problem List    Diagnosis Date Noted    BMI 30.0-30.9,adult 10/07/2024     Priority: Medium    Mass of upper lobe of right lung 10/07/2024     Priority: Medium    History of stroke 10/07/2024     Priority: Medium    PFO (patent foramen ovale) 06/13/2024     Priority: Medium    Acute ischemic right MCA stroke (H) 10/10/2023     Priority: Medium     Left-sided weakness 09/11/2023     Priority: Medium    Arthritis of knee 06/06/2014     Priority: Medium    Pes planus 06/06/2014     Priority: Medium     Problem list name updated by automated process. Provider to review      Tinnitus 06/06/2014     Priority: Medium    Urticaria 06/20/2013     Priority: Medium    Osteopenia 12/06/2012     Priority: Medium    Post-nasal drip 07/12/2011     Priority: Medium    Type 2 diabetes mellitus with vascular disease (H) 10/31/2010     Priority: Medium    HYPERLIPIDEMIA LDL GOAL <100 10/31/2010     Priority: Medium    Torticollis 11/05/2009     Priority: Medium    Bee sting reaction 07/28/2009     Priority: Medium    Postmenopausal atrophic vaginitis 07/24/2008     Priority: Medium    Essential hypertension, benign 12/16/2002     Priority: Medium    Allergic rhinitis 09/17/2002     Priority: Medium     Problem list name updated by automated process. Provider to review        Past Medical History:   Diagnosis Date    Acute ischemic right MCA stroke (H) 10/10/2023    Allergic rhinitis, cause unspecified 2003a    Arthritis of knee 06/06/2014    Bee sting reaction 07/2009    DIABETES TYPE II 2003    Essential hypertension, benign     Flat foot(734) 06/06/2014    Hyperlipidaemia LDL goal < 100     Localized osteoarthrosis not specified whether primary or secondary, ankle and foot     After surgery    Metabolic syndrome X     PVC's (premature ventricular contractions)     Tinnitus 06/06/2014     Past Surgical History:   Procedure Laterality Date    ABDOMEN SURGERY  7/1988    ceasaran birth    COLONOSCOPY  12/07    COLONOSCOPY N/A 1/31/2022    Procedure: COLONOSCOPY, WITH POLYPECTOMY AND BIOPSY;  Surgeon: Nicole Grubbs MD;  Location:  GI    ENT SURGERY      frequent sinus infections    EP LOOP RECORDER IMPLANT N/A 6/7/2024    Procedure: Loop Recorder Implant;  Surgeon: Rm More MD;  Location:  HEART CARDIAC CATH LAB    HC ENDOMETRIAL BIOPSY W/O CERVICAL DILATION       ORTHOPEDIC SURGERY      broken right ankle/plates and screws    SOFT TISSUE SURGERY      tendonitis in right upper leg and below right elbow    ZZC  DELIVERY ONLY      [facility] LIGATE FALLOPIAN TUBE,POSTPARTUM      [facility] NONSPECIFIC PROCEDURE      right ankle roe and pins     Current Outpatient Medications   Medication Sig Dispense Refill    aspirin (ASA) 325 MG EC tablet Take 325 mg by mouth At Bedtime      atorvastatin (LIPITOR) 10 MG tablet TAKE 1 TABLET BY MOUTH DAILY. 90 tablet 1    azelastine (ASTELIN) 0.1 % nasal spray Spray 1 spray into both nostrils 2 times daily 30 mL 11    blood glucose (NO BRAND SPECIFIED) lancets standard Use to test blood sugar one* times daily or as directed. 100 each 3    blood glucose (ONETOUCH ULTRA) test strip Use to test blood sugar daily or as directed. 100 strip 3    blood glucose calibration (ONETOUCH ULTRA CONTROL) solution Use to calibrate blood glucose monitor as directed. 1 Bottle 1    blood glucose monitoring (ONE TOUCH ULTRA 2) meter device kit Use to test blood sugar 1 times daily. 1 kit 0    carvedilol (COREG) 3.125 MG tablet Take 1 tablet (3.125 mg) by mouth 2 times daily (with meals). 180 tablet 3    Cholecalciferol (VITAMIN D3 PO) Take 2,000 Units by mouth daily       citalopram (CELEXA) 10 MG tablet Take 1 tablet (10 mg) by mouth daily 90 tablet 3    EPINEPHrine (ANY BX GENERIC EQUIV) 0.3 MG/0.3ML injection 2-pack INJECT 0.3 MLS INTO THE MUSCLE ONCE AS NEEDED FOR ANAPHYLAXIS 2 each 0    fluticasone (FLONASE) 50 MCG/ACT spray Spray 1-2 sprays into both nostrils daily (Patient taking differently: Spray 1-2 sprays into both nostrils daily as needed for allergies.) 16 g 0    lisinopril-hydrochlorothiazide (ZESTORETIC) 20-25 MG tablet Take 0.5 tablets by mouth daily 45 tablet 1    metFORMIN (GLUCOPHAGE) 850 MG tablet TAKE 1 TABLET (850 MG) BY MOUTH 2 TIMES DAILY (WITH MEALS) 180 tablet 0    mupirocin (BACTROBAN) 2 % external ointment Apply twice daily  to each nostril x 5 days. 15 g 1    Omega-3 Fatty Acids (OMEGA-3 FISH OIL PO) Take 1 g by mouth daily       semaglutide (OZEMPIC) 2 MG/3ML pen Inject 0.25 mg subcutaneously every 7 days. 3 mL 0    sodium chloride (OCEAN) 0.65 % nasal spray Spray 1 spray into both nostrils daily as needed for congestion      vitamin B complex with vitamin C (STRESS TAB) tablet Take 1 tablet by mouth daily         Allergies   Allergen Reactions    Atovaquone-Proguanil Hcl Hives    Amoxicillin Hives    Bee Hives and Swelling     carries Epi Pen     Ceftin Hives     hives    Clindamycin Hives    Erythromycin GI Disturbance    Seasonal Allergies     Sertraline       cough, gag and many times I vomit    Shellfish-Derived Products Unknown    Shrimp Hives     Happened twice    Tetracycline Other (See Comments)     ?severe headaches  & nausea         Social History     Tobacco Use    Smoking status: Former     Current packs/day: 0.00     Average packs/day: 1 pack/day for 10.0 years (10.0 ttl pk-yrs)     Types: Cigarettes     Start date: 1972     Quit date: 1982     Years since quittin.1    Smokeless tobacco: Never   Substance Use Topics    Alcohol use: Not Currently     Comment: glass of wine on the weekends     Family History   Problem Relation Age of Onset    Musculoskeletal Disorder Mother         b:193   Hx Fibromyalgia    Hypertension Mother     Cerebrovascular Disease Mother     Anesthesia Reaction Mother     Cerebrovascular Disease Father         b:193,  age 68  massive stroke    Diabetes Father         dx age 50s and his family    Gastrointestinal Disease Sister         b:   Hx of reflux    Cerebrovascular Disease Sister     Cerebrovascular Disease Sister 62        ? TIA    Bronchitis Sister     Anxiety Disorder Sister     Anesthesia Reaction Sister     Family History Negative Brother         b:    Cancer Maternal Grandfather         throat    Diabetes Paternal Grandmother     Hypertension Daughter      "Family History Negative Daughter         b:1988    Diabetes Daughter         b:1978    Diabetes dx age 22**insulin    CABG Daughter 43    Hypertension Daughter     Lipids Son         b:1981 high cholosterol and triglycerides     History   Drug Use No     Comment: caffeine tea 3-4/d             Review of Systems  Constitutional, HEENT, cardiovascular, pulmonary, GI, , musculoskeletal, neuro, skin, endocrine and psych systems are negative, except as otherwise noted.    Objective    /77 (BP Location: Right arm, Patient Position: Sitting, Cuff Size: Adult Large)   Pulse 76   Temp 97.7  F (36.5  C)   Resp 16   Ht 1.575 m (5' 2\")   Wt 75.3 kg (166 lb)   LMP 03/17/2001   SpO2 98%   BMI 30.36 kg/m     Estimated body mass index is 30.36 kg/m  as calculated from the following:    Height as of this encounter: 1.575 m (5' 2\").    Weight as of this encounter: 75.3 kg (166 lb).  Physical Exam  GENERAL: alert and no distress  NECK: no adenopathy, no asymmetry, masses, or scars  RESP: lungs clear to auscultation - no rales, rhonchi or wheezes  CV: regular rate and rhythm, normal S1 S2, no S3 or S4, no murmur, click or rub, no peripheral edema  ABDOMEN: soft, nontender, no hepatosplenomegaly, no masses and bowel sounds normal  MS: no gross musculoskeletal defects noted, no edema    Recent Labs   Lab Test 08/28/24  1204 06/05/24  0803 05/29/24  1255 12/07/23  1053 12/01/23  1054 11/28/23  1331   0000   HGB  --   --   --   --  13.2  --   --    PLT  --   --   --   --  331  --   --    INR  --   --   --  1.03  --   --   --    NA  --  141  --   --  140  --   --    POTASSIUM  --  4.5  --   --  4.1  --   --    CR 0.9 0.81   < >  --  0.69  --    < >   A1C  --  6.0*  --   --   --  6.0*  --     < > = values in this interval not displayed.        Diagnostics  Labs pending at this time.  Results will be reviewed when available.   No EKG required for low risk surgery     Revised Cardiac Risk Index (RCRI)  The patient has the " following serious cardiovascular risks for perioperative complications:   - Cerebrovascular Disease (TIA or CVA) = 1 point   - Diabetes Mellitus (on Insulin) = 1 point     RCRI Interpretation: 2 points: Class III (moderate risk - 6.6% complication rate)     Estimated Functional Capacity: Performs 4 METS exercise without symptoms (e.g., light housework, stairs, 4 mph walk, 7 mph bike, slow step dance)           Signed Electronically by: Masood Osborne MD  A copy of this evaluation report is provided to the requesting physician.

## 2024-10-08 ENCOUNTER — VIRTUAL VISIT (OUTPATIENT)
Dept: PHARMACY | Facility: CLINIC | Age: 74
End: 2024-10-08
Payer: COMMERCIAL

## 2024-10-08 DIAGNOSIS — E78.5 HYPERLIPIDEMIA LDL GOAL <100: ICD-10-CM

## 2024-10-08 DIAGNOSIS — E11.9 TYPE 2 DIABETES MELLITUS WITHOUT COMPLICATION, WITHOUT LONG-TERM CURRENT USE OF INSULIN (H): Primary | ICD-10-CM

## 2024-10-08 DIAGNOSIS — Z86.73 HISTORY OF STROKE: ICD-10-CM

## 2024-10-08 DIAGNOSIS — M85.80 OSTEOPENIA, UNSPECIFIED LOCATION: ICD-10-CM

## 2024-10-08 DIAGNOSIS — F43.22 ADJUSTMENT DISORDER WITH ANXIOUS MOOD: ICD-10-CM

## 2024-10-08 DIAGNOSIS — Z78.9 TAKES DIETARY SUPPLEMENTS: ICD-10-CM

## 2024-10-08 DIAGNOSIS — I10 ESSENTIAL HYPERTENSION, BENIGN: ICD-10-CM

## 2024-10-08 DIAGNOSIS — J30.2 SEASONAL ALLERGIC RHINITIS, UNSPECIFIED TRIGGER: ICD-10-CM

## 2024-10-08 DIAGNOSIS — R00.0 SINUS TACHYCARDIA: ICD-10-CM

## 2024-10-08 PROCEDURE — 99607 MTMS BY PHARM ADDL 15 MIN: CPT | Mod: 95 | Performed by: PHARMACIST

## 2024-10-08 PROCEDURE — 99606 MTMS BY PHARM EST 15 MIN: CPT | Mod: 95 | Performed by: PHARMACIST

## 2024-10-08 NOTE — PATIENT INSTRUCTIONS
"Recommendations from today's MTM visit:                                                    MTM (medication therapy management) is a service provided by a clinical pharmacist designed to help you get the most of out of your medicines.   Today we reviewed what your medicines are for, how to know if they are working, that your medicines are safe and how to make your medicine regimen as easy as possible.      We talked about risks vs benefits of Ozempic.  Your pharmacy should be able to let you know how much your copay may be.  If you decide to proceed, please let us know.    Follow-up: Return in about 6 months (around 4/8/2025) for Follow-up Medication Review.    It was great speaking with you today.  I value your experience and would be very thankful for your time in providing feedback in our clinic survey. In the next few days, you may receive an email or text message from [employer] with a link to a survey related to your  clinical pharmacist.\"     To schedule another MTM appointment, please call the clinic directly or you may call the MTM scheduling line at [phone] or toll-free at [phone].     My Clinical Pharmacist's contact information:                                                      Please feel free to contact me with any questions or concerns you have.      Danyelle Jonas, Tresa, [facility]  Medication Therapy Management Provider  [phone]      "

## 2024-10-08 NOTE — PROGRESS NOTES
Medication Therapy Management (MTM) Encounter    ASSESSMENT:                            Medication Adherence/Access: No issues identified    Diabetes   Stable.  Patient is meeting A1c goal of < 7%.  Needed additional education regarding GLP1RA.  She wishes to think about this more and find out from her pharmacy what her copay may be.    Hypertension/Sinus Tachycardia/H/o CVA  Stable. Patient is meeting blood pressure goal of < 130/80mmHg.     Hyperlipidemia   LDL is not at goal <55mg/dL per ADA guidelines but is on maximally tolerated statin therapy.  Could consider adding ezetimibe or PCSK9i, not discussed today       Anxiety  Stable.     Osteopenia  Plan in place.  Could consider additional therapy as well.     Allergic rhinitis   Stable.     Supplements  Stable.     PLAN:                            Discussed risks vs benefits of GLP1RA.  She'll think about if she wants to start and will follow-up with her pharmacy to find out estimated copay.  If she decides to proceed with Ozempic, she will let us know.    Future considerations - add ezetimibe or PCSK9i    Follow-up: Return in about 6 months (around 4/8/2025) for Follow-up Medication Review.    SUBJECTIVE/OBJECTIVE:                          Alma Helm is a 74 year old female seen for a follow-up visit.       Reason for visit: Routine follow-up.    Tobacco: She reports that she quit smoking about 42 years ago. Her smoking use included cigarettes. She started smoking about 52 years ago. She has a 10 pack-year smoking history. She has never used smokeless tobacco.  Alcohol: 1-3 beverages/week at the most    Medication Adherence/Access: no issues reported    Diabetes   Metformin 850mg twice daily   Ozempic 0.25mg weekly - prescribed yesterday, she hasn't heard anything from her pharmacy regarding co-pays.  Alma has some reservations due to side effects her daughter had while taking.  Daughter is taking Mounjaro successfully.  She's not wanting to proceed at this  "point, but will consider moving forward.  Says \"I'm just getting used to the idea\"  Aspirin 325mg daily for secondary prevention (history of CVA) although discussion ongoing with cardiology/neurology about whether the dose can be reduced  Patient is not experiencing side effects.  Current diabetes symptoms: none  Diet/Exercise: She started a research project with the [employer] a few weeks ago - is working with OT on getting additional movement.  Is tracking steps on her apple watch and she finds this motivating.   Blood sugar monitorin time(s) weekly; Ranges: (patient reported) Fasting - range 104-126mg/dL  Eye exam is up to date  Foot exam: due    Hypertension /Sinus Tachycardia/H/o CVA  Carvedilol 3.125mg twice daily   Lisinopril/hydrochlorothiazide 20/25mg - 1/2 tablet daily  Patient reports no current medication side effects  Patient does not self-monitor blood pressure.       Hyperlipidemia   Atorvastatin 10mg daily - has not been able to tolerate higher dose due to myalgias  Patient reports no significant myalgias or other side effects.     Mental Health   Anxiety  Citalopram 10mg daily - changed from sertraline since our last visit  Patient reports no current medication side effects.  Patient reports symptoms are stable.     Bone Health   Osteopenia  Vitamin D 1000 international unit(s) daily  Patient is not experiencing side effects.  DEXA History: 2024 - Osteopenia. FRAX Results: The 10 year probability of major osteoporotic fracture is 17.1%, and of hip fracture is 3.4%, based on left femoral neck BMD.   Patient is working to get additional calcium through her diet.  Risk factors: post-menopausal      Allergic Rhinitis  Astelin nasal spray daily, increases to twice daily when needed  EpiPen as needed - no use, but confirms is not   Flonase nasal spray 1-2 sprays daily   Saline nasal spray as needed   Patient reports no current medication side effects.     Patient feels that current therapy is " effective.      Supplements   Fish oil 1g daily  Vitamin B complex daily  No reported issues at this time.      Today's Vitals: LMP 03/17/2001   ----------------    I spent 22 minutes with this patient today.  A copy of the visit note was provided to the patient's provider(s).    A summary of these recommendations was sent via B[employer].    Danyelle Jonas, PharmD, BCACP  Medication Therapy Management Provider  [phone]     Telemedicine Visit Details  The patient's medications can be safely assessed via a telemedicine encounter.  Type of service:  Video Conference via [employer]  Originating Location (pt. Location): Home    Distant Location (provider location):  On-site  Start Time: 9:02 AM  End Time: 9:24 AM     Medication Therapy Recommendations  Type 2 diabetes mellitus without complication, without long-term current use of insulin (H)    Current Medication: semaglutide (OZEMPIC) 2 MG/3ML pen   Rationale: Does not understand instructions - Adherence - Adherence   Recommendation: Provide Education   Status: Patient Agreed - Adherence/Education

## 2024-10-09 ENCOUNTER — TELEPHONE (OUTPATIENT)
Dept: NEUROLOGY | Facility: CLINIC | Age: 74
End: 2024-10-09
Payer: COMMERCIAL

## 2024-10-09 NOTE — TELEPHONE ENCOUNTER
"Called pt to schedule stroke return visit, left message to call back. Patient was seen by the stroke team about 6 months ago, at that time it was recommended patient follow up with the stroke team to check in, review plan and optimize overall stroke risk management and we would like to offer an appointment.     When patient calls back: please schedule Stroke Return with Marietta Thakkar CNP however if no availability with this provider, ok to schedule with a different stroke LISA, any visit type as per patient preference, appointment notes \"6 month follow up for stroke, last visit with Bijan 4/2024\".  Return in about 6 months (around 10/3/2024) for with Bijan or  .     QUINCY ROBERTSON CMA    "

## 2024-11-01 DIAGNOSIS — I10 ESSENTIAL HYPERTENSION, BENIGN: ICD-10-CM

## 2024-11-01 RX ORDER — LISINOPRIL AND HYDROCHLOROTHIAZIDE 20; 25 MG/1; MG/1
0.5 TABLET ORAL DAILY
Qty: 45 TABLET | Refills: 0 | Status: SHIPPED | OUTPATIENT
Start: 2024-11-01

## 2024-11-05 RX ORDER — LIDOCAINE 40 MG/G
CREAM TOPICAL
Status: CANCELLED | OUTPATIENT
Start: 2024-11-05

## 2024-11-05 RX ORDER — FLUMAZENIL 0.1 MG/ML
0.2 INJECTION, SOLUTION INTRAVENOUS
Status: CANCELLED | OUTPATIENT
Start: 2024-11-05 | End: 2024-11-06

## 2024-11-05 RX ORDER — ONDANSETRON 4 MG/1
4 TABLET, ORALLY DISINTEGRATING ORAL EVERY 6 HOURS PRN
Status: CANCELLED | OUTPATIENT
Start: 2024-11-05

## 2024-11-05 RX ORDER — NALOXONE HYDROCHLORIDE 0.4 MG/ML
0.2 INJECTION, SOLUTION INTRAMUSCULAR; INTRAVENOUS; SUBCUTANEOUS
Status: CANCELLED | OUTPATIENT
Start: 2024-11-05

## 2024-11-05 RX ORDER — NALOXONE HYDROCHLORIDE 0.4 MG/ML
0.4 INJECTION, SOLUTION INTRAMUSCULAR; INTRAVENOUS; SUBCUTANEOUS
Status: CANCELLED | OUTPATIENT
Start: 2024-11-05

## 2024-11-05 RX ORDER — ONDANSETRON 2 MG/ML
4 INJECTION INTRAMUSCULAR; INTRAVENOUS EVERY 6 HOURS PRN
Status: CANCELLED | OUTPATIENT
Start: 2024-11-05

## 2024-11-05 RX ORDER — PROCHLORPERAZINE MALEATE 5 MG/1
5 TABLET ORAL EVERY 6 HOURS PRN
Status: CANCELLED | OUTPATIENT
Start: 2024-11-05

## 2024-11-05 RX ORDER — ONDANSETRON 2 MG/ML
4 INJECTION INTRAMUSCULAR; INTRAVENOUS
Status: CANCELLED | OUTPATIENT
Start: 2024-11-05

## 2024-11-06 ENCOUNTER — TELEPHONE (OUTPATIENT)
Dept: GASTROENTEROLOGY | Facility: CLINIC | Age: 74
End: 2024-11-06
Payer: COMMERCIAL

## 2024-11-06 DIAGNOSIS — E11.9 TYPE 2 DIABETES MELLITUS WITHOUT COMPLICATION, WITHOUT LONG-TERM CURRENT USE OF INSULIN (H): ICD-10-CM

## 2024-11-06 NOTE — TELEPHONE ENCOUNTER
Caller: Elvia Helm      Reason for Reschedule/Cancellation   (please be detailed, any staff messages or encounters to note?): PT ILL      Prior to reschedule please review:  Ordering Provider:   SHASTA LEON       Sedation Determined: MODERATE  Does patient have any ASC Exclusions, please identify?: N      Notes on Cancelled Procedure:  Procedure: Lower Endoscopy [Colonoscopy]   Date: 11/06/2024  Location: [facility]; [facility] [address] [place] [address]   Surgeon: FARNAZ      Rescheduled: No, PT WILL CALL BACK TO SCHEDULE       Did you cancel or rescheduled an EUS procedure? No.

## 2024-11-07 ENCOUNTER — HOSPITAL ENCOUNTER (OUTPATIENT)
Facility: CLINIC | Age: 74
End: 2024-11-07
Attending: INTERNAL MEDICINE | Admitting: INTERNAL MEDICINE
Payer: COMMERCIAL

## 2024-11-07 NOTE — TELEPHONE ENCOUNTER
Rescheduled: Yes,   Procedure: Lower Endoscopy [Colonoscopy]    Date: 2/13   Location: [facility]; [facility] [address] [place] [address]    Surgeon: DIPAK   Sedation Level Scheduled  MAC ,  Reason for Sedation Level  RN  Instructions updated and sent: Y     Does patient need PAC or Pre -Op Rescheduled? : YES       Did you cancel or rescheduled an EUS procedure? No.

## 2024-11-15 NOTE — PROGRESS NOTES
__________________________________________________________      [facility]   [phone]  __________________________________________________________           History of Present Illness   Chief Complaint: Patient presents with:  RECHECK: Some of her other doctors are concerned about her aspirin dosage, some word finding difficulty      Elvia Helm is a 74 year old female presenting for follow-up for stroke.     She was hospitalized at [facility] on 9/11/23. Prior to the hospital stay, she had a past medical history of HTN, HLD, DM2, past smoking, colon adenoma. She presented to the hospital with speech difficulty and LLE weakness. MRI with L middle frontal gyrus infarct of unclear etiology. She was treated with DPAT x21 days and Lipitor.     Most recent stroke follow-up was with myself 4/20/2024.  At that time she reported improvement but residual difficulty with speech. Her children had raised concerns with her mood; although she did not appreciate this she had recently transitioned from zoloft to celexa. It was recommended she continue ASA and Lipitor for stroke prevention and follow up with hematology, cardiology and oncology.    She saw cardiology 4/25/24 who recommended DAMASO; this was completed 5/9/24 with evidence of aneurysmal atrial septum and mildly positive bubble study. She saw cardiology again 5/20/24 who recommended ILR. Ultimately cardiology did not recommend PFO closure unless she would experience another stroke.     She saw hematology 4/25/24 who did not feel prothrombin gene mutation contributed to stroke and did not recommend anticoagulation.     Today, Alma reports that she has been doing generally well since her most recent follow-up.  She does note that she continues to have some ongoing speech changes; she notes that she can have some difficulty finding words to describe things or an overall delay and words coming out.   "Fortunately she feels that \"for the most part\" she is able to work around the symptoms and function quite normally.  She feels that mood is stable; she is currently on Celexa.  She denies any new stroke symptoms or concerns.    She continues on aspirin 325 mg; she denies identified side effects.  However, she notes that both her PCP and cardiology have suggested that she consider a lower dose of aspirin (81 mg).  We discussed that the status of evidence on weight-based dosing in stroke has recently changed; overall there is not strong evidence that she needs to maintain 325 mg for stroke prevention and I would be comfortable with her reducing to 81 mg.  At this time her preference is to continue her current dose as she has been tolerating it well.  She also continues on Lipitor without concern.  She reports that she is doing blood pressure monitoring regularly with most recent readings in the 120s/70s.  She is participating in the study to the [employer] which focuses on activity and movement following stroke and she finds this has helped her be more active.    Modified Dickenson Scale  Score: 2-Slight disability; unable to carry out all previous activities, but able to look after own affairs    Stroke Evaluation Summarized:  New results resulted and reviewed by me today are in BOLD below.      MRI/Head CT MRI showed subuacte stroke L middle frontal gyrus   Intracranial Vasculature CTA head normal   Cervical Vasculature CTA neck normal         Echocardiogram TTE: EF 60-65%  Bubble study:  mildly positive for flow across the interatrial septum and more positive with Valsalva.    DAMASO 5/9/24: LVEF 75%, aneurysmal atrial septum, bubble study mildly positive   EKG/Telemetry SR on hospital EKG. Since discharge, 30 day event monitor showed no atrial fibrillation or flutter    ILR interrogation 9/19: no atrial fibrillation captured    Other Testing CT CAP:   1.  There is an 8 x 6 mm right upper lobe groundglass nodule, which " may represent neoplasm on the spectrum of adenocarcinoma in situ. Please see follow-up guidelines below.     2.  There is a 2.3 cm possible right ovarian cyst. This can be further evaluated by nonemergent transabdominal and transvaginal pelvic ultrasound.     Pelvic  US:   1.  Simple right paraovarian cysts measuring up to 2.7 cm.  2.  A 2.2 cm uterine fibroid.      Hypercoags:  Lupus anticoagulant: negative/normal   Protein C: negative/normal   Antithrombin III: negative/normal   Factor 5: negative/normal   Factor 2: present     Labs Lab Results   Component Value Date    LDL 72 06/05/2024    A1C 6.0 (H) 10/07/2024    CTROPT <6 12/01/2023    INR 1.03 12/07/2023    INR 0.91 09/11/2023                 Home Medications     Current Outpatient Medications   Medication Sig Dispense Refill    aspirin (ASA) 325 MG EC tablet Take 325 mg by mouth At Bedtime      atorvastatin (LIPITOR) 10 MG tablet TAKE 1 TABLET BY MOUTH DAILY. 90 tablet 1    azelastine (ASTELIN) 0.1 % nasal spray Spray 1 spray into both nostrils 2 times daily 30 mL 11    blood glucose (NO BRAND SPECIFIED) lancets standard Use to test blood sugar one* times daily or as directed. 100 each 3    blood glucose (ONETOUCH ULTRA) test strip Use to test blood sugar daily or as directed. 100 strip 3    blood glucose calibration (ONETOUCH ULTRA CONTROL) solution Use to calibrate blood glucose monitor as directed. 1 Bottle 1    blood glucose monitoring (ONE TOUCH ULTRA 2) meter device kit Use to test blood sugar 1 times daily. 1 kit 0    carvedilol (COREG) 3.125 MG tablet Take 1 tablet (3.125 mg) by mouth 2 times daily (with meals). 180 tablet 3    Cholecalciferol (VITAMIN D3 PO) Take 1,000 Units by mouth daily.      citalopram (CELEXA) 10 MG tablet Take 1 tablet (10 mg) by mouth daily 90 tablet 3    EPINEPHrine (ANY BX GENERIC EQUIV) 0.3 MG/0.3ML injection 2-pack INJECT 0.3 MLS INTO THE MUSCLE ONCE AS NEEDED FOR ANAPHYLAXIS 2 each 0    fluticasone (FLONASE) 50 MCG/ACT  spray Spray 1-2 sprays into both nostrils daily 16 g 0    lisinopril-hydrochlorothiazide (ZESTORETIC) 20-25 MG tablet Take 0.5 tablets by mouth daily 45 tablet 0    metFORMIN (GLUCOPHAGE) 850 MG tablet TAKE 1 TABLET (850 MG) BY MOUTH 2 TIMES DAILY (WITH MEALS) 180 tablet 0    Omega-3 Fatty Acids (OMEGA-3 FISH OIL PO) Take 1 g by mouth daily       sodium chloride (OCEAN) 0.65 % nasal spray Spray 1 spray into both nostrils daily as needed for congestion      vitamin B complex with vitamin C (STRESS TAB) tablet Take 1 tablet by mouth daily       No current facility-administered medications for this visit.            Physical Examination     Vitals:            BMI Readings from Last 1 Encounters:   10/07/24 30.36 kg/m        Neurologic: Completed via telemedicine video call  Mental Status:  alert, oriented x 3, subtle delay in word finding at times, but able to communicate effectively  Cranial Nerves:  EOMI with normal smooth pursuit, facial movements symmetric, hearing not formally tested but intact to conversation, no dysarthria, shoulder shrug equal bilaterally, tongue protrusion midline  Motor:  no abnormal movements, able to move all limbs antigravity spontaneously with no signs of hemiparesis observed, no pronator drift  Coordination: No ataxia identified         Screenings and Questionnaires:     Tobacco:    Tobacco Use      Smoking status: Former        Packs/day: 0.00        Years: 1 pack/day for 10.0 years (10.0 ttl pk-yrs)        Types: Cigarettes        Start date: 1972        Quit date: 1982        Years since quittin.2      Smokeless tobacco: Never      Sleep Apnea:      2023     9:51 AM   STOP-Bang Questionnaire   Do you SNORE loudly (louder than talking or loud enough to be heard through closed doors)? 0    Do you often feel TIRED, fatigued, or sleepy during daytime? 1    Has anyone OBSERVED you stop breathing during your sleep? 0    Do you have or are you being treated for high blood  PRESSURE? 1    BMI more than 35kg/m2? 0    AGE over 50 years old? 1    NECK circumference > 16 inches (40cm)? 0    GENDER: Male? 0    Total Score 3       Patient-reported       Depression:      6/3/2024     8:56 AM 4/3/2024     7:49 AM   PHQ-2 ( 1999 [employer])   Q1: Little interest or pleasure in doing things 0  0   Q2: Feeling down, depressed or hopeless 0  0   PHQ-2 Score 0 0   Q1: Little interest or pleasure in doing things Not at all    Q2: Feeling down, depressed or hopeless Not at all    PHQ-2 Score 0        Patient-reported       Stroke Recovery and Risk Factors:      10/28/2023     1:01 PM   Stroke Questionnaire   Residual effects: Any residual effects from stroke? I have some symptoms, but I'm not sure if they're residual effects of the stroke    Residual effects: Describe speech    Level of independence: Could you live alone? Yes    Quality of Life: Rating (0-100%) 85    Quality of Life: What work now or before stroke Retired    Quality of Life: Current work status Retired    Quality of Life: How do you get around Family member drives me    Quality of Life: Living situation before stroke With family or significant other    Quality of Life: Living situation now With family or significant other    Medication: How do you take your meds Myself, from a pillbox that I set up    Medication: Do you ever miss or forget meds Rarely    Risk Factor: Checking blood pressure at home No, I have a blood pressure cuff but do not use it    Risk Factor: Checking blood sugar at home Yes    Risk Factor: Usual blood sugar numbers 100 to 150    Risk Factor: Second-hand smoke at home No    Risk Factor: How much caffeine per day hot tea 2-3 cups, tea drink 1, diet soda 1. average    Who completed this questionnaire? The patient independently        Patient-reported           Assessment and Plan   L MCA infarct of unclear etiology, September 2023.     Positive Factor 2; per hematology low concern for contribution to stroke and no  "anticoagulation recommended     Mildly positive bubble study on TTE; per cardiology no recommendation for closure unless recurrent strokes     R lung nodule, ongoing surveillance recommended     -Continue ASA 325mg for stroke prevention; discussed that for stroke prevention she could be on either 325 mg versus 81 mg for stroke prevention as \"weight-based dosing\" is no longer strongly supported by the evidence.  At this time she prefers to maintain 325 mg dosage.  - Continue Lipitor, titrate to maintain goal LDL 40-70  - Blood glucose monitoring, Hgb A1c 6.0%, (goal <7% for secondary stroke prevention), follow-up with PCP  - ongoing ILR interrogation; if atrial fibrillation is identified would recommend initiation of anticoagulation     - Return in about 1 year (around 11/18/2025) for stroke, with ADRYAN Thakkar only.     Stroke Education provided.  She will call us with any questions.  For any acute neurologic deficits she was advised to  go directly to the hospital rather than call the clinic.      Janelle Thakkar, KRISTI [facility]  Neurology  11/18/2024         ____________________________________________________________________    Billing:  Please note: for coding purposes this visit should be billed only as established and not new, since this patient was seen by my same subspecialty team (ealth Vascular Neurology) during the hospitalization that this visit is a follow up for.  I spent a total of 26 minutes on the day of the visit.   Time spent by me today doing chart review, history and exam, documentation and further activities per the note    "

## 2024-11-18 ENCOUNTER — VIRTUAL VISIT (OUTPATIENT)
Dept: NEUROLOGY | Facility: CLINIC | Age: 74
End: 2024-11-18
Payer: COMMERCIAL

## 2024-11-18 DIAGNOSIS — Z86.73 HISTORY OF ISCHEMIC RIGHT MCA STROKE: Primary | ICD-10-CM

## 2024-11-18 NOTE — LETTER
11/18/2024      Elvia Helm  [id number] [address] [facility] [id number]      Dear Colleague,    Thank you for referring your patient, Elvia Helm, to the [facility] NEUROLOGY [facility]. Please see a copy of my visit note below.        __________________________________________________________      [facility] Neurology [facility]   [phone]  __________________________________________________________           History of Present Illness   Chief Complaint: Patient presents with:  RECHECK: Some of her other doctors are concerned about her aspirin dosage, some word finding difficulty      Elvia Helm is a 74 year old female presenting for follow-up for stroke.     She was hospitalized at [facility] on 9/11/23. Prior to the hospital stay, she had a past medical history of HTN, HLD, DM2, past smoking, colon adenoma. She presented to the hospital with speech difficulty and LLE weakness. MRI with L middle frontal gyrus infarct of unclear etiology. She was treated with DPAT x21 days and Lipitor.     Most recent stroke follow-up was with myself 4/20/2024.  At that time she reported improvement but residual difficulty with speech. Her children had raised concerns with her mood; although she did not appreciate this she had recently transitioned from zoloft to celexa. It was recommended she continue ASA and Lipitor for stroke prevention and follow up with hematology, cardiology and oncology.    She saw cardiology 4/25/24 who recommended DAMASO; this was completed 5/9/24 with evidence of aneurysmal atrial septum and mildly positive bubble study. She saw cardiology again 5/20/24 who recommended ILR. Ultimately cardiology did not recommend PFO closure unless she would experience another stroke.     She saw hematology 4/25/24 who did not feel prothrombin gene mutation contributed to stroke and did not recommend anticoagulation.     Today, Alma reports that she  "has been doing generally well since her most recent follow-up.  She does note that she continues to have some ongoing speech changes; she notes that she can have some difficulty finding words to describe things or an overall delay and words coming out.  Fortunately she feels that \"for the most part\" she is able to work around the symptoms and function quite normally.  She feels that mood is stable; she is currently on Celexa.  She denies any new stroke symptoms or concerns.    She continues on aspirin 325 mg; she denies identified side effects.  However, she notes that both her PCP and cardiology have suggested that she consider a lower dose of aspirin (81 mg).  We discussed that the status of evidence on weight-based dosing in stroke has recently changed; overall there is not strong evidence that she needs to maintain 325 mg for stroke prevention and I would be comfortable with her reducing to 81 mg.  At this time her preference is to continue her current dose as she has been tolerating it well.  She also continues on Lipitor without concern.  She reports that she is doing blood pressure monitoring regularly with most recent readings in the 120s/70s.  She is participating in the study to the [place] which focuses on activity and movement following stroke and she finds this has helped her be more active.    Modified [place] Scale  Score: 2-Slight disability; unable to carry out all previous activities, but able to look after own affairs    Stroke Evaluation Summarized:  New results resulted and reviewed by me today are in BOLD below.      MRI/Head CT MRI showed subuacte stroke L middle frontal gyrus   Intracranial Vasculature CTA head normal   Cervical Vasculature CTA neck normal         Echocardiogram TTE: EF 60-65%  Bubble study:  mildly positive for flow across the interatrial septum and more positive with Valsalva.    DAMASO 5/9/24: LVEF 75%, aneurysmal atrial septum, bubble study mildly positive   EKG/Telemetry " SR on hospital EKG. Since discharge, 30 day event monitor showed no atrial fibrillation or flutter    ILR interrogation 9/19: no atrial fibrillation captured    Other Testing CT CAP:   1.  There is an 8 x 6 mm right upper lobe groundglass nodule, which may represent neoplasm on the spectrum of adenocarcinoma in situ. Please see follow-up guidelines below.     2.  There is a 2.3 cm possible right ovarian cyst. This can be further evaluated by nonemergent transabdominal and transvaginal pelvic ultrasound.     Pelvic  US:   1.  Simple right paraovarian cysts measuring up to 2.7 cm.  2.  A 2.2 cm uterine fibroid.      Hypercoags:  Lupus anticoagulant: negative/normal   Protein C: negative/normal   Antithrombin III: negative/normal   Factor 5: negative/normal   Factor 2: present     Labs Lab Results   Component Value Date    LDL 72 06/05/2024    A1C 6.0 (H) 10/07/2024    CTROPT <6 12/01/2023    INR 1.03 12/07/2023    INR 0.91 09/11/2023                 Home Medications     Current Outpatient Medications   Medication Sig Dispense Refill     aspirin (ASA) 325 MG EC tablet Take 325 mg by mouth At Bedtime       atorvastatin (LIPITOR) 10 MG tablet TAKE 1 TABLET BY MOUTH DAILY. 90 tablet 1     azelastine (ASTELIN) 0.1 % nasal spray Spray 1 spray into both nostrils 2 times daily 30 mL 11     blood glucose (NO BRAND SPECIFIED) lancets standard Use to test blood sugar one* times daily or as directed. 100 each 3     blood glucose (ONETOUCH ULTRA) test strip Use to test blood sugar daily or as directed. 100 strip 3     blood glucose calibration (ONETOUCH ULTRA CONTROL) solution Use to calibrate blood glucose monitor as directed. 1 Bottle 1     blood glucose monitoring (ONE TOUCH ULTRA 2) meter device kit Use to test blood sugar 1 times daily. 1 kit 0     carvedilol (COREG) 3.125 MG tablet Take 1 tablet (3.125 mg) by mouth 2 times daily (with meals). 180 tablet 3     Cholecalciferol (VITAMIN D3 PO) Take 1,000 Units by mouth daily.        citalopram (CELEXA) 10 MG tablet Take 1 tablet (10 mg) by mouth daily 90 tablet 3     EPINEPHrine (ANY BX GENERIC EQUIV) 0.3 MG/0.3ML injection 2-pack INJECT 0.3 MLS INTO THE MUSCLE ONCE AS NEEDED FOR ANAPHYLAXIS 2 each 0     fluticasone (FLONASE) 50 MCG/ACT spray Spray 1-2 sprays into both nostrils daily 16 g 0     lisinopril-hydrochlorothiazide (ZESTORETIC) 20-25 MG tablet Take 0.5 tablets by mouth daily 45 tablet 0     metFORMIN (GLUCOPHAGE) 850 MG tablet TAKE 1 TABLET (850 MG) BY MOUTH 2 TIMES DAILY (WITH MEALS) 180 tablet 0     Omega-3 Fatty Acids (OMEGA-3 FISH OIL PO) Take 1 g by mouth daily        sodium chloride (OCEAN) 0.65 % nasal spray Spray 1 spray into both nostrils daily as needed for congestion       vitamin B complex with vitamin C (STRESS TAB) tablet Take 1 tablet by mouth daily       No current facility-administered medications for this visit.            Physical Examination     Vitals:            BMI Readings from Last 1 Encounters:   10/07/24 30.36 kg/m        Neurologic: Completed via telemedicine video call  Mental Status:  alert, oriented x 3, subtle delay in word finding at times, but able to communicate effectively  Cranial Nerves:  EOMI with normal smooth pursuit, facial movements symmetric, hearing not formally tested but intact to conversation, no dysarthria, shoulder shrug equal bilaterally, tongue protrusion midline  Motor:  no abnormal movements, able to move all limbs antigravity spontaneously with no signs of hemiparesis observed, no pronator drift  Coordination: No ataxia identified         Screenings and Questionnaires:     Tobacco:    Tobacco Use      Smoking status: Former        Packs/day: 0.00        Years: 1 pack/day for 10.0 years (10.0 ttl pk-yrs)        Types: Cigarettes        Start date: 1972        Quit date: 1982        Years since quittin.2      Smokeless tobacco: Never      Sleep Apnea:      2023     9:51 AM   STOP-Bang Questionnaire   Do you  SNORE loudly (louder than talking or loud enough to be heard through closed doors)? 0    Do you often feel TIRED, fatigued, or sleepy during daytime? 1    Has anyone OBSERVED you stop breathing during your sleep? 0    Do you have or are you being treated for high blood PRESSURE? 1    BMI more than 35kg/m2? 0    AGE over 50 years old? 1    NECK circumference > 16 inches (40cm)? 0    GENDER: Male? 0    Total Score 3       Patient-reported       Depression:      6/3/2024     8:56 AM 4/3/2024     7:49 AM   PHQ-2 ( 1999 [employer])   Q1: Little interest or pleasure in doing things 0  0   Q2: Feeling down, depressed or hopeless 0  0   PHQ-2 Score 0 0   Q1: Little interest or pleasure in doing things Not at all    Q2: Feeling down, depressed or hopeless Not at all    PHQ-2 Score 0        Patient-reported       Stroke Recovery and Risk Factors:      10/28/2023     1:01 PM   Stroke Questionnaire   Residual effects: Any residual effects from stroke? I have some symptoms, but I'm not sure if they're residual effects of the stroke    Residual effects: Describe speech    Level of independence: Could you live alone? Yes    Quality of Life: Rating (0-100%) 85    Quality of Life: What work now or before stroke Retired    Quality of Life: Current work status Retired    Quality of Life: How do you get around Family member drives me    Quality of Life: Living situation before stroke With family or significant other    Quality of Life: Living situation now With family or significant other    Medication: How do you take your meds Myself, from a pillbox that I set up    Medication: Do you ever miss or forget meds Rarely    Risk Factor: Checking blood pressure at home No, I have a blood pressure cuff but do not use it    Risk Factor: Checking blood sugar at home Yes    Risk Factor: Usual blood sugar numbers 100 to 150    Risk Factor: Second-hand smoke at home No    Risk Factor: How much caffeine per day hot tea 2-3 cups, tea drink 1, diet  "soda 1. average    Who completed this questionnaire? The patient independently        Patient-reported           Assessment and Plan   L MCA infarct of unclear etiology, September 2023.     Positive Factor 2; per hematology low concern for contribution to stroke and no anticoagulation recommended     Mildly positive bubble study on TTE; per cardiology no recommendation for closure unless recurrent strokes     R lung nodule, ongoing surveillance recommended     -Continue ASA 325mg for stroke prevention; discussed that for stroke prevention she could be on either 325 mg versus 81 mg for stroke prevention as \"weight-based dosing\" is no longer strongly supported by the evidence.  At this time she prefers to maintain 325 mg dosage.  - Continue Lipitor, titrate to maintain goal LDL 40-70  - Blood glucose monitoring, Hgb A1c 6.0%, (goal <7% for secondary stroke prevention), follow-up with PCP  - ongoing ILR interrogation; if atrial fibrillation is identified would recommend initiation of anticoagulation     - Return in about 1 year (around 11/18/2025) for stroke, with ADRYAN Thakkar only.     Stroke Education provided.  She will call us with any questions.  For any acute neurologic deficits she was advised to  go directly to the hospital rather than call the clinic.      KRISTI Mcgowan [facility]  Neurology  11/18/2024         ____________________________________________________________________    Billing:  Please note: for coding purposes this visit should be billed only as established and not new, since this patient was seen by my same subspecialty team (ealth Vascular Neurology) during the hospitalization that this visit is a follow up for.  I spent a total of 26 minutes on the day of the visit.   Time spent by me today doing chart review, history and exam, documentation and further activities per the note      Again, thank you for allowing me to participate in the care of your patient.        Sincerely,        Janelle MYLES" KRISTI Thakkar CNP

## 2024-11-18 NOTE — NURSING NOTE
Alma is a 74 year old who is being evaluated via a billable video visit.    How would you like to obtain your AVS? MyChart  If the video visit is dropped, the invitation should be resent by: MyC  Will anyone else be joining your video visit? No    Video-Visit Details    Type of service:  Video Visit   Video End Time:  Originating Location (pt. Location): Home    Distant Location (provider location):  Off-site  Platform used for Video Visit: Tasia

## 2024-12-04 ENCOUNTER — ANCILLARY PROCEDURE (OUTPATIENT)
Dept: CT IMAGING | Facility: CLINIC | Age: 74
End: 2024-12-04
Attending: THORACIC SURGERY (CARDIOTHORACIC VASCULAR SURGERY)
Payer: COMMERCIAL

## 2024-12-04 DIAGNOSIS — R91.1 SOLITARY LUNG NODULE: ICD-10-CM

## 2024-12-04 PROCEDURE — 71250 CT THORAX DX C-: CPT

## 2024-12-26 ENCOUNTER — ANCILLARY PROCEDURE (OUTPATIENT)
Dept: CARDIOLOGY | Facility: CLINIC | Age: 74
End: 2024-12-26
Attending: INTERNAL MEDICINE
Payer: COMMERCIAL

## 2024-12-26 DIAGNOSIS — I63.511 ACUTE ISCHEMIC RIGHT MCA STROKE (H): ICD-10-CM

## 2024-12-26 DIAGNOSIS — Z45.09 ENCOUNTER FOR LOOP RECORDER CHECK: ICD-10-CM

## 2024-12-26 PROCEDURE — 93298 REM INTERROG DEV EVAL SCRMS: CPT | Performed by: INTERNAL MEDICINE

## 2025-01-03 SDOH — HEALTH STABILITY: PHYSICAL HEALTH: ON AVERAGE, HOW MANY DAYS PER WEEK DO YOU ENGAGE IN MODERATE TO STRENUOUS EXERCISE (LIKE A BRISK WALK)?: 2 DAYS

## 2025-01-03 SDOH — HEALTH STABILITY: PHYSICAL HEALTH: ON AVERAGE, HOW MANY MINUTES DO YOU ENGAGE IN EXERCISE AT THIS LEVEL?: 20 MIN

## 2025-01-03 ASSESSMENT — SOCIAL DETERMINANTS OF HEALTH (SDOH): HOW OFTEN DO YOU GET TOGETHER WITH FRIENDS OR RELATIVES?: TWICE A WEEK

## 2025-01-06 ENCOUNTER — OFFICE VISIT (OUTPATIENT)
Dept: FAMILY MEDICINE | Facility: CLINIC | Age: 75
End: 2025-01-06
Payer: COMMERCIAL

## 2025-01-06 VITALS
OXYGEN SATURATION: 99 % | SYSTOLIC BLOOD PRESSURE: 130 MMHG | TEMPERATURE: 97.5 F | RESPIRATION RATE: 16 BRPM | BODY MASS INDEX: 30.36 KG/M2 | HEIGHT: 62 IN | HEART RATE: 87 BPM | WEIGHT: 165 LBS | DIASTOLIC BLOOD PRESSURE: 84 MMHG

## 2025-01-06 DIAGNOSIS — E11.59 TYPE 2 DIABETES MELLITUS WITH VASCULAR DISEASE (H): ICD-10-CM

## 2025-01-06 DIAGNOSIS — R53.1 LEFT-SIDED WEAKNESS: ICD-10-CM

## 2025-01-06 DIAGNOSIS — Z86.73 HISTORY OF STROKE: ICD-10-CM

## 2025-01-06 DIAGNOSIS — R91.8 MASS OF UPPER LOBE OF RIGHT LUNG: ICD-10-CM

## 2025-01-06 DIAGNOSIS — Z00.00 ENCOUNTER FOR ANNUAL WELLNESS VISIT (AWV) IN MEDICARE PATIENT: Primary | ICD-10-CM

## 2025-01-06 DIAGNOSIS — D12.6 ADENOMATOUS POLYP OF COLON, UNSPECIFIED PART OF COLON: ICD-10-CM

## 2025-01-06 DIAGNOSIS — R05.8 DRY COUGH: ICD-10-CM

## 2025-01-06 DIAGNOSIS — I10 ESSENTIAL HYPERTENSION, BENIGN: ICD-10-CM

## 2025-01-06 DIAGNOSIS — E78.5 HYPERLIPIDEMIA LDL GOAL <100: ICD-10-CM

## 2025-01-06 DIAGNOSIS — R00.0 SINUS TACHYCARDIA: ICD-10-CM

## 2025-01-06 DIAGNOSIS — M85.89 OSTEOPENIA OF MULTIPLE SITES: ICD-10-CM

## 2025-01-06 LAB
ALBUMIN SERPL BCG-MCNC: 4.6 G/DL (ref 3.5–5.2)
ALP SERPL-CCNC: 54 U/L (ref 40–150)
ALT SERPL W P-5'-P-CCNC: 19 U/L (ref 0–50)
ANION GAP SERPL CALCULATED.3IONS-SCNC: 16 MMOL/L (ref 7–15)
AST SERPL W P-5'-P-CCNC: 24 U/L (ref 0–45)
BILIRUB SERPL-MCNC: 0.4 MG/DL
BUN SERPL-MCNC: 17.9 MG/DL (ref 8–23)
CALCIUM SERPL-MCNC: 10.1 MG/DL (ref 8.8–10.4)
CHLORIDE SERPL-SCNC: 99 MMOL/L (ref 98–107)
CHOLEST SERPL-MCNC: 151 MG/DL
CREAT SERPL-MCNC: 0.77 MG/DL (ref 0.51–0.95)
CREAT UR-MCNC: 26.9 MG/DL
EGFRCR SERPLBLD CKD-EPI 2021: 80 ML/MIN/1.73M2
ERYTHROCYTE [DISTWIDTH] IN BLOOD BY AUTOMATED COUNT: 13.8 % (ref 10–15)
EST. AVERAGE GLUCOSE BLD GHB EST-MCNC: 128 MG/DL
FASTING STATUS PATIENT QL REPORTED: YES
FASTING STATUS PATIENT QL REPORTED: YES
FLUAV AG SPEC QL IA: NEGATIVE
FLUBV AG SPEC QL IA: NEGATIVE
GLUCOSE SERPL-MCNC: 127 MG/DL (ref 70–99)
HBA1C MFR BLD: 6.1 % (ref 0–5.6)
HCO3 SERPL-SCNC: 24 MMOL/L (ref 22–29)
HCT VFR BLD AUTO: 38.6 % (ref 35–47)
HDLC SERPL-MCNC: 54 MG/DL
HGB BLD-MCNC: 12.4 G/DL (ref 11.7–15.7)
LDLC SERPL CALC-MCNC: 71 MG/DL
MCH RBC QN AUTO: 28.6 PG (ref 26.5–33)
MCHC RBC AUTO-ENTMCNC: 32.1 G/DL (ref 31.5–36.5)
MCV RBC AUTO: 89 FL (ref 78–100)
MDC_IDC_EPISODE_DTM: NORMAL
MDC_IDC_EPISODE_ID: NORMAL
MDC_IDC_EPISODE_TYPE: NORMAL
MDC_IDC_MSMT_BATTERY_DTM: NORMAL
MDC_IDC_MSMT_BATTERY_STATUS: NORMAL
MDC_IDC_PG_IMPLANT_DTM: NORMAL
MDC_IDC_PG_MFG: NORMAL
MDC_IDC_PG_MODEL: NORMAL
MDC_IDC_PG_SERIAL: NORMAL
MDC_IDC_PG_TYPE: NORMAL
MDC_IDC_SESS_CLINIC_NAME: NORMAL
MDC_IDC_SESS_DTM: NORMAL
MDC_IDC_SESS_TYPE: NORMAL
MDC_IDC_STAT_AT_BURDEN_PERCENT: 0 %
MDC_IDC_STAT_AT_DTM_END: NORMAL
MDC_IDC_STAT_AT_DTM_START: NORMAL
MICROALBUMIN UR-MCNC: <12 MG/L
MICROALBUMIN/CREAT UR: NORMAL MG/G{CREAT}
NONHDLC SERPL-MCNC: 97 MG/DL
PLATELET # BLD AUTO: 341 10E3/UL (ref 150–450)
POTASSIUM SERPL-SCNC: 4.2 MMOL/L (ref 3.4–5.3)
PROT SERPL-MCNC: 7.5 G/DL (ref 6.4–8.3)
RBC # BLD AUTO: 4.34 10E6/UL (ref 3.8–5.2)
SARS-COV-2 RNA RESP QL NAA+PROBE: NEGATIVE
SODIUM SERPL-SCNC: 139 MMOL/L (ref 135–145)
TRIGL SERPL-MCNC: 132 MG/DL
WBC # BLD AUTO: 8.3 10E3/UL (ref 4–11)

## 2025-01-06 PROCEDURE — 85027 COMPLETE CBC AUTOMATED: CPT | Performed by: INTERNAL MEDICINE

## 2025-01-06 PROCEDURE — 80061 LIPID PANEL: CPT | Performed by: INTERNAL MEDICINE

## 2025-01-06 PROCEDURE — 36415 COLL VENOUS BLD VENIPUNCTURE: CPT | Performed by: INTERNAL MEDICINE

## 2025-01-06 PROCEDURE — 99214 OFFICE O/P EST MOD 30 MIN: CPT | Mod: 25 | Performed by: INTERNAL MEDICINE

## 2025-01-06 PROCEDURE — 83036 HEMOGLOBIN GLYCOSYLATED A1C: CPT | Performed by: INTERNAL MEDICINE

## 2025-01-06 PROCEDURE — 80053 COMPREHEN METABOLIC PANEL: CPT | Performed by: INTERNAL MEDICINE

## 2025-01-06 PROCEDURE — 87635 SARS-COV-2 COVID-19 AMP PRB: CPT | Performed by: INTERNAL MEDICINE

## 2025-01-06 PROCEDURE — 87804 INFLUENZA ASSAY W/OPTIC: CPT | Performed by: INTERNAL MEDICINE

## 2025-01-06 PROCEDURE — 82043 UR ALBUMIN QUANTITATIVE: CPT | Performed by: INTERNAL MEDICINE

## 2025-01-06 PROCEDURE — G0439 PPPS, SUBSEQ VISIT: HCPCS | Performed by: INTERNAL MEDICINE

## 2025-01-06 PROCEDURE — 82570 ASSAY OF URINE CREATININE: CPT | Performed by: INTERNAL MEDICINE

## 2025-01-06 RX ORDER — ASPIRIN 81 MG/1
81 TABLET ORAL DAILY
COMMUNITY
Start: 2025-01-06

## 2025-01-06 RX ORDER — BLOOD SUGAR DIAGNOSTIC
STRIP MISCELLANEOUS
Qty: 100 STRIP | Refills: 3 | Status: SHIPPED | OUTPATIENT
Start: 2025-01-06

## 2025-01-06 RX ORDER — LOSARTAN POTASSIUM AND HYDROCHLOROTHIAZIDE 12.5; 5 MG/1; MG/1
1 TABLET ORAL DAILY
Qty: 90 TABLET | Refills: 3 | Status: SHIPPED | OUTPATIENT
Start: 2025-01-06

## 2025-01-06 ASSESSMENT — PAIN SCALES - GENERAL: PAINLEVEL_OUTOF10: MILD PAIN (2)

## 2025-01-06 NOTE — PROGRESS NOTES
Preventive Care Visit  [facility] HIPOLITO Osborne MD, Internal Medicine  Jan 6, 2025      Assessment & Plan     Encounter for annual wellness visit (AWV) in Medicare patient  This is a very pleasant 74 years old he was exposed to COVID at home  We will check her for that and influenza because of cough symptoms and congestion  Her weight has remained stable and she will do mammogram in summer  She is scheduled for colonoscopy and does not need a Pap smear and her diet is good    Type 2 diabetes mellitus with vascular disease (H)  We will reduce the dose of metformin to once a day  She is a good candidate for Jardiance or GLP agonist which we will consider if blood sugar increases  Her BMI is 30  - HEMOGLOBIN A1C  - Albumin Random Urine Quantitative with Creat Ratio  - metFORMIN (GLUCOPHAGE) 850 MG tablet  Dispense: 90 tablet; Refill: 3    Mass of upper lobe of right lung  Repeat CT is discussed and that shows stability it will be checked in 1 year    Left-sided weakness  This has improved although some speech deficit remains    History of stroke  We will change to baby aspirin now    Essential hypertension, benign  Because of dry cough for months we will change losartan hydrochlorothiazide and stop lisinopril  - CBC with Platelets    - losartan-hydrochlorothiazide (HYZAAR) 50-12.5 MG tablet  Dispense: 90 tablet; Refill: 3    Osteopenia of multiple sites  We will continue calcium and vitamin D    BMI 30.0-30.9,adult  As above complicated by diabetes    Hyperlipidemia LDL goal <100  On Lipitor  - Comprehensive metabolic panel  - Lipid panel reflex to direct LDL Fasting    Adenomatous polyp of colon, unspecified part of colon  If this colonoscopy is negative no further colonoscopies    Sinus tachycardia  On carvedilol    Dry cough  As above we will make the change and test her for COVID and influenza and we discussed the treatment presents  - losartan-hydrochlorothiazide (HYZAAR) 50-12.5 MG tablet   "Dispense: 90 tablet; Refill: 3  - COVID-19 Virus (Coronavirus) by PCR Nose  - Influenza A & B Antigen - Clinic Collect          Here are my comments about your recent results:  -Normal red blood cell (hgb) levels, normal white blood cell count and normal platelet levels.  -Liver and gallbladder tests are normal (ALT,AST, Alk phos, bilirubin), kidney function is normal (Cr, GFR), sodium is normal, potassium is normal, calcium is normal, glucose is high due to diabetes-A1C (test of diabetes control the last 2-3 months) is at your goal. Please continue with your current plan. Also, you should make an appointment to see me and recheck your A1C test in 6 months.   -Microalbumin (urine protein) test is normal.  ADVISE: rechecking this annually.  Influenza test is negative      BMI  Estimated body mass index is 30.4 kg/m  as calculated from the following:    Height as of this encounter: 1.569 m (5' 1.77\").    Weight as of this encounter: 74.8 kg (165 lb).       Counseling  Appropriate preventive services were addressed with this patient via screening, questionnaire, or discussion as appropriate for fall prevention, nutrition, physical activity,  social engagement, weight loss and cognition.  Checklist reviewing preventive services available has been given to the patient.  Reviewed patient's diet, addressing concerns and/or questions.   She is at risk for lack of exercise and has been provided with information to increase physical activity for the benefit of her well-being.   She is at risk for psychosocial distress and has been provided with information to reduce risk.   The patient was provided with written information regarding signs of hearing loss.   Information on urinary incontinence and treatment options given to patient.           Miguel A Marquez is a 74 year old, presenting for the following:  Physical (Fasting )          HPI    This extremely nice 74 years old found to have type 2 diabetes and hypertension and " hyperlipidemia with a stroke 3 years ago  She was found to have a right upper lung lesion which has remained stable at 8 mm now  For the last 1 year she has dry cough but since last week she has congestion and since yesterday cough with congestion  Her  had COVID-19 infection in [place] time    Patient has fully recovered from stroke but sometimes notices weakness    Other issue is that her blood sugars are remaining 10 2-1 20  She could not complete previous colonoscopy prep    Diabetes Follow-up    How often are you checking your blood sugar?   What concerns do you have today about your diabetes? None   Do you have any of these symptoms? (Select all that apply)  No numbness or tingling in feet.  No redness, sores or blisters on feet.  No complaints of excessive thirst.  No reports of blurry vision.  No significant changes to weight.      BP Readings from Last 2 Encounters:   01/06/25 130/84   10/07/24 127/77     Hemoglobin A1C (%)   Date Value   10/07/2024 6.0 (H)   06/05/2024 6.0 (H)   06/14/2021 6.1 (H)   03/26/2021 5.9 (H)     LDL Cholesterol Calculated (mg/dL)   Date Value   06/05/2024 72   09/12/2023 66   06/14/2021 65   06/23/2020 64             Hypertension Follow-up    Do you check your blood pressure regularly outside of the clinic? No   Are you following a low salt diet? Yes  Are your blood pressures ever more than 140 on the top number (systolic) OR more   than 90 on the bottom number (diastolic), for example 140/90? No  Health Care Directive  Patient does not have a Health Care Directive: Patient states has Advance Directive and will bring in a copy to clinic.      1/3/2025   General Health   How would you rate your overall physical health? Good   Feel stress (tense, anxious, or unable to sleep) To some extent   (!) STRESS CONCERN      1/3/2025   Nutrition   Diet: Diabetic         1/3/2025   Exercise   Days per week of moderate/strenous exercise 2 days   Average minutes spent exercising at  this level 20 min   (!) EXERCISE CONCERN      1/3/2025   Social Factors   Frequency of gathering with friends or relatives Twice a week   Worry food won't last until get money to buy more No   Food not last or not have enough money for food? No   Do you have housing? (Housing is defined as stable permanent housing and does not include staying ouside in a car, in a tent, in an abandoned building, in an overnight shelter, or couch-surfing.) Yes   Are you worried about losing your housing? No   Lack of transportation? No   Unable to get utilities (heat,electricity)? No         1/3/2025   Fall Risk   Fallen 2 or more times in the past year? No   Trouble with walking or balance? No          1/3/2025   Activities of Daily Living- Home Safety   Needs help with the following daily activites None of the above   Safety concerns in the home None of the above         1/3/2025   Dental   Dentist two times every year? Yes         1/3/2025   Hearing Screening   Hearing concerns? (!) I NEED TO ASK PEOPLE TO SPEAK UP OR REPEAT THEMSELVES.    (!) IT'S HARD TO FOLLOW A CONVERSATION IN A NOISY RESTAURANT OR CROWDED ROOM.       Multiple values from one day are sorted in reverse-chronological order         1/3/2025   Driving Risk Screening   Patient/family members have concerns about driving No         1/3/2025   General Alertness/Fatigue Screening   Have you been more tired than usual lately? No         1/3/2025   Urinary Incontinence Screening   Bothered by leaking urine in past 6 months Yes         1/3/2025   TB Screening   Were you born outside of the [place]? No               1/3/2025   Substance Use   Alcohol more than 3/day or more than 7/wk No   Do you have a current opioid prescription? No   How severe/bad is pain from 1 to 10? 2/10   Do you use any other substances recreationally? No    (!) PRESCRIPTION DRUGS       Multiple values from one day are sorted in reverse-chronological order     Social History     Tobacco Use    Smoking  status: Former     Current packs/day: 0.00     Average packs/day: 1 pack/day for 10.0 years (10.0 ttl pk-yrs)     Types: Cigarettes     Start date: 1972     Quit date: 1982     Years since quittin.4    Smokeless tobacco: Never   Vaping Use    Vaping status: Never Used   Substance Use Topics    Alcohol use: Not Currently     Comment: glass of wine on the weekends    Drug use: No     Comment: caffeine tea 3-4/d           2024   LAST FHS-7 RESULTS   1st degree relative breast or ovarian cancer No   Any relative bilateral breast cancer No   Any male have breast cancer No   Any ONE woman have BOTH breast AND ovarian cancer No   Any woman with breast cancer before 50yrs No   2 or more relatives with breast AND/OR ovarian cancer No   2 or more relatives with breast AND/OR bowel cancer No        Mammogram Screening - Mammogram every 1-2 years updated in Health Maintenance based on mutual decision making    ASCVD Risk   The ASCVD Risk score (Rodger BRIZUELA, et al., 2019) failed to calculate for the following reasons:    Risk score cannot be calculated because patient has a medical history suggesting prior/existing ASCVD            Reviewed and updated as needed this visit by Provider     Meds  Problems  Med Hx   Fam Hx            BP Readings from Last 3 Encounters:   25 130/84   10/07/24 127/77   24 120/72    Wt Readings from Last 3 Encounters:   25 74.8 kg (165 lb)   10/07/24 75.3 kg (166 lb)   24 74.8 kg (165 lb)                  Patient Active Problem List   Diagnosis    Allergic rhinitis    Essential hypertension, benign    Postmenopausal atrophic vaginitis    Bee sting reaction    Torticollis    Type 2 diabetes mellitus with vascular disease (H)    HYPERLIPIDEMIA LDL GOAL <100    Post-nasal drip    Osteopenia    Urticaria    Arthritis of knee    Pes planus    Tinnitus    Left-sided weakness    Acute ischemic right MCA stroke (H)    PFO (patent foramen ovale)    BMI  30.0-30.9,adult    Mass of upper lobe of right lung    History of stroke    Adenomatous polyp of colon, unspecified part of colon     Past Surgical History:   Procedure Laterality Date    ABDOMEN SURGERY  1988    ceasaran birth    COLONOSCOPY      COLONOSCOPY N/A 2022    Procedure: COLONOSCOPY, WITH POLYPECTOMY AND BIOPSY;  Surgeon: Nicole Grubbs MD;  Location:  GI    ENT SURGERY      frequent sinus infections    EP LOOP RECORDER IMPLANT N/A 2024    Procedure: Loop Recorder Implant;  Surgeon: Rm More MD;  Location:  HEART CARDIAC CATH LAB    HC ENDOMETRIAL BIOPSY W/O CERVICAL DILATION      ORTHOPEDIC SURGERY      broken right ankle/plates and screws    SOFT TISSUE SURGERY      tendonitis in right upper leg and below right elbow    ZZC  DELIVERY ONLY      ZZC LIGATE FALLOPIAN TUBE,POSTPARTUM      ZZC NONSPECIFIC PROCEDURE      right ankle roe and pins       Social History     Tobacco Use    Smoking status: Former     Current packs/day: 0.00     Average packs/day: 1 pack/day for 10.0 years (10.0 ttl pk-yrs)     Types: Cigarettes     Start date: 1972     Quit date: 1982     Years since quittin.4    Smokeless tobacco: Never   Substance Use Topics    Alcohol use: Not Currently     Comment: glass of wine on the weekends     Family History   Problem Relation Age of Onset    Musculoskeletal Disorder Mother         b:1938   Hx Fibromyalgia    Hypertension Mother     Cerebrovascular Disease Mother     Anesthesia Reaction Mother     Cerebrovascular Disease Father         b:193,  age 68  massive stroke    Diabetes Father         dx age 50s and his family    Cerebrovascular Disease Sister 62        ? TIA    Bronchitis Sister     Anxiety Disorder Sister     Anesthesia Reaction Sister     Bronchitis Sister     Family History Negative Brother         b:    Cancer Maternal Grandfather         throat    Diabetes Paternal Grandmother     Hypertension  Daughter     Diabetes Daughter         b:1978    Diabetes dx age 22**insulin    CABG Daughter 43    Hypertension Daughter     Lipids Son         b:1981 high cholosterol and triglycerides         Current Outpatient Medications   Medication Sig Dispense Refill    aspirin 81 MG EC tablet Take 1 tablet (81 mg) by mouth daily.      atorvastatin (LIPITOR) 10 MG tablet TAKE 1 TABLET BY MOUTH DAILY. 90 tablet 1    azelastine (ASTELIN) 0.1 % nasal spray Spray 1 spray into both nostrils 2 times daily 30 mL 11    blood glucose (NO BRAND SPECIFIED) lancets standard Use to test blood sugar one* times daily or as directed. 100 each 3    blood glucose (ONETOUCH ULTRA) test strip Use to test blood sugar daily or as directed. 100 strip 3    blood glucose calibration (ONETOUCH ULTRA CONTROL) solution Use to calibrate blood glucose monitor as directed. 1 Bottle 1    blood glucose monitoring (ONE TOUCH ULTRA 2) meter device kit Use to test blood sugar 1 times daily. 1 kit 0    carvedilol (COREG) 3.125 MG tablet Take 1 tablet (3.125 mg) by mouth 2 times daily (with meals). 180 tablet 3    Cholecalciferol (VITAMIN D3 PO) Take 1,000 Units by mouth daily.      citalopram (CELEXA) 10 MG tablet Take 1 tablet (10 mg) by mouth daily 90 tablet 3    EPINEPHrine (ANY BX GENERIC EQUIV) 0.3 MG/0.3ML injection 2-pack INJECT 0.3 MLS INTO THE MUSCLE ONCE AS NEEDED FOR ANAPHYLAXIS 2 each 0    fluticasone (FLONASE) 50 MCG/ACT spray Spray 1-2 sprays into both nostrils daily 16 g 0    losartan-hydrochlorothiazide (HYZAAR) 50-12.5 MG tablet Take 1 tablet by mouth daily. 90 tablet 3    metFORMIN (GLUCOPHAGE) 850 MG tablet Take 1 tablet (850 mg) by mouth daily (with dinner). 90 tablet 3    Omega-3 Fatty Acids (OMEGA-3 FISH OIL PO) Take 1 g by mouth daily       sodium chloride (OCEAN) 0.65 % nasal spray Spray 1 spray into both nostrils daily as needed for congestion      vitamin B complex with vitamin C (STRESS TAB) tablet Take 1 tablet by mouth daily        Allergies   Allergen Reactions    Atovaquone-Proguanil Hcl Hives    Amoxicillin Hives    Bee Hives and Swelling     carries Epi Pen     Ceftin Hives     hives    Clindamycin Hives    Erythromycin GI Disturbance    Seasonal Allergies     Sertraline       cough, gag and many times I vomit    Shellfish-Derived Products Unknown    Shrimp Hives     Happened twice    Tetracycline Other (See Comments)     ?severe headaches  & nausea      Current providers sharing in care for this patient include:  Patient Care Team:  Masood Osborne MD as PCP - General (Internal Medicine)  Masood Osborne MD as PCP - Internal Medicine  Masood Osborne MD as Assigned PCP  Danyelle Jonas, PharmD as Pharmacist (Pharmacist)  Danyelle Jonas, PharmD as Assigned MTM Pharmacist  Marietta Burgess PA-C as Physician Assistant (Psychiatry & Neurology Vascular Neurology)  Renato Plata MD as Referring Physician (Family Medicine)  Varun Murray MD as MD (Cardiovascular Disease)  Robbie Siegel DO as Assigned Musculoskeletal Provider  Jenny Carlson NP as Nurse Practitioner (Cardiology)  Janelle Thakkar APRN CNP as Nurse Practitioner (Psychiatry & Neurology Vascular Neurology)  aZin Marshall MD as MD (Allergy & Immunology)  No Toro MD as MD (OB/Gyn)  Davis Rosales MD as Assigned Cancer Care Provider  Zain Marshall MD as Assigned Allergy Provider  Varun Murray MD as Assigned Heart and Vascular Provider  Janelle Thakkar APRN CNP as Assigned Surgical Provider    The following health maintenance items are reviewed in Epic and correct as of today:  Health Maintenance   Topic Date Due    ZOSTER IMMUNIZATION (2 of 3) 01/14/2010    DIABETIC FOOT EXAM  04/11/2024    RSV VACCINE (1 - 1-dose 75+ series) Never done    ANNUAL REVIEW OF HM ORDERS  11/28/2024    A1C  04/07/2025    MAMMO SCREENING  05/07/2025    LIPID  06/05/2025    EYE EXAM  08/30/2025    BMP  10/07/2025    MICROALBUMIN  10/07/2025    CREATININE  10/07/2025  "   MEDICARE ANNUAL WELLNESS VISIT  01/06/2026    FALL RISK ASSESSMENT  01/06/2026    COLORECTAL CANCER SCREENING  09/12/2027    DEXA  09/27/2027    ADVANCE CARE PLANNING  12/13/2028    DTAP/TDAP/TD IMMUNIZATION (3 - Td or Tdap) 10/24/2034    HEPATITIS C SCREENING  Completed    PHQ-2 (once per calendar year)  Completed    INFLUENZA VACCINE  Completed    Pneumococcal Vaccine: 50+ Years  Completed    COVID-19 Vaccine  Completed    HPV IMMUNIZATION  Aged Out    MENINGITIS IMMUNIZATION  Aged Out    RSV MONOCLONAL ANTIBODY  Aged Out         Review of Systems  Constitutional, HEENT, cardiovascular, pulmonary, GI, , musculoskeletal, neuro, skin, endocrine and psych systems are negative, except as otherwise noted.     Objective    Exam  /84 (BP Location: Right arm, Patient Position: Sitting, Cuff Size: Adult Large)   Pulse 87   Temp 97.5  F (36.4  C)   Resp 16   Ht 1.569 m (5' 1.77\")   Wt 74.8 kg (165 lb)   LMP 03/17/2001   SpO2 99%   BMI 30.40 kg/m     Estimated body mass index is 30.4 kg/m  as calculated from the following:    Height as of this encounter: 1.569 m (5' 1.77\").    Weight as of this encounter: 74.8 kg (165 lb).    Physical Exam  GENERAL: alert and no distress  EYES: Eyes grossly normal to inspection, PERRL and conjunctivae and sclerae normal  HENT: normal cephalic/atraumatic, ear canals and TM's normal, and nose and mouth in a mask but has a nasal voice  NECK: no adenopathy, no asymmetry, masses, or scars  RESP: lungs clear to auscultation - no rales, rhonchi or wheezes  BREAST: normal without masses, tenderness or nipple discharge and no palpable axillary masses or adenopathy  CV: regular rate and rhythm, normal S1 S2, no S3 or S4, no murmur, click or rub, no peripheral edema  ABDOMEN: soft, nontender, no hepatosplenomegaly, no masses and bowel sounds normal  MS: no gross musculoskeletal defects noted, no edema  SKIN: Few benign lesions no suspicious lesions or rashes  NEURO: Normal strength " and tone, mentation intact and speech normal  PSYCH: mentation appears normal, affect normal/bright         1/6/2025   Mini Cog   Clock Draw Score 2 Normal   3 Item Recall 3 objects recalled   Mini Cog Total Score 5              Signed Electronically by: Masood Osborne MD

## 2025-01-06 NOTE — PATIENT INSTRUCTIONS
Change Lisinopril/hydrochlorothiazide to Losartan - hydrochlorothiazide     Reduce Metformin to once daily

## 2025-01-15 ENCOUNTER — HOSPITAL ENCOUNTER (OUTPATIENT)
Facility: CLINIC | Age: 75
End: 2025-01-15
Attending: INTERNAL MEDICINE | Admitting: INTERNAL MEDICINE
Payer: COMMERCIAL

## 2025-01-15 ENCOUNTER — TELEPHONE (OUTPATIENT)
Dept: GASTROENTEROLOGY | Facility: CLINIC | Age: 75
End: 2025-01-15
Payer: COMMERCIAL

## 2025-01-15 NOTE — TELEPHONE ENCOUNTER
"Endoscopy Scheduling Screen    Have you had any respiratory illness or flu-like symptoms in the last 10 days?  No    What is your communication preference for Instructions and/or Bowel Prep?   [place]    What insurance is in the chart?  Other:  [facility]    Caller: Elvia Helm      Reason for Reschedule/Cancellation (please be detailed, any staff messages or encounters to note?):   PT WILL BE TRAVELING    Did you cancel or rescheduled an EUS procedure? No.    Is screening questionnaire older than 3 months from the reschedule date.   If Yes, please complete screening questionnaire. Yes    Prior to reschedule please review:  Ordering Provider: SHASTA LEON   Sedation Determined: MAC  Does patient have any ASC Exclusions, please identify?: NO    Notes on Cancelled Procedure:  Procedure: Lower Endoscopy [Colonoscopy]   Date: 02/13/2025  Location: [facility]; [address] [place] [address]   Surgeon: DIPAK    Rescheduled: Yes,   Procedure: Lower Endoscopy [Colonoscopy]    Date: 05/21/2025  Location: [facility]; [address] [place] [address]    Surgeon: BISMARK  Sedation Level Scheduled  MAC ,  Reason for Sedation Level TORTUOUS COLON   Instructions updated and sent: VIA [employer]     Does patient need PAC or Pre -Op Rescheduled? : NO      BMI: Estimated body mass index is 30.4 kg/m  as calculated from the following:    Height as of 1/6/25: 1.569 m (5' 1.77\").    Weight as of 1/6/25: 74.8 kg (165 lb).     Sedation Ordered  moderate sedation.   If patient BMI > 50 do not schedule in ASC.    If patient [facility] > 45 [facility]    Are you taking methadone or Suboxone?  NO, No RN review required.    Have you been diagnosed and are being treated for severe PTSD or severe anxiety?  NO, No RN review required.    Are you taking any prescription medications for pain 3 or more times per week?   NO, No RN review required.    Do you have a history of malignant hyperthermia?  No    (Females) " "Are you currently pregnant?   No     Have you been diagnosed or told you have pulmonary hypertension?   No    Do you have an LVAD?  No    Have you been told you have moderate to severe sleep apnea?  No.    Have you been told you have COPD, asthma, or any other lung disease?  No    Do you have any heart conditions?  Yes     In the past year, have you had any hospitalizations for heart related issues including cardiomyopathy, heart attack, or stent placement?  No    Do you have any implantable devices in your body (pacemaker, ICD)?  Other AFIB HEART MONITOR    Do you take nitroglycerine?  No    Have you ever had or are you waiting for an organ transplant?  No. Continue scheduling, no site restrictions.    Have you had a stroke or transient ischemic attack (TIA aka \"mini stroke\" in the last 6 months?   No    Have you been diagnosed with or been told you have cirrhosis of the liver?   No.    Are you currently on dialysis?   No    Do you need assistance transferring?   No    BMI: Estimated body mass index is 30.4 kg/m  as calculated from the following:    Height as of 1/6/25: 1.569 m (5' 1.77\").    Weight as of 1/6/25: 74.8 kg (165 lb).     Is patients BMI > 40 and scheduling location [facility]?  No    Do you take an injectable or oral medication for weight loss or diabetes (excluding insulin)?  Yes, hold time can be up to 7 days. Please consult with you prescribing provider to discuss endoscopy recommendations. (Please schedule at least 7 days out.) - METFORMIN    Do you take the medication Naltrexone?  No    Do you take blood thinners?  No       Prep   Are you currently on dialysis or do you have chronic kidney disease?  No    Do you have a diagnosis of diabetes?  Yes (Golytely Prep)    Do you have a diagnosis of cystic fibrosis (CF)?  No    On a regular basis do you go 3 -5 days between bowel movements?  No    BMI > 40?  No    Preferred Pharmacy:    [id number] [place] [place] [facility] [address] [facility]  [id number] [address] [facility] [address]  Phone: [phone] Fax: [phone]    [facility]  [address] [address]  [facility] [address]  Phone: [phone] Fax: [phone]      Final Scheduling Details

## 2025-01-30 NOTE — TELEPHONE ENCOUNTER
Scheduled home sleep study on May 5th.   Scheduled, confirmed with patient for video visit.    QUINCY ROBERTSON, CMA     [facility]

## 2025-02-12 ENCOUNTER — OFFICE VISIT (OUTPATIENT)
Dept: URGENT CARE | Facility: URGENT CARE | Age: 75
End: 2025-02-12
Payer: COMMERCIAL

## 2025-02-12 VITALS
WEIGHT: 166.4 LBS | DIASTOLIC BLOOD PRESSURE: 92 MMHG | SYSTOLIC BLOOD PRESSURE: 159 MMHG | HEART RATE: 82 BPM | BODY MASS INDEX: 30.66 KG/M2 | TEMPERATURE: 98.4 F | OXYGEN SATURATION: 98 % | RESPIRATION RATE: 18 BRPM

## 2025-02-12 DIAGNOSIS — J01.90 ACUTE SINUSITIS WITH COEXISTING CONDITION, NEED PROPHYLACTIC TREATMENT: Primary | ICD-10-CM

## 2025-02-12 PROCEDURE — 99214 OFFICE O/P EST MOD 30 MIN: CPT | Performed by: PHYSICIAN ASSISTANT

## 2025-02-12 RX ORDER — AZITHROMYCIN 250 MG/1
TABLET, FILM COATED ORAL
Qty: 6 TABLET | Refills: 0 | Status: SHIPPED | OUTPATIENT
Start: 2025-02-12 | End: 2025-02-17

## 2025-02-12 RX ORDER — GUAIFENESIN 600 MG/1
1200 TABLET, EXTENDED RELEASE ORAL 2 TIMES DAILY
Qty: 28 TABLET | Refills: 0 | Status: SHIPPED | OUTPATIENT
Start: 2025-02-12 | End: 2025-02-19

## 2025-02-12 RX ORDER — HYDROCODONE BITARTRATE AND ACETAMINOPHEN 5; 325 MG/1; MG/1
TABLET ORAL
COMMUNITY
Start: 2024-06-21

## 2025-02-12 RX ORDER — FLUTICASONE PROPIONATE 50 MCG
1 SPRAY, SUSPENSION (ML) NASAL DAILY
Qty: 15.8 ML | Refills: 0 | Status: SHIPPED | OUTPATIENT
Start: 2025-02-12

## 2025-02-12 NOTE — PROGRESS NOTES
SUBJECTIVE:  Elvia Helm is a 74 year old female here with concerns about sinus infection.  She states onset of symptoms were 3 day(s) ago.  She has had maxillary, frontal pressure. Course of illness is worsening. Severity moderate  Current and Associated symptoms: nasal congestion, rhinorrhea, facial pain/pressure, tooth pain, headache, and post-nasal drainage  Predisposing factors include none. Recent treatment has included: Antihistamine and OTC meds    Past Medical History:   Diagnosis Date    Acute ischemic right MCA stroke (H) 10/10/2023    Allergic rhinitis, cause unspecified a    Arthritis of knee 2014    Bee sting reaction 2009    DIABETES TYPE II     Essential hypertension, benign     Flat foot(734) 2014    Hyperlipidaemia LDL goal < 100     Localized osteoarthrosis not specified whether primary or secondary, ankle and foot     After surgery    Metabolic syndrome X     PVC's (premature ventricular contractions)     Tinnitus 2014     Social History     Tobacco Use    Smoking status: Former     Current packs/day: 0.00     Average packs/day: 1 pack/day for 10.0 years (10.0 ttl pk-yrs)     Types: Cigarettes     Start date: 1972     Quit date: 1982     Years since quittin.5    Smokeless tobacco: Never   Substance Use Topics    Alcohol use: Not Currently     Comment: glass of wine on the weekends       ROS:  Review of systems negative except as stated above.    OBJECTIVE:  BP (!) 159/92   Pulse 82   Temp 98.4  F (36.9  C) (Oral)   Resp 18   Wt 75.5 kg (166 lb 6.4 oz)   LMP 2001   SpO2 98%   BMI 30.66 kg/m    Exam:GENERAL APPEARANCE: healthy, alert and no distress  EYES: EOMI,  PERRL, conjunctiva clear  HENT: ear canals and TM's normal.  Nose and mouth without ulcers, erythema or lesions  NECK: supple, nontender, no lymphadenopathy  RESP: lungs clear to auscultation - no rales, rhonchi or wheezes  CV: regular rates and rhythm, normal S1 S2, no  murmur noted  NEURO: Normal strength and tone, sensory exam grossly normal,  normal speech and mentation  SKIN: no suspicious lesions or rashes      No results found for any visits on 02/12/25.    ASSESSMENT:  (J01.90) Acute sinusitis with coexisting condition, need prophylactic treatment  (primary encounter diagnosis)  Plan: fluticasone (FLONASE) 50 MCG/ACT nasal spray,         guaiFENesin (MUCINEX) 600 MG 12 hr tablet,         azithromycin (ZITHROMAX) 250 MG tablet      Will use afrin 1 day prior to flight and day of flight only    Red flags and emergent follow up discussed, and understood by patient  Follow up with PCP if symptoms worsen or fail to improve

## 2025-03-10 ENCOUNTER — PATIENT OUTREACH (OUTPATIENT)
Dept: CARE COORDINATION | Facility: CLINIC | Age: 75
End: 2025-03-10
Payer: COMMERCIAL

## 2025-03-27 ENCOUNTER — ANCILLARY ORDERS (OUTPATIENT)
Dept: CT IMAGING | Facility: CLINIC | Age: 75
End: 2025-03-27

## 2025-03-27 ENCOUNTER — ANCILLARY PROCEDURE (OUTPATIENT)
Dept: CARDIOLOGY | Facility: CLINIC | Age: 75
End: 2025-03-27
Attending: INTERNAL MEDICINE
Payer: COMMERCIAL

## 2025-03-27 DIAGNOSIS — Z45.09 ENCOUNTER FOR LOOP RECORDER CHECK: ICD-10-CM

## 2025-03-27 DIAGNOSIS — R91.1 SOLITARY LUNG NODULE: Primary | ICD-10-CM

## 2025-03-27 DIAGNOSIS — I63.511 ACUTE ISCHEMIC RIGHT MCA STROKE (H): ICD-10-CM

## 2025-03-27 PROCEDURE — 93298 REM INTERROG DEV EVAL SCRMS: CPT | Performed by: INTERNAL MEDICINE

## 2025-04-03 ENCOUNTER — OFFICE VISIT (OUTPATIENT)
Dept: CARDIOLOGY | Facility: CLINIC | Age: 75
End: 2025-04-03
Attending: INTERNAL MEDICINE
Payer: COMMERCIAL

## 2025-04-03 VITALS
HEART RATE: 78 BPM | WEIGHT: 171.8 LBS | HEIGHT: 62 IN | OXYGEN SATURATION: 99 % | SYSTOLIC BLOOD PRESSURE: 138 MMHG | BODY MASS INDEX: 31.62 KG/M2 | DIASTOLIC BLOOD PRESSURE: 76 MMHG

## 2025-04-03 DIAGNOSIS — Q21.12 PFO (PATENT FORAMEN OVALE): ICD-10-CM

## 2025-04-03 DIAGNOSIS — I63.511 ACUTE ISCHEMIC RIGHT MCA STROKE (H): Primary | ICD-10-CM

## 2025-04-03 DIAGNOSIS — E11.59 TYPE 2 DIABETES MELLITUS WITH VASCULAR DISEASE (H): ICD-10-CM

## 2025-04-03 LAB
MDC_IDC_EPISODE_DTM: NORMAL
MDC_IDC_EPISODE_ID: NORMAL
MDC_IDC_EPISODE_TYPE: NORMAL
MDC_IDC_MSMT_BATTERY_DTM: NORMAL
MDC_IDC_MSMT_BATTERY_STATUS: NORMAL
MDC_IDC_PG_IMPLANT_DTM: NORMAL
MDC_IDC_PG_MFG: NORMAL
MDC_IDC_PG_MODEL: NORMAL
MDC_IDC_PG_SERIAL: NORMAL
MDC_IDC_PG_TYPE: NORMAL
MDC_IDC_SESS_CLINIC_NAME: NORMAL
MDC_IDC_SESS_DTM: NORMAL
MDC_IDC_SESS_TYPE: NORMAL
MDC_IDC_STAT_AT_BURDEN_PERCENT: 1 %
MDC_IDC_STAT_AT_DTM_END: NORMAL
MDC_IDC_STAT_AT_DTM_START: NORMAL

## 2025-04-03 NOTE — PATIENT INSTRUCTIONS
Follow up October 2025.  If you don't hear from us by July/August for your appointment, call to schedule.  [phone]

## 2025-04-03 NOTE — PROGRESS NOTES
"            ~Cardiology Clinic Visit~    Primary Cardiologist: Dr. Murray > will need new [place] DAVIS (suggest Dr. Engle)    History of Present Illness    Elvia Helm is a very pleasant 73 year old female with a history of left MCA CVA.      Patient was hospitalized for CVA in September 2023.  Felt to be possibly a left MCA pattern her workup included an MRI which showed a CVA, CT angiogram did not find any significant blockages.  An echocardiogram was done without any bubble study.  No atrial fibrillation was seen in the hospital nor on a post discharge 30-day event monitor.     In November 2023 as part of hypercoagulability workup, a chest CT showed an 8x6mm groundglass appearing nodule in the right upper lobe, possible adenocarcinoma.  2.3 cm possible right ovarian cyst was also seen.  Follow up was arranged with Dr. Montalvo, and patient was seen 11/29/2023.       She was seen in November by Dr. Murray.  He discussed implanting a loop recorder following a hypercoagulable workup (the team felt she did not have a hypercoagulable state).  12/8/2023 limited echo with bubble study showed a mildly positive flow across the interatrial septum, more positive with Valsalva.  Reviewed with MD who rec'd DAMASO and loop recorder implantation.  On 6/7/24 we implanted a loop recorder.     In routine follow up, patient has done well overall.  Patient denies chest pain or chest tightness. Denies dizziness, lightheadedness or other presyncopal symptoms. Denies tachycardia or palpitations. Denies shortness of breath.  She has no bleeding issues on ASA.  Discussed future use of her apple watch for AF detection once loop recorder battery expires.     Diagnostics:  4/3/25 device report: AF: 1- EGM shows clear P waves for SR. NO TRUE AF.    /76 (BP Location: Left arm, Patient Position: Sitting)   Pulse 78   Ht 1.569 m (5' 1.77\")   Wt 77.9 kg (171 lb 12.8 oz)   LMP 03/17/2001   SpO2 99%   BMI 31.66 kg/m  "   __________________________________________________________________         Assessment and Impression:     1.  Left MCA CVA, no residual deficits  2. Heterozygous for Factor 2  3.  Right upper lobe groundglass nodule  4. Ovarian cyst  5. Hypertension, controlled  6. Hyperlipidemia, controlled         Recommendations and Plan:     No changes today.  Routine device checks as scheduled.  Discussed Apple watch AF detection.  Routine follow up with LISA in 6-months.  __________________________________________________________________    Thank you for the opportunity to participate in this pleasant patient's care.    We would be happy to see this patient sooner for any concerns in the meantime.    KRISTI Rey, CNP   Nurse Practitioner  [facility]    Today's clinic visit entailed:  The following tests were independently interpreted by me as noted in my documentation: device report, labs  Prescription drug management  The level of medical decision making during this visit was of moderate complexity.  Review of the result(s) of each unique test - cardiac testing, cardiac imaging, labs  Care everywhere reviewed for additional records to facilitate comprehensive patient care.    Orders this Visit:  Orders Placed This Encounter   Procedures    Follow-Up with Cardiology- LISA     No orders of the defined types were placed in this encounter.    Medications Discontinued During This Encounter   Medication Reason    HYDROcodone-acetaminophen (NORCO) 5-325 MG tablet Stopped by Patient (No AVS)     Encounter Diagnoses   Name Primary?    Acute ischemic right MCA stroke (H) Yes    PFO (patent foramen ovale)     Type 2 diabetes mellitus with vascular disease (H)      CURRENT MEDICATIONS:  Current Outpatient Medications   Medication Sig Dispense Refill    aspirin 81 MG EC tablet Take 1 tablet (81 mg) by mouth daily.      atorvastatin (LIPITOR) 10 MG tablet TAKE 1 TABLET BY MOUTH DAILY. 90 tablet 3     azelastine (ASTELIN) 0.1 % nasal spray Spray 1 spray into both nostrils 2 times daily 30 mL 11    blood glucose (NO BRAND SPECIFIED) lancets standard Use to test blood sugar one* times daily or as directed. 100 each 3    blood glucose (ONETOUCH ULTRA) test strip Use to test blood sugar daily or as directed. 100 strip 3    blood glucose calibration (ONETOUCH ULTRA CONTROL) solution Use to calibrate blood glucose monitor as directed. 1 Bottle 1    blood glucose monitoring (ONE TOUCH ULTRA 2) meter device kit Use to test blood sugar 1 times daily. 1 kit 0    carvedilol (COREG) 3.125 MG tablet Take 1 tablet (3.125 mg) by mouth 2 times daily (with meals). 180 tablet 3    Cholecalciferol (VITAMIN D3 PO) Take 1,000 Units by mouth daily.      citalopram (CELEXA) 10 MG tablet Take 1 tablet (10 mg) by mouth daily 90 tablet 3    EPINEPHrine (ANY BX GENERIC EQUIV) 0.3 MG/0.3ML injection 2-pack INJECT 0.3 MLS INTO THE MUSCLE ONCE AS NEEDED FOR ANAPHYLAXIS 2 each 0    fluticasone (FLONASE) 50 MCG/ACT nasal spray Spray 1 spray into both nostrils daily. 15.8 mL 0    fluticasone (FLONASE) 50 MCG/ACT spray Spray 1-2 sprays into both nostrils daily 16 g 0    losartan-hydrochlorothiazide (HYZAAR) 50-12.5 MG tablet Take 1 tablet by mouth daily. 90 tablet 3    metFORMIN (GLUCOPHAGE) 850 MG tablet Take 1 tablet (850 mg) by mouth daily (with dinner). 90 tablet 3    Omega-3 Fatty Acids (OMEGA-3 FISH OIL PO) Take 1 g by mouth daily       sodium chloride (OCEAN) 0.65 % nasal spray Spray 1 spray into both nostrils daily as needed for congestion      vitamin B complex with vitamin C (STRESS TAB) tablet Take 1 tablet by mouth daily       ALLERGIES     Allergies   Allergen Reactions    Atovaquone-Proguanil Hcl Hives    Amoxicillin Hives    Bee Hives and Swelling     carries Epi Pen     Ceftin Hives     hives    Clindamycin Hives    Erythromycin GI Disturbance    Seasonal Allergies     Sertraline       cough, gag and many times I vomit     "Shellfish-Derived Products Unknown    Shrimp Hives     Happened twice    Sulfacetamide     Tetracycline Other (See Comments)     ?severe headaches  & nausea      PAST MEDICAL, SURGICAL, FAMILY, SOCIAL HISTORY:  History was reviewed and updated as needed, see medical record.    Review of Systems:  A 10-point Review Of Systems is otherwise normal except for that which is noted in the HPI and interval summary.    Physical Exam:    Vitals: /76 (BP Location: Left arm, Patient Position: Sitting)   Pulse 78   Ht 1.569 m (5' 1.77\")   Wt 77.9 kg (171 lb 12.8 oz)   LMP 03/17/2001   SpO2 99%   BMI 31.66 kg/m    Constitutional: Appears stated age, well nourished, NAD.  Neck: Supple. Carotid pulses full and equal.   Respiratory: Non-labored. Lungs CTAB.  Cardiovascular: RRR, normal S1 and S2. No M/G/R.  No edema.  GI: Soft, non-distended, non-tender.  Skin: Warm and dry.   Musculoskeletal/Extremities: Symmetrical movement.  Neurologic: No gross focal deficits. Alert, awake.  Psychiatric: Affect appropriate. Mentation normal.    Recent Lab Results:  LIPID RESULTS:  Lab Results   Component Value Date    CHOL 151 01/06/2025    CHOL 151 06/14/2021    HDL 54 01/06/2025    HDL 60 06/14/2021    LDL 71 01/06/2025    LDL 65 06/14/2021    TRIG 132 01/06/2025    TRIG 128 06/14/2021    CHOLHDLRATIO 2.9 06/30/2015     LIVER ENZYME RESULTS:  Lab Results   Component Value Date    AST 24 01/06/2025    AST 23 06/14/2021    ALT 19 01/06/2025    ALT 32 06/14/2021     CBC RESULTS:  Lab Results   Component Value Date    WBC 8.3 01/06/2025    WBC 7.5 06/14/2021    RBC 4.34 01/06/2025    RBC 4.36 06/14/2021    HGB 12.4 01/06/2025    HGB 13.5 06/14/2021    HCT 38.6 01/06/2025    HCT 40.5 06/14/2021    MCV 89 01/06/2025    MCV 93 06/14/2021    MCH 28.6 01/06/2025    MCH 31.0 06/14/2021    MCHC 32.1 01/06/2025    MCHC 33.3 06/14/2021    RDW 13.8 01/06/2025    RDW 14.0 06/14/2021     01/06/2025     06/14/2021     BMP " RESULTS:  Lab Results   Component Value Date     01/06/2025     06/14/2021    POTASSIUM 4.2 01/06/2025    POTASSIUM 4.3 09/29/2022    POTASSIUM 4.0 06/14/2021    CHLORIDE 99 01/06/2025    CHLORIDE 104 09/29/2022    CHLORIDE 104 06/14/2021    CO2 24 01/06/2025    CO2 30 09/29/2022    CO2 29 06/14/2021    ANIONGAP 16 (H) 01/06/2025    ANIONGAP 3 09/29/2022    ANIONGAP 4 06/14/2021     (H) 01/06/2025     (H) 09/13/2023     (H) 09/29/2022     (H) 06/14/2021    BUN 17.9 01/06/2025    BUN 16 09/29/2022    BUN 15 06/14/2021    CR 0.77 01/06/2025    CR 0.81 06/14/2021    GFRESTIMATED 80 01/06/2025    GFRESTIMATED >60 08/28/2024    GFRESTIMATED 72 06/14/2021    GFRESTBLACK 84 06/14/2021    KIM 10.1 01/06/2025    KIM 9.4 06/14/2021      A1C RESULTS:  Lab Results   Component Value Date    A1C 6.1 (H) 01/06/2025    A1C 6.1 (H) 06/14/2021     INR RESULTS:  Lab Results   Component Value Date    INR 1.03 12/07/2023    INR 0.91 09/11/2023

## 2025-04-03 NOTE — LETTER
4/3/2025    Masood Osborne MD  [address]  [place] [address]    RE: Elvia Helm       Dear Colleague,     I had the pleasure of seeing Elvia Helm in the [facility].              ~Cardiology Clinic Visit~    Primary Cardiologist: Dr. Murray > will need new Jaci DAVIS (suggest Dr. Engle)    History of Present Illness    Elvia Helm is a very pleasant 73 year old female with a history of left MCA CVA.      Patient was hospitalized for CVA in September 2023.  Felt to be possibly a left MCA pattern her workup included an MRI which showed a CVA, CT angiogram did not find any significant blockages.  An echocardiogram was done without any bubble study.  No atrial fibrillation was seen in the hospital nor on a post discharge 30-day event monitor.     In November 2023 as part of hypercoagulability workup, a chest CT showed an 8x6mm groundglass appearing nodule in the right upper lobe, possible adenocarcinoma.  2.3 cm possible right ovarian cyst was also seen.  Follow up was arranged with Dr. Montalvo, and patient was seen 11/29/2023.       She was seen in November by Dr. Murray.  He discussed implanting a loop recorder following a hypercoagulable workup (the team felt she did not have a hypercoagulable state).  12/8/2023 limited echo with bubble study showed a mildly positive flow across the interatrial septum, more positive with Valsalva.  Reviewed with MD who rec'd DAMASO and loop recorder implantation.  On 6/7/24 we implanted a loop recorder.     In routine follow up, patient has done well overall.  Patient denies chest pain or chest tightness. Denies dizziness, lightheadedness or other presyncopal symptoms. Denies tachycardia or palpitations. Denies shortness of breath.  She has no bleeding issues on ASA.  Discussed future use of her apple watch for AF detection once loop recorder battery expires.     Diagnostics:  4/3/25 device report: AF: 1- EGM shows clear P waves  "for SR. NO TRUE AF.    /76 (BP Location: Left arm, Patient Position: Sitting)   Pulse 78   Ht 1.569 m (5' 1.77\")   Wt 77.9 kg (171 lb 12.8 oz)   LMP 03/17/2001   SpO2 99%   BMI 31.66 kg/m    __________________________________________________________________         Assessment and Impression:     1.  Left MCA CVA, no residual deficits  2. Heterozygous for Factor 2  3.  Right upper lobe groundglass nodule  4. Ovarian cyst  5. Hypertension, controlled  6. Hyperlipidemia, controlled         Recommendations and Plan:     No changes today.  Routine device checks as scheduled.  Discussed Apple watch AF detection.  Routine follow up with LISA in 6-months.  __________________________________________________________________    Thank you for the opportunity to participate in this pleasant patient's care.    We would be happy to see this patient sooner for any concerns in the meantime.    KRISTI Rey, CNP   Nurse Practitioner  [facility]    Today's clinic visit entailed:  The following tests were independently interpreted by me as noted in my documentation: device report, labs  Prescription drug management  The level of medical decision making during this visit was of moderate complexity.  Review of the result(s) of each unique test - cardiac testing, cardiac imaging, labs  Care everywhere reviewed for additional records to facilitate comprehensive patient care.    Orders this Visit:  Orders Placed This Encounter   Procedures     Follow-Up with Cardiology- LISA     No orders of the defined types were placed in this encounter.    Medications Discontinued During This Encounter   Medication Reason     HYDROcodone-acetaminophen (NORCO) 5-325 MG tablet Stopped by Patient (No AVS)     Encounter Diagnoses   Name Primary?     Acute ischemic right MCA stroke (H) Yes     PFO (patent foramen ovale)      Type 2 diabetes mellitus with vascular disease (H)      CURRENT MEDICATIONS:  Current " Outpatient Medications   Medication Sig Dispense Refill     aspirin 81 MG EC tablet Take 1 tablet (81 mg) by mouth daily.       atorvastatin (LIPITOR) 10 MG tablet TAKE 1 TABLET BY MOUTH DAILY. 90 tablet 3     azelastine (ASTELIN) 0.1 % nasal spray Spray 1 spray into both nostrils 2 times daily 30 mL 11     blood glucose (NO BRAND SPECIFIED) lancets standard Use to test blood sugar one* times daily or as directed. 100 each 3     blood glucose (ONETOUCH ULTRA) test strip Use to test blood sugar daily or as directed. 100 strip 3     blood glucose calibration (ONETOUCH ULTRA CONTROL) solution Use to calibrate blood glucose monitor as directed. 1 Bottle 1     blood glucose monitoring (ONE TOUCH ULTRA 2) meter device kit Use to test blood sugar 1 times daily. 1 kit 0     carvedilol (COREG) 3.125 MG tablet Take 1 tablet (3.125 mg) by mouth 2 times daily (with meals). 180 tablet 3     Cholecalciferol (VITAMIN D3 PO) Take 1,000 Units by mouth daily.       citalopram (CELEXA) 10 MG tablet Take 1 tablet (10 mg) by mouth daily 90 tablet 3     EPINEPHrine (ANY BX GENERIC EQUIV) 0.3 MG/0.3ML injection 2-pack INJECT 0.3 MLS INTO THE MUSCLE ONCE AS NEEDED FOR ANAPHYLAXIS 2 each 0     fluticasone (FLONASE) 50 MCG/ACT nasal spray Spray 1 spray into both nostrils daily. 15.8 mL 0     fluticasone (FLONASE) 50 MCG/ACT spray Spray 1-2 sprays into both nostrils daily 16 g 0     losartan-hydrochlorothiazide (HYZAAR) 50-12.5 MG tablet Take 1 tablet by mouth daily. 90 tablet 3     metFORMIN (GLUCOPHAGE) 850 MG tablet Take 1 tablet (850 mg) by mouth daily (with dinner). 90 tablet 3     Omega-3 Fatty Acids (OMEGA-3 FISH OIL PO) Take 1 g by mouth daily        sodium chloride (OCEAN) 0.65 % nasal spray Spray 1 spray into both nostrils daily as needed for congestion       vitamin B complex with vitamin C (STRESS TAB) tablet Take 1 tablet by mouth daily       ALLERGIES     Allergies   Allergen Reactions     Atovaquone-Proguanil Hcl Hives      "Amoxicillin Hives     Bee Hives and Swelling     carries Epi Pen      Ceftin Hives     hives     Clindamycin Hives     Erythromycin GI Disturbance     Seasonal Allergies      Sertraline       cough, gag and many times I vomit     Shellfish-Derived Products Unknown     Shrimp Hives     Happened twice     Sulfacetamide      Tetracycline Other (See Comments)     ?severe headaches  & nausea      PAST MEDICAL, SURGICAL, FAMILY, SOCIAL HISTORY:  History was reviewed and updated as needed, see medical record.    Review of Systems:  A 10-point Review Of Systems is otherwise normal except for that which is noted in the HPI and interval summary.    Physical Exam:    Vitals: /76 (BP Location: Left arm, Patient Position: Sitting)   Pulse 78   Ht 1.569 m (5' 1.77\")   Wt 77.9 kg (171 lb 12.8 oz)   LMP 03/17/2001   SpO2 99%   BMI 31.66 kg/m    Constitutional: Appears stated age, well nourished, NAD.  Neck: Supple. Carotid pulses full and equal.   Respiratory: Non-labored. Lungs CTAB.  Cardiovascular: RRR, normal S1 and S2. No M/G/R.  No edema.  GI: Soft, non-distended, non-tender.  Skin: Warm and dry.   Musculoskeletal/Extremities: Symmetrical movement.  Neurologic: No gross focal deficits. Alert, awake.  Psychiatric: Affect appropriate. Mentation normal.    Recent Lab Results:  LIPID RESULTS:  Lab Results   Component Value Date    CHOL 151 01/06/2025    CHOL 151 06/14/2021    HDL 54 01/06/2025    HDL 60 06/14/2021    LDL 71 01/06/2025    LDL 65 06/14/2021    TRIG 132 01/06/2025    TRIG 128 06/14/2021    CHOLHDLRATIO 2.9 06/30/2015     LIVER ENZYME RESULTS:  Lab Results   Component Value Date    AST 24 01/06/2025    AST 23 06/14/2021    ALT 19 01/06/2025    ALT 32 06/14/2021     CBC RESULTS:  Lab Results   Component Value Date    WBC 8.3 01/06/2025    WBC 7.5 06/14/2021    RBC 4.34 01/06/2025    RBC 4.36 06/14/2021    HGB 12.4 01/06/2025    HGB 13.5 06/14/2021    HCT 38.6 01/06/2025    HCT 40.5 06/14/2021    MCV 89 " 01/06/2025    MCV 93 06/14/2021    MCH 28.6 01/06/2025    MCH 31.0 06/14/2021    MCHC 32.1 01/06/2025    MCHC 33.3 06/14/2021    RDW 13.8 01/06/2025    RDW 14.0 06/14/2021     01/06/2025     06/14/2021     BMP RESULTS:  Lab Results   Component Value Date     01/06/2025     06/14/2021    POTASSIUM 4.2 01/06/2025    POTASSIUM 4.3 09/29/2022    POTASSIUM 4.0 06/14/2021    CHLORIDE 99 01/06/2025    CHLORIDE 104 09/29/2022    CHLORIDE 104 06/14/2021    CO2 24 01/06/2025    CO2 30 09/29/2022    CO2 29 06/14/2021    ANIONGAP 16 (H) 01/06/2025    ANIONGAP 3 09/29/2022    ANIONGAP 4 06/14/2021     (H) 01/06/2025     (H) 09/13/2023     (H) 09/29/2022     (H) 06/14/2021    BUN 17.9 01/06/2025    BUN 16 09/29/2022    BUN 15 06/14/2021    CR 0.77 01/06/2025    CR 0.81 06/14/2021    GFRESTIMATED 80 01/06/2025    GFRESTIMATED >60 08/28/2024    GFRESTIMATED 72 06/14/2021    GFRESTBLACK 84 06/14/2021    KIM 10.1 01/06/2025    KIM 9.4 06/14/2021      A1C RESULTS:  Lab Results   Component Value Date    A1C 6.1 (H) 01/06/2025    A1C 6.1 (H) 06/14/2021     INR RESULTS:  Lab Results   Component Value Date    INR 1.03 12/07/2023    INR 0.91 09/11/2023                     Thank you for allowing me to participate in the care of your patient.      Sincerely,     KRISTI Rey [facility]  cc:   Varun [place] MD  [address]  [place] [id number]

## 2025-04-07 ENCOUNTER — PATIENT OUTREACH (OUTPATIENT)
Dept: CARE COORDINATION | Facility: CLINIC | Age: 75
End: 2025-04-07
Payer: COMMERCIAL

## 2025-04-09 ENCOUNTER — VIRTUAL VISIT (OUTPATIENT)
Dept: PHARMACY | Facility: CLINIC | Age: 75
End: 2025-04-09
Payer: COMMERCIAL

## 2025-04-09 DIAGNOSIS — R00.0 SINUS TACHYCARDIA: ICD-10-CM

## 2025-04-09 DIAGNOSIS — Z86.73 HISTORY OF STROKE: ICD-10-CM

## 2025-04-09 DIAGNOSIS — E78.5 HYPERLIPIDEMIA LDL GOAL <100: ICD-10-CM

## 2025-04-09 DIAGNOSIS — M85.80 OSTEOPENIA, UNSPECIFIED LOCATION: ICD-10-CM

## 2025-04-09 DIAGNOSIS — I10 ESSENTIAL HYPERTENSION, BENIGN: ICD-10-CM

## 2025-04-09 DIAGNOSIS — Z78.9 TAKES DIETARY SUPPLEMENTS: ICD-10-CM

## 2025-04-09 DIAGNOSIS — F43.22 ADJUSTMENT DISORDER WITH ANXIOUS MOOD: ICD-10-CM

## 2025-04-09 DIAGNOSIS — J30.2 SEASONAL ALLERGIC RHINITIS, UNSPECIFIED TRIGGER: ICD-10-CM

## 2025-04-09 DIAGNOSIS — E11.59 TYPE 2 DIABETES MELLITUS WITH VASCULAR DISEASE (H): Primary | ICD-10-CM

## 2025-04-09 PROCEDURE — 99607 MTMS BY PHARM ADDL 15 MIN: CPT | Mod: 95 | Performed by: PHARMACIST

## 2025-04-09 PROCEDURE — 99605 MTMS BY PHARM NP 15 MIN: CPT | Mod: 95 | Performed by: PHARMACIST

## 2025-04-09 NOTE — LETTER
"Recommended To-Do List      Prepared on: Apr 9, 2025       You can get the best results from your medications by completing the items on this \"To-Do List.\"      Bring your To-Do List when you go to your doctor. And, share it with your family or caregivers.    My To-Do List:  What we talked about: What I should do:   Your medication dosage being too low    Increase how often you take metFORMIN (GLUCOPHAGE)          What we talked about: What I should do:                     "

## 2025-04-09 NOTE — PATIENT INSTRUCTIONS
"Recommendations from today's MTM visit:                                                    MTM (medication therapy management) is a service provided by a clinical pharmacist designed to help you get the most of out of your medicines.   Today we reviewed what your medicines are for, how to know if they are working, that your medicines are safe and how to make your medicine regimen as easy as possible.      Increase metformin back to taking 850mg twice daily     Follow-up: Return in about 23 days (around 5/2/2025) for Physical Exam, Lab Work with Dr. Osborne.    It was great speaking with you today.  I value your experience and would be very thankful for your time in providing feedback in our clinic survey. In the next few days, you may receive an email or text message from [employer] with a link to a survey related to your  clinical pharmacist.\"     To schedule another MTM appointment, please call the clinic directly or you may call the MTM scheduling line at [phone] or toll-free at [phone].     My Clinical Pharmacist's contact information:                                                      Please feel free to contact me with any questions or concerns you have.      Alisa Veliz, PharmD  Medication Therapy Management Pharmacy Resident  [facility]  [phone]    Danyelle Jonas PharmD, [facility]  Medication Therapy Management Provider  [facility]  [phone]     " Chief Complaint   Patient presents with    Back Pain     Hx of Sciatica. C/O pain radiating down right side to the right foot beginning in October     Depression     Previous Taking Zoloft, states the medication abdon symptoms worse    Referral Request     Referral to Dermatology - Evaluation of Areas on Face Hx of Skin Cancer    Immunization/Injection     Influenza Vaccine       1. Have you been to the ER, urgent care clinic since your last visit? Hospitalized since your last visit? No    2. Have you seen or consulted any other health care providers outside of the [address] [place] [address] since your last visit? Include any pap smears or colon screening. No     Verbal order per physician order/administer influenza vaccine (Fluarix Quadrivalent) and TDap. Order repeated for verified/order confirmed. Albert Wilkes is a 51 y.o. female who presents for routine immunizations. She denies any symptoms , reactions or allergies that would exclude them from being immunized today. Risks and adverse reactions were discussed and the VIS was given to them. All questions were addressed. She was observed for 15 min post injection. There were no reactions observed.     Felicitas Mott LPN

## 2025-04-09 NOTE — LETTER
April 9, 2025  Elvia Helm  [address] [facility] [id number]    Dear Ms. Helm, [facility]     Thank you for talking with me on Apr 9, 2025 about your health and medications. As a follow-up to our conversation, I have included two documents:      Your Recommended To-Do List has steps you should take to get the best results from your medications.  Your Medication List will help you keep track of your medications and how to take them.    If you want to talk about these documents, please call Danyelle Jonas PharmD at phone: [phone], Monday-Friday 8-4:30pm.    I look forward to working with you and your doctors to make sure your medications work well for you.    Sincerely,  Danyelle Jonas PharmD  [facility] Pharmacist, [facility]

## 2025-04-09 NOTE — PROGRESS NOTES
Medication Therapy Management (MTM) Encounter    ASSESSMENT:                            Medication Adherence/Access: No issues identified.    Diabetes   Patient is meeting A1c goal of < 7%.  Self monitoring of blood glucose is not at goal of fasting  mg/dL since metformin was reduced.  May benefit from increasing metformin back to twice daily administration to ensure she's getting coverage 24 hours/day (is on IR metformin).    Hypertension/Sinus Tachycardia/H/o CVA  Stable. Patient is meeting blood pressure goal of < 140/90mmHg, not consistently meeting more aggressive goal <130/80mmHg in clinic.     Hyperlipidemia   LDL is near goal <70mg/dL (71mg/dL with last check), not meeting more aggressive goal of <55mg/dL per ADA guidelines given CVA history.  Will monitor as she's on maximally tolerated statin therapy.    Allergic rhinitis   Stable.     Anxiety  Stable.    Osteopenia  Stable.     Supplements  Stable.         PLAN:                            Increased metformin back to 850mg twice daily     Follow-up: 3-6 months based on lab results  Future Appointments 4/9/2025 - 10/6/2025        Date Visit Type Length Department Provider     5/2/2025  9:00 AM PRE-OPERATIVE 30 min  FAMILY PRAC/Masood Kevin MD    Location Instructions:     [facility] is in [address] of the [facility] at [address] This is just south of [facility] and the [facility] exit off of [address]. Free parking is available; access the lot by turning east from [facility] onto [address]. Through the main entrance, the clinic is directly to the left.     SUBJECTIVE/OBJECTIVE:                          Alma Helm is a 74 year old female seen for a follow-up visit.       Reason for visit: Routine follow-up.    Tobacco: She reports that she quit smoking about 42 years ago. Her smoking use included cigarettes. She started smoking about 52 years ago. She has a  10 pack-year smoking history. She has never used smokeless tobacco.  Alcohol: 1-3 beverages / week    Medication Adherence/Access: no issues reported.    Diabetes   Metformin 850mg once daily (reduced from twice daily administration by primary care provider in January as it was felt she could control blood sugar with a lower dose)  Aspirin 81mg daily (for secondary prevention)  Patient is not experiencing side effects.  Current diabetes symptoms: none  Diet/Exercise: Since reducing metformin, she's been having more hunger.   Blood sugar monitorin time(s) daily; Ranges: (patient reported) Fasting - 140-165mg/dL, increased from lower 100s since metformin dose was reduced   Eye exam is up to date  Foot exam: due    Hypertension /Sinus Tachycardia/H/o CVA  Aspirin 81mg daily (as above)  Carvedilol 3.125mg twice daily   Losartan/hydrochlorothiazide 50/12.5mg daily  Patient reports no current medication side effects  Patient does not self-monitor blood pressure.       Hyperlipidemia   Atorvastatin 10mg daily - has not been able to tolerate higher dose due to myalgias  Patient reports no significant myalgias or other side effects.     Allergy   Astelin nasal spray daily, increases to twice daily when needed  EpiPen as needed - no use, but confirms is not   Flonase nasal spray 1-2 sprays daily   Saline nasal spray as needed   Patient reports no current medication side effects.     Patient feels that current therapy is effective.       Mental Health   Anxiety  Citalopram 10mg daily  Patient reports no current medication side effects.  Patient reports symptoms are stable.     Bone Health   Osteopenia  Vitamin D 1000 international unit(s) daily  Patient is not experiencing side effects.  DEXA History: 2024 - Osteopenia. FRAX Results: The 10 year probability of major osteoporotic fracture is 17.1%, and of hip fracture is 3.4%, based on left femoral neck BMD.   Patient is working to get additional calcium through  her diet.  Risk factors: post-menopausal     Supplements   Fish oil 1g daily  Vitamin B complex daily  No reported issues at this time.      Today's Vitals: LMP 03/17/2001   ----------------    I spent 8 minutes with this patient today. All changes were made via collaborative practice agreement with Dr. Osborne.     A summary of these recommendations was sent via [employer].    Danyelle Jonas, PharmD, BCACP  Medication Therapy Management Provider  [phone]     Telemedicine Visit Details  The patient's medications can be safely assessed via a telemedicine encounter.  Type of service:  Video Conference via [employer]  Originating Location (pt. Location): Home    Distant Location (provider location):  On-site  Start Time: 9:06 AM  End Time: 9:14 AM     Medication Therapy Recommendations  Type 2 diabetes mellitus with vascular disease (H)   1 Current Medication: metFORMIN (GLUCOPHAGE) 850 MG tablet (Discontinued)   Current Medication Sig: Take 1 tablet (850 mg) by mouth daily (with dinner).   Rationale: Frequency inappropriate - Dosage too low - Effectiveness   Recommendation: Increase Frequency   Status: Accepted per CPA   Identified Date: 4/9/2025 Completed Date: 4/9/2025

## 2025-04-09 NOTE — LETTER
_  Medication List        Prepared on: Apr 9, 2025     Bring your Medication List when you go to the doctor, hospital, or   emergency room. And, share it with your family or caregivers.     Note any changes to how you take your medications.  Cross out medications when you no longer use them.    Medication How I take it Why I use it Prescriber   aspirin 81 MG EC tablet Take 1 tablet (81 mg) by mouth daily. History of stroke Masood Osborne MD   atorvastatin (LIPITOR) 10 MG tablet TAKE 1 TABLET BY MOUTH DAILY. Hyperlipidemia LDL Goal <100 Masood Osborne MD   azelastine (ASTELIN) 0.1 % nasal spray Spray 1 spray into both nostrils 2 times daily Seasonal allergic rhinitis due to pollen Zain Marshall MD   blood glucose (NO BRAND SPECIFIED) lancets standard Use to test blood sugar one* times daily or as directed. Type 2 diabetes mellitus without complication, without long-term current use of insulin (H) Masood Osborne MD   blood glucose (ONETOUCH ULTRA) test strip Use to test blood sugar daily or as directed. Type 2 diabetes Masood Osborne MD   blood glucose calibration (ONETOUCH ULTRA CONTROL) solution Use to calibrate blood glucose monitor as directed. Type 2 diabetes mellitus without complication, without long-term current use of insulin (H) Masood Osborne MD   blood glucose monitoring (ONE TOUCH ULTRA 2) meter device kit Use to test blood sugar 1 times daily. Type 2 diabetes mellitus without complication, without long-term current use of insulin (H) Masood Osborne MD   carvedilol (COREG) 3.125 MG tablet Take 1 tablet (3.125 mg) by mouth 2 times daily (with meals). Essential Hypertension, Benign; Sinus Tachycardia Masood Osborne MD   Cholecalciferol (VITAMIN D3 PO) Take 1,000 Units by mouth daily. Osteopenia Patient Reported   citalopram (CELEXA) 10 MG tablet Take 1 tablet (10 mg) by mouth daily Type 2 diabetes mellitus without complication, without long-term current use of insulin (H) Masood Osborne MD   EPINEPHrine (ANY BX  GENERIC EQUIV) 0.3 MG/0.3ML injection 2-pack INJECT 0.3 MLS INTO THE MUSCLE ONCE AS NEEDED FOR ANAPHYLAXIS Bee sting reaction, accidental or unintentional, sequela Masood Osborne MD   fluticasone (FLONASE) 50 MCG/ACT spray Spray 1-2 sprays into both nostrils daily Seasonal allergic rhinitis due to other allergic trigger Vincent Encinas PA-C   losartan-hydrochlorothiazide (HYZAAR) 50-12.5 MG tablet Take 1 tablet by mouth daily. Essential Hypertension, Benign; Dry Cough Masood Osborne MD   metFORMIN (GLUCOPHAGE) 850 MG tablet Take 1 tablet (850 mg) by mouth 2 times daily (with meals). Type 2 diabetes mellitus with vascular disease (H) Masood Osborne MD   Omega-3 Fatty Acids (OMEGA-3 FISH OIL PO) Take 1 g by mouth daily  General Health  Self   sodium chloride (OCEAN) 0.65 % nasal spray Spray 1 spray into both nostrils daily as needed for congestion Allergies Patient Reported   vitamin B complex with vitamin C (STRESS TAB) tablet Take 1 tablet by mouth daily General Health  Self         Add new medications, over-the-counter drugs, herbals, vitamins, or  minerals in the blank rows below.    Medication How I take it Why I use it Prescriber                                      Allergies:      - Atovaquone-proguanil Hcl - Hives  - Amoxicillin - Hives  - Bee - Hives, Swelling  - Ceftin - Hives  - Clindamycin - Hives  - Erythromycin - GI Disturbance  - Lisinopril - Cough  - Seasonal Allergies  - Sertraline  - Shellfish-derived Products - Unknown  - Shrimp - Hives  - Sulfacetamide  - Tetracycline - Other (See Comments)        Side effects I have had:      Not on File        Other Information:              My notes and questions:

## 2025-05-16 RX ORDER — LIDOCAINE 40 MG/G
CREAM TOPICAL
Status: CANCELLED | OUTPATIENT
Start: 2025-05-16

## 2025-05-16 RX ORDER — ONDANSETRON 2 MG/ML
4 INJECTION INTRAMUSCULAR; INTRAVENOUS
Status: CANCELLED | OUTPATIENT
Start: 2025-05-16

## 2025-05-20 ENCOUNTER — TELEPHONE (OUTPATIENT)
Dept: GASTROENTEROLOGY | Facility: CLINIC | Age: 75
End: 2025-05-20
Payer: COMMERCIAL

## 2025-05-20 NOTE — TELEPHONE ENCOUNTER
Caller: Alma    Reason for Reschedule/Cancellation (please be detailed, any staff messages or encounters to note?):   Patient is ill    Did you cancel or rescheduled an EUS procedure? No.    Is screening questionnaire older than 3 months from the reschedule date.   If Yes, please complete screening questionnaire. Yes    Prior to reschedule please review:  Ordering Provider: SHASTA LEON   Sedation Determined: MAC--tortuous colon  Does patient have any ASC Exclusions, please identify?: no    Notes on Cancelled Procedure:  Procedure: Lower Endoscopy [Colonoscopy]   Date: 5/21  Location: [facility]; [facility] [address] [place] [address]   Surgeon: Jonnie    Rescheduled: No, will call when ready

## 2025-05-31 ENCOUNTER — HEALTH MAINTENANCE LETTER (OUTPATIENT)
Age: 75
End: 2025-05-31

## 2025-06-04 ENCOUNTER — ANCILLARY PROCEDURE (OUTPATIENT)
Dept: CT IMAGING | Facility: CLINIC | Age: 75
End: 2025-06-04
Attending: THORACIC SURGERY (CARDIOTHORACIC VASCULAR SURGERY)
Payer: COMMERCIAL

## 2025-06-04 DIAGNOSIS — R91.1 SOLITARY LUNG NODULE: ICD-10-CM

## 2025-06-04 PROCEDURE — 71250 CT THORAX DX C-: CPT

## 2025-07-08 ENCOUNTER — PATIENT OUTREACH (OUTPATIENT)
Dept: GASTROENTEROLOGY | Facility: CLINIC | Age: 75
End: 2025-07-08
Payer: COMMERCIAL

## 2025-07-09 ENCOUNTER — ANCILLARY PROCEDURE (OUTPATIENT)
Dept: CARDIOLOGY | Facility: CLINIC | Age: 75
End: 2025-07-09
Attending: INTERNAL MEDICINE
Payer: COMMERCIAL

## 2025-07-09 DIAGNOSIS — Z45.09 ENCOUNTER FOR LOOP RECORDER CHECK: ICD-10-CM

## 2025-07-09 PROCEDURE — 93298 REM INTERROG DEV EVAL SCRMS: CPT | Performed by: INTERNAL MEDICINE

## 2025-07-17 ENCOUNTER — OFFICE VISIT (OUTPATIENT)
Dept: FAMILY MEDICINE | Facility: CLINIC | Age: 75
End: 2025-07-17
Payer: COMMERCIAL

## 2025-07-17 VITALS
SYSTOLIC BLOOD PRESSURE: 125 MMHG | BODY MASS INDEX: 31.6 KG/M2 | TEMPERATURE: 96.4 F | DIASTOLIC BLOOD PRESSURE: 77 MMHG | HEART RATE: 76 BPM | HEIGHT: 62 IN | WEIGHT: 171.7 LBS | RESPIRATION RATE: 16 BRPM | OXYGEN SATURATION: 97 %

## 2025-07-17 DIAGNOSIS — E11.59 TYPE 2 DIABETES MELLITUS WITH VASCULAR DISEASE (H): ICD-10-CM

## 2025-07-17 DIAGNOSIS — I10 ESSENTIAL HYPERTENSION, BENIGN: ICD-10-CM

## 2025-07-17 DIAGNOSIS — E78.5 HYPERLIPIDEMIA LDL GOAL <100: ICD-10-CM

## 2025-07-17 DIAGNOSIS — H25.9 SENILE CATARACT OF LEFT EYE, UNSPECIFIED AGE-RELATED CATARACT TYPE: ICD-10-CM

## 2025-07-17 DIAGNOSIS — I69.354 HEMIPLEGIA AND HEMIPARESIS FOLLOWING CEREBRAL INFARCTION AFFECTING LEFT NON-DOMINANT SIDE (H): ICD-10-CM

## 2025-07-17 DIAGNOSIS — Z01.818 PREOP GENERAL PHYSICAL EXAM: Primary | ICD-10-CM

## 2025-07-17 DIAGNOSIS — F41.8 SITUATIONAL ANXIETY: ICD-10-CM

## 2025-07-17 LAB
ERYTHROCYTE [DISTWIDTH] IN BLOOD BY AUTOMATED COUNT: 13.7 % (ref 10–15)
EST. AVERAGE GLUCOSE BLD GHB EST-MCNC: 128 MG/DL
HBA1C MFR BLD: 6.1 % (ref 0–5.6)
HCT VFR BLD AUTO: 39.8 % (ref 35–47)
HGB BLD-MCNC: 12.8 G/DL (ref 11.7–15.7)
MCH RBC QN AUTO: 28.6 PG (ref 26.5–33)
MCHC RBC AUTO-ENTMCNC: 32.2 G/DL (ref 31.5–36.5)
MCV RBC AUTO: 89 FL (ref 78–100)
PLATELET # BLD AUTO: 290 10E3/UL (ref 150–450)
RBC # BLD AUTO: 4.47 10E6/UL (ref 3.8–5.2)
WBC # BLD AUTO: 8.4 10E3/UL (ref 4–11)

## 2025-07-17 RX ORDER — CITALOPRAM HYDROBROMIDE 10 MG/1
10 TABLET ORAL DAILY
Qty: 90 TABLET | Refills: 3 | Status: SHIPPED | OUTPATIENT
Start: 2025-07-17

## 2025-07-17 ASSESSMENT — PAIN SCALES - GENERAL: PAINLEVEL_OUTOF10: MILD PAIN (3)

## 2025-07-17 NOTE — PROGRESS NOTES
{PROVIDER CHARTING PREFERENCE:117969}    Miguel A Marquez is a 75 year old, presenting for the following health issues:  Diabetes  {(!) Visit Details have not yet been documented.  Please enter Visit Details and then use this list to pull in documentation. (Optional):119777}  History of Present Illness       Diabetes:   She presents for follow up of diabetes.  She is checking home blood glucose a few times a week.   She checks blood glucose before meals.  Blood glucose is never over 200 and never under 70. She is aware of hypoglycemia symptoms including shakiness.    She has no concerns regarding her diabetes at this time.   She is not experiencing numbness or burning in feet, excessive thirst, blurry vision, weight changes or redness, sores or blisters on feet.           She eats 2-3 servings of fruits and vegetables daily.She consumes 0 sweetened beverage(s) daily.She exercises with enough effort to increase her heart rate 10 to 19 minutes per day.  She exercises with enough effort to increase her heart rate 4 days per week.   She is taking medications regularly.        {MA/LPN/RN Pre-Provider Visit Orders- hCG/UA/Strep (Optional):567466}  {SUPERLIST (Optional):881583}  {additonal problems for provider to add (Optional):351713}    {ROS Picklists (Optional):900098}      Objective    LMP 03/17/2001   There is no height or weight on file to calculate BMI.  Physical Exam   {Exam List (Optional):097076}    {Diagnostic Test Results (Optional):068284}        Signed Electronically by: Masood Osborne MD  {Email feedback regarding this note to [email or web address]   :[id number]}

## 2025-07-17 NOTE — PROGRESS NOTES
Answers submitted by the patient for this visit:  Diabetes Visit (Submitted on 7/17/2025)  Chief Complaint: Chronic problems general questions HPI Form  Frequency of checking blood sugars:: a few times a week  What time of day are you checking your blood sugars : before meals  Have you had any blood sugars above 200?: No  Have you had any blood sugars below 70?: No  Hypoglycemia symptoms:: shakiness  Diabetic concerns:: none  Paraesthesia present:: none of these symptoms  General Questionnaire (Submitted on 7/17/2025)  Chief Complaint: Chronic problems general questions HPI Form  How many servings of fruits and vegetables do you eat daily?: 2-3  On average, how many sweetened beverages do you drink each day (Examples: soda, juice, sweet tea, etc.  Do NOT count diet or artificially sweetened beverages)?: 0  How many minutes a day do you exercise enough to make your heart beat faster?: 10 to 19  How many days a week do you exercise enough to make your heart beat faster?: 4  How many days per week do you miss taking your medication?: 0  Questionnaire about: Chronic problems general questions HPI Form (Submitted on 7/17/2025)  Chief Complaint: Chronic problems general questions HPI Form  Preoperative Evaluation  [address], [address]  [facility] [address]  Phone: [phone]  Primary Provider: Masood Osborne MD  Pre-op Performing Provider: Masood Osborne MD  Jul 17, 2025 7/17/2025   Surgical Information   What procedure is being done? Cataracts removal - left eye    Facility or Hospital where procedure/surgery will be performed: [facility]    Who is doing the procedure / surgery? Dr. Liz    Date of surgery / procedure: 7/21/2025    Time of surgery / procedure: 8:00 am    Where do you plan to recover after surgery? at home with family        Proxy-reported     Fax number for surgical facility: [phone]    Assessment & Plan     The proposed  surgical procedure is considered LOW risk.  Preop general physical exam  Patient is a good surgical candidate for left eye cataract surgery  We will not stop her baby aspirin for surgery    Senile cataract of left eye, unspecified age-related cataract type  She will hold her supplements the procedure starting today    Type 2 diabetes mellitus with vascular disease (H)  Well-controlled diabetic diet and not on medications other than metformin  - HEMOGLOBIN A1C; Future  - HEMOGLOBIN A1C    Hemiplegia and hemiparesis following cerebral infarction affecting left non-dominant side (H)  Patient will continue baby aspirin and stroke was cryptogenic  - CBC with Platelets  ; Future  - CBC with Platelets      Essential hypertension, benign  Patient is on carvedilol, losartan, hydrochlorothiazide    Hyperlipidemia LDL goal <100  Patient is on statins    Situational anxiety    - citalopram (CELEXA) 10 MG tablet; Take 1 tablet (10 mg) by mouth daily.       Implanted Device                Recommendation  Approval given to proceed with proposed procedure, without further diagnostic evaluation.        Miguel A Marquez is a 75 year old, presenting for the following:  Diabetes          7/17/2025     9:17 AM   Additional Questions   Roomed by NADINE Wu   Accompanied by self     HPI:    this is a very nice 75 years old woman who needs a preop for the left eye cataract surgery   She recently had right eye surgery which went very well  Patient is a type II diabetic with hypertension and history of stroke which has caused left-sided weakness  She has also a lung mass which has been followed and patent foramen ovale right upper lobe lung mass seems to be stable    Her blood sugars at home are running pretty good.      7/17/2025   Pre-Op Questionnaire   Have you ever had a heart attack or stroke? (!) YES     Have you ever had surgery on your heart or blood vessels, such as a stent placement, a coronary artery bypass, or surgery on an  artery in your head, neck, heart, or legs? No    Do you have chest pain with activity? No    Do you have a history of heart failure? No    Do you currently have a cold, bronchitis or symptoms of other infection? No    Do you have a cough, shortness of breath, or wheezing? No    Do you or anyone in your family have previous history of blood clots? (!) YES     Do you or does anyone in your family have a serious bleeding problem such as prolonged bleeding following surgeries or cuts? No    Have you ever had problems with anemia or been told to take iron pills? No    Have you had any abnormal blood loss such as black, tarry or bloody stools, or abnormal vaginal bleeding? No    Have you ever had a blood transfusion? No    Are you willing to have a blood transfusion if it is medically needed before, during, or after your surgery? Yes    Have you or any of your relatives ever had problems with anesthesia? (!) YES     Do you have sleep apnea, excessive snoring or daytime drowsiness? No    Do you have any artifical heart valves or other implanted medical devices like a pacemaker, defibrillator, or continuous glucose monitor? (!) YES    What type of device do you have? heart monitor    Name of the clinic that manages your device Cardiology    Do you have artificial joints? No    Are you allergic to latex? No        Proxy-reported     Advance Care Planning    Document on file is a Health Care Directive or POLST.    Preoperative Review of    reviewed - no record of controlled substances prescribed.          Patient Active Problem List    Diagnosis Date Noted    Adenomatous polyp of colon, unspecified part of colon 01/06/2025     Priority: Medium    BMI 30.0-30.9,adult 10/07/2024     Priority: Medium    Mass of upper lobe of right lung 10/07/2024     Priority: Medium    History of stroke 10/07/2024     Priority: Medium    PFO (patent foramen ovale) 06/13/2024     Priority: Medium    Acute ischemic right MCA stroke (H)  10/10/2023     Priority: Medium    Left-sided weakness 09/11/2023     Priority: Medium    Arthritis of knee 06/06/2014     Priority: Medium    Pes planus 06/06/2014     Priority: Medium     Problem list name updated by automated process. Provider to review      Tinnitus 06/06/2014     Priority: Medium    Urticaria 06/20/2013     Priority: Medium    Osteopenia 12/06/2012     Priority: Medium    Post-nasal drip 07/12/2011     Priority: Medium    Type 2 diabetes mellitus with vascular disease (H) 10/31/2010     Priority: Medium    HYPERLIPIDEMIA LDL GOAL <100 10/31/2010     Priority: Medium    Torticollis 11/05/2009     Priority: Medium    Bee sting reaction 07/28/2009     Priority: Medium    Postmenopausal atrophic vaginitis 07/24/2008     Priority: Medium    Essential hypertension, benign 12/16/2002     Priority: Medium    Allergic rhinitis 09/17/2002     Priority: Medium     Problem list name updated by automated process. Provider to review        Past Medical History:   Diagnosis Date    Acute ischemic right MCA stroke (H) 10/10/2023    Allergic rhinitis, cause unspecified 2003a    Arthritis of knee 06/06/2014    Bee sting reaction 07/2009    DIABETES TYPE II 2003    Essential hypertension, benign     Flat foot(734) 06/06/2014    Hyperlipidaemia LDL goal < 100     Localized osteoarthrosis not specified whether primary or secondary, ankle and foot     After surgery    Metabolic syndrome X     PVC's (premature ventricular contractions)     Tinnitus 06/06/2014     Past Surgical History:   Procedure Laterality Date    ABDOMEN SURGERY  07/1988    ceasaran birth    COLONOSCOPY  08/2022    next one 10/16/2023    COLONOSCOPY N/A 01/31/2022    Procedure: COLONOSCOPY, WITH POLYPECTOMY AND BIOPSY;  Surgeon: Nicole Grubbs MD;  Location:  GI    ENT SURGERY      frequent sinus infections    EP LOOP RECORDER IMPLANT N/A 06/07/2024    Procedure: Loop Recorder Implant;  Surgeon: Rm More MD;  Location:  HEART  CARDIAC CATH LAB     ENDOMETRIAL BIOPSY W/O CERVICAL DILATION      ORTHOPEDIC SURGERY      broken right ankle/plates and screws    SOFT TISSUE SURGERY      tendonitis in right upper leg and below right elbow    ZZC  DELIVERY ONLY      [facility] LIGATE FALLOPIAN TUBE,POSTPARTUM      [facility] NONSPECIFIC PROCEDURE      right ankle roe and pins     Current Outpatient Medications   Medication Sig Dispense Refill    aspirin 81 MG EC tablet Take 1 tablet (81 mg) by mouth daily.      atorvastatin (LIPITOR) 10 MG tablet TAKE 1 TABLET BY MOUTH DAILY. 90 tablet 3    azelastine (ASTELIN) 0.1 % nasal spray Spray 1 spray into both nostrils 2 times daily 30 mL 11    bisacodyl (DULCOLAX) 5 MG EC tablet Take 2 tablets at 3 pm the day before your procedure. If your procedure is before 11 am, take 2 additional tablets at 11 pm. If your procedure is after 11 am, take 2 additional tablets at 6 am. For additional instructions refer to your colonoscopy prep instructions. 4 tablet 0    blood glucose (NO BRAND SPECIFIED) lancets standard Use to test blood sugar one* times daily or as directed. 100 each 3    blood glucose (ONETOUCH ULTRA) test strip Use to test blood sugar daily or as directed. 100 strip 3    blood glucose calibration (ONETOUCH ULTRA CONTROL) solution Use to calibrate blood glucose monitor as directed. 1 Bottle 1    blood glucose monitoring (ONE TOUCH ULTRA 2) meter device kit Use to test blood sugar 1 times daily. 1 kit 0    carvedilol (COREG) 3.125 MG tablet Take 1 tablet (3.125 mg) by mouth 2 times daily (with meals). 180 tablet 3    Cholecalciferol (VITAMIN D3 PO) Take 1,000 Units by mouth daily.      citalopram (CELEXA) 10 MG tablet Take 1 tablet (10 mg) by mouth daily 90 tablet 3    EPINEPHrine (ANY BX GENERIC EQUIV) 0.3 MG/0.3ML injection 2-pack INJECT 0.3 MLS INTO THE MUSCLE ONCE AS NEEDED FOR ANAPHYLAXIS 2 each 0    fluticasone (FLONASE) 50 MCG/ACT spray Spray 1-2 sprays into both nostrils daily 16 g 0     losartan-hydrochlorothiazide (HYZAAR) 50-12.5 MG tablet Take 1 tablet by mouth daily. 90 tablet 3    metFORMIN (GLUCOPHAGE) 850 MG tablet Take 1 tablet (850 mg) by mouth 2 times daily (with meals). 180 tablet 3    metoclopramide (REGLAN) 10 MG tablet Take one tablet 30 minutes prior to drinking bowel prep to help with nausea. 2 tablet 0    polyethylene glycol (GOLYTELY) 236 g suspension The night before the exam at 6 pm drink an 8-ounce glass every 15 minutes until the jug is half empty. If you arrive before 11 AM: Drink the other half of the Golytely jug at 11 PM night before procedure. If you arrive after 11 AM: Drink the other half of the Golytely jug at 6 AM day of procedure. For additional instructions refer to your colonoscopy prep instructions. 4000 mL 0    sodium chloride (OCEAN) 0.65 % nasal spray Spray 1 spray into both nostrils daily as needed for congestion         Allergies   Allergen Reactions    Atovaquone-Proguanil Hcl Hives    Amoxicillin Hives    Bee Hives and Swelling     carries Epi Pen     Ceftin Hives     hives    Clindamycin Hives    Erythromycin GI Disturbance    Lisinopril Cough    Seasonal Allergies     Sertraline       cough, gag and many times I vomit    Shellfish-Derived Products Unknown    Shrimp Hives     Happened twice    Sulfacetamide     Tetracycline Other (See Comments)     ?severe headaches  & nausea         Social History     Tobacco Use    Smoking status: Former     Current packs/day: 0.00     Average packs/day: 1 pack/day for 10.0 years (10.0 ttl pk-yrs)     Types: Cigarettes     Start date: 1972     Quit date: 1982     Years since quittin.9    Smokeless tobacco: Never   Substance Use Topics    Alcohol use: Not Currently     Comment: glass of wine on the weekends     Family History   Problem Relation Age of Onset    Musculoskeletal Disorder Mother         b:1938   Hx Fibromyalgia    Hypertension Mother     Cerebrovascular Disease Mother     Anesthesia Reaction  "Mother     Cerebrovascular Disease Father         b:1939,  age 68  massive stroke    Diabetes Father         dx age 50s and his family    Cerebrovascular Disease Sister 62        ? TIA    Bronchitis Sister     Anxiety Disorder Sister     Anesthesia Reaction Sister     Bronchitis Sister     Family History Negative Brother         b:1955    Cancer Maternal Grandfather         throat    Diabetes Paternal Grandmother     Hypertension Daughter     Diabetes Daughter         b:    Diabetes dx age 22**insulin    CABG Daughter 43    Hypertension Daughter     Lipids Son         b: high cholosterol and triglycerides    Diabetes Daughter     Hypertension Daughter     Hypertension Daughter     Cerebrovascular Disease Sister      History   Drug Use No     Comment: caffeine tea 3-4/d             Review of Systems  Constitutional, HEENT, cardiovascular, pulmonary, GI, , musculoskeletal, neuro, skin, endocrine and psych systems are negative, except as otherwise noted.    Objective    /77 (BP Location: Right arm, Patient Position: Sitting, Cuff Size: Adult Regular)   Pulse 76   Temp (!) 96.4  F (35.8  C) (Temporal)   Resp 16   Ht 1.569 m (5' 1.77\")   Wt 77.9 kg (171 lb 11.2 oz)   LMP 2001   SpO2 97%   BMI 31.64 kg/m     Estimated body mass index is 31.64 kg/m  as calculated from the following:    Height as of this encounter: 1.569 m (5' 1.77\").    Weight as of this encounter: 77.9 kg (171 lb 11.2 oz).  Physical Exam  GENERAL: alert and no distress  NECK: no adenopathy, no asymmetry, masses, or scars  RESP: lungs clear to auscultation - no rales, rhonchi or wheezes  CV: regular rate and rhythm, normal S1 S2, no S3 or S4, no murmur, click or rub, no peripheral edema  ABDOMEN: soft, nontender, no hepatosplenomegaly, no masses and bowel sounds normal  MS: no gross musculoskeletal defects noted, no edema    Recent Labs   Lab Test 25  0941 25  0944 10/07/24  1003   HGB  --  12.4 12.5   PLT  --  " 341 343    139 139   POTASSIUM 4.2 4.2 4.3   CR 0.83 0.77 0.76   A1C 6.2* 6.1* 6.0*        Diagnostics  Labs pending at this time.  Results will be reviewed when available.   No EKG required for low risk surgery (cataract, skin procedure, breast biopsy, etc).    Revised Cardiac Risk Index (RCRI)  The patient has the following serious cardiovascular risks for perioperative complications:   - Cerebrovascular Disease (TIA or CVA) = 1 point     RCRI Interpretation: 1 point: Class II (low risk - 0.9% complication rate)         Signed Electronically by: Masood Osborne MD  A copy of this evaluation report is provided to the requesting physician.

## 2025-07-17 NOTE — PATIENT INSTRUCTIONS
How to Take Your Medication Before Surgery  Preoperative Medication Instructions   Hold supplements 1 week before supplements       Patient Education   Preparing for Your Surgery  For Adults  Getting started  In most cases, a nurse will call to review your health history and instructions. They will give you an arrival time based on your scheduled surgery time. Please be ready to share:  Your doctor's clinic name and phone number  Your medical, surgical, and anesthesia history  A list of allergies and sensitivities  A list of medicines, including herbal treatments and over-the-counter drugs  Whether the patient has a legal guardian (ask how to send us the papers in advance)  Note: You may not receive a call if you were seen at our PAC (Preoperative Assessment Center).  Please tell us if you're pregnant--or if there's any chance you might be pregnant. Some surgeries may injure a fetus (unborn baby), so they require a pregnancy test. Surgeries that are safe for a fetus don't always need a test, and you can choose whether to have one.   Preparing for surgery  Within 10 to 30 days of surgery: Have a pre-op exam (sometimes called an H&P, or History and Physical). This can be done at a clinic or pre-operative center.  If you're having a , you may not need this exam. Talk to your care team.  At your pre-op exam, talk to your care team about all medicines you take. (This includes CBD oil and any drugs, such as THC, marijuana, and other forms of cannabis.) If you need to stop any medicine before surgery, ask when to start taking it again.  This is for your safety. Many medicines and drugs can make you bleed too much during surgery. Some change how well surgery (anesthesia) drugs work.  Call your insurance company to let them know you're having surgery. (If you don't have insurance, call [phone].)  Call your clinic if there's any change in your health. This includes a scrape or scratch near the surgery site, or  any signs of a cold (sore throat, runny nose, cough, rash, fever).  Eating and drinking guidelines  For your safety: Unless your surgeon tells you otherwise, follow the guidelines below.  Eat and drink as normal until 8 hours before you arrive for surgery. After that, no food or milk. You can spit out gum when you arrive.  Drink clear liquids until 2 hours before you arrive. These are liquids you can see through, like water, Gatorade, and Propel Water. They also include plain black coffee and tea (no cream or milk).  No alcohol for 24 hours before you arrive. The night before surgery, stop any drinks that contain THC.  If your care team tells you to take medicine on the morning of surgery, it's okay to take it with a sip of water. No other medicines or drugs are allowed (including CBD oil)--follow your care team's instructions.  If you have questions the day of surgery, call your hospital or surgery center.   Preventing infection  Shower or bathe the night before and the morning of surgery. Follow the instructions your clinic gave you. (If no instructions, use regular soap.)  Don't shave or clip hair near your surgery site. We'll remove the hair if needed.  Don't smoke or vape the morning of surgery. No chewing tobacco for 6 hours before you arrive. A nicotine patch is okay. You may spit out nicotine gum when you arrive.  For some surgeries, the surgeon will tell you to fully quit smoking and nicotine.  We will make every effort to keep you safe from infection. We will:  Clean our hands often with soap and water (or an alcohol-based hand rub).  Clean the skin at your surgery site with a special soap that kills germs.  Give you a special gown to keep you warm. (Cold raises the risk of infection.)  Wear hair covers, masks, gowns, and gloves during surgery.  Give antibiotic medicine, if prescribed. Not all surgeries need this medicine.  What to bring on the day of surgery  Photo ID and insurance card  Copy of your  health care directive, if you have one  Glasses and hearing aids (bring cases)  You can't wear contacts during surgery  Inhaler and eye drops, if you use them (tell us about these when you arrive)  CPAP machine or breathing device, if you use them  A few personal items, if spending the night  If you have . . .  A pacemaker, ICD (cardiac defibrillator), or other implant: Bring the ID card.  An implanted stimulator: Bring the remote control.  A legal guardian: Bring a copy of the certified (court-stamped) guardianship papers.  Please remove any jewelry, including body piercings. Leave jewelry and other valuables at home.  If you're going home the day of surgery  You must have a support person drive you home. They should stay with you overnight, and they may need to help with your self-care.  If you don't have a support person, please tells us as soon as possible. We can help.  After surgery  If it's hard to control your pain or you need more pain medicine, please call your surgeon's office.  Questions?   If you have any questions for your care team, list them here:   ____________________________________________________________________________________________________________________________________________________________________________________________________________________________________________________________  For informational purposes only. Not to replace the advice of your health care provider. Copyright   2003, 2019 [facility]. All rights reserved. Clinically reviewed by Osman Murphy MD. [employer] [id number] - REV 02/25.

## 2025-08-06 ENCOUNTER — TELEPHONE (OUTPATIENT)
Dept: GASTROENTEROLOGY | Facility: CLINIC | Age: 75
End: 2025-08-06
Payer: COMMERCIAL

## 2025-08-06 DIAGNOSIS — Z12.11 ENCOUNTER FOR SCREENING COLONOSCOPY: Primary | ICD-10-CM

## 2025-08-06 RX ORDER — BISACODYL 5 MG/1
TABLET, DELAYED RELEASE ORAL
Qty: 4 TABLET | Refills: 0 | Status: SHIPPED | OUTPATIENT
Start: 2025-08-06

## 2025-08-06 RX ORDER — METOCLOPRAMIDE 10 MG/1
TABLET ORAL
Qty: 2 TABLET | Refills: 0 | Status: SHIPPED | OUTPATIENT
Start: 2025-08-06

## 2025-08-20 ENCOUNTER — TRANSFERRED RECORDS (OUTPATIENT)
Dept: HEALTH INFORMATION MANAGEMENT | Facility: CLINIC | Age: 75
End: 2025-08-20
Payer: COMMERCIAL

## 2025-08-21 ENCOUNTER — LAB REQUISITION (OUTPATIENT)
Dept: LAB | Facility: CLINIC | Age: 75
End: 2025-08-21
Payer: COMMERCIAL

## 2025-08-27 ENCOUNTER — OFFICE VISIT (OUTPATIENT)
Dept: URGENT CARE | Facility: URGENT CARE | Age: 75
End: 2025-08-27
Payer: COMMERCIAL

## 2025-08-27 VITALS
BODY MASS INDEX: 31.51 KG/M2 | TEMPERATURE: 98.8 F | WEIGHT: 171 LBS | SYSTOLIC BLOOD PRESSURE: 135 MMHG | DIASTOLIC BLOOD PRESSURE: 85 MMHG | OXYGEN SATURATION: 98 % | RESPIRATION RATE: 16 BRPM | HEART RATE: 68 BPM

## 2025-08-27 DIAGNOSIS — J34.89 SINUS PAIN: ICD-10-CM

## 2025-08-27 DIAGNOSIS — E11.59 TYPE 2 DIABETES MELLITUS WITH VASCULAR DISEASE (H): ICD-10-CM

## 2025-08-27 DIAGNOSIS — B96.89 BACTERIAL SINUSITIS: ICD-10-CM

## 2025-08-27 DIAGNOSIS — H10.9 BACTERIAL CONJUNCTIVITIS OF LEFT EYE: Primary | ICD-10-CM

## 2025-08-27 DIAGNOSIS — J32.9 BACTERIAL SINUSITIS: ICD-10-CM

## 2025-08-27 PROCEDURE — 3079F DIAST BP 80-89 MM HG: CPT | Performed by: PHYSICIAN ASSISTANT

## 2025-08-27 PROCEDURE — 99214 OFFICE O/P EST MOD 30 MIN: CPT | Performed by: PHYSICIAN ASSISTANT

## 2025-08-27 PROCEDURE — 3075F SYST BP GE 130 - 139MM HG: CPT | Performed by: PHYSICIAN ASSISTANT

## 2025-08-27 RX ORDER — AZITHROMYCIN 250 MG/1
TABLET, FILM COATED ORAL
Qty: 6 TABLET | Refills: 0 | Status: SHIPPED | OUTPATIENT
Start: 2025-08-27 | End: 2025-09-01

## 2025-08-27 RX ORDER — TOBRAMYCIN 3 MG/ML
1-2 SOLUTION/ DROPS OPHTHALMIC EVERY 4 HOURS
Qty: 5 ML | Refills: 0 | Status: SHIPPED | OUTPATIENT
Start: 2025-08-27

## 2025-09-03 ENCOUNTER — DOCUMENTATION ONLY (OUTPATIENT)
Dept: GASTROENTEROLOGY | Facility: CLINIC | Age: 75
End: 2025-09-03
Payer: COMMERCIAL

## (undated) RX ORDER — LIDOCAINE HYDROCHLORIDE 40 MG/ML
SOLUTION TOPICAL
Status: DISPENSED
Start: 2024-05-09

## (undated) RX ORDER — FLUMAZENIL 0.1 MG/ML
INJECTION, SOLUTION INTRAVENOUS
Status: DISPENSED
Start: 2024-05-09

## (undated) RX ORDER — NALOXONE HYDROCHLORIDE 0.4 MG/ML
INJECTION, SOLUTION INTRAMUSCULAR; INTRAVENOUS; SUBCUTANEOUS
Status: DISPENSED
Start: 2024-05-09

## (undated) RX ORDER — FENTANYL CITRATE 50 UG/ML
INJECTION, SOLUTION INTRAMUSCULAR; INTRAVENOUS
Status: DISPENSED
Start: 2022-01-31

## (undated) RX ORDER — FENTANYL CITRATE 50 UG/ML
INJECTION, SOLUTION INTRAMUSCULAR; INTRAVENOUS
Status: DISPENSED
Start: 2024-05-09

## (undated) RX ORDER — FENTANYL CITRATE 50 UG/ML
INJECTION, SOLUTION INTRAMUSCULAR; INTRAVENOUS
Status: DISPENSED
Start: 2021-07-26

## (undated) RX ORDER — GLYCOPYRROLATE 0.2 MG/ML
INJECTION, SOLUTION INTRAMUSCULAR; INTRAVENOUS
Status: DISPENSED
Start: 2024-05-09